# Patient Record
Sex: FEMALE | Race: WHITE | Employment: FULL TIME | RURAL
[De-identification: names, ages, dates, MRNs, and addresses within clinical notes are randomized per-mention and may not be internally consistent; named-entity substitution may affect disease eponyms.]

---

## 2018-01-17 ENCOUNTER — OFFICE VISIT (OUTPATIENT)
Dept: FAMILY MEDICINE CLINIC | Age: 53
End: 2018-01-17

## 2018-01-17 VITALS
HEART RATE: 90 BPM | RESPIRATION RATE: 20 BRPM | OXYGEN SATURATION: 96 % | SYSTOLIC BLOOD PRESSURE: 120 MMHG | WEIGHT: 221 LBS | DIASTOLIC BLOOD PRESSURE: 86 MMHG | TEMPERATURE: 98.6 F | BODY MASS INDEX: 35.52 KG/M2 | HEIGHT: 66 IN

## 2018-01-17 DIAGNOSIS — R68.89 FLU-LIKE SYMPTOMS: ICD-10-CM

## 2018-01-17 DIAGNOSIS — Z76.89 ENCOUNTER TO ESTABLISH CARE: ICD-10-CM

## 2018-01-17 DIAGNOSIS — J06.9 VIRAL URI WITH COUGH: Primary | ICD-10-CM

## 2018-01-17 LAB
QUICKVUE INFLUENZA TEST: NEGATIVE
VALID INTERNAL CONTROL?: YES

## 2018-01-17 RX ORDER — MELATONIN 5 MG
10 CAPSULE ORAL
COMMUNITY
End: 2022-06-12

## 2018-01-17 RX ORDER — ALPRAZOLAM 0.5 MG/1
0.5 TABLET ORAL AS NEEDED
COMMUNITY
End: 2018-07-11 | Stop reason: SDUPTHER

## 2018-01-17 RX ORDER — ERGOCALCIFEROL 1.25 MG/1
50000 CAPSULE ORAL
COMMUNITY
Start: 2017-12-11 | End: 2018-06-14 | Stop reason: SDUPTHER

## 2018-01-17 RX ORDER — BENZONATATE 100 MG/1
100 CAPSULE ORAL
Qty: 21 CAP | Refills: 0 | Status: SHIPPED | OUTPATIENT
Start: 2018-01-17 | End: 2018-01-24

## 2018-01-17 RX ORDER — LISINOPRIL 40 MG/1
40 TABLET ORAL DAILY
COMMUNITY
Start: 2017-12-17 | End: 2018-06-14 | Stop reason: SDUPTHER

## 2018-01-17 NOTE — PATIENT INSTRUCTIONS
Upper Respiratory Infection (Cold): Care Instructions  Your Care Instructions    An upper respiratory infection, or URI, is an infection of the nose, sinuses, or throat. URIs are spread by coughs, sneezes, and direct contact. The common cold is the most frequent kind of URI. The flu and sinus infections are other kinds of URIs. Almost all URIs are caused by viruses. Antibiotics won't cure them. But you can treat most infections with home care. This may include drinking lots of fluids and taking over-the-counter pain medicine. You will probably feel better in 4 to 10 days. The doctor has checked you carefully, but problems can develop later. If you notice any problems or new symptoms, get medical treatment right away. Follow-up care is a key part of your treatment and safety. Be sure to make and go to all appointments, and call your doctor if you are having problems. It's also a good idea to know your test results and keep a list of the medicines you take. How can you care for yourself at home? · To prevent dehydration, drink plenty of fluids, enough so that your urine is light yellow or clear like water. Choose water and other caffeine-free clear liquids until you feel better. If you have kidney, heart, or liver disease and have to limit fluids, talk with your doctor before you increase the amount of fluids you drink. · Take an over-the-counter pain medicine, such as acetaminophen (Tylenol), ibuprofen (Advil, Motrin), or naproxen (Aleve). Read and follow all instructions on the label. · Before you use cough and cold medicines, check the label. These medicines may not be safe for young children or for people with certain health problems. · Be careful when taking over-the-counter cold or flu medicines and Tylenol at the same time. Many of these medicines have acetaminophen, which is Tylenol. Read the labels to make sure that you are not taking more than the recommended dose.  Too much acetaminophen (Tylenol) can be harmful. · Get plenty of rest.  · Do not smoke or allow others to smoke around you. If you need help quitting, talk to your doctor about stop-smoking programs and medicines. These can increase your chances of quitting for good. When should you call for help? Call 911 anytime you think you may need emergency care. For example, call if:  ? · You have severe trouble breathing. ?Call your doctor now or seek immediate medical care if:  ? · You seem to be getting much sicker. ? · You have new or worse trouble breathing. ? · You have a new or higher fever. ? · You have a new rash. ? Watch closely for changes in your health, and be sure to contact your doctor if:  ? · You have a new symptom, such as a sore throat, an earache, or sinus pain. ? · You cough more deeply or more often, especially if you notice more mucus or a change in the color of your mucus. ? · You do not get better as expected. Where can you learn more? Go to http://kiran-travis.info/. Enter O515 in the search box to learn more about \"Upper Respiratory Infection (Cold): Care Instructions. \"  Current as of: May 12, 2017  Content Version: 11.4  © 7761-6035 Healthwise, Incorporated. Care instructions adapted under license by [employer] (which disclaims liability or warranty for this information). If you have questions about a medical condition or this instruction, always ask your healthcare professional. [place] [address] any warranty or liability for your use of this information.

## 2018-01-17 NOTE — PROGRESS NOTES
Identified pt with two pt identifiers(name and ). Chief Complaint   Patient presents with    URI     cough, low grade fever, chills, body aches x 24 hrs. Health Maintenance Due   Topic    Hepatitis C Screening     DTaP/Tdap/Td series (1 - Tdap)    PAP AKA CERVICAL CYTOLOGY     BREAST CANCER SCRN MAMMOGRAM     FOBT Q 1 YEAR AGE 50-75     Influenza Age 5 to Adult        Wt Readings from Last 3 Encounters:   18 221 lb (100.2 kg)     Temp Readings from Last 3 Encounters:   18 98.6 °F (37 °C) (Oral)     BP Readings from Last 3 Encounters:   18 120/86     Pulse Readings from Last 3 Encounters:   18 90         Learning Assessment:  :     No flowsheet data found. Depression Screening:  :     PHQ over the last two weeks 2018   Little interest or pleasure in doing things Not at all   Feeling down, depressed or hopeless Not at all   Total Score PHQ 2 0       Fall Risk Assessment:  :     No flowsheet data found. Abuse Screening:  :     Abuse Screening Questionnaire 2018   Do you ever feel afraid of your partner? N   Are you in a relationship with someone who physically or mentally threatens you? N   Is it safe for you to go home? Y       Coordination of Care Questionnaire:  :     1) Have you been to an emergency room, urgent care clinic since your last visit? no   Hospitalized since your last visit? no             2) Have you seen or consulted any other health care providers outside of [facility] since your last visit? no  (Include any pap smears or colon screenings in this section.)    3) Do you have an Advance Directive on file? no  Are you interested in receiving information about Advance Directives? no    Patient is accompanied by spouse I have received verbal consent from [address] [place] [address] to discuss any/all medical information while they are present in the room.     Reviewed record in preparation for visit and have obtained necessary documentation. Medication reconciliation up to date and corrected with patient at this time.

## 2018-01-17 NOTE — MR AVS SNAPSHOT
[place] 
 
 
 [address] [facility] 
[phone] Patient: Flakito Kumar MRN: [id number] :1965 Visit Information Date & Time Provider Department Dept. Phone Encounter #  
 2018  9:00 AM Leonel Witt [place] [phone] [id number] Follow-up Instructions Return if symptoms worsen or fail to improve. Upcoming Health Maintenance Date Due Hepatitis C Screening 1965 DTaP/Tdap/Td series (1 - Tdap) 1985 PAP AKA CERVICAL CYTOLOGY 1985 BREAST CANCER SCRN MAMMOGRAM 2014 FOBT Q 1 YEAR AGE 50-75 2014 Influenza Age 5 to Adult 2017 Allergies as of 2018  Review Complete On: 2018 By: Nae Esposito MD  
  
 Severity Noted Reaction Type Reactions Sulfa (Sulfonamide Antibiotics)  2018    Hives Current Immunizations  Never Reviewed No immunizations on file. Not reviewed this visit You Were Diagnosed With   
  
 Codes Comments Viral URI with cough    -  Primary ICD-10-CM: J06.9, B97.89 ICD-9-CM: 465.9 Flu-like symptoms     ICD-10-CM: R68.89 ICD-9-CM: 780.99 Encounter to establish care     ICD-10-CM: Z76.89 
ICD-9-CM: V65.8 Vitals BP Pulse Temp Resp Height(growth percentile) Weight(growth percentile) 120/86 (BP 1 Location: Left arm, BP Patient Position: Sitting) 90 98.6 °F (37 °C) (Oral) 20 5' 6\" (1.676 m) 221 lb (100.2 kg) SpO2 BMI OB Status Smoking Status 96% 35.67 kg/m2 Menopause Never Smoker BMI and BSA Data Body Mass Index Body Surface Area  
 35.67 kg/m 2 2.16 m 2 Preferred Pharmacy Pharmacy Name Phone [id number] [place] [facility] [address] [phone] Your Updated Medication List  
  
   
This list is accurate as of: 18  9:27 AM.  Always use your most recent med list.  
  
  
  
  
 benzonatate 100 mg capsule Commonly known as:  TESSALON Take 1 Cap by mouth three (3) times daily as needed for Cough for up to 7 days. lisinopril 40 mg tablet Commonly known as:  Noreen Justo Take 40 mg by mouth daily. melatonin 5 mg Cap capsule Take 5 mg by mouth nightly. PROBIOTIC 4X 10-15 mg Tbec Generic drug:  B.infantis-B.ani-B.long-B.bifi Take 1 Cap by mouth daily. VITAMIN D2 50,000 unit capsule Generic drug:  ergocalciferol Take 50,000 Units by mouth every seven (7) days. XANAX 0.5 mg tablet Generic drug:  ALPRAZolam  
Take 0.5 mg by mouth as needed for Anxiety. Prescriptions Sent to Pharmacy Refills  
 benzonatate (TESSALON) 100 mg capsule 0 Sig: Take 1 Cap by mouth three (3) times daily as needed for Cough for up to 7 days. Class: Normal  
 Pharmacy: [id number] - [place] - [address] Ph #: [phone] Route: Oral  
  
We Performed the Following AMB POC RAPID INFLUENZA TEST [93361 CPT(R)] Follow-up Instructions Return if symptoms worsen or fail to improve. Patient Instructions Upper Respiratory Infection (Cold): Care Instructions Your Care Instructions An upper respiratory infection, or URI, is an infection of the nose, sinuses, or throat. URIs are spread by coughs, sneezes, and direct contact. The common cold is the most frequent kind of URI. The flu and sinus infections are other kinds of URIs. Almost all URIs are caused by viruses. Antibiotics won't cure them. But you can treat most infections with home care. This may include drinking lots of fluids and taking over-the-counter pain medicine. You will probably feel better in 4 to 10 days. The doctor has checked you carefully, but problems can develop later. If you notice any problems or new symptoms, get medical treatment right away. Follow-up care is a key part of your treatment and safety.  Be sure to make and go to all appointments, and call your doctor if you are having problems. It's also a good idea to know your test results and keep a list of the medicines you take. How can you care for yourself at home? · To prevent dehydration, drink plenty of fluids, enough so that your urine is light yellow or clear like water. Choose water and other caffeine-free clear liquids until you feel better. If you have kidney, heart, or liver disease and have to limit fluids, talk with your doctor before you increase the amount of fluids you drink. · Take an over-the-counter pain medicine, such as acetaminophen (Tylenol), ibuprofen (Advil, Motrin), or naproxen (Aleve). Read and follow all instructions on the label. · Before you use cough and cold medicines, check the label. These medicines may not be safe for young children or for people with certain health problems. · Be careful when taking over-the-counter cold or flu medicines and Tylenol at the same time. Many of these medicines have acetaminophen, which is Tylenol. Read the labels to make sure that you are not taking more than the recommended dose. Too much acetaminophen (Tylenol) can be harmful. · Get plenty of rest. 
· Do not smoke or allow others to smoke around you. If you need help quitting, talk to your doctor about stop-smoking programs and medicines. These can increase your chances of quitting for good. When should you call for help? Call 911 anytime you think you may need emergency care. For example, call if: 
? · You have severe trouble breathing. ?Call your doctor now or seek immediate medical care if: 
? · You seem to be getting much sicker. ? · You have new or worse trouble breathing. ? · You have a new or higher fever. ? · You have a new rash. ? Watch closely for changes in your health, and be sure to contact your doctor if: 
? · You have a new symptom, such as a sore throat, an earache, or sinus pain. ? · You cough more deeply or more often, especially if you notice more mucus or a change in the color of your mucus. ? · You do not get better as expected. Where can you learn more? Go to http://kiran-travis.info/. Enter Q660 in the search box to learn more about \"Upper Respiratory Infection (Cold): Care Instructions. \" Current as of: May 12, 2017 Content Version: 11.4 © 6514-7507 [employer]. Care instructions adapted under license by [facility] [facility] [employer] (which disclaims liability or warranty for this information). If you have questions about a medical condition or this instruction, always ask your healthcare professional. [address] any warranty or liability for your use of this information. Introducing [facility]! Dear [facility]: 
Thank you for requesting a [employer] account. Our records indicate that you have previously registered for a [employer] account but its currently inactive. Please call our [employer] support line at [phone]. Additional Information If you have questions, please visit the Frequently Asked Questions section of the Smart [employer] website at [email or web address] Remember, [employer] is NOT to be used for urgent needs. For medical emergencies, dial 911. Now available from your iPhone and Android! Please provide this summary of care documentation to your next provider. Your primary care clinician is listed as Sukhdev Crouch. If you have any questions after today's visit, please call [phone].

## 2018-01-17 NOTE — PROGRESS NOTES
Subjective:      Mariusz Romero is a 47 y.o. female new patient her with complaint of a dry harsh cough which started about 3 days ago. Yesterday she had the onset of body aches, chills, low grade fevers. Appetite has been good and she is drinking plenty of fluids. Her  currently on Tamiflu for influenza A (diagnsed 5 days ago). She does not get the influenza vaccine - states that it makes her really sick. She denies nausea, vomiting, diarrhea, shortness of breath. Evaluation to date: none  Treatment to date: Coricidin HBP, Advil, dose of Tamiflu last night     Current Outpatient Prescriptions   Medication Sig Dispense Refill    VITAMIN D2 50,000 unit capsule Take 50,000 Units by mouth every seven (7) days.  lisinopril (PRINIVIL, ZESTRIL) 40 mg tablet Take 40 mg by mouth daily.  melatonin 5 mg cap capsule Take 5 mg by mouth nightly.  ALPRAZolam (XANAX) 0.5 mg tablet Take 0.5 mg by mouth as needed for Anxiety.  B.infantis-B.ani-B.long-B.bifi (PROBIOTIC 4X) 10-15 mg TbEC Take 1 Cap by mouth daily. Allergies   Allergen Reactions    Sulfa (Sulfonamide Antibiotics) Hives       Past medical history - reviewed. Past Medical History:   Diagnosis Date    Anxiety     Hypertension     Trouble in sleeping     Vertigo     Vitamin D deficiency        Social history - reviewed. Social History   Substance Use Topics    Smoking status: Never Smoker    Smokeless tobacco: Never Used    Alcohol use Yes      Comment: socially        Family history - reviewed. Family History   Problem Relation Age of Onset    Hypertension Mother     Hypertension Father        Review of Systems  Pertinent items are noted in HPI.      Objective:     Visit Vitals    /86 (BP 1 Location: Left arm, BP Patient Position: Sitting)    Pulse 90    Temp 98.6 °F (37 °C) (Oral)    Resp 20    Ht 5' 6\" (1.676 m)    Wt 221 lb (100.2 kg)    SpO2 96%    BMI 35.67 kg/m2      General appearance - alert, mildly ill appearing, and in no distress  Eyes - pupils equal and reactive, extraocular eye movements intact, sclera anicteric  Ears - bilateral TM's and external ear canals normal  Nasal exam - mucosal congestion and sinuses normal and nontender  Oropharyngx - mucous membranes moist, pharynx normal without lesions  Neck - supple, no significant adenopathy  Chest - clear to auscultation, no wheezes, rales or rhonchi, symmetric air entry, no tachypnea, retractions or cyanosis  Heart - normal rate, regular rhythm, normal S1, S2, no murmurs, rubs, clicks or gallops  Neurologic - alert, oriented, normal speech, no focal findings or movement disorder noted  Skin - normal coloration and turgor, no rashes, no suspicious skin lesions noted  Mental Status - alert, oriented to person, place, and time, normal mood, behavior, speech, dress, motor activity, and thought processes    Recent Results (from the past 12 hour(s))   AMB POC RAPID INFLUENZA TEST    Collection Time: 01/17/18  9:09 AM   Result Value Ref Range    VALID INTERNAL CONTROL POC Yes     QuickVue Influenza test Negative Negative       Assessment/Plan:   Leonel Rodas is a 47 y.o. female seen for:     1. Viral URI with cough: rapid influenza testing negative. Symptoms viral in etiology and symptomatic therapies suggested - rest, push fluids, OTC cough suppressant of choice, ibuprofen and/or acetaminophen as needed for pain. Sign/symptoms that would need to be reevaluated discussed. - benzonatate (TESSALON) 100 mg capsule; Take 1 Cap by mouth three (3) times daily as needed for Cough for up to 7 days. Dispense: 21 Cap; Refill: 0    2. Flu-like symptoms  - AMB POC RAPID INFLUENZA TEST    3. Encounter to establish care: previous medical records requested. I have discussed the diagnosis with the patient and the intended plan as seen in the above orders. The patient has received an after-visit summary and questions were answered concerning future plans.  I have discussed medication side effects and warnings with the patient as well. Patient verbalizes understanding of plan of care and denies further questions or concerns at this time. Informed patient to return to the office if symptoms worsen or if new symptoms arise. Follow-up Disposition:  Return if symptoms worsen or fail to improve.

## 2018-01-19 ENCOUNTER — TELEPHONE (OUTPATIENT)
Dept: FAMILY MEDICINE CLINIC | Age: 53
End: 2018-01-19

## 2018-01-19 DIAGNOSIS — R05.9 COUGH: Primary | ICD-10-CM

## 2018-01-19 RX ORDER — CODEINE PHOSPHATE AND GUAIFENESIN 10; 100 MG/5ML; MG/5ML
5 SOLUTION ORAL
Qty: 118 ML | Refills: 0 | OUTPATIENT
Start: 2018-01-19 | End: 2018-02-06

## 2018-01-19 NOTE — TELEPHONE ENCOUNTER
Patient called in today to report the following symptoms: low grade fevers (Tmax 100.9 degrees F), head congestion, green nasal discharge, worsening dry cough. Evaluated in the office on 1/17/18 with negative influenza testing at that time. Discussed symptomatic therapies and Tessalon Perles for cough (has not been effective). Will call in [facility] to her pharmacy, continue with symptomatic therapies. Advised to return for re-evaluation if symptoms fail to improve as expected. Patient verbalizes understanding.

## 2018-01-25 ENCOUNTER — TELEPHONE (OUTPATIENT)
Dept: FAMILY MEDICINE CLINIC | Age: 53
End: 2018-01-25

## 2018-01-25 NOTE — TELEPHONE ENCOUNTER
Pt is still sick and has lots of congestion and would like something called over to [facility]

## 2018-01-26 RX ORDER — AMOXICILLIN AND CLAVULANATE POTASSIUM 875; 125 MG/1; MG/1
1 TABLET, FILM COATED ORAL EVERY 12 HOURS
Qty: 14 TAB | Refills: 0 | Status: SHIPPED | OUTPATIENT
Start: 2018-01-26 | End: 2018-02-02

## 2018-01-26 NOTE — TELEPHONE ENCOUNTER
Patient called. Still with nasal congestion, nasal blockage, and sinus pressure. Symptoms present for >10 days initially attributed to viral infection. Will start Augmentin 875-125 mg BID x 7 days. Symptomatic therapies discussed. Patient verbalizes understanding. Orders Placed This Encounter    amoxicillin-clavulanate (AUGMENTIN) 875-125 mg per tablet     Sig: Take 1 Tab by mouth every twelve (12) hours for 7 days.      Dispense:  14 Tab     Refill:  0

## 2018-02-06 ENCOUNTER — OFFICE VISIT (OUTPATIENT)
Dept: FAMILY MEDICINE CLINIC | Age: 53
End: 2018-02-06

## 2018-02-06 VITALS
HEART RATE: 78 BPM | TEMPERATURE: 98.7 F | RESPIRATION RATE: 18 BRPM | BODY MASS INDEX: 35.52 KG/M2 | HEIGHT: 66 IN | DIASTOLIC BLOOD PRESSURE: 88 MMHG | OXYGEN SATURATION: 98 % | SYSTOLIC BLOOD PRESSURE: 138 MMHG | WEIGHT: 221 LBS

## 2018-02-06 DIAGNOSIS — I10 ESSENTIAL HYPERTENSION: Primary | ICD-10-CM

## 2018-02-06 DIAGNOSIS — Z23 ENCOUNTER FOR IMMUNIZATION: ICD-10-CM

## 2018-02-06 DIAGNOSIS — Z23 NEED FOR DIPHTHERIA-TETANUS-PERTUSSIS (TDAP) VACCINE: ICD-10-CM

## 2018-02-06 RX ORDER — LANOLIN ALCOHOL/MO/W.PET/CERES
1000 CREAM (GRAM) TOPICAL DAILY
COMMUNITY
End: 2018-04-05 | Stop reason: ALTCHOICE

## 2018-02-06 NOTE — PROGRESS NOTES
Identified pt with two pt identifiers(name and ). Chief Complaint   Patient presents with    Immunization/Injection     TDAp        Health Maintenance Due   Topic    Hepatitis C Screening     COLONOSCOPY     DTaP/Tdap/Td series (1 - Tdap)    PAP AKA CERVICAL CYTOLOGY     BREAST CANCER SCRN MAMMOGRAM        Wt Readings from Last 3 Encounters:   18 221 lb (100.2 kg)   18 221 lb (100.2 kg)     Temp Readings from Last 3 Encounters:   18 98.7 °F (37.1 °C) (Oral)   18 98.6 °F (37 °C) (Oral)     BP Readings from Last 3 Encounters:   18 (!) 142/98   18 120/86     Pulse Readings from Last 3 Encounters:   18 78   18 90         Learning Assessment:  :     No flowsheet data found. Depression Screening:  :     PHQ over the last two weeks 2018   Little interest or pleasure in doing things Not at all   Feeling down, depressed or hopeless Not at all   Total Score PHQ 2 0       Fall Risk Assessment:  :     No flowsheet data found. Abuse Screening:  :     Abuse Screening Questionnaire 2018   Do you ever feel afraid of your partner? N   Are you in a relationship with someone who physically or mentally threatens you? N   Is it safe for you to go home? Y       Coordination of Care Questionnaire:  :     1) Have you been to an emergency room, urgent care clinic since your last visit? no   Hospitalized since your last visit? no             2) Have you seen or consulted any other health care providers outside of [address] [place] [address] since your last visit? no  (Include any pap smears or colon screenings in this section.)    3) Do you have an Advance Directive on file? no  Are you interested in receiving information about Advance Directives? no    Reviewed record in preparation for visit and have obtained necessary documentation. Medication reconciliation up to date and corrected with patient at this time.

## 2018-02-06 NOTE — MR AVS SNAPSHOT
[address] [facility] 
[phone] Patient: Destini Funk MRN: [id number] :1965 Visit Information Date & Time Provider Department Dept. Phone Encounter #  
 2018  8:00 AM Leonel Mendez [phone] [id number] Follow-up Instructions Return as needed. Upcoming Health Maintenance Date Due Hepatitis C Screening 1965 COLONOSCOPY 1982 PAP AKA CERVICAL CYTOLOGY 1985 BREAST CANCER SCRN MAMMOGRAM 2014 DTaP/Tdap/Td series (2 - Td) 2028 Allergies as of 2018  Review Complete On: 2018 By: Fran Zuluaga MD  
  
 Severity Noted Reaction Type Reactions Sulfa (Sulfonamide Antibiotics)  2018    Hives Current Immunizations  Reviewed on 2018 Name Date DTP 2012 Influenza Vaccine (Quad) PF 2018 Pneumococcal Conjugate (PCV-13) 3/17/2017 Tdap 2018 Zoster Vaccine, Live 2017 Reviewed by Fran Zuluaga MD on 2018 at  8:32 AM  
You Were Diagnosed With   
  
 Codes Comments Essential hypertension    -  Primary ICD-10-CM: I10 
ICD-9-CM: 401.9 Encounter for immunization     ICD-10-CM: Y28 ICD-9-CM: V03.89 Need for diphtheria-tetanus-pertussis (Tdap) vaccine     ICD-10-CM: J77 ICD-9-CM: V06.1 Vitals BP Pulse Temp Resp Height(growth percentile) Weight(growth percentile) 138/88 (BP 1 Location: Right arm, BP Patient Position: Sitting) 78 98.7 °F (37.1 °C) (Oral) 18 5' 6\" (1.676 m) 221 lb (100.2 kg) SpO2 BMI OB Status Smoking Status 98% 35.67 kg/m2 Menopause Never Smoker Vitals History BMI and BSA Data Body Mass Index Body Surface Area  
 35.67 kg/m 2 2.16 m 2 Preferred Pharmacy Pharmacy Name Phone [id number] [address] [phone] Your Updated Medication List  
  
 This list is accurate as of: 2/6/18  9:04 AM.  Always use your most recent med list.  
  
  
  
  
 cyanocobalamin 1,000 mcg tablet Take 1,000 mcg by mouth daily. lisinopril 40 mg tablet Commonly known as:  Delketty Rumble Take 40 mg by mouth daily. melatonin 5 mg Cap capsule Take 5 mg by mouth nightly. PROBIOTIC 4X 10-15 mg Tbec Generic drug:  B.infantis-B.ani-B.long-B.bifi Take 1 Cap by mouth daily. VITAMIN D2 50,000 unit capsule Generic drug:  ergocalciferol Take 50,000 Units by mouth every seven (7) days. XANAX 0.5 mg tablet Generic drug:  ALPRAZolam  
Take 0.5 mg by mouth as needed for Anxiety. We Performed the Following INFLUENZA VIRUS VAC QUAD,SPLIT,PRESV FREE SYRINGE IM [id number] CPT(R)] TETANUS, DIPHTHERIA TOXOIDS AND ACELLULAR PERTUSSIS VACCINE (TDAP), IN INDIVIDS. >=7, IM [id number] CPT(R)] Follow-up Instructions Return as needed. Patient Instructions Influenza (Flu) Vaccine (Inactivated or Recombinant): What You Need to Know Why get vaccinated? Influenza (\"flu\") is a contagious disease that spreads around the [place] every winter, usually between October and May. Flu is caused by influenza viruses and is spread mainly by coughing, sneezing, and close contact. Anyone can get flu. Flu strikes suddenly and can last several days. Symptoms vary by age, but can include: · Fever/chills. · Sore throat. · Muscle aches. · Fatigue. · Cough. · Headache. · Runny or stuffy nose. Flu can also lead to pneumonia and blood infections, and cause diarrhea and seizures in children. If you have a medical condition, such as heart or lung disease, flu can make it worse. Flu is more dangerous for some people. Infants and young children, people 72years of age and older, pregnant women, and people with certain health conditions or a weakened immune system are at greatest risk. Each year thousands of people in the [facility] die from flu, and many more are hospitalized. Flu vaccine can: · Keep you from getting flu. · Make flu less severe if you do get it. · Keep you from spreading flu to your family and other people. Inactivated and recombinant flu vaccines A dose of flu vaccine is recommended every flu season. Children 6 months through 6years of age may need two doses during the same flu season. Everyone else needs only one dose each flu season. Some inactivated flu vaccines contain a very small amount of a mercury-based preservative called thimerosal. Studies have not shown thimerosal in vaccines to be harmful, but flu vaccines that do not contain thimerosal are available. There is no live flu virus in flu shots. They cannot cause the flu. There are many flu viruses, and they are always changing. Each year a new flu vaccine is made to protect against three or four viruses that are likely to cause disease in the upcoming flu season. But even when the vaccine doesn't exactly match these viruses, it may still provide some protection. Flu vaccine cannot prevent: · Flu that is caused by a virus not covered by the vaccine. · Illnesses that look like flu but are not. Some people should not get this vaccine Tell the person who is giving you the vaccine: · If you have any severe (life-threatening) allergies. If you ever had a life-threatening allergic reaction after a dose of flu vaccine, or have a severe allergy to any part of this vaccine, you may be advised not to get vaccinated. Most, but not all, types of flu vaccine contain a small amount of egg protein. · If you ever had Guillain-Barré syndrome (also called GBS) Some people with a history of GBS should not get this vaccine. This should be discussed with your doctor. · If you are not feeling well.  It is usually okay to get flu vaccine when you have a mild illness, but you might be asked to come back when you feel better. Risks of a vaccine reaction With any medicine, including vaccines, there is a chance of reactions. These are usually mild and go away on their own, but serious reactions are also possible. Most people who get a flu shot do not have any problems with it. Minor problems following a flu shot include: · Soreness, redness, or swelling where the shot was given · Hoarseness · Sore, red or itchy eyes · Cough · Fever · Aches · Headache · Itching · Fatigue If these problems occur, they usually begin soon after the shot and last 1 or 2 days. More serious problems following a flu shot can include the following: · There may be a small increased risk of Guillain-Barré Syndrome (GBS) after inactivated flu vaccine. This risk has been estimated at 1 or 2 additional cases per million people vaccinated. This is much lower than the risk of severe complications from flu, which can be prevented by flu vaccine. · Agueda Sanchez children who get the flu shot along with pneumococcal vaccine (PCV13) and/or DTaP vaccine at the same time might be slightly more likely to have a seizure caused by fever. Ask your doctor for more information. Tell your doctor if a child who is getting flu vaccine has ever had a seizure Problems that could happen after any injected vaccine: · People sometimes faint after a medical procedure, including vaccination. Sitting or lying down for about 15 minutes can help prevent fainting, and injuries caused by a fall. Tell your doctor if you feel dizzy, or have vision changes or ringing in the ears. · Some people get severe pain in the shoulder and have difficulty moving the arm where a shot was given. This happens very rarely. · Any medication can cause a severe allergic reaction. Such reactions from a vaccine are very rare, estimated at about 1 in a million doses, and would happen within a few minutes to a few hours after the vaccination. As with any medicine, there is a very remote chance of a vaccine causing a serious injury or death. The safety of vaccines is always being monitored. For more information, visit: www.cdc.gov/vaccinesafety/. What if there is a serious reaction? What should I look for? · Look for anything that concerns you, such as signs of a severe allergic reaction, very high fever, or unusual behavior. Signs of a severe allergic reaction can include hives, swelling of the face and throat, difficulty breathing, a fast heartbeat, dizziness, and weakness - usually within a few minutes to a few hours after the vaccination. What should I do? · If you think it is a severe allergic reaction or other emergency that can't wait, call 9-1-1 and get the person to the nearest hospital. Otherwise, call your doctor. · Reactions should be reported to the \"Vaccine Adverse Event Reporting System\" (VAERS). Your doctor should file this report, or you can do it yourself through the VAERS website at www.vaers. [facility].gov, or by calling 8-906.881.6744. VAERS does not give medical advice. The National Vaccine Injury Compensation Program 
The National Vaccine Injury Compensation Program (VICP) is a federal program that was created to compensate people who may have been injured by certain vaccines. Persons who believe they may have been injured by a vaccine can learn about the program and about filing a claim by calling 3-368.951.7186 or visiting the [employer] website at www.[facility].gov/vaccinecompensation. There is a time limit to file a claim for compensation. How can I learn more? · Ask your healthcare provider. He or she can give you the vaccine package insert or suggest other sources of information. · Call your local or state health department. · Contact the Centers for Disease Control and Prevention (CDC): 
¨ Call 6-477.650.8229 (1-800-CDC-INFO) or ¨ Visit CDC's website at www.cdc.gov/flu Vaccine Information Statement Inactivated Influenza Vaccine 2015) 42 ACE Rose 254WE-18 [facility] and [employer] Centers for Disease Control and Prevention Many Vaccine Information Statements are available in [place]ian and other languages. See www.immunize.org/vis. Muchas hojas de información sobre vacunas están disponibles en español y en otros idiomas. Visite www.immunize.org/vis. Care instructions adapted under license by [employer] (which disclaims liability or warranty for this information). If you have questions about a medical condition or this instruction, always ask your healthcare professional. [place] [address] any warranty or liability for your use of this information. Tdap (Tetanus, Diphtheria, Pertussis) Vaccine: What You Need to Know Why get vaccinated? Tetanus, diphtheria, and pertussis are very serious diseases. Tdap vaccine can protect us from these diseases. And Tdap vaccine given to pregnant women can protect  babies against pertussis. Tetanus (lockjaw) is rare in the [facility] today. It causes painful muscle tightening and stiffness, usually all over the body. · It can lead to tightening of muscles in the head and neck so you can't open your mouth, swallow, or sometimes even breathe. Tetanus kills about 1 out of 10 people who are infected even after receiving the best medical care. Diphtheria is also rare in the [place] today. It can cause a thick coating to form in the back of the throat. · It can lead to breathing problems, heart failure, paralysis, and death. Pertussis (whooping cough) causes severe coughing spells, which can cause difficulty breathing, vomiting, and disturbed sleep. · It can also lead to weight loss, incontinence, and rib fractures. Up to 2 in 100 adolescents and 5 in 100 adults with pertussis are hospitalized or have complications, which could include pneumonia or death. These diseases are caused by bacteria. Diphtheria and pertussis are spread from person to person through secretions from coughing or sneezing. Tetanus enters the body through cuts, scratches, or wounds. Before vaccines, as many as 200,000 cases of diphtheria, 200,000 cases of pertussis, and hundreds of cases of tetanus were reported in the [place] each year. Since vaccination began, reports of cases for tetanus and diphtheria have dropped by about 99% and for pertussis by about 80%. Tdap vaccine The Tdap vaccine can protect adolescents and adults from tetanus, diphtheria, and pertussis. One dose of Tdap is routinely given at age 6 or 15. People who did not get Tdap at that age should get it as soon as possible. Tdap is especially important for health care professionals and anyone having close contact with a baby younger than 12 months. Pregnant women should get a dose of Tdap during every pregnancy, to protect the  from pertussis. Infants are most at risk for severe, life-threatening complications from pertussis. Another vaccine, called Td, protects against tetanus and diphtheria, but not pertussis. A Td booster should be given every 10 years. Tdap may be given as one of these boosters if you have never gotten Tdap before. Tdap may also be given after a severe cut or burn to prevent tetanus infection. Your doctor or the person giving you the vaccine can give you more information. Tdap may safely be given at the same time as other vaccines. Some people should not get this vaccine · A person who has ever had a life-threatening allergic reaction after a previous dose of any diphtheria-, tetanus-, or pertussis-containing vaccine, OR has a severe allergy to any part of this vaccine, should not get Tdap vaccine. Tell the person giving the vaccine about any severe allergies.  
· Anyone who had coma or long repeated seizures within 7 days after a childhood dose of DTP or DTaP, or a previous dose of Tdap, should not get Tdap, unless a cause other than the vaccine was found. They can still get Td. · Talk to your doctor if you: 
¨ Have seizures or another nervous system problem. ¨ Had severe pain or swelling after any vaccine containing diphtheria, tetanus, or pertussis. ¨ Ever had a condition called Guillain-Barré Syndrome (GBS). ¨ Aren't feeling well on the day the shot is scheduled. Risks With any medicine, including vaccines, there is a chance of side effects. These are usually mild and go away on their own. Serious reactions are also possible but are rare. Most people who get Tdap vaccine do not have any problems with it. Mild problems following Tdap 
(Did not interfere with activities) · Pain where the shot was given (about 3 in 4 adolescents or 2 in 3 adults) · Redness or swelling where the shot was given (about 1 person in 5) · Mild fever of at least 100.4°F (up to about 1 in 25 adolescents or 1 in 100 adults) · Headache (about 3 or 4 people in 10) · Tiredness (about 1 person in 3 or 4) · Nausea, vomiting, diarrhea, stomachache (up to 1 in 4 adolescents or 1 in 10 adults) · Chills, sore joints (about 1 person in 10) · Body aches (about 1 person in 3 or 4) · Rash, swollen glands (uncommon) Moderate problems following Tdap (Interfered with activities, but did not require medical attention) · Pain where the shot was given (up to 1 in 5 or 6) · Redness or swelling where the shot was given (up to about 1 in 16 adolescents or 1 in 12 adults) · Fever over 102°F (about 1 in 100 adolescents or 1 in 250 adults) · Headache (about 1 in 7 adolescents or 1 in 10 adults) · Nausea, vomiting, diarrhea, stomachache (up to 1 to 3 people in 100) · Swelling of the entire arm where the shot was given (up to about 1 in 500) Severe problems following Tdap 
(Unable to perform usual activities; required medical attention) · Swelling, severe pain, bleeding and redness in the arm where the shot was given (rare) Problems that could happen after any vaccine: · People sometimes faint after a medical procedure, including vaccination. Sitting or lying down for about 15 minutes can help prevent fainting, and injuries caused by a fall. Tell your doctor if you feel dizzy or have vision changes or ringing in the ears. · Some people get severe pain in the shoulder and have difficulty moving the arm where a shot was given. This happens very rarely. · Any medication can cause a severe allergic reaction. Such reactions from a vaccine are very rare, estimated at fewer than 1 in a million doses, and would happen within a few minutes to a few hours after the vaccination. As with any medicine, there is a very remote chance of a vaccine causing a serious injury or death. The safety of vaccines is always being monitored. For more information, visit: www.cdc.gov/vaccinesafety. What if there is a serious problem? What should I look for? · Look for anything that concerns you, such as signs of a severe allergic reaction, very high fever, or unusual behavior. Signs of a severe allergic reaction can include hives, swelling of the face and throat, difficulty breathing, a fast heartbeat, dizziness, and weakness. These would usually start a few minutes to a few hours after the vaccination. What should I do? · If you think it is a severe allergic reaction or other emergency that can't wait, call 9-1-1 or get the person to the nearest hospital. Otherwise, call your doctor. · Afterward, the reaction should be reported to the Vaccine Adverse Event Reporting System (VAERS). Your doctor might file this report, or you can do it yourself through the VAERS web site at www.vaers. hhs.gov, or by calling 3-318.146.7470. VAERS does not give medical advice.  
The [employer] Vaccine Injury [employer] 
 The [employer] Injury Compensation Program (VICP) is a federal program that was created to compensate people who may have been injured by certain vaccines. Persons who believe they may have been injured by a vaccine can learn about the program and about filing a claim by calling 8-419.525.4756 or visiting the [employer] website at www.[facility].gov/vaccinecompensation. There is a time limit to file a claim for compensation. How can I learn more? · Ask your doctor. He or she can give you the vaccine package insert or suggest other sources of information. · Call your local or state health department. · Contact the Centers for Disease Control and Prevention (CDC): 
¨ Call 0-613.625.5201 (1-800-CDC-INFO) or ¨ Visit CDC's website at www.cdc.gov/vaccines Vaccine Information Statement (Interim) Tdap Vaccine 
(2/24/15) 42 ACE Rose [id number]-21 [facility] and [employer] Centers for Disease Control and Prevention Many Vaccine Information Statements are available in [place] and other languages. See www.immunize.org/vis. Muchas hojas de información sobre vacunas están disponibles en español y en otros idiomas. Visite www.immunize.org/vis. Care instructions adapted under license by [employer] (which disclaims liability or warranty for this information). If you have questions about a medical condition or this instruction, always ask your healthcare professional. [place] [address] any warranty or liability for your use of this information. Introducing [facility]! Dear Tessa Kirkland: 
Thank you for requesting a [employer] account. Our records indicate that you have previously registered for a [employer] account but its currently inactive. Please call our [employer] support line at [phone]. Additional Information If you have questions, please visit the Frequently Asked Questions section of the [employer] website at [email or web address]. [employer]. [employer]/. Remember, MyChart is NOT to be used for urgent needs. For medical emergencies, dial 911. Now available from your iPhone and Android! Please provide this summary of care documentation to your next provider. Your primary care clinician is listed as Guerda Charles. If you have any questions after today's visit, please call [phone].

## 2018-02-06 NOTE — PROGRESS NOTES
Subjective:      Brian Chung is a 47 y.o. female here for immunization. Her son and daughter-in-law are expecting a child due in March 2018. It has been recommended that she receive her pertussis vaccination as she will have close contact with the child. She believes her last Tdap vaccine was over 5 years ago. She is current doing well and has no other concerns or complaints at this time. Current Outpatient Prescriptions   Medication Sig Dispense Refill    cyanocobalamin 1,000 mcg tablet Take 1,000 mcg by mouth daily.  VITAMIN D2 50,000 unit capsule Take 50,000 Units by mouth every seven (7) days.  lisinopril (PRINIVIL, ZESTRIL) 40 mg tablet Take 40 mg by mouth daily.  melatonin 5 mg cap capsule Take 5 mg by mouth nightly.  ALPRAZolam (XANAX) 0.5 mg tablet Take 0.5 mg by mouth as needed for Anxiety.  B.infantis-B.ani-B.long-B.bifi (PROBIOTIC 4X) 10-15 mg TbEC Take 1 Cap by mouth daily.          Allergies   Allergen Reactions    Sulfa (Sulfonamide Antibiotics) Hives       Past Medical History:   Diagnosis Date    Anxiety     Hypertension     Trouble in sleeping     Vertigo     Vitamin D deficiency        Social History   Substance Use Topics    Smoking status: Never Smoker    Smokeless tobacco: Never Used    Alcohol use Yes      Comment: socially        Review of Systems  Constitutional: negative for fevers and chills  Ears, nose, mouth, throat, and face: negative for nasal congestion and sore throat  Respiratory: negative for cough, sputum or dyspnea on exertion  Cardiovascular: negative for chest pain, chest pressure/discomfort, palpitations, irregular heart beats, lower extremity edema  Gastrointestinal: negative for nausea, vomiting, change in bowel habits and abdominal pain    Objective:     Vitals:    02/06/18 0819 02/06/18 0842   BP: (!) 142/98  Comment: manual 138/88   BP 1 Location: Right arm Right arm  Comment: Manual   BP Patient Position: Sitting Sitting   Pulse: 78 Resp: 18    Temp: 98.7 °F (37.1 °C)    TempSrc: Oral    SpO2: 98%    Weight: 221 lb (100.2 kg)    Height: 5' 6\" (1.676 m)      General appearance - alert, well appearing, and in no distress  Chest - clear to auscultation, no wheezes, rales or rhonchi, symmetric air entry, no tachypnea, retractions or cyanosis  Heart - normal rate, regular rhythm, normal S1, S2, no murmurs, rubs, clicks or gallops  Neurological - alert, oriented, normal speech, no focal findings or movement disorder noted    Assessment/Plan:   Leonel Rodas is a 47 y.o. female seen for:     1. Essential hypertension: repeat blood pressure improved. Continue with current therapy. 2. Encounter for immunization: Tdap administered, VIS provided. - INFLUENZA VIRUS VACCINE QUADRIVALENT, PRESERVATIVE FREE SYRINGE (08408)  - TETANUS, DIPHTHERIA TOXOIDS AND ACELLULAR PERTUSSIS VACCINE (TDAP), IN INDIVIDS. >=7, IM    3. Need for diphtheria-tetanus-pertussis (Tdap) vaccine  - TETANUS, DIPHTHERIA TOXOIDS AND ACELLULAR PERTUSSIS VACCINE (TDAP), IN INDIVIDS. >=7, IM    I have discussed the diagnosis with the patient and the intended plan as seen in the above orders. The patient has received an after-visit summary and questions were answered concerning future plans. I have discussed medication side effects and warnings with the patient as well. Patient verbalizes understanding of plan of care and denies further questions or concerns at this time. Informed patient to return to the office if symptoms worsen or if new symptoms arise. Follow-up Disposition:  Return as needed.

## 2018-02-06 NOTE — PATIENT INSTRUCTIONS
Influenza (Flu) Vaccine (Inactivated or Recombinant): What You Need to Know  Why get vaccinated? Influenza (\"flu\") is a contagious disease that spreads around the [place] every winter, usually between October and May. Flu is caused by influenza viruses and is spread mainly by coughing, sneezing, and close contact. Anyone can get flu. Flu strikes suddenly and can last several days. Symptoms vary by age, but can include:  · Fever/chills. · Sore throat. · Muscle aches. · Fatigue. · Cough. · Headache. · Runny or stuffy nose. Flu can also lead to pneumonia and blood infections, and cause diarrhea and seizures in children. If you have a medical condition, such as heart or lung disease, flu can make it worse. Flu is more dangerous for some people. Infants and young children, people 72years of age and older, pregnant women, and people with certain health conditions or a weakened immune system are at greatest risk. Each year thousands of people in the [facility] die from flu, and many more are hospitalized. Flu vaccine can:  · Keep you from getting flu. · Make flu less severe if you do get it. · Keep you from spreading flu to your family and other people. Inactivated and recombinant flu vaccines  A dose of flu vaccine is recommended every flu season. Children 6 months through 6years of age may need two doses during the same flu season. Everyone else needs only one dose each flu season. Some inactivated flu vaccines contain a very small amount of a mercury-based preservative called thimerosal. Studies have not shown thimerosal in vaccines to be harmful, but flu vaccines that do not contain thimerosal are available. There is no live flu virus in flu shots. They cannot cause the flu. There are many flu viruses, and they are always changing. Each year a new flu vaccine is made to protect against three or four viruses that are likely to cause disease in the upcoming flu season.  But even when the vaccine doesn't exactly match these viruses, it may still provide some protection. Flu vaccine cannot prevent:  · Flu that is caused by a virus not covered by the vaccine. · Illnesses that look like flu but are not. Some people should not get this vaccine  Tell the person who is giving you the vaccine:  · If you have any severe (life-threatening) allergies. If you ever had a life-threatening allergic reaction after a dose of flu vaccine, or have a severe allergy to any part of this vaccine, you may be advised not to get vaccinated. Most, but not all, types of flu vaccine contain a small amount of egg protein. · If you ever had Guillain-Barré syndrome (also called GBS) Some people with a history of GBS should not get this vaccine. This should be discussed with your doctor. · If you are not feeling well. It is usually okay to get flu vaccine when you have a mild illness, but you might be asked to come back when you feel better. Risks of a vaccine reaction  With any medicine, including vaccines, there is a chance of reactions. These are usually mild and go away on their own, but serious reactions are also possible. Most people who get a flu shot do not have any problems with it. Minor problems following a flu shot include:  · Soreness, redness, or swelling where the shot was given  · Hoarseness  · Sore, red or itchy eyes  · Cough  · Fever  · Aches  · Headache  · Itching  · Fatigue  If these problems occur, they usually begin soon after the shot and last 1 or 2 days. More serious problems following a flu shot can include the following:  · There may be a small increased risk of Guillain-Barré Syndrome (GBS) after inactivated flu vaccine. This risk has been estimated at 1 or 2 additional cases per million people vaccinated. This is much lower than the risk of severe complications from flu, which can be prevented by flu vaccine.   · Antonieta Jose children who get the flu shot along with pneumococcal vaccine (PCV13) and/or DTaP vaccine at the same time might be slightly more likely to have a seizure caused by fever. Ask your doctor for more information. Tell your doctor if a child who is getting flu vaccine has ever had a seizure  Problems that could happen after any injected vaccine:  · People sometimes faint after a medical procedure, including vaccination. Sitting or lying down for about 15 minutes can help prevent fainting, and injuries caused by a fall. Tell your doctor if you feel dizzy, or have vision changes or ringing in the ears. · Some people get severe pain in the shoulder and have difficulty moving the arm where a shot was given. This happens very rarely. · Any medication can cause a severe allergic reaction. Such reactions from a vaccine are very rare, estimated at about 1 in a million doses, and would happen within a few minutes to a few hours after the vaccination. As with any medicine, there is a very remote chance of a vaccine causing a serious injury or death. The safety of vaccines is always being monitored. For more information, visit: www.cdc.gov/vaccinesafety/. What if there is a serious reaction? What should I look for? · Look for anything that concerns you, such as signs of a severe allergic reaction, very high fever, or unusual behavior. Signs of a severe allergic reaction can include hives, swelling of the face and throat, difficulty breathing, a fast heartbeat, dizziness, and weakness - usually within a few minutes to a few hours after the vaccination. What should I do? · If you think it is a severe allergic reaction or other emergency that can't wait, call 9-1-1 and get the person to the nearest hospital. Otherwise, call your doctor. · Reactions should be reported to the \"Vaccine Adverse Event Reporting System\" (VAERS). Your doctor should file this report, or you can do it yourself through the VAERS website at www.vaers. [facility].gov, or by calling 4-767.655.5450.   [employer] does not give medical advice. The National Vaccine Injury Compensation Program  The National Vaccine Injury Compensation Program (VICP) is a federal program that was created to compensate people who may have been injured by certain vaccines. Persons who believe they may have been injured by a vaccine can learn about the program and about filing a claim by calling 3-341.743.2019 or visiting the [employer] website at www.[facility].gov/vaccinecompensation. There is a time limit to file a claim for compensation. How can I learn more? · Ask your healthcare provider. He or she can give you the vaccine package insert or suggest other sources of information. · Call your local or state health department. · Contact the Centers for Disease Control and Prevention (CDC):  ¨ Call 8-443.928.6012 (1-800-CDC-INFO) or  ¨ Visit CDC's website at www.cdc.gov/flu  Vaccine Information Statement  Inactivated Influenza Vaccine  2015)  42 ACE Agustin 405UW-23  Department of Health and Human Services  Centers for Disease Control and Prevention  Many Vaccine Information Statements are available in [place]ese and other languages. See www.immunize.org/vis. Muchas hojas de información sobre vacunas están disponibles en español y en otros idiomas. Visite www.immunize.org/vis. Care instructions adapted under license by [employer] (which disclaims liability or warranty for this information). If you have questions about a medical condition or this instruction, always ask your healthcare professional. [place] [address] any warranty or liability for your use of this information. Tdap (Tetanus, Diphtheria, Pertussis) Vaccine: What You Need to Know  Why get vaccinated? Tetanus, diphtheria, and pertussis are very serious diseases. Tdap vaccine can protect us from these diseases. And Tdap vaccine given to pregnant women can protect  babies against pertussis. Tetanus (lockjaw) is rare in the [facility] today.  It causes painful muscle tightening and stiffness, usually all over the body. · It can lead to tightening of muscles in the head and neck so you can't open your mouth, swallow, or sometimes even breathe. Tetanus kills about 1 out of 10 people who are infected even after receiving the best medical care. Diphtheria is also rare in the [place] today. It can cause a thick coating to form in the back of the throat. · It can lead to breathing problems, heart failure, paralysis, and death. Pertussis (whooping cough) causes severe coughing spells, which can cause difficulty breathing, vomiting, and disturbed sleep. · It can also lead to weight loss, incontinence, and rib fractures. Up to 2 in 100 adolescents and 5 in 100 adults with pertussis are hospitalized or have complications, which could include pneumonia or death. These diseases are caused by bacteria. Diphtheria and pertussis are spread from person to person through secretions from coughing or sneezing. Tetanus enters the body through cuts, scratches, or wounds. Before vaccines, as many as 200,000 cases of diphtheria, 200,000 cases of pertussis, and hundreds of cases of tetanus were reported in the [place] each year. Since vaccination began, reports of cases for tetanus and diphtheria have dropped by about 99% and for pertussis by about 80%. Tdap vaccine  The Tdap vaccine can protect adolescents and adults from tetanus, diphtheria, and pertussis. One dose of Tdap is routinely given at age 6 or 15. People who did not get Tdap at that age should get it as soon as possible. Tdap is especially important for health care professionals and anyone having close contact with a baby younger than 12 months. Pregnant women should get a dose of Tdap during every pregnancy, to protect the  from pertussis. Infants are most at risk for severe, life-threatening complications from pertussis. Another vaccine, called Td, protects against tetanus and diphtheria, but not pertussis. A Td booster should be given every 10 years. Tdap may be given as one of these boosters if you have never gotten Tdap before. Tdap may also be given after a severe cut or burn to prevent tetanus infection. Your doctor or the person giving you the vaccine can give you more information. Tdap may safely be given at the same time as other vaccines. Some people should not get this vaccine  · A person who has ever had a life-threatening allergic reaction after a previous dose of any diphtheria-, tetanus-, or pertussis-containing vaccine, OR has a severe allergy to any part of this vaccine, should not get Tdap vaccine. Tell the person giving the vaccine about any severe allergies. · Anyone who had coma or long repeated seizures within 7 days after a childhood dose of DTP or DTaP, or a previous dose of Tdap, should not get Tdap, unless a cause other than the vaccine was found. They can still get Td. · Talk to your doctor if you:  ¨ Have seizures or another nervous system problem. ¨ Had severe pain or swelling after any vaccine containing diphtheria, tetanus, or pertussis. ¨ Ever had a condition called Guillain-Barré Syndrome (GBS). ¨ Aren't feeling well on the day the shot is scheduled. Risks  With any medicine, including vaccines, there is a chance of side effects. These are usually mild and go away on their own. Serious reactions are also possible but are rare. Most people who get Tdap vaccine do not have any problems with it.   Mild problems following Tdap  (Did not interfere with activities)  · Pain where the shot was given (about 3 in 4 adolescents or 2 in 3 adults)  · Redness or swelling where the shot was given (about 1 person in 5)  · Mild fever of at least 100.4°F (up to about 1 in 25 adolescents or 1 in 100 adults)  · Headache (about 3 or 4 people in 10)  · Tiredness (about 1 person in 3 or 4)  · Nausea, vomiting, diarrhea, stomachache (up to 1 in 4 adolescents or 1 in 10 adults)  · Chills, sore joints (about 1 person in 10)  · Body aches (about 1 person in 3 or 4)  · Rash, swollen glands (uncommon)  Moderate problems following Tdap  (Interfered with activities, but did not require medical attention)  · Pain where the shot was given (up to 1 in 5 or 6)  · Redness or swelling where the shot was given (up to about 1 in 16 adolescents or 1 in 12 adults)  · Fever over 102°F (about 1 in 100 adolescents or 1 in 250 adults)  · Headache (about 1 in 7 adolescents or 1 in 10 adults)  · Nausea, vomiting, diarrhea, stomachache (up to 1 to 3 people in 100)  · Swelling of the entire arm where the shot was given (up to about 1 in 500)  Severe problems following Tdap  (Unable to perform usual activities; required medical attention)  · Swelling, severe pain, bleeding and redness in the arm where the shot was given (rare)  Problems that could happen after any vaccine:  · People sometimes faint after a medical procedure, including vaccination. Sitting or lying down for about 15 minutes can help prevent fainting, and injuries caused by a fall. Tell your doctor if you feel dizzy or have vision changes or ringing in the ears. · Some people get severe pain in the shoulder and have difficulty moving the arm where a shot was given. This happens very rarely. · Any medication can cause a severe allergic reaction. Such reactions from a vaccine are very rare, estimated at fewer than 1 in a million doses, and would happen within a few minutes to a few hours after the vaccination. As with any medicine, there is a very remote chance of a vaccine causing a serious injury or death. The safety of vaccines is always being monitored. For more information, visit: www.cdc.gov/vaccinesafety. What if there is a serious problem? What should I look for? · Look for anything that concerns you, such as signs of a severe allergic reaction, very high fever, or unusual behavior.   Signs of a severe allergic reaction can include hives, swelling of the face and throat, difficulty breathing, a fast heartbeat, dizziness, and weakness. These would usually start a few minutes to a few hours after the vaccination. What should I do? · If you think it is a severe allergic reaction or other emergency that can't wait, call 9-1-1 or get the person to the nearest hospital. Otherwise, call your doctor. · Afterward, the reaction should be reported to the Vaccine Adverse Event Reporting System (VAERS). Your doctor might file this report, or you can do it yourself through the VAERS web site at www.vaers. [facility].gov, or by calling 7-259.975.7071. VAERS does not give medical advice. The National Vaccine Injury Compensation Program  The National Vaccine Injury Compensation Program (VICP) is a federal program that was created to compensate people who may have been injured by certain vaccines. Persons who believe they may have been injured by a vaccine can learn about the program and about filing a claim by calling 7-235.879.6875 or visiting the [employer] website at www.[facility].gov/vaccinecompensation. There is a time limit to file a claim for compensation. How can I learn more? · Ask your doctor. He or she can give you the vaccine package insert or suggest other sources of information. · Call your local or state health department. · Contact the Centers for Disease Control and Prevention (CDC):  ¨ Call 2-186.853.2895 (1-800-CDC-INFO) or  ¨ Visit CDC's website at www.cdc.gov/vaccines  Vaccine Information Statement (Interim)  Tdap Vaccine  (2/24/15)  42 UDave Wing 065HI-86  Department of Health and Human Services  Centers for Disease Control and Prevention  Many Vaccine Information Statements are available in [place] and other languages. See www.immunize.org/vis. Muchas hojas de información sobre vacunas están disponibles en español y en otros idiomas. Visite www.immunize.org/vis.   Care instructions adapted under license by [employer] (which disclaims liability or warranty for this information). If you have questions about a medical condition or this instruction, always ask your healthcare professional. [place] [address] any warranty or liability for your use of this information.

## 2018-04-05 ENCOUNTER — OFFICE VISIT (OUTPATIENT)
Dept: FAMILY MEDICINE CLINIC | Age: 53
End: 2018-04-05

## 2018-04-05 VITALS
OXYGEN SATURATION: 97 % | TEMPERATURE: 97.5 F | HEIGHT: 66 IN | BODY MASS INDEX: 36.16 KG/M2 | HEART RATE: 73 BPM | SYSTOLIC BLOOD PRESSURE: 132 MMHG | DIASTOLIC BLOOD PRESSURE: 82 MMHG | RESPIRATION RATE: 18 BRPM | WEIGHT: 225 LBS

## 2018-04-05 DIAGNOSIS — R42 VERTIGO: ICD-10-CM

## 2018-04-05 DIAGNOSIS — Z13.29 SCREENING FOR THYROID DISORDER: ICD-10-CM

## 2018-04-05 DIAGNOSIS — Z13.220 SCREENING FOR LIPID DISORDERS: ICD-10-CM

## 2018-04-05 DIAGNOSIS — E55.9 VITAMIN D DEFICIENCY: ICD-10-CM

## 2018-04-05 DIAGNOSIS — I10 ESSENTIAL HYPERTENSION: ICD-10-CM

## 2018-04-05 DIAGNOSIS — Z00.00 LABORATORY EXAM ORDERED AS PART OF ROUTINE GENERAL MEDICAL EXAMINATION: ICD-10-CM

## 2018-04-05 DIAGNOSIS — Z00.00 ENCOUNTER FOR ROUTINE ADULT MEDICAL EXAMINATION: Primary | ICD-10-CM

## 2018-04-05 RX ORDER — AMOXICILLIN AND CLAVULANATE POTASSIUM 875; 125 MG/1; MG/1
TABLET, FILM COATED ORAL
Refills: 0 | COMMUNITY
Start: 2018-03-04 | End: 2018-04-05 | Stop reason: ALTCHOICE

## 2018-04-05 RX ORDER — ALBUTEROL SULFATE 90 UG/1
AEROSOL, METERED RESPIRATORY (INHALATION)
Refills: 0 | COMMUNITY
Start: 2018-03-04 | End: 2018-04-05 | Stop reason: ALTCHOICE

## 2018-04-05 NOTE — PROGRESS NOTES
Subjective:   Wyatt Stoner is a 47 y.o. female here today for her annual physical exam. She is fasting today. Her gyne and breast care is done elsewhere by her Ob-Gyne physician. - will be scheduling appointment with Dr. Conrado Peña. Health Maintenance:  · Cervical cancer screening: will be scheduling well woman exam   · Mammogram: 4/20/17   · Colonoscopy: 4/20/17, repeat in 10 year   · Hepatitis C screening (born between 1945 and 1965): 9/20/17, negative  · Tetanus: Tdap 2/6/18  · Pneumovax: PCV-13 on 3/17/17  · Zostavax: 9/20/17    PMHx:   Vertigo - has been previously evaluated by Dr. Juana Linda for which she takes intermittent Xanax therpay. Current Outpatient Prescriptions   Medication Sig Dispense Refill    VITAMIN D2 50,000 unit capsule Take 50,000 Units by mouth every seven (7) days.  lisinopril (PRINIVIL, ZESTRIL) 40 mg tablet Take 40 mg by mouth daily.  melatonin 5 mg cap capsule Take 5 mg by mouth nightly.  ALPRAZolam (XANAX) 0.5 mg tablet Take 0.5 mg by mouth as needed (Vertigo).  B.infantis-B.ani-B.long-B.bifi (PROBIOTIC 4X) 10-15 mg TbEC Take 1 Cap by mouth daily. Allergies   Allergen Reactions    Sulfa (Sulfonamide Antibiotics) Hives       Past Medical History:   Diagnosis Date    Anxiety     Hypertension     Trouble in sleeping     Vertigo     Vitamin D deficiency        Social History   Substance Use Topics    Smoking status: Never Smoker    Smokeless tobacco: Never Used    Alcohol use Yes      Comment: socially        ROS: Feeling generally well. No TIA's or unusual headaches, no dysphagia. No prolonged cough. No dyspnea or chest pain on exertion. No abdominal pain, change in bowel habits, black or bloody stools. No urinary tract symptoms. No new or unusual musculoskeletal symptoms.     Objective:     Visit Vitals    /82 (BP 1 Location: Right arm, BP Patient Position: Sitting)  Comment: Manual    Pulse 73    Temp 97.5 °F (36.4 °C) (Oral)  Comment: .  Resp 18    Ht 5' 6\" (1.676 m)    Wt 225 lb (102.1 kg)    SpO2 97%    BMI 36.32 kg/m2     The patient appears well, alert, oriented x 3, in no distress. ENT normal.  Neck supple. No adenopathy or thyromegaly. FAITH. Lungs are clear, good air entry, no wheezes, rhonchi or rales. S1 and S2 normal, no murmurs, regular rate and rhythm. Abdomen soft without tenderness, guarding, mass or organomegaly. Extremities show no edema, normal peripheral pulses. Neurological is normal, no focal findings. Breast and Pelvic exams are deferred. Assessment/Plan:   Erika Mullins is a 47 y.o. female seen today for:     1. Encounter for routine adult medical examination: routine labs as below. She will schedule Gynecology appointment. Immunizations are UTD.   - TSH 3RD GENERATION  - T4, FREE  - T3, FREE  - LIPID PANEL  - METABOLIC PANEL, COMPREHENSIVE  - CBC WITH AUTOMATED DIFF    2. Laboratory exam ordered as part of routine general medical examination  - TSH 3RD GENERATION  - T4, FREE  - T3, FREE  - LIPID PANEL  - METABOLIC PANEL, COMPREHENSIVE  - CBC WITH AUTOMATED DIFF    3. Vertigo: has had ENT evaluation. PRN use of alprazolam.     4. Essential hypertension  - METABOLIC PANEL, COMPREHENSIVE    5. Vitamin D deficiency  - VITAMIN D, 25 HYDROXY    6. Screening for lipid disorders  - LIPID PANEL    7. Screening for thyroid disorder  - TSH 3RD GENERATION  - T4, FREE  - T3, FREE    8. Body mass index (bmi) 36.0-36.9, adult: Discussed the patient's above normal BMI with her. I have recommended the following interventions: encourage exercise and lifestyle education regarding diet . The BMI follow up plan is as follows: BMI is out of normal parameters and plan is as follows: I have counseled this patient on diet and exercise regimens (recommend at least 150 minutes per week of moderate-intensity exercise).      Follow-up Disposition:  Return in about 1 year (around 4/5/2019) for annual physical exam.

## 2018-04-05 NOTE — MR AVS SNAPSHOT
[address] [place] [address] [address] [facility] 
[phone] Patient: Kerrie Garcia MRN: [id number] :1965 Visit Information Date & Time Provider Department Dept. Phone Encounter #  
 2018  8:00 AM Leonel Swift [phone] [id number] Follow-up Instructions Return in about 1 year (around 2019) for annual physical exam. Upcoming Health Maintenance Date Due Hepatitis C Screening 1965 COLONOSCOPY 1982 PAP AKA CERVICAL CYTOLOGY 1985 BREAST CANCER SCRN MAMMOGRAM 2019 DTaP/Tdap/Td series (2 - Td) 2028 Allergies as of 2018  Review Complete On: 2018 By: Mleissa Delong MD  
  
 Severity Noted Reaction Type Reactions Sulfa (Sulfonamide Antibiotics)  2018    Hives Current Immunizations  Reviewed on 2018 Name Date DTP 2012 Influenza Vaccine (Quad) PF 2018 Pneumococcal Conjugate (PCV-13) 3/17/2017 Tdap 2018 Zoster Vaccine, Live 2017 Reviewed by Melissa Delong MD on 2018 at  8:12 AM  
You Were Diagnosed With   
  
 Codes Comments Encounter for routine adult medical examination    -  Primary ICD-10-CM: Z00.00 ICD-9-CM: V70.0 Laboratory exam ordered as part of routine general medical examination     ICD-10-CM: Z00.00 ICD-9-CM: V72.62 Vertigo     ICD-10-CM: G30 ICD-9-CM: 780.4 Essential hypertension     ICD-10-CM: I10 
ICD-9-CM: 401.9 Vitamin D deficiency     ICD-10-CM: E55.9 ICD-9-CM: 268.9 Screening for lipid disorders     ICD-10-CM: Z13.220 ICD-9-CM: V77.91 Screening for thyroid disorder     ICD-10-CM: Z13.29 ICD-9-CM: V77.0 Body mass index (bmi) 36.0-36.9, adult     ICD-10-CM: Z68.36 
ICD-9-CM: V85.36 Vitals BP Pulse Temp Resp Height(growth percentile) Weight(growth percentile) 132/82 (BP 1 Location: Right arm, BP Patient Position: Sitting) 73 97.5 °F (36.4 °C) (Oral) 18 5' 6\" (1.676 m) 225 lb (102.1 kg) SpO2 BMI OB Status Smoking Status 97% 36.32 kg/m2 Menopause Never Smoker Vitals History BMI and BSA Data Body Mass Index Body Surface Area  
 36.32 kg/m 2 2.18 m 2 Preferred Pharmacy Pharmacy Name Phone [id number] [address] [phone] Your Updated Medication List  
  
   
This list is accurate as of 4/5/18  8:38 AM.  Always use your most recent med list.  
  
  
  
  
 lisinopril 40 mg tablet Commonly known as:  Delroy Littler Take 40 mg by mouth daily. melatonin 5 mg Cap capsule Take 5 mg by mouth nightly. PROBIOTIC 4X 10-15 mg Tbec Generic drug:  B.infantis-B.ani-B.long-B.bifi Take 1 Cap by mouth daily. VITAMIN D2 50,000 unit capsule Generic drug:  ergocalciferol Take 50,000 Units by mouth every seven (7) days. XANAX 0.5 mg tablet Generic drug:  ALPRAZolam  
Take 0.5 mg by mouth as needed (Vertigo). We Performed the Following CBC WITH AUTOMATED DIFF [09552 CPT(R)] LIPID PANEL [35322 CPT(R)] METABOLIC PANEL, COMPREHENSIVE [86422 CPT(R)] T3, FREE [id number] CPT(R)] T4, FREE [id number] CPT(R)] TSH 3RD GENERATION [78766 CPT(R)] VITAMIN D, 25 HYDROXY [id number] CPT(R)] Follow-up Instructions Return in about 1 year (around 4/5/2019) for annual physical exam.  
  
  
Patient Instructions A Healthy Lifestyle: Care Instructions Your Care Instructions A healthy lifestyle can help you feel good, stay at a healthy weight, and have plenty of energy for both work and play. A healthy lifestyle is something you can share with your whole family. A healthy lifestyle also can lower your risk for serious health problems, such as high blood pressure, heart disease, and diabetes. You can follow a few steps listed below to improve your health and the health of your family. Follow-up care is a key part of your treatment and safety. Be sure to make and go to all appointments, and call your doctor if you are having problems. It's also a good idea to know your test results and keep a list of the medicines you take. How can you care for yourself at home? · Do not eat too much sugar, fat, or fast foods. You can still have dessert and treats now and then. The goal is moderation. · Start small to improve your eating habits. Pay attention to portion sizes, drink less juice and soda pop, and eat more fruits and vegetables. ¨ Eat a healthy amount of food. A 3-ounce serving of meat, for example, is about the size of a deck of cards. Fill the rest of your plate with vegetables and whole grains. ¨ Limit the amount of soda and sports drinks you have every day. Drink more water when you are thirsty. ¨ Eat at least 5 servings of fruits and vegetables every day. It may seem like a lot, but it is not hard to reach this goal. A serving or helping is 1 piece of fruit, 1 cup of vegetables, or 2 cups of leafy, raw vegetables. Have an apple or some carrot sticks as an afternoon snack instead of a candy bar. Try to have fruits and/or vegetables at every meal. 
· Make exercise part of your daily routine. You may want to start with simple activities, such as walking, bicycling, or slow swimming. Try to be active 30 to 60 minutes every day. You do not need to do all 30 to 60 minutes all at once. For example, you can exercise 3 times a day for 10 or 20 minutes. Moderate exercise is safe for most people, but it is always a good idea to talk to your doctor before starting an exercise program. 
· Keep moving. Leila Banner the lawn, work in the garden, or [place]. Take the stairs instead of the elevator at work. · If you smoke, quit.  People who smoke have an increased risk for heart attack, stroke, cancer, and other lung illnesses. Quitting is hard, but there are ways to boost your chance of quitting tobacco for good. ¨ Use nicotine gum, patches, or lozenges. ¨ Ask your doctor about stop-smoking programs and medicines. ¨ Keep trying. In addition to reducing your risk of diseases in the future, you will notice some benefits soon after you stop using tobacco. If you have shortness of breath or asthma symptoms, they will likely get better within a few weeks after you quit. · Limit how much alcohol you drink. Moderate amounts of alcohol (up to 2 drinks a day for men, 1 drink a day for women) are okay. But drinking too much can lead to liver problems, high blood pressure, and other health problems. Family health If you have a family, there are many things you can do together to improve your health. · Eat meals together as a family as often as possible. · Eat healthy foods. This includes fruits, vegetables, lean meats and dairy, and whole grains. · Include your family in your fitness plan. Most people think of activities such as jogging or tennis as the way to fitness, but there are many ways you and your family can be more active. Anything that makes you breathe hard and gets your heart pumping is exercise. Here are some tips: 
¨ Walk to do errands or to take your child to school or the bus. ¨ Go for a family bike ride after dinner instead of watching TV. Where can you learn more? Go to http://kiran-travis.info/. Enter C293 in the search box to learn more about \"A Healthy Lifestyle: Care Instructions. \" Current as of: May 12, 2017 Content Version: 11.4 © 5179-3969 CPXi. Care instructions adapted under license by [employer] (which disclaims liability or warranty for this information).  If you have questions about a medical condition or this instruction, always ask your healthcare professional. Rylie Lez[employer] disclaims any warranty or liability for your use of this information. Introducing [facility]! Dear Kip Galvan: 
Thank you for requesting a [employer] account. Our records indicate that you already have an active [employer] account. You can access your account anytime at [email or web address] Did you know that you can access your hospital and ER discharge instructions at any time in [employer]? You can also review all of your test results from your hospital stay or ER visit. Additional Information If you have questions, please visit the Frequently Asked Questions section of the [employer] website at [email or web address] Remember, [employer] is NOT to be used for urgent needs. For medical emergencies, dial 911. Now available from your iPhone and Android! Please provide this summary of care documentation to your next provider. Your primary care clinician is listed as Tessa Flannery. If you have any questions after today's visit, please call [phone].

## 2018-04-05 NOTE — PATIENT INSTRUCTIONS

## 2018-04-05 NOTE — PROGRESS NOTES
Identified pt with two pt identifiers(name and ). Chief Complaint   Patient presents with    Annual Wellness Visit    Labs     Fasting for labwork        Health Maintenance Due   Topic    Hepatitis C Screening     COLONOSCOPY     PAP AKA CERVICAL CYTOLOGY        Wt Readings from Last 3 Encounters:   18 225 lb (102.1 kg)   18 221 lb (100.2 kg)   18 221 lb (100.2 kg)     Temp Readings from Last 3 Encounters:   18 97.5 °F (36.4 °C) (Oral)   18 98.7 °F (37.1 °C) (Oral)   18 98.6 °F (37 °C) (Oral)     BP Readings from Last 3 Encounters:   18 132/82   18 138/88   18 120/86     Pulse Readings from Last 3 Encounters:   18 73   18 78   18 90         Learning Assessment:  :     No flowsheet data found. Depression Screening:  :     PHQ over the last two weeks 2018   Little interest or pleasure in doing things Not at all   Feeling down, depressed or hopeless Not at all   Total Score PHQ 2 0       Fall Risk Assessment:  :     No flowsheet data found. Abuse Screening:  :     Abuse Screening Questionnaire 2018   Do you ever feel afraid of your partner? N   Are you in a relationship with someone who physically or mentally threatens you? N   Is it safe for you to go home? Y       Coordination of Care Questionnaire:  :     1) Have you been to an emergency room, urgent care clinic since your last visit? no   Hospitalized since your last visit? no             2) Have you seen or consulted any other health care providers outside of [facility] since your last visit? no  (Include any pap smears or colon screenings in this section.)    3) Do you have an Advance Directive on file? no  Are you interested in receiving information about Advance Directives? no    Reviewed record in preparation for visit and have obtained necessary documentation. Medication reconciliation up to date and corrected with patient at this time.

## 2018-04-06 LAB
25(OH)D3+25(OH)D2 SERPL-MCNC: 37.4 NG/ML (ref 30–100)
ALBUMIN SERPL-MCNC: 4.1 G/DL (ref 3.5–5.5)
ALBUMIN/GLOB SERPL: 1.4 {RATIO} (ref 1.2–2.2)
ALP SERPL-CCNC: 72 IU/L (ref 39–117)
ALT SERPL-CCNC: 16 IU/L (ref 0–32)
AST SERPL-CCNC: 18 IU/L (ref 0–40)
BASOPHILS # BLD AUTO: 0 X10E3/UL (ref 0–0.2)
BASOPHILS NFR BLD AUTO: 0 %
BILIRUB SERPL-MCNC: 0.5 MG/DL (ref 0–1.2)
BUN SERPL-MCNC: 15 MG/DL (ref 6–24)
BUN/CREAT SERPL: 21 (ref 9–23)
CALCIUM SERPL-MCNC: 9.4 MG/DL (ref 8.7–10.2)
CHLORIDE SERPL-SCNC: 101 MMOL/L (ref 96–106)
CHOLEST SERPL-MCNC: 208 MG/DL (ref 100–199)
CO2 SERPL-SCNC: 27 MMOL/L (ref 18–29)
CREAT SERPL-MCNC: 0.71 MG/DL (ref 0.57–1)
EOSINOPHIL # BLD AUTO: 0.1 X10E3/UL (ref 0–0.4)
EOSINOPHIL NFR BLD AUTO: 1 %
ERYTHROCYTE [DISTWIDTH] IN BLOOD BY AUTOMATED COUNT: 15.9 % (ref 12.3–15.4)
GFR SERPLBLD CREATININE-BSD FMLA CKD-EPI: 112 ML/MIN/1.73
GFR SERPLBLD CREATININE-BSD FMLA CKD-EPI: 98 ML/MIN/1.73
GLOBULIN SER CALC-MCNC: 2.9 G/DL (ref 1.5–4.5)
GLUCOSE SERPL-MCNC: 87 MG/DL (ref 65–99)
HCT VFR BLD AUTO: 39.9 % (ref 34–46.6)
HDLC SERPL-MCNC: 73 MG/DL
HGB BLD-MCNC: 13.2 G/DL (ref 11.1–15.9)
IMM GRANULOCYTES # BLD: 0 X10E3/UL (ref 0–0.1)
IMM GRANULOCYTES NFR BLD: 0 %
INTERPRETATION, 910389: NORMAL
LDLC SERPL CALC-MCNC: 118 MG/DL (ref 0–99)
LYMPHOCYTES # BLD AUTO: 2 X10E3/UL (ref 0.7–3.1)
LYMPHOCYTES NFR BLD AUTO: 39 %
MCH RBC QN AUTO: 28.2 PG (ref 26.6–33)
MCHC RBC AUTO-ENTMCNC: 33.1 G/DL (ref 31.5–35.7)
MCV RBC AUTO: 85 FL (ref 79–97)
MONOCYTES # BLD AUTO: 0.4 X10E3/UL (ref 0.1–0.9)
MONOCYTES NFR BLD AUTO: 7 %
NEUTROPHILS # BLD AUTO: 2.8 X10E3/UL (ref 1.4–7)
NEUTROPHILS NFR BLD AUTO: 53 %
PLATELET # BLD AUTO: 285 X10E3/UL (ref 150–379)
POTASSIUM SERPL-SCNC: 4.6 MMOL/L (ref 3.5–5.2)
PROT SERPL-MCNC: 7 G/DL (ref 6–8.5)
RBC # BLD AUTO: 4.68 X10E6/UL (ref 3.77–5.28)
SODIUM SERPL-SCNC: 142 MMOL/L (ref 134–144)
T3FREE SERPL-MCNC: 3.6 PG/ML (ref 2–4.4)
T4 FREE SERPL-MCNC: 1.27 NG/DL (ref 0.82–1.77)
TRIGL SERPL-MCNC: 85 MG/DL (ref 0–149)
TSH SERPL DL<=0.005 MIU/L-ACNC: 2.91 UIU/ML (ref 0.45–4.5)
VLDLC SERPL CALC-MCNC: 17 MG/DL (ref 5–40)
WBC # BLD AUTO: 5.3 X10E3/UL (ref 3.4–10.8)

## 2018-06-18 RX ORDER — LISINOPRIL 40 MG/1
40 TABLET ORAL DAILY
Qty: 90 TAB | Refills: 1 | Status: SHIPPED | OUTPATIENT
Start: 2018-06-18 | End: 2018-12-15 | Stop reason: SDUPTHER

## 2018-06-18 RX ORDER — ERGOCALCIFEROL 1.25 MG/1
50000 CAPSULE ORAL
Qty: 12 CAP | Refills: 1 | Status: SHIPPED | OUTPATIENT
Start: 2018-06-18 | End: 2018-12-03 | Stop reason: SDUPTHER

## 2018-06-18 RX ORDER — LISINOPRIL 40 MG/1
40 TABLET ORAL DAILY
Qty: 90 TAB | Refills: 1 | Status: SHIPPED | OUTPATIENT
Start: 2018-06-18 | End: 2018-06-18 | Stop reason: SDUPTHER

## 2018-06-18 RX ORDER — ERGOCALCIFEROL 1.25 MG/1
50000 CAPSULE ORAL
Qty: 12 CAP | Refills: 0 | Status: SHIPPED | OUTPATIENT
Start: 2018-06-18 | End: 2018-06-18 | Stop reason: SDUPTHER

## 2018-06-18 NOTE — TELEPHONE ENCOUNTER
----- Message from Lilibeth Elias sent at 6/18/2018 12:21 PM EDT -----  Regarding: Dr. Noelle Land: [phone]  Pt needs to confirm that Rx for \"Ergocalciferol\" and \" Lisinopril\" was sent to [facility] and not [facility]. Please call and confirm with pt. When looking at her encounters I saw [facility] but then it stated sure scripts at the bottom. . Pt would like a call please.

## 2018-06-18 NOTE — TELEPHONE ENCOUNTER
Relayed information to patient and patient verbalized understanding.   Called and canceled RX at [facility]

## 2018-08-02 ENCOUNTER — APPOINTMENT (OUTPATIENT)
Dept: CT IMAGING | Age: 53
End: 2018-08-02
Attending: EMERGENCY MEDICINE
Payer: COMMERCIAL

## 2018-08-02 ENCOUNTER — HOSPITAL ENCOUNTER (EMERGENCY)
Age: 53
Discharge: HOME | End: 2018-08-02
Attending: EMERGENCY MEDICINE
Payer: COMMERCIAL

## 2018-08-02 ENCOUNTER — TELEPHONE (OUTPATIENT)
Dept: FAMILY MEDICINE CLINIC | Age: 53
End: 2018-08-02

## 2018-08-02 VITALS
DIASTOLIC BLOOD PRESSURE: 78 MMHG | BODY MASS INDEX: 36.32 KG/M2 | TEMPERATURE: 98.2 F | RESPIRATION RATE: 15 BRPM | SYSTOLIC BLOOD PRESSURE: 153 MMHG | HEART RATE: 74 BPM | HEIGHT: 66 IN | OXYGEN SATURATION: 95 % | WEIGHT: 226 LBS

## 2018-08-02 DIAGNOSIS — K62.5 RECTAL BLEEDING: ICD-10-CM

## 2018-08-02 DIAGNOSIS — R19.7 DIARRHEA OF PRESUMED INFECTIOUS ORIGIN: Primary | ICD-10-CM

## 2018-08-02 LAB
ALBUMIN SERPL-MCNC: 3.5 G/DL (ref 3.5–5)
ALBUMIN/GLOB SERPL: 0.9 {RATIO} (ref 1.1–2.2)
ALP SERPL-CCNC: 79 U/L (ref 45–117)
ALT SERPL-CCNC: 26 U/L (ref 12–78)
ANION GAP SERPL CALC-SCNC: 7 MMOL/L (ref 5–15)
APTT PPP: 27.3 SEC (ref 22.1–32)
AST SERPL-CCNC: 13 U/L (ref 15–37)
BASOPHILS # BLD: 0 K/UL (ref 0–0.1)
BASOPHILS NFR BLD: 0 % (ref 0–1)
BILIRUB SERPL-MCNC: 0.6 MG/DL (ref 0.2–1)
BUN SERPL-MCNC: 11 MG/DL (ref 6–20)
BUN/CREAT SERPL: 14 (ref 12–20)
CALCIUM SERPL-MCNC: 9 MG/DL (ref 8.5–10.1)
CHLORIDE SERPL-SCNC: 103 MMOL/L (ref 97–108)
CO2 SERPL-SCNC: 29 MMOL/L (ref 21–32)
CREAT SERPL-MCNC: 0.79 MG/DL (ref 0.55–1.02)
DIFFERENTIAL METHOD BLD: ABNORMAL
EOSINOPHIL # BLD: 0 K/UL (ref 0–0.4)
EOSINOPHIL NFR BLD: 0 % (ref 0–7)
ERYTHROCYTE [DISTWIDTH] IN BLOOD BY AUTOMATED COUNT: 14.8 % (ref 11.5–14.5)
GLOBULIN SER CALC-MCNC: 4 G/DL (ref 2–4)
GLUCOSE SERPL-MCNC: 104 MG/DL (ref 65–100)
HCT VFR BLD AUTO: 40.8 % (ref 35–47)
HGB BLD-MCNC: 13.6 G/DL (ref 11.5–16)
INR PPP: 1 (ref 0.9–1.1)
LYMPHOCYTES # BLD: 2.2 K/UL (ref 0.8–3.5)
LYMPHOCYTES NFR BLD: 32 % (ref 12–49)
MCH RBC QN AUTO: 28.6 PG (ref 26–34)
MCHC RBC AUTO-ENTMCNC: 33.3 G/DL (ref 30–36.5)
MCV RBC AUTO: 85.9 FL (ref 80–99)
MONOCYTES # BLD: 0.6 K/UL (ref 0–1)
MONOCYTES NFR BLD: 8 % (ref 5–13)
NEUTS SEG # BLD: 4.1 K/UL (ref 1.8–8)
NEUTS SEG NFR BLD: 60 % (ref 32–75)
PLATELET # BLD AUTO: 246 K/UL (ref 150–400)
PMV BLD AUTO: 9.7 FL (ref 8.9–12.9)
POTASSIUM SERPL-SCNC: 3.9 MMOL/L (ref 3.5–5.1)
PROT SERPL-MCNC: 7.5 G/DL (ref 6.4–8.2)
PROTHROMBIN TIME: 9.4 SEC (ref 9–11.1)
RBC # BLD AUTO: 4.75 M/UL (ref 3.8–5.2)
SODIUM SERPL-SCNC: 139 MMOL/L (ref 136–145)
THERAPEUTIC RANGE,PTTT: NORMAL SECS (ref 58–77)
WBC # BLD AUTO: 6.9 K/UL (ref 3.6–11)
XXWBCSUS: 0

## 2018-08-02 PROCEDURE — 85610 PROTHROMBIN TIME: CPT | Performed by: EMERGENCY MEDICINE

## 2018-08-02 PROCEDURE — 74011636320 HC RX REV CODE- 636/320: Performed by: EMERGENCY MEDICINE

## 2018-08-02 PROCEDURE — 36415 COLL VENOUS BLD VENIPUNCTURE: CPT | Performed by: EMERGENCY MEDICINE

## 2018-08-02 PROCEDURE — 74011250636 HC RX REV CODE- 250/636: Performed by: EMERGENCY MEDICINE

## 2018-08-02 PROCEDURE — 99284 EMERGENCY DEPT VISIT MOD MDM: CPT

## 2018-08-02 PROCEDURE — 85730 THROMBOPLASTIN TIME PARTIAL: CPT | Performed by: EMERGENCY MEDICINE

## 2018-08-02 PROCEDURE — 80053 COMPREHEN METABOLIC PANEL: CPT | Performed by: EMERGENCY MEDICINE

## 2018-08-02 PROCEDURE — 74177 CT ABD & PELVIS W/CONTRAST: CPT

## 2018-08-02 PROCEDURE — 96374 THER/PROPH/DIAG INJ IV PUSH: CPT

## 2018-08-02 PROCEDURE — 96361 HYDRATE IV INFUSION ADD-ON: CPT

## 2018-08-02 PROCEDURE — 85025 COMPLETE CBC W/AUTO DIFF WBC: CPT | Performed by: EMERGENCY MEDICINE

## 2018-08-02 RX ORDER — KETOROLAC TROMETHAMINE 30 MG/ML
15 INJECTION, SOLUTION INTRAMUSCULAR; INTRAVENOUS
Status: COMPLETED | OUTPATIENT
Start: 2018-08-02 | End: 2018-08-02

## 2018-08-02 RX ORDER — DICYCLOMINE HYDROCHLORIDE 10 MG/1
10 CAPSULE ORAL 3 TIMES DAILY
Qty: 21 CAP | Refills: 0 | Status: SHIPPED | OUTPATIENT
Start: 2018-08-02 | End: 2018-10-01 | Stop reason: ALTCHOICE

## 2018-08-02 RX ORDER — CIPROFLOXACIN 500 MG/1
500 TABLET ORAL 2 TIMES DAILY
Qty: 10 TAB | Refills: 0 | Status: SHIPPED | OUTPATIENT
Start: 2018-08-02 | End: 2018-08-07

## 2018-08-02 RX ORDER — ONDANSETRON 4 MG/1
4 TABLET, ORALLY DISINTEGRATING ORAL
Qty: 8 TAB | Refills: 0 | Status: SHIPPED | OUTPATIENT
Start: 2018-08-02 | End: 2018-10-01 | Stop reason: ALTCHOICE

## 2018-08-02 RX ADMIN — KETOROLAC TROMETHAMINE 15 MG: 30 INJECTION, SOLUTION INTRAMUSCULAR at 11:51

## 2018-08-02 RX ADMIN — SODIUM CHLORIDE 1000 ML: 900 INJECTION, SOLUTION INTRAVENOUS at 11:50

## 2018-08-02 RX ADMIN — IOPAMIDOL 100 ML: 755 INJECTION, SOLUTION INTRAVENOUS at 12:51

## 2018-08-02 NOTE — DISCHARGE INSTRUCTIONS
Diarrhea: Care Instructions  Your Care Instructions    Diarrhea is loose, watery stools (bowel movements). The exact cause is often hard to find. Sometimes diarrhea is your body's way of getting rid of what caused an upset stomach. Viruses, food poisoning, and many medicines can cause diarrhea. Some people get diarrhea in response to emotional stress, anxiety, or certain foods. Almost everyone has diarrhea now and then. It usually isn't serious, and your stools will return to normal soon. The important thing to do is replace the fluids you have lost, so you can prevent dehydration. The doctor has checked you carefully, but problems can develop later. If you notice any problems or new symptoms, get medical treatment right away. Follow-up care is a key part of your treatment and safety. Be sure to make and go to all appointments, and call your doctor if you are having problems. It's also a good idea to know your test results and keep a list of the medicines you take. How can you care for yourself at home? · Watch for signs of dehydration, which means your body has lost too much water. Dehydration is a serious condition and should be treated right away. Signs of dehydration are:  ¨ Increasing thirst and dry eyes and mouth. ¨ Feeling faint or lightheaded. ¨ Darker urine, and a smaller amount of urine than normal.  · To prevent dehydration, drink plenty of fluids, enough so that your urine is light yellow or clear like water. Choose water and other caffeine-free clear liquids until you feel better. If you have kidney, heart, or liver disease and have to limit fluids, talk with your doctor before you increase the amount of fluids you drink. · Begin eating small amounts of mild foods the next day, if you feel like it. ¨ Try yogurt that has live cultures of Lactobacillus. (Check the label.)  ¨ Avoid spicy foods, fruits, alcohol, and caffeine until 48 hours after all symptoms are gone.   ¨ Avoid chewing gum that contains sorbitol. ¨ Avoid dairy products (except for yogurt with Lactobacillus) while you have diarrhea and for 3 days after symptoms are gone. · The doctor may recommend that you take over-the-counter medicine, such as loperamide (Imodium), if you still have diarrhea after 6 hours. Read and follow all instructions on the label. Do not use this medicine if you have bloody diarrhea, a high fever, or other signs of serious illness. Call your doctor if you think you are having a problem with your medicine. When should you call for help? Call 911 anytime you think you may need emergency care. For example, call if:    · You passed out (lost consciousness).     · Your stools are maroon or very bloody.    Call your doctor now or seek immediate medical care if:    · You are dizzy or lightheaded, or you feel like you may faint.     · Your stools are black and look like tar, or they have streaks of blood.     · You have new or worse belly pain.     · You have symptoms of dehydration, such as:  ¨ Dry eyes and a dry mouth. ¨ Passing only a little dark urine. ¨ Feeling thirstier than usual.     · You have a new or higher fever.    Watch closely for changes in your health, and be sure to contact your doctor if:    · Your diarrhea is getting worse.     · You see pus in the diarrhea.     · You are not getting better after 2 days (48 hours). Where can you learn more? Go to http://kiran-travis.info/. Enter W439 in the search box to learn more about \"Diarrhea: Care Instructions. \"  Current as of: November 20, 2017  Content Version: 11.7  © 6888-1466 YouScribe. Care instructions adapted under license by [employer] (which disclaims liability or warranty for this information).  If you have questions about a medical condition or this instruction, always ask your healthcare professional. [place] [address] any warranty or liability for your use of this information. Lower Gastrointestinal Bleeding: Care Instructions  Your Care Instructions    The digestive or gastrointestinal tract goes from the mouth to the anus. It is often called the GI tract. Bleeding in the lower GI tract can happen anywhere in your small or large intestine. It can also happen in your rectum or anus. In some cases, it is caused by an infection, cancer, or inflammatory bowel disease. Or it may be caused by hemorrhoids, diverticulitis, or clotting problems. Light bleeding may not cause any symptoms at first. But if you continue to bleed for a while, you may feel very weak or tired. Sudden, heavy bleeding means you need to see a doctor right away. This kind of bleeding can be very dangerous. But it can usually be cured or controlled. The doctor may do some tests to find the cause of your bleeding. Follow-up care is a key part of your treatment and safety. Be sure to make and go to all appointments, and call your doctor if you are having problems. It's also a good idea to know your test results and keep a list of the medicines you take. How can you care for yourself at home? · Be safe with medicines. Take your medicines exactly as prescribed. Call your doctor if you think you are having a problem with your medicine. You will get more details on the specific medicines your doctor prescribes. · Do not take aspirin or other anti-inflammatory medicines, such as naproxen (Aleve) or ibuprofen (Advil, Motrin), without talking to your doctor first. Ask your doctor if it is okay to use acetaminophen (Tylenol). · Do not drink alcohol. · The bleeding may make you lose iron. So it's important to eat foods that have a lot of iron. These include red meat, shellfish, poultry, and eggs. They also include beans, raisins, whole-grain breads, and leafy green vegetables. If you want help planning meals, you can meet with a dietitian. When should you call for help?   Call 911 anytime you think you may need emergency care. For example, call if:    · You have sudden, severe belly pain.     · You vomit blood or what looks like coffee grounds.     · You passed out (lost consciousness).     · Your stools are maroon or very bloody.    Call your doctor now or seek immediate medical care if:    · You are dizzy or lightheaded, or you feel like you may faint.     · Your stools are black and look like tar, or they have streaks of blood.     · You have belly pain.     · You vomit or have nausea.    Watch closely for changes in your health, and be sure to contact your doctor if you do not get better as expected. Where can you learn more? Go to http://kiran-travis.info/. Enter J171 in the search box to learn more about \"Lower Gastrointestinal Bleeding: Care Instructions. \"  Current as of: November 20, 2017  Content Version: 11.7  © 9348-8097 NewsCrafted. Care instructions adapted under license by [employer] (which disclaims liability or warranty for this information). If you have questions about a medical condition or this instruction, always ask your healthcare professional. [place] [address] any warranty or liability for your use of this information. We hope that we have addressed all of your medical concerns. The examination and treatment you received in the Emergency Department were for an emergent problem and were not intended as complete care. It is important that you follow up with your healthcare provider(s) for ongoing care. If your symptoms worsen or do not improve as expected, and you are unable to reach your usual health care provider(s), you should return to the Emergency Department. Today's healthcare is undergoing tremendous change, and patient satisfaction surveys are one of the many tools to assess the quality of medical care.   You may receive a survey from the [employer] regarding your experience in the Emergency Department. I hope that your experience has been completely positive, particularly the medical care that I provided. As such, please participate in the survey; anything less than excellent does not meet my expectations or intentions. [address] [facility] and [facility] participate in nationally recognized quality of care measures. If your blood pressure is greater than 120/80, as reported below, we urge that you seek medical care to address the potential of high blood pressure, commonly known as hypertension. Hypertension can be hereditary or can be caused by certain medical conditions, pain, stress, or \"white coat syndrome. \"       Please make an appointment with your health care provider(s) for follow up of your Emergency Department visit. VITALS:   Patient Vitals for the past 8 hrs:   Temp Pulse Resp BP SpO2   08/02/18 1345 - - - (!) 161/98 97 %   08/02/18 1330 - - - (!) 164/92 94 %   08/02/18 1300 - - - 155/88 96 %   08/02/18 1245 - - - (!) 155/97 98 %   08/02/18 1130 - - - 159/88 97 %   08/02/18 1120 98.2 °F (36.8 °C) 74 15 (!) 172/102 98 %   08/02/18 1115 - - - (!) 176/102 96 %          Thank you for allowing us to provide you with medical care today. We realize that you have many choices for your emergency care needs. Please choose us in the future for any continued health care needs.       Marixa Armstrong MD    [facility], [facility].   Office: [phone]            Recent Results (from the past 24 hour(s))   CBC WITH AUTOMATED DIFF    Collection Time: 08/02/18 11:26 AM   Result Value Ref Range    WBC 6.9 3.6 - 11.0 K/uL    RBC 4.75 3.80 - 5.20 M/uL    HGB 13.6 11.5 - 16.0 g/dL    HCT 40.8 35.0 - 47.0 %    MCV 85.9 80.0 - 99.0 FL    MCH 28.6 26.0 - 34.0 PG    MCHC 33.3 30.0 - 36.5 g/dL    RDW 14.8 (H) 11.5 - 14.5 %    PLATELET 552 564 - 341 K/uL    MPV 9.7 8.9 - 12.9 FL    NEUTROPHILS 60 32 - 75 %    LYMPHOCYTES 32 12 - 49 % MONOCYTES 8 5 - 13 %    EOSINOPHILS 0 0 - 7 %    BASOPHILS 0 0 - 1 %    ABS. NEUTROPHILS 4.1 1.8 - 8.0 K/UL    ABS. LYMPHOCYTES 2.2 0.8 - 3.5 K/UL    ABS. MONOCYTES 0.6 0.0 - 1.0 K/UL    ABS. EOSINOPHILS 0.0 0.0 - 0.4 K/UL    ABS. BASOPHILS 0.0 0.0 - 0.1 K/UL    DF AUTOMATED      XXWBCSUS 0     METABOLIC PANEL, COMPREHENSIVE    Collection Time: 08/02/18 11:26 AM   Result Value Ref Range    Sodium 139 136 - 145 mmol/L    Potassium 3.9 3.5 - 5.1 mmol/L    Chloride 103 97 - 108 mmol/L    CO2 29 21 - 32 mmol/L    Anion gap 7 5 - 15 mmol/L    Glucose 104 (H) 65 - 100 mg/dL    BUN 11 6 - 20 MG/DL    Creatinine 0.79 0.55 - 1.02 MG/DL    BUN/Creatinine ratio 14 12 - 20      GFR est AA >60 >60 ml/min/1.73m2    GFR est non-AA >60 >60 ml/min/1.73m2    Calcium 9.0 8.5 - 10.1 MG/DL    Bilirubin, total 0.6 0.2 - 1.0 MG/DL    ALT (SGPT) 26 12 - 78 U/L    AST (SGOT) 13 (L) 15 - 37 U/L    Alk. phosphatase 79 45 - 117 U/L    Protein, total 7.5 6.4 - 8.2 g/dL    Albumin 3.5 3.5 - 5.0 g/dL    Globulin 4.0 2.0 - 4.0 g/dL    A-G Ratio 0.9 (L) 1.1 - 2.2     PROTHROMBIN TIME + INR    Collection Time: 08/02/18 11:26 AM   Result Value Ref Range    INR 1.0 0.9 - 1.1      Prothrombin time 9.4 9.0 - 11.1 sec   PTT    Collection Time: 08/02/18 11:26 AM   Result Value Ref Range    aPTT 27.3 22.1 - 32.0 sec    aPTT, therapeutic range     58.0 - 77.0 SECS       Ct Abd Pelv W Cont    Result Date: 8/2/2018  EXAM: CT ABDOMEN PELVIS WITH CONTRAST INDICATION: Abdominal pain and rectal bleeding, possible diverticulitis. . COMPARISON: None. CONTRAST: 100 mL of Isovue-370. TECHNIQUE: Multislice helical CT was performed from the diaphragm to the symphysis pubis during uneventful rapid bolus intravenous contrast administration. Oral contrast was not administered. Contiguous 5 mm axial images were reconstructed and lung and soft tissue windows were generated. Coronal and sagittal reformations were generated.  CT dose reduction was achieved through use of a standardized protocol tailored for this examination and automatic exposure control for dose modulation. FINDINGS: LOWER CHEST: The visualized portions of the lung bases are clear. ABDOMEN: Liver: The liver is normal in size and contour with no focal abnormality. Gallbladder and bile ducts: There is no calcified gallstone or biliary dilatation. Spleen: No abnormality. Pancreas: No abnormality. Adrenal glands: No abnormality. Kidneys: The kidneys are incompletely rotated bilaterally. PELVIS: Reproductive organs: The uterus is normal. Bladder: No abnormality. BOWEL AND MESENTERY: The small bowel is normal.  There is no mesenteric mass or adenopathy. The appendix is absent and there are surgical clips in the right lower quadrant. Gianni Keel PERITONEUM: There is no ascites or free intraperitoneal air. RETROPERITONEUM: The aorta  tapers without aneurysm. There is no retroperitoneal adenopathy or mass. There is no pelvic mass or adenopathy. BONES AND SOFT TISSUES: The bones and soft tissues of the abdominal wall are within normal limits. The patient is obese. IMPRESSION: 1. No evidence of diverticulitis or other acute abdominal or pelvic abnormality. 2. Nonrotation of the kidneys. 3. Status post appendectomy. Gianni Caban

## 2018-08-02 NOTE — ED TRIAGE NOTES
Pt brought to treatment area via wheelchair with c/o \"last night my stomach started hurting and this morning I started passing quarter sized blood clots in my stool. \"  Pt reports \"at least 4-5 bowel movements with bright red blood. \"  Pt states \"i am allergic to oysters and this reminds me of the same reaction only usually I am vomiting as well. \"  Pt also reports \"bilateral calf pain that has been going on a week. \"

## 2018-08-02 NOTE — ED PROVIDER NOTES
HPI Comments: 50-year-old female with a past medical history significant for hypertension and anxiety who presents with abdominal pain and rectal bleeding. Symptoms started last night when patient woke up with cramping abdominal pain. Pain was located throughout the abdomen with no radiation. It was somewhat relieved after having a loose bowel movement. She had several additional episodes of this throughout the night. After 3-4 episodes, patient started noticing dark red blood clots. She continued to have some abdominal pain although not as severe as initially. Patient has not taken any medications for the symptoms. There are no exacerbating or alleviating factors. She ate shrimp last night but no other significant food exposures. Patient notes that this morning she woke up feeling very dizzy and lightheaded. Denies any chest pain or trouble breathing. No fever that she knows of. She has been taking some ibuprofen this past week for bilateral calf pain. Patient is a 47 y.o. female presenting with abdominal pain and melena. The history is provided by the patient and the spouse. Abdominal Pain Associated symptoms include melena. Pertinent negatives include no fever, no dysuria, no arthralgias and no chest pain. Melena Associated symptoms include abdominal pain. Pertinent negatives include no fever, no chest pain and no rash. Past Medical History:  
Diagnosis Date  Anxiety  Hiatal hernia  Hypertension  Trouble in sleeping  Vertigo  Vitamin D deficiency Past Surgical History:  
Procedure Laterality Date  HX APPENDECTOMY    HX  SECTION    
 HX ORTHOPAEDIC Right ankle x 2 Family History:  
Problem Relation Age of Onset  Hypertension Mother  Hypertension Father Social History Social History  Marital status:  Spouse name: N/A  
 Number of children: N/A  
 Years of education: N/A Occupational History  Not on file. Social History Main Topics  Smoking status: Never Smoker  Smokeless tobacco: Never Used  Alcohol use Yes Comment: socially  Drug use: No  
 Sexual activity: Yes  
  Partners: Male Other Topics Concern  Not on file Social History Narrative ALLERGIES: Shellfish derived and Sulfa (sulfonamide antibiotics) Review of Systems Constitutional: Negative for fever. HENT: Negative for facial swelling. Eyes: Negative for visual disturbance. Respiratory: Negative for chest tightness. Cardiovascular: Negative for chest pain. Gastrointestinal: Positive for abdominal pain and melena. Genitourinary: Negative for dysuria. Musculoskeletal: Negative for arthralgias. Skin: Negative for rash. Neurological: Negative for dizziness. Hematological: Negative for adenopathy. Psychiatric/Behavioral: Negative for suicidal ideas. Vitals:  
 08/02/18 1115 08/02/18 1120 08/02/18 1130 BP: (!) 176/102 (!) 172/102 159/88 Pulse:  74 Resp:  15 Temp:  98.2 °F (36.8 °C) SpO2: 96% 98% 97% Weight:  102.5 kg (226 lb) Height:  5' 6\" (1.676 m) Physical Exam  
Constitutional: She is oriented to person, place, and time. She appears well-developed and well-nourished. No distress. HENT:  
Head: Normocephalic and atraumatic. Mouth/Throat: Oropharynx is clear and moist.  
Eyes: Pupils are equal, round, and reactive to light. No scleral icterus. Neck: Normal range of motion. Neck supple. No thyromegaly present. Cardiovascular: Normal rate, regular rhythm, normal heart sounds and intact distal pulses. No murmur heard. Pulmonary/Chest: Effort normal and breath sounds normal. No respiratory distress. Abdominal: Soft. Bowel sounds are normal. She exhibits no distension. There is no tenderness. Musculoskeletal: Normal range of motion. She exhibits no edema. Neurological: She is alert and oriented to person, place, and time.   
Skin: Skin is warm and dry. No rash noted. She is not diaphoretic. Nursing note and vitals reviewed. MDM Number of Diagnoses or Management Options Diagnosis management comments: A:  abd pain and rectal bleeding. No evidence of active bleeding at this time. VS stable with elevated BP.  abd soft with no focal ttp. P: 
Labs 
ivr Declines nausea medication at this time 
toradol for pain Ct a/p 
 
 
 
ED Course Procedures Labs and CT unremarkable. 2:16 PM 
Patient resting comfortably with no complaints at this time. VS remain stable. Repeat physical exam is unremarkable. Pt ambulatory without difficulty and tolerating po's well. Stable for discharge. Cipro for presumed infectious diarrhea. F/u with PCP in 3 days as needed.

## 2018-08-02 NOTE — TELEPHONE ENCOUNTER
The pt called to see if she should make an appt at the office or go to the ER. She has had an upset stomach since last night, very painful stomach pains to the point of her feeling like she is going to pass out. She now is passing blood clots in her stool while using the restroom and is still having stomach pains. Pt was advised to go to the ER due to the clots and possibly needing more testing than we could do in the pcp office. Pt understood and stated she would.

## 2018-08-02 NOTE — LETTER
[facility] EMERGENCY DEPT 
[phone] 
[phone] Work/School Note Date: 8/2/2018 To Whom It May concern: 
 
Juliana Chung was seen and treated today in the emergency room by the following provider(s): 
Attending Provider: Earl Winn MD. Juliana Chung may return to work on 8/6/2018.  
 
Sincerely, 
 
 
 
 
Earl Winn MD

## 2018-10-01 ENCOUNTER — OFFICE VISIT (OUTPATIENT)
Dept: FAMILY MEDICINE CLINIC | Age: 53
End: 2018-10-01

## 2018-10-01 VITALS
OXYGEN SATURATION: 97 % | HEIGHT: 66 IN | BODY MASS INDEX: 36.77 KG/M2 | DIASTOLIC BLOOD PRESSURE: 98 MMHG | SYSTOLIC BLOOD PRESSURE: 142 MMHG | RESPIRATION RATE: 20 BRPM | WEIGHT: 228.8 LBS | HEART RATE: 79 BPM | TEMPERATURE: 97.4 F

## 2018-10-01 DIAGNOSIS — J02.9 PHARYNGITIS, UNSPECIFIED ETIOLOGY: Primary | ICD-10-CM

## 2018-10-01 PROBLEM — E66.01 SEVERE OBESITY (HCC): Status: ACTIVE | Noted: 2018-10-01

## 2018-10-01 LAB
S PYO AG THROAT QL: NEGATIVE
VALID INTERNAL CONTROL?: YES

## 2018-10-01 RX ORDER — AMOXICILLIN 875 MG/1
875 TABLET, FILM COATED ORAL 2 TIMES DAILY
Qty: 20 TAB | Refills: 0 | Status: SHIPPED | OUTPATIENT
Start: 2018-10-01 | End: 2018-10-11

## 2018-10-01 NOTE — PROGRESS NOTES
Subjective:   Tena Russo is a 46 y.o. female who complains of congestion, sore throat, dry cough, myalgias and headache for 3-4 days, gradually worsening since that time. Granddaughter has strep. She denies a history of shortness of breath and wheezing. Evaluation to date: none. Treatment to date: OTC products. Patient does not smoke cigarettes. Relevant PMH: No pertinent additional PMH. Patient Active Problem List    Diagnosis Date Noted    Severe obesity ([address] 10/01/2018    Trouble in sleeping     Hypertension     Anxiety     Vitamin D deficiency     Vertigo      Current Outpatient Prescriptions   Medication Sig Dispense Refill    lisinopril (PRINIVIL, ZESTRIL) 40 mg tablet Take 1 Tab by mouth daily. 90 Tab 1    VITAMIN D2 50,000 unit capsule Take 1 Cap by mouth every seven (7) days. 12 Cap 1    melatonin 5 mg cap capsule Take 5 mg by mouth nightly.  ALPRAZolam (XANAX) 0.5 mg tablet Take 1 Tab by mouth as needed (Vertigo). Max Daily Amount: 48 mg. 30 Tab 1     Allergies   Allergen Reactions    Shellfish Derived Diarrhea and Nausea and Vomiting    Sulfa (Sulfonamide Antibiotics) Hives        Review of Systems  Pertinent items are noted in HPI. Objective:     Visit Vitals    BP (!) 142/98 (BP 1 Location: Left arm, BP Patient Position: Sitting)  Comment: manual    Pulse 79    Temp 97.4 °F (36.3 °C) (Oral)    Resp 20    Ht 5' 6\" (1.676 m)    Wt 228 lb 12.8 oz (103.8 kg)    SpO2 97%    BMI 36.93 kg/m2     General:  alert, cooperative, no distress   Eyes: negative   Ears: normal TM's and external ear canals AU   Sinuses: Normal paranasal sinuses without tenderness   Mouth:  abnormal findings: moderate oropharyngeal erythema   Neck: supple, symmetrical, trachea midline and no adenopathy. Heart: S1 and S2 normal, no murmurs noted.     Lungs: clear to auscultation bilaterally   Abdomen:         Results for orders placed or performed in visit on 10/01/18   AMB POC RAPID STREP A Result Value Ref Range    VALID INTERNAL CONTROL POC Yes     Group A Strep Ag Negative Negative       Assessment/Plan:         ICD-10-CM ICD-9-CM    1. Pharyngitis, unspecified etiology J02.9 462 AMB POC RAPID STREP A      amoxicillin (AMOXIL) 875 mg tablet   .

## 2018-10-01 NOTE — MR AVS SNAPSHOT
[facility] 
 
 
 [facility] [address] [facility] 
[phone] Patient: Jeffery Hinkle MRN: [id number] :1965 Visit Information Date & Time Provider Department Dept. Phone Encounter #  
   2:47 PM Leonel Alejandra [phone] [id number] Upcoming Health Maintenance Date Due COLONOSCOPY 1982 PAP AKA CERVICAL CYTOLOGY 1985 Shingrix Vaccine Age 50> (1 of 2) 2014 Influenza Age 5 to Adult 2018 BREAST CANCER SCRN MAMMOGRAM 2019 DTaP/Tdap/Td series (2 - Td) 2028 Allergies as of 10/1/2018  Review Complete On:  By: Ani Chaves MD  
  
 Severity Noted Reaction Type Reactions Shellfish Derived  2018    Diarrhea, Nausea and Vomiting  
 Sulfa (Sulfonamide Antibiotics)  2018    Hives Current Immunizations  Reviewed on 2018 Name Date DTP 2012 Influenza Vaccine (Quad) PF 2018 Pneumococcal Conjugate (PCV-13) 3/17/2017 Tdap 2018 Zoster Vaccine, Live 2017 Not reviewed this visit You Were Diagnosed With   
  
 Codes Comments Pharyngitis, unspecified etiology    -  Primary ICD-10-CM: J02.9 ICD-9-CM: 787 Vitals BP Pulse Temp Resp Height(growth percentile) Weight(growth percentile) (!) 142/98 (BP 1 Location: Left arm, BP Patient Position: Sitting) 79 97.4 °F (36.3 °C) (Oral) 20 5' 6\" (1.676 m) 228 lb 12.8 oz (103.8 kg) SpO2 BMI OB Status Smoking Status 97% 36.93 kg/m2 Menopause Never Smoker BMI and BSA Data Body Mass Index Body Surface Area  
 36.93 kg/m 2 2.2 m 2 Preferred Pharmacy Pharmacy Name Phone [id number] [address] [phone] Your Updated Medication List  
  
   
This list is accurate as of 10/1/18  3:02 PM.  Always use your most recent med list.  
  
  
  
  
 ALPRAZolam 0.5 mg tablet Commonly known as:  Roddie Finely Take 1 Tab by mouth as needed (Vertigo). Max Daily Amount: 48 mg.  
  
 amoxicillin 875 mg tablet Commonly known as:  AMOXIL Take 1 Tab by mouth two (2) times a day for 10 days. lisinopril 40 mg tablet Commonly known as:  Bonnita Silk Take 1 Tab by mouth daily. melatonin 5 mg Cap capsule Take 5 mg by mouth nightly. VITAMIN D2 50,000 unit capsule Generic drug:  ergocalciferol Take 1 Cap by mouth every seven (7) days. Prescriptions Sent to Pharmacy Refills  
 amoxicillin (AMOXIL) 875 mg tablet 0 Sig: Take 1 Tab by mouth two (2) times a day for 10 days. Class: Normal  
 Pharmacy: [facility] [address], [address] [place] [address] Ph #: [phone] Route: Oral  
  
We Performed the Following AMB POC RAPID STREP A [66840 CPT(R)] Introducing [facility] SERVICES! Dear Anna Loera: 
Thank you for requesting a [employer] account. Our records indicate that you already have an active [employer] account. You can access your account anytime at [email or web address] Did you know that you can access your hospital and ER discharge instructions at any time in [employer]? You can also review all of your test results from your hospital stay or ER visit. Additional Information If you have questions, please visit the Frequently Asked Questions section of the [employer] website at [email or web address] Remember, [employer] is NOT to be used for urgent needs. For medical emergencies, dial 911. Now available from your iPhone and Android! Please provide this summary of care documentation to your next provider. Your primary care clinician is listed as Jose Bush. If you have any questions after today's visit, please call [phone].

## 2018-10-01 NOTE — PROGRESS NOTES
Identified pt with two pt identifiers(name and ). Chief Complaint   Patient presents with    Fever     started sat morning - 99.8     Sore Throat    Back Pain    Cold Symptoms    Fatigue        Health Maintenance Due   Topic    COLONOSCOPY     PAP AKA CERVICAL CYTOLOGY     Shingrix Vaccine Age 49> (1 of 2)    Influenza Age 5 to Adult        Wt Readings from Last 3 Encounters:   10/01/18 228 lb 12.8 oz (103.8 kg)   18 226 lb (102.5 kg)   18 225 lb (102.1 kg)     Temp Readings from Last 3 Encounters:   10/01/18 97.4 °F (36.3 °C) (Oral)   18 98.2 °F (36.8 °C)   18 97.5 °F (36.4 °C) (Oral)     BP Readings from Last 3 Encounters:   10/01/18 (!) 142/98   18 153/78   18 132/82     Pulse Readings from Last 3 Encounters:   10/01/18 79   18 74   18 73         Learning Assessment:  :     Learning Assessment 10/1/2018   PRIMARY LEARNER Patient   HIGHEST LEVEL OF EDUCATION - PRIMARY LEARNER  GRADUATED HIGH SCHOOL OR GED   BARRIERS PRIMARY LEARNER NONE   CO-LEARNER CAREGIVER No   PRIMARY LANGUAGE ENGLISH   LEARNER PREFERENCE PRIMARY DEMONSTRATION   ANSWERED BY self   RELATIONSHIP SELF       Depression Screening:  :     PHQ over the last two weeks 2018   Little interest or pleasure in doing things Not at all   Feeling down, depressed, irritable, or hopeless Not at all   Total Score PHQ 2 0       Fall Risk Assessment:  :     No flowsheet data found. Abuse Screening:  :     Abuse Screening Questionnaire 2018   Do you ever feel afraid of your partner? N   Are you in a relationship with someone who physically or mentally threatens you? N   Is it safe for you to go home? Y       Coordination of Care Questionnaire:  :     1) Have you been to an emergency room, urgent care clinic since your last visit?  yes Lorin Bassem 18 stomach   Hospitalized since your last visit? no             2) Have you seen or consulted any other health care providers outside of [facility] [address] since your last visit? no  (Include any pap smears or colon screenings in this section.)    3) Do you have an Advance Directive on file? no  Are you interested in receiving information about Advance Directives? no    Patient is accompanied by spouse I have received verbal consent from [facility] to discuss any/all medical information while they are present in the room. Reviewed record in preparation for visit and have obtained necessary documentation. Medication reconciliation up to date and corrected with patient at this time.      Results for orders placed or performed in visit on 10/01/18   AMB POC RAPID STREP A   Result Value Ref Range    VALID INTERNAL CONTROL POC Yes     Group A Strep Ag Negative Negative

## 2018-10-22 ENCOUNTER — OFFICE VISIT (OUTPATIENT)
Dept: FAMILY MEDICINE CLINIC | Age: 53
End: 2018-10-22

## 2018-10-22 VITALS
HEART RATE: 100 BPM | BODY MASS INDEX: 36.64 KG/M2 | HEIGHT: 66 IN | DIASTOLIC BLOOD PRESSURE: 90 MMHG | OXYGEN SATURATION: 96 % | RESPIRATION RATE: 20 BRPM | SYSTOLIC BLOOD PRESSURE: 138 MMHG | TEMPERATURE: 98.5 F | WEIGHT: 228 LBS

## 2018-10-22 DIAGNOSIS — R68.89 FLU-LIKE SYMPTOMS: ICD-10-CM

## 2018-10-22 DIAGNOSIS — J01.90 SUBACUTE SINUSITIS, UNSPECIFIED LOCATION: Primary | ICD-10-CM

## 2018-10-22 LAB
FLUAV+FLUBV AG NOSE QL IA.RAPID: NEGATIVE POS/NEG
FLUAV+FLUBV AG NOSE QL IA.RAPID: NEGATIVE POS/NEG
VALID INTERNAL CONTROL?: YES

## 2018-10-22 RX ORDER — LEVOFLOXACIN 500 MG/1
500 TABLET, FILM COATED ORAL DAILY
Qty: 10 TAB | Refills: 0 | Status: SHIPPED | OUTPATIENT
Start: 2018-10-22 | End: 2018-11-01

## 2018-10-22 NOTE — LETTER
NOTIFICATION RETURN TO WORK / SCHOOL 
 
10/22/2018 12:46 PM 
 
Ms. Hilary Salgado 
[id number] [place] [phone] To Whom It May Concern: 
 
Hilary Salgado is currently under the care of [address] [place] [address]. She will return to work on: 10/24/2018. If there are questions or concerns please have the patient contact our office. Sincerely, Mary Shoemaker MD

## 2018-10-22 NOTE — PROGRESS NOTES
Identified pt with two pt identifiers(name and ). Chief Complaint   Patient presents with    Fever     100.1 this morning - this is day 4    Cough    Sinus Infection    Generalized Body Aches        Health Maintenance Due   Topic    COLONOSCOPY     PAP AKA CERVICAL CYTOLOGY     Shingrix Vaccine Age 49> (1 of 2)    Influenza Age 5 to Adult        Wt Readings from Last 3 Encounters:   10/22/18 228 lb (103.4 kg)   10/01/18 228 lb 12.8 oz (103.8 kg)   18 226 lb (102.5 kg)     Temp Readings from Last 3 Encounters:   10/22/18 98.5 °F (36.9 °C) (Oral)   10/01/18 97.4 °F (36.3 °C) (Oral)   18 98.2 °F (36.8 °C)     BP Readings from Last 3 Encounters:   10/22/18 138/90   10/01/18 (!) 142/98   18 153/78     Pulse Readings from Last 3 Encounters:   10/22/18 100   10/01/18 79   18 74         Learning Assessment:  :     Learning Assessment 10/1/2018   PRIMARY LEARNER Patient   HIGHEST LEVEL OF EDUCATION - PRIMARY LEARNER  GRADUATED HIGH SCHOOL OR GED   BARRIERS PRIMARY LEARNER NONE   CO-LEARNER CAREGIVER No   PRIMARY LANGUAGE ENGLISH   LEARNER PREFERENCE PRIMARY DEMONSTRATION   ANSWERED BY self   RELATIONSHIP SELF       Depression Screening:  :     PHQ over the last two weeks 2018   Little interest or pleasure in doing things Not at all   Feeling down, depressed, irritable, or hopeless Not at all   Total Score PHQ 2 0       Fall Risk Assessment:  :     No flowsheet data found. Abuse Screening:  :     Abuse Screening Questionnaire 2018   Do you ever feel afraid of your partner? N   Are you in a relationship with someone who physically or mentally threatens you? N   Is it safe for you to go home?  Y       Coordination of Care Questionnaire:  :     1) Have you been to an emergency room, urgent care clinic since your last visit? no   Hospitalized since your last visit? no             2) Have you seen or consulted any other health care providers outside of [address] [place] [address] since your last visit? no  (Include any pap smears or colon screenings in this section.)    3) Do you have an Advance Directive on file? no  Are you interested in receiving information about Advance Directives? no    Reviewed record in preparation for visit and have obtained necessary documentation. Medication reconciliation up to date and corrected with patient at this time.      Results for orders placed or performed in visit on 10/22/18   AMB POC PROSPER INFLUENZA A/B TEST   Result Value Ref Range    VALID INTERNAL CONTROL POC Yes     Influenza A Ag POC Negative Negative Pos/Neg    Influenza B Ag POC Negative Negative Pos/Neg

## 2018-10-22 NOTE — PATIENT INSTRUCTIONS
Sinusitis: Care Instructions  Your Care Instructions    Sinusitis is an infection of the lining of the sinus cavities in your head. Sinusitis often follows a cold. It causes pain and pressure in your head and face. In most cases, sinusitis gets better on its own in 1 to 2 weeks. But some mild symptoms may last for several weeks. Sometimes antibiotics are needed. Follow-up care is a key part of your treatment and safety. Be sure to make and go to all appointments, and call your doctor if you are having problems. It's also a good idea to know your test results and keep a list of the medicines you take. How can you care for yourself at home? · Take an over-the-counter pain medicine, such as acetaminophen (Tylenol), ibuprofen (Advil, Motrin), or naproxen (Aleve). Read and follow all instructions on the label. · If the doctor prescribed antibiotics, take them as directed. Do not stop taking them just because you feel better. You need to take the full course of antibiotics. · Be careful when taking over-the-counter cold or flu medicines and Tylenol at the same time. Many of these medicines have acetaminophen, which is Tylenol. Read the labels to make sure that you are not taking more than the recommended dose. Too much acetaminophen (Tylenol) can be harmful. · Breathe warm, moist air from a steamy shower, a hot bath, or a sink filled with hot water. Avoid cold, dry air. Using a humidifier in your home may help. Follow the directions for cleaning the machine. · Use saline (saltwater) nasal washes to help keep your nasal passages open and wash out mucus and bacteria. You can buy saline nose drops at a grocery store or drugstore. Or you can make your own at home by adding 1 teaspoon of salt and 1 teaspoon of baking soda to 2 cups of distilled water. If you make your own, fill a bulb syringe with the solution, insert the tip into your nostril, and squeeze gently. Luther Osman your nose.   · Put a hot, wet towel or a warm gel pack on your face 3 or 4 times a day for 5 to 10 minutes each time. · Try a decongestant nasal spray like oxymetazoline (Afrin). Do not use it for more than 3 days in a row. Using it for more than 3 days can make your congestion worse. When should you call for help? Call your doctor now or seek immediate medical care if:    · You have new or worse swelling or redness in your face or around your eyes.     · You have a new or higher fever.    Watch closely for changes in your health, and be sure to contact your doctor if:    · You have new or worse facial pain.     · The mucus from your nose becomes thicker (like pus) or has new blood in it.     · You are not getting better as expected. Where can you learn more? Go to http://kiran-travis.info/. Enter X830 in the search box to learn more about \"Sinusitis: Care Instructions. \"  Current as of: March 28, 2018  Content Version: 11.8  © 8765-8382 Healthwise, Incorporated. Care instructions adapted under license by [employer] (which disclaims liability or warranty for this information). If you have questions about a medical condition or this instruction, always ask your healthcare professional. [address] any warranty or liability for your use of this information.

## 2018-10-22 NOTE — PROGRESS NOTES
Subjective:   Santino Ramirez is a 46 y.o. female who complains of congestion, dry cough, myalgias, fever and chills for 4 days, gradually worsening since that time. She denies a history of chest pain, shortness of breath and wheezing. Evaluation to date: none. Took Amoxicillin 3 weeks ago. Treatment to date: OTC products. Patient does not smoke cigarettes. Relevant PMH: No pertinent additional PMH. Patient Active Problem List    Diagnosis Date Noted    Severe obesity ([address] 10/01/2018    Trouble in sleeping     Hypertension     Anxiety     Vitamin D deficiency     Vertigo      Allergies   Allergen Reactions    Shellfish Derived Diarrhea and Nausea and Vomiting    Sulfa (Sulfonamide Antibiotics) Hives        Review of Systems  Pertinent items are noted in HPI. Objective:     Visit Vitals  /90 (BP 1 Location: Right arm, BP Patient Position: Sitting) Comment: manual   Pulse 100   Temp 98.5 °F (36.9 °C) (Oral)   Resp 20   Ht 5' 6\" (1.676 m)   Wt 228 lb (103.4 kg)   SpO2 96%   BMI 36.80 kg/m²     General:  alert, cooperative, no distress   Eyes: negative   Ears: normal TM's and external ear canals AU   Sinuses: tenderness over bilateral maxillary   Mouth:  Lips, mucosa, and tongue normal. Teeth and gums normal   Neck: supple, symmetrical, trachea midline and mild anterior cervical adenopathy. Heart: S1 and S2 normal, no murmurs noted. Lungs: clear to auscultation bilaterally   Abdomen:         Assessment/Plan:   sinusitis      ICD-10-CM ICD-9-CM    1. Flu-like symptoms R68.89 780.99 AMB POC PROSPER INFLUENZA A/B TEST   2. Subacute sinusitis, unspecified location J01.90 461.9 levoFLOXacin (LEVAQUIN) 500 mg tablet   .

## 2018-12-03 RX ORDER — ERGOCALCIFEROL 1.25 MG/1
CAPSULE ORAL
Qty: 12 CAP | Refills: 1 | Status: SHIPPED | OUTPATIENT
Start: 2018-12-03 | End: 2019-05-20 | Stop reason: SDUPTHER

## 2019-01-04 RX ORDER — LISINOPRIL 40 MG/1
TABLET ORAL
Qty: 90 TAB | Refills: 1 | Status: SHIPPED | OUTPATIENT
Start: 2019-01-04 | End: 2019-07-03 | Stop reason: SDUPTHER

## 2019-01-04 NOTE — TELEPHONE ENCOUNTER
Forwarded from call center. ..           Pt got a notice from [facility] that the prescription for lisinopril has not been approve. [facility] is waiting for approval/ new electronic script for 90 days.  Pts contact [phone].

## 2019-02-04 ENCOUNTER — CLINICAL SUPPORT (OUTPATIENT)
Dept: FAMILY MEDICINE CLINIC | Age: 54
End: 2019-02-04

## 2019-02-04 VITALS
BODY MASS INDEX: 36.64 KG/M2 | RESPIRATION RATE: 16 BRPM | WEIGHT: 228 LBS | SYSTOLIC BLOOD PRESSURE: 139 MMHG | OXYGEN SATURATION: 96 % | DIASTOLIC BLOOD PRESSURE: 89 MMHG | HEIGHT: 66 IN | HEART RATE: 83 BPM | TEMPERATURE: 98.2 F

## 2019-02-04 DIAGNOSIS — Z23 ENCOUNTER FOR IMMUNIZATION: Primary | ICD-10-CM

## 2019-02-04 RX ORDER — MELATONIN
50000
COMMUNITY
Start: 2018-06-18 | End: 2019-04-10

## 2019-02-04 NOTE — PROGRESS NOTES
Identified pt with two pt identifiers(name and ). Chief Complaint   Patient presents with    Immunization/Injection     here for influenza vaccine        Health Maintenance Due   Topic    COLONOSCOPY     PAP AKA CERVICAL CYTOLOGY     Shingrix Vaccine Age 49> (1 of 2)    Influenza Age 5 to Adult     BREAST CANCER SCRN MAMMOGRAM        Wt Readings from Last 3 Encounters:   19 228 lb (103.4 kg)   10/22/18 228 lb (103.4 kg)   10/01/18 228 lb 12.8 oz (103.8 kg)     Temp Readings from Last 3 Encounters:   19 98.2 °F (36.8 °C) (Oral)   10/22/18 98.5 °F (36.9 °C) (Oral)   10/01/18 97.4 °F (36.3 °C) (Oral)     BP Readings from Last 3 Encounters:   19 139/89   10/22/18 138/90   10/01/18 (!) 142/98     Pulse Readings from Last 3 Encounters:   19 83   10/22/18 100   10/01/18 79         Learning Assessment:  :     Learning Assessment 10/1/2018   PRIMARY LEARNER Patient   HIGHEST LEVEL OF EDUCATION - PRIMARY LEARNER  GRADUATED HIGH SCHOOL OR GED   BARRIERS PRIMARY LEARNER NONE   CO-LEARNER CAREGIVER No   PRIMARY LANGUAGE ENGLISH   LEARNER PREFERENCE PRIMARY DEMONSTRATION   ANSWERED BY self   RELATIONSHIP SELF       Depression Screening:  :     PHQ over the last two weeks 2019   Little interest or pleasure in doing things Not at all   Feeling down, depressed, irritable, or hopeless Not at all   Total Score PHQ 2 0       Fall Risk Assessment:  :     No flowsheet data found. Abuse Screening:  :     Abuse Screening Questionnaire 2019   Do you ever feel afraid of your partner? N N   Are you in a relationship with someone who physically or mentally threatens you? N N   Is it safe for you to go home?  Y Y       Coordination of Care Questionnaire:  :     1) Have you been to an emergency room, urgent care clinic since your last visit? no   Hospitalized since your last visit? no             2) Have you seen or consulted any other health care providers outside of [facility] since your last visit? no  (Include any pap smears or colon screenings in this section.)    3) Do you have an Advance Directive on file? no  Are you interested in receiving information about Advance Directives? no    Patient is accompanied by . I have received verbal consent from Paramjit Stewart to discuss any/all medical information while they are present in the room. Reviewed record in preparation for visit and have obtained necessary documentation. Medication reconciliation up to date and corrected with patient at this time. Order for POC influenza placed per [facility]'s verbal order. Paramjit Stewart is a 46 y.o. female who presents for routine influenza immunization. She denies any symptoms, reactions or allergies that would exclude them from being immunized today. Risks and adverse reactions were discussed and the VIS was given to them. All questions were addressed. She was observed for 05 min post injection. There were no reactions observed.     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

## 2019-02-04 NOTE — PATIENT INSTRUCTIONS
Vaccine Information Statement    Influenza (Flu) Vaccine (Inactivated or Recombinant): What you need to know    Many Vaccine Information Statements are available in Thai and other languages. See www.immunize.org/vis  Hojas de Información Sobre Vacunas están disponibles en Español y en muchos otros idiomas. Visite www.immunize.org/vis    1. Why get vaccinated? Influenza (flu) is a contagious disease that spreads around the [place] every year, usually between October and May. Flu is caused by influenza viruses, and is spread mainly by coughing, sneezing, and close contact. Anyone can get flu. Flu strikes suddenly and can last several days. Symptoms vary by age, but can include:   fever/chills   sore throat   muscle aches   fatigue   cough   headache    runny or stuffy nose    Flu can also lead to pneumonia and blood infections, and cause diarrhea and seizures in children. If you have a medical condition, such as heart or lung disease, flu can make it worse. Flu is more dangerous for some people. Infants and young children, people 72years of age and older, pregnant women, and people with certain health conditions or a weakened immune system are at greatest risk. Each year thousands of people in the [facility] die from flu, and many more are hospitalized. Flu vaccine can:   keep you from getting flu,   make flu less severe if you do get it, and   keep you from spreading flu to your family and other people. 2. Inactivated and recombinant flu vaccines    A dose of flu vaccine is recommended every flu season. Children 6 months through 6years of age may need two doses during the same flu season. Everyone else needs only one dose each flu season.        Some inactivated flu vaccines contain a very small amount of a mercury-based preservative called thimerosal. Studies have not shown thimerosal in vaccines to be harmful, but flu vaccines that do not contain thimerosal are available. There is no live flu virus in flu shots. They cannot cause the flu. There are many flu viruses, and they are always changing. Each year a new flu vaccine is made to protect against three or four viruses that are likely to cause disease in the upcoming flu season. But even when the vaccine doesnt exactly match these viruses, it may still provide some protection    Flu vaccine cannot prevent:   flu that is caused by a virus not covered by the vaccine, or   illnesses that look like flu but are not. It takes about 2 weeks for protection to develop after vaccination, and protection lasts through the flu season. 3. Some people should not get this vaccine    Tell the person who is giving you the vaccine:     If you have any severe, life-threatening allergies. If you ever had a life-threatening allergic reaction after a dose of flu vaccine, or have a severe allergy to any part of this vaccine, you may be advised not to get vaccinated. Most, but not all, types of flu vaccine contain a small amount of egg protein.  If you ever had Guillain-Barré Syndrome (also called GBS). Some people with a history of GBS should not get this vaccine. This should be discussed with your doctor.  If you are not feeling well. It is usually okay to get flu vaccine when you have a mild illness, but you might be asked to come back when you feel better. 4. Risks of a vaccine reaction    With any medicine, including vaccines, there is a chance of reactions. These are usually mild and go away on their own, but serious reactions are also possible. Most people who get a flu shot do not have any problems with it.      Minor problems following a flu shot include:    soreness, redness, or swelling where the shot was given     hoarseness   sore, red or itchy eyes   cough   fever   aches   headache   itching   fatigue  If these problems occur, they usually begin soon after the shot and last 1 or 2 days. More serious problems following a flu shot can include the following:     There may be a small increased risk of Guillain-Barré Syndrome (GBS) after inactivated flu vaccine. This risk has been estimated at 1 or 2 additional cases per million people vaccinated. This is much lower than the risk of severe complications from flu, which can be prevented by flu vaccine.  Young children who get the flu shot along with pneumococcal vaccine (PCV13) and/or DTaP vaccine at the same time might be slightly more likely to have a seizure caused by fever. Ask your doctor for more information. Tell your doctor if a child who is getting flu vaccine has ever had a seizure. Problems that could happen after any injected vaccine:      People sometimes faint after a medical procedure, including vaccination. Sitting or lying down for about 15 minutes can help prevent fainting, and injuries caused by a fall. Tell your doctor if you feel dizzy, or have vision changes or ringing in the ears.  Some people get severe pain in the shoulder and have difficulty moving the arm where a shot was given. This happens very rarely.  Any medication can cause a severe allergic reaction. Such reactions from a vaccine are very rare, estimated at about 1 in a million doses, and would happen within a few minutes to a few hours after the vaccination. As with any medicine, there is a very remote chance of a vaccine causing a serious injury or death. The safety of vaccines is always being monitored. For more information, visit: www.cdc.gov/vaccinesafety/    5. What if there is a serious reaction? What should I look for?  Look for anything that concerns you, such as signs of a severe allergic reaction, very high fever, or unusual behavior.     Signs of a severe allergic reaction can include hives, swelling of the face and throat, difficulty breathing, a fast heartbeat, dizziness, and weakness - usually within a few minutes to a few hours after the vaccination. What should I do?  If you think it is a severe allergic reaction or other emergency that cant wait, call 9-1-1 and get the person to the nearest hospital. Otherwise, call your doctor.  Reactions should be reported to the Vaccine Adverse Event Reporting System (VAERS). Your doctor should file this report, or you can do it yourself through  the VAERS web site at www.vaers. [facility].gov, or by calling 6-480.684.4902. VAERS does not give medical advice. 6. The National Vaccine Injury Compensation Program    The [facility] Vaccine Injury Compensation Program (VICP) is a federal program that was created to compensate people who may have been injured by certain vaccines. Persons who believe they may have been injured by a vaccine can learn about the program and about filing a claim by calling 0-386.600.5941 or visiting the [employer] website at www.[facility].gov/vaccinecompensation. There is a time limit to file a claim for compensation. 7. How can I learn more?  Ask your healthcare provider. He or she can give you the vaccine package insert or suggest other sources of information.  Call your local or state health department.  Contact the Centers for Disease Control and Prevention (CDC):  - Call 5-396.770.5762 (1-800-CDC-INFO) or  - Visit CDCs website at www.cdc.gov/flu    Vaccine Information Statement   Inactivated Influenza Vaccine   8/7/2015  42 [address] [employer] Cons [id number]    Department of Health and Human Services  Centers for Disease Control and Prevention    Office Use Only

## 2019-02-07 ENCOUNTER — OFFICE VISIT (OUTPATIENT)
Dept: FAMILY MEDICINE CLINIC | Age: 54
End: 2019-02-07

## 2019-02-07 VITALS
TEMPERATURE: 98.1 F | BODY MASS INDEX: 36.16 KG/M2 | RESPIRATION RATE: 18 BRPM | WEIGHT: 225 LBS | HEART RATE: 78 BPM | SYSTOLIC BLOOD PRESSURE: 140 MMHG | DIASTOLIC BLOOD PRESSURE: 88 MMHG | HEIGHT: 66 IN | OXYGEN SATURATION: 98 %

## 2019-02-07 DIAGNOSIS — J06.9 URI WITH COUGH AND CONGESTION: ICD-10-CM

## 2019-02-07 DIAGNOSIS — R68.89 FLU-LIKE SYMPTOMS: ICD-10-CM

## 2019-02-07 DIAGNOSIS — J01.90 ACUTE RHINOSINUSITIS: Primary | ICD-10-CM

## 2019-02-07 LAB
QUICKVUE INFLUENZA TEST: NEGATIVE
VALID INTERNAL CONTROL?: YES

## 2019-02-07 RX ORDER — LEVOFLOXACIN 500 MG/1
500 TABLET, FILM COATED ORAL DAILY
Qty: 10 TAB | Refills: 0 | Status: SHIPPED | OUTPATIENT
Start: 2019-02-07 | End: 2019-02-17

## 2019-02-07 NOTE — PROGRESS NOTES
Subjective:      Kerrie Garcia is a 46 y.o. female here with complaint of congestion, left sinus pain and pressure, body aches, fever, chills, headache, harsh raspy cough for 1 day. She denies a history of nausea, vomiting, diarrhea. Positive sick contacts - her . She did receive her influenza vaccine on 2/4/19. Appetite is decreased, but she is trying to increase her fluid intake. Evaluation to date: none   Treatment to date: Coricidin, Tylenol (last dose ~1 hour ago), Flonase       Current Outpatient Medications   Medication Sig Dispense Refill    cholecalciferol (VITAMIN D3) 1,000 unit tablet Take 50,000 Units by mouth every seven (7) days.  lisinopril (PRINIVIL, ZESTRIL) 40 mg tablet TAKE 1 TABLET DAILY 90 Tab 1    VITAMIN D2 50,000 unit capsule TAKE 1 CAPSULE EVERY 7 DAYS 12 Cap 1    ALPRAZolam (XANAX) 0.5 mg tablet Take 1 Tab by mouth as needed (Vertigo). Max Daily Amount: 48 mg. 30 Tab 1    melatonin 5 mg cap capsule Take 5 mg by mouth nightly. Allergies   Allergen Reactions    Shellfish Derived Diarrhea and Nausea and Vomiting    Sulfa (Sulfonamide Antibiotics) Hives       Past Medical History:   Diagnosis Date    Anxiety     Hiatal hernia     Hypertension     Trouble in sleeping     Vertigo     Vitamin D deficiency        Social History     Tobacco Use    Smoking status: Never Smoker    Smokeless tobacco: Never Used   Substance Use Topics    Alcohol use: Yes     Comment: socially        Review of Systems  Pertinent items are noted in HPI.      Objective:     Visit Vitals  /88 (BP 1 Location: Right arm, BP Patient Position: Sitting) Comment: Manual   Pulse 78   Temp 98.1 °F (36.7 °C) (Oral) Comment: .   Resp 18   Ht 5' 6\" (1.676 m)   Wt 225 lb (102.1 kg)   SpO2 98%   BMI 36.32 kg/m²      General appearance - alert, ill appearing, and in no distress  Eyes - pupils equal and reactive, extraocular eye movements intact, sclera anicteric  Ears - bilateral TM's and external ear canals normal  Nose - mucosal congestion, mucosal erythema and sinus tenderness noted left maxillary sinus   Oropharyngx - mucous membranes moist, pharynx normal without lesions  Neck - bilateral symmetric anterior adenopathy  Chest - clear to auscultation, no wheezes, rales or rhonchi, symmetric air entry, no tachypnea, retractions or cyanosis  Heart - normal rate, regular rhythm, normal S1, S2, no murmurs, rubs, clicks or gallops    Recent Results (from the past 12 hour(s))   AMB POC RAPID INFLUENZA TEST    Collection Time: 02/07/19  2:37 PM   Result Value Ref Range    VALID INTERNAL CONTROL POC Yes     QuickVue Influenza test Negative Negative       Assessment/Plan:   Doron Rosa is a 46 y.o. female seen for:     1. Acute rhinosinusitis  - levoFLOXacin (LEVAQUIN) 500 mg tablet; Take 1 Tab by mouth daily for 10 days. Dispense: 10 Tab; Refill: 0  - Symptomatic therapy suggested: push fluids, rest, gargle warm salt water, use acetaminophen, ibuprofen, cough suppressant of choice prn and apply heat to sinuses prn  - Letter for work provided     2. URI with cough and congestion  - levoFLOXacin (LEVAQUIN) 500 mg tablet; Take 1 Tab by mouth daily for 10 days. Dispense: 10 Tab; Refill: 0    3. Flu-like symptoms: influenza testing negative  - AMB POC RAPID INFLUENZA TEST    I have discussed the diagnosis with the patient and the intended plan as seen in the above orders. The patient has received an after-visit summary and questions were answered concerning future plans. I have discussed medication side effects and warnings with the patient as well. Patient verbalizes understanding of plan of care and denies further questions or concerns at this time. Informed patient to return to the office if symptoms worsen or if new symptoms arise. Follow-up Disposition:  Return if symptoms worsen or fail to improve.

## 2019-02-07 NOTE — PROGRESS NOTES
Identified pt with two pt identifiers(name and ). Chief Complaint   Patient presents with    Cough     Symptoms began yesterday.  Generalized Body Aches    Fever     99.5    Nasal Congestion        Health Maintenance Due   Topic    COLONOSCOPY     PAP AKA CERVICAL CYTOLOGY     Shingrix Vaccine Age 49> (1 of 2)    BREAST CANCER SCRN MAMMOGRAM        Wt Readings from Last 3 Encounters:   19 225 lb (102.1 kg)   19 228 lb (103.4 kg)   10/22/18 228 lb (103.4 kg)     Temp Readings from Last 3 Encounters:   19 98.1 °F (36.7 °C) (Oral)   19 98.2 °F (36.8 °C) (Oral)   10/22/18 98.5 °F (36.9 °C) (Oral)     BP Readings from Last 3 Encounters:   19 140/88   19 139/89   10/22/18 138/90     Pulse Readings from Last 3 Encounters:   19 78   19 83   10/22/18 100         Learning Assessment:  :     Learning Assessment 10/1/2018   PRIMARY LEARNER Patient   HIGHEST LEVEL OF EDUCATION - PRIMARY LEARNER  GRADUATED HIGH SCHOOL OR GED   BARRIERS PRIMARY LEARNER NONE   CO-LEARNER CAREGIVER No   PRIMARY LANGUAGE ENGLISH   LEARNER PREFERENCE PRIMARY DEMONSTRATION   ANSWERED BY self   RELATIONSHIP SELF       Depression Screening:  :     PHQ over the last two weeks 2019   Little interest or pleasure in doing things Not at all   Feeling down, depressed, irritable, or hopeless Not at all   Total Score PHQ 2 0       Fall Risk Assessment:  :     No flowsheet data found. Abuse Screening:  :     Abuse Screening Questionnaire 2019   Do you ever feel afraid of your partner? N N   Are you in a relationship with someone who physically or mentally threatens you? N N   Is it safe for you to go home?  Y Y       Coordination of Care Questionnaire:  :     1) Have you been to an emergency room, urgent care clinic since your last visit? no   Hospitalized since your last visit? no             2) Have you seen or consulted any other health care providers outside of [facility] since your last visit? no  (Include any pap smears or colon screenings in this section.)    3) Do you have an Advance Directive on file? no  Are you interested in receiving information about Advance Directives? no    Reviewed record in preparation for visit and have obtained necessary documentation. Medication reconciliation up to date and corrected with patient at this time.

## 2019-02-07 NOTE — PATIENT INSTRUCTIONS
Sinusitis: Care Instructions  Your Care Instructions    Sinusitis is an infection of the lining of the sinus cavities in your head. Sinusitis often follows a cold. It causes pain and pressure in your head and face. In most cases, sinusitis gets better on its own in 1 to 2 weeks. But some mild symptoms may last for several weeks. Sometimes antibiotics are needed. Follow-up care is a key part of your treatment and safety. Be sure to make and go to all appointments, and call your doctor if you are having problems. It's also a good idea to know your test results and keep a list of the medicines you take. How can you care for yourself at home? · Take an over-the-counter pain medicine, such as acetaminophen (Tylenol), ibuprofen (Advil, Motrin), or naproxen (Aleve). Read and follow all instructions on the label. · If the doctor prescribed antibiotics, take them as directed. Do not stop taking them just because you feel better. You need to take the full course of antibiotics. · Be careful when taking over-the-counter cold or flu medicines and Tylenol at the same time. Many of these medicines have acetaminophen, which is Tylenol. Read the labels to make sure that you are not taking more than the recommended dose. Too much acetaminophen (Tylenol) can be harmful. · Breathe warm, moist air from a steamy shower, a hot bath, or a sink filled with hot water. Avoid cold, dry air. Using a humidifier in your home may help. Follow the directions for cleaning the machine. · Use saline (saltwater) nasal washes to help keep your nasal passages open and wash out mucus and bacteria. You can buy saline nose drops at a grocery store or drugstore. Or you can make your own at home by adding 1 teaspoon of salt and 1 teaspoon of baking soda to 2 cups of distilled water. If you make your own, fill a bulb syringe with the solution, insert the tip into your nostril, and squeeze gently. [place] your nose.   · Put a hot, wet towel or a warm gel pack on your face 3 or 4 times a day for 5 to 10 minutes each time. · Try a decongestant nasal spray like oxymetazoline (Afrin). Do not use it for more than 3 days in a row. Using it for more than 3 days can make your congestion worse. When should you call for help? Call your doctor now or seek immediate medical care if:    · You have new or worse swelling or redness in your face or around your eyes.     · You have a new or higher fever.    Watch closely for changes in your health, and be sure to contact your doctor if:    · You have new or worse facial pain.     · The mucus from your nose becomes thicker (like pus) or has new blood in it.     · You are not getting better as expected. Where can you learn more? Go to http://kiran-travis.info/. Enter C560 in the search box to learn more about \"Sinusitis: Care Instructions. \"  Current as of: March 27, 2018  Content Version: 11.9  © 4246-4950 [employer]. Care instructions adapted under license by [employer] (which disclaims liability or warranty for this information). If you have questions about a medical condition or this instruction, always ask your healthcare professional. [place] [address] any warranty or liability for your use of this information.

## 2019-02-07 NOTE — LETTER
NOTIFICATION RETURN TO WORK / SCHOOL 
 
2/7/2019 2:49 PM 
 
Ms. Zack Morel 
[phone] [employer] [facility] [phone] To Whom It May Concern: 
 
Zack Morel is currently under the care of [address] [place] [address]. She will return to work/school on: 2/11/19. Please excuse her absence 2//719 - 2/10/19 due to illness. If there are questions or concerns please have the patient contact our office. Sincerely, Francoise Hendrickson MD

## 2019-04-10 ENCOUNTER — TELEPHONE (OUTPATIENT)
Dept: FAMILY MEDICINE CLINIC | Age: 54
End: 2019-04-10

## 2019-04-10 ENCOUNTER — OFFICE VISIT (OUTPATIENT)
Dept: FAMILY MEDICINE CLINIC | Age: 54
End: 2019-04-10

## 2019-04-10 VITALS
TEMPERATURE: 98 F | HEIGHT: 66 IN | HEART RATE: 78 BPM | WEIGHT: 231 LBS | DIASTOLIC BLOOD PRESSURE: 80 MMHG | OXYGEN SATURATION: 98 % | BODY MASS INDEX: 37.12 KG/M2 | RESPIRATION RATE: 18 BRPM | SYSTOLIC BLOOD PRESSURE: 116 MMHG

## 2019-04-10 DIAGNOSIS — R42 VERTIGO: ICD-10-CM

## 2019-04-10 DIAGNOSIS — I10 ESSENTIAL HYPERTENSION: ICD-10-CM

## 2019-04-10 DIAGNOSIS — E66.01 SEVERE OBESITY (HCC): ICD-10-CM

## 2019-04-10 DIAGNOSIS — E55.9 VITAMIN D DEFICIENCY: ICD-10-CM

## 2019-04-10 DIAGNOSIS — Z13.29 SCREENING FOR THYROID DISORDER: ICD-10-CM

## 2019-04-10 DIAGNOSIS — Z00.00 ENCOUNTER FOR ROUTINE ADULT HEALTH EXAMINATION WITHOUT ABNORMAL FINDINGS: Primary | ICD-10-CM

## 2019-04-10 DIAGNOSIS — Z13.220 SCREENING FOR LIPID DISORDERS: ICD-10-CM

## 2019-04-10 RX ORDER — ALPRAZOLAM 0.5 MG/1
0.5 TABLET ORAL AS NEEDED
Qty: 30 TAB | Refills: 1 | Status: SHIPPED | OUTPATIENT
Start: 2019-04-10 | End: 2020-03-23 | Stop reason: SDUPTHER

## 2019-04-10 NOTE — PATIENT INSTRUCTIONS
A Healthy Lifestyle: Care Instructions Your Care Instructions A healthy lifestyle can help you feel good, stay at a healthy weight, and have plenty of energy for both work and play. A healthy lifestyle is something you can share with your whole family. A healthy lifestyle also can lower your risk for serious health problems, such as high blood pressure, heart disease, and diabetes. You can follow a few steps listed below to improve your health and the health of your family. Follow-up care is a key part of your treatment and safety. Be sure to make and go to all appointments, and call your doctor if you are having problems. It's also a good idea to know your test results and keep a list of the medicines you take. How can you care for yourself at home? · Do not eat too much sugar, fat, or fast foods. You can still have dessert and treats now and then. The goal is moderation. · Start small to improve your eating habits. Pay attention to portion sizes, drink less juice and soda pop, and eat more fruits and vegetables. ? Eat a healthy amount of food. A 3-ounce serving of meat, for example, is about the size of a deck of cards. Fill the rest of your plate with vegetables and whole grains. ? Limit the amount of soda and sports drinks you have every day. Drink more water when you are thirsty. ? Eat at least 5 servings of fruits and vegetables every day. It may seem like a lot, but it is not hard to reach this goal. A serving or helping is 1 piece of fruit, 1 cup of vegetables, or 2 cups of leafy, raw vegetables. Have an apple or some carrot sticks as an afternoon snack instead of a candy bar. Try to have fruits and/or vegetables at every meal. 
· Make exercise part of your daily routine. You may want to start with simple activities, such as walking, bicycling, or slow swimming. Try to be active 30 to 60 minutes every day.  You do not need to do all 30 to 60 minutes all at once. For example, you can exercise 3 times a day for 10 or 20 minutes. Moderate exercise is safe for most people, but it is always a good idea to talk to your doctor before starting an exercise program. 
· Keep moving. Tad Lanie the lawn, work in the garden, or [employer] driving school. Take the stairs instead of the elevator at work. · If you smoke, quit. People who smoke have an increased risk for heart attack, stroke, cancer, and other lung illnesses. Quitting is hard, but there are ways to boost your chance of quitting tobacco for good. ? Use nicotine gum, patches, or lozenges. ? Ask your doctor about stop-smoking programs and medicines. ? Keep trying. In addition to reducing your risk of diseases in the future, you will notice some benefits soon after you stop using tobacco. If you have shortness of breath or asthma symptoms, they will likely get better within a few weeks after you quit. · Limit how much alcohol you drink. Moderate amounts of alcohol (up to 2 drinks a day for men, 1 drink a day for women) are okay. But drinking too much can lead to liver problems, high blood pressure, and other health problems. Family health If you have a family, there are many things you can do together to improve your health. · Eat meals together as a family as often as possible. · Eat healthy foods. This includes fruits, vegetables, lean meats and dairy, and whole grains. · Include your family in your fitness plan. Most people think of activities such as jogging or tennis as the way to fitness, but there are many ways you and your family can be more active. Anything that makes you breathe hard and gets your heart pumping is exercise. Here are some tips: 
? Walk to do errands or to take your child to school or the bus. 
? Go for a family bike ride after dinner instead of watching TV. Where can you learn more? Go to http://kiran-travis.info/. Enter C838 in the search box to learn more about \"A Healthy Lifestyle: Care Instructions. \" Current as of: September 11, 2018 Content Version: 11.9 © 2154-6312 [employer]. Care instructions adapted under license by [employer] (which disclaims liability or warranty for this information). If you have questions about a medical condition or this instruction, always ask your healthcare professional. [place] [address] any warranty or liability for your use of this information.

## 2019-04-10 NOTE — PROGRESS NOTES
Identified pt with two pt identifiers(name and ). Chief Complaint Patient presents with  Annual Exam  
 Labs  
  patient is fasting Health Maintenance Due Topic  COLONOSCOPY  PAP AKA CERVICAL CYTOLOGY  Shingrix Vaccine Age 50> (1 of 2)  BREAST CANCER SCRN MAMMOGRAM   
 
 
Wt Readings from Last 3 Encounters:  
04/10/19 231 lb (104.8 kg) 19 225 lb (102.1 kg) 19 228 lb (103.4 kg) Temp Readings from Last 3 Encounters:  
04/10/19 98 °F (36.7 °C) (Oral) 19 98.1 °F (36.7 °C) (Oral) 19 98.2 °F (36.8 °C) (Oral) BP Readings from Last 3 Encounters:  
04/10/19 116/80  
19 140/88  
19 139/89 Pulse Readings from Last 3 Encounters:  
04/10/19 78  
19 78  
19 83 Learning Assessment: 
:  
 
Learning Assessment 10/1/2018 PRIMARY LEARNER Patient HIGHEST LEVEL OF EDUCATION - PRIMARY LEARNER  GRADUATED HIGH SCHOOL OR GED  
BARRIERS PRIMARY LEARNER NONE  
CO-LEARNER CAREGIVER No  
PRIMARY LANGUAGE ENGLISH  
LEARNER PREFERENCE PRIMARY DEMONSTRATION  
ANSWERED BY self RELATIONSHIP SELF Depression Screening: 
:  
 
3 most recent PHQ Screens 2019 Little interest or pleasure in doing things Not at all Feeling down, depressed, irritable, or hopeless Not at all Total Score PHQ 2 0 Fall Risk Assessment: 
:  
 
No flowsheet data found. Abuse Screening: 
:  
 
Abuse Screening Questionnaire 2019 Do you ever feel afraid of your partner? Rosa Abel Are you in a relationship with someone who physically or mentally threatens you? Rosa Abel Is it safe for you to go home? Katty Gomez Coordination of Care Questionnaire: 
:  
 
1) Have you been to an emergency room, urgent care clinic since your last visit? no  
Hospitalized since your last visit? no          
 
2) Have you seen or consulted any other health care providers outside of [address] [place] [address] since your last visit? no  (Include any pap smears or colon screenings in this section.) 3) Do you have an Advance Directive on file? no 
Are you interested in receiving information about Advance Directives? no 
 
Reviewed record in preparation for visit and have obtained necessary documentation. Medication reconciliation up to date and corrected with patient at this time.

## 2019-04-10 NOTE — PROGRESS NOTES
Subjective:  
Charline Kelly is a 46 y.o. female here today for her annual physical exam. She is fasting today. Her gyne and breast care is done elsewhere by her Ob-Gyne physician - Dr. Justine Varma. Health Maintenance: · Colonoscopy: 17, repeat in 10 years · Hepatitis C screenin17, negative · Tdap: 18 · Pneumovax: PCV-13 on 3/17/17 · Zostavax: 17 Additional concerns: - Vertigo - has been previously evaluated by Dr. Antoine Miranda for which she takes intermittent Xanax therpay. She is finding that she still is having episodes, but has greatly improved from previous. Requests medication refill.  
 
- Hypertension - does monitor BP at home with BP in normal ranges. Tolerating current therapy well. - Endorses fatigue, hot hands, insomnia, headaches, arthralgias for which she is concerned about hypothyroidism. Current Outpatient Medications Medication Sig Dispense Refill  lisinopril (PRINIVIL, ZESTRIL) 40 mg tablet TAKE 1 TABLET DAILY 90 Tab 1  VITAMIN D2 50,000 unit capsule TAKE 1 CAPSULE EVERY 7 DAYS 12 Cap 1  
 ALPRAZolam (XANAX) 0.5 mg tablet Take 1 Tab by mouth as needed (Vertigo). Max Daily Amount: 48 mg. 30 Tab 1  
 melatonin 5 mg cap capsule Take 5 mg by mouth nightly. Allergies Allergen Reactions  Shellfish Derived Diarrhea and Nausea and Vomiting  Sulfa (Sulfonamide Antibiotics) Hives Past Medical History:  
Diagnosis Date  Anxiety  Hiatal hernia  Hypertension  Trouble in sleeping  Vertigo  Vitamin D deficiency Social History Tobacco Use  Smoking status: Never Smoker  Smokeless tobacco: Never Used Substance Use Topics  Alcohol use: Yes Comment: socially ROS: Feeling generally well. No TIA's or unusual headaches, no dysphagia. No prolonged cough. No dyspnea or chest pain on exertion. No abdominal pain, change in bowel habits, black or bloody stools.   No urinary tract symptoms. No new or unusual musculoskeletal symptoms. Objective:  
 
Visit Vitals /80 (BP 1 Location: Right arm, BP Patient Position: Sitting) Comment: Manual  
Pulse 78 Temp 98 °F (36.7 °C) (Oral) Comment: . Resp 18 Ht 5' 6\" (1.676 m) Wt 231 lb (104.8 kg) SpO2 98% BMI 37.28 kg/m² The patient appears well, alert, oriented x 3, in no distress. ENT normal.  Neck supple. No adenopathy or thyromegaly. FAITH. Lungs are clear, good air entry, no wheezes, rhonchi or rales. S1 and S2 normal, no murmurs, regular rate and rhythm. Abdomen soft without tenderness, guarding, mass or organomegaly. Extremities show no edema, normal peripheral pulses. Neurological is normal, no focal findings. Breast and Pelvic exams are deferred. Assessment/Plan:  
Corinne Bushy is a 46 y.o. female seen today for:  
 
1. Encounter for routine adult health examination without abnormal findings: healthy, routine labs. Will complete biometric screening form. - LIPID PANEL 
- METABOLIC PANEL, COMPREHENSIVE 
- CBC W/O DIFF 2. Vertigo: stable, continue with PRN alprazolam.  
- ALPRAZolam (XANAX) 0.5 mg tablet; Take 1 Tab by mouth as needed (Vertigo). Max Daily Amount: 48 mg. Dispense: 30 Tab; Refill: 1 3. Essential hypertension: controlled, continue with current therapy. - METABOLIC PANEL, COMPREHENSIVE 
- CBC W/O DIFF 4. Severe obesity ([address]): I have reviewed/discussed the above normal BMI with the patient. I have recommended the following interventions: dietary management education, guidance, and counseling and encourage exercise. 5. Vitamin D deficiency: on high dose supplementation. Check level. - VITAMIN D, 25 HYDROXY 6. Screening for thyroid disorder: with symptoms suggestive for thyroid disease. Check labs. - TSH AND FREE T4 
- T3, FREE 7. Screening for lipid disorders - LIPID PANEL I have discussed the diagnosis with the patient and the intended plan as seen in the above orders. The patient has received an after-visit summary and questions were answered concerning future plans. I have discussed medication side effects and warnings with the patient as well. Patient verbalizes understanding of plan of care and denies further questions or concerns at this time. Informed patient to return to the office if symptoms worsen or if new symptoms arise. Follow-up and Dispositions · Return in about 6 months (around 10/10/2019) for hypertension follow up or sooner as needed.

## 2019-04-10 NOTE — TELEPHONE ENCOUNTER
Patient called and wanted to let Megan Ascencio know that she had her colonoscopy done on 4/20/17 at [address] [place] [address].

## 2019-04-11 LAB
25(OH)D3+25(OH)D2 SERPL-MCNC: 47.7 NG/ML (ref 30–100)
ALBUMIN SERPL-MCNC: 4.1 G/DL (ref 3.5–5.5)
ALBUMIN/GLOB SERPL: 1.5 {RATIO} (ref 1.2–2.2)
ALP SERPL-CCNC: 73 IU/L (ref 39–117)
ALT SERPL-CCNC: 19 IU/L (ref 0–32)
AST SERPL-CCNC: 18 IU/L (ref 0–40)
BILIRUB SERPL-MCNC: 0.5 MG/DL (ref 0–1.2)
BUN SERPL-MCNC: 12 MG/DL (ref 6–24)
BUN/CREAT SERPL: 18 (ref 9–23)
CALCIUM SERPL-MCNC: 9.2 MG/DL (ref 8.7–10.2)
CHLORIDE SERPL-SCNC: 103 MMOL/L (ref 96–106)
CHOLEST SERPL-MCNC: 205 MG/DL (ref 100–199)
CO2 SERPL-SCNC: 25 MMOL/L (ref 20–29)
CREAT SERPL-MCNC: 0.66 MG/DL (ref 0.57–1)
ERYTHROCYTE [DISTWIDTH] IN BLOOD BY AUTOMATED COUNT: 15.1 % (ref 12.3–15.4)
GLOBULIN SER CALC-MCNC: 2.8 G/DL (ref 1.5–4.5)
GLUCOSE SERPL-MCNC: 88 MG/DL (ref 65–99)
HCT VFR BLD AUTO: 40.1 % (ref 34–46.6)
HDLC SERPL-MCNC: 76 MG/DL
HGB BLD-MCNC: 13 G/DL (ref 11.1–15.9)
INTERPRETATION, 910389: NORMAL
LDLC SERPL CALC-MCNC: 111 MG/DL (ref 0–99)
MCH RBC QN AUTO: 27.5 PG (ref 26.6–33)
MCHC RBC AUTO-ENTMCNC: 32.4 G/DL (ref 31.5–35.7)
MCV RBC AUTO: 85 FL (ref 79–97)
PLATELET # BLD AUTO: 253 X10E3/UL (ref 150–379)
POTASSIUM SERPL-SCNC: 4.4 MMOL/L (ref 3.5–5.2)
PROT SERPL-MCNC: 6.9 G/DL (ref 6–8.5)
RBC # BLD AUTO: 4.73 X10E6/UL (ref 3.77–5.28)
SODIUM SERPL-SCNC: 143 MMOL/L (ref 134–144)
T3FREE SERPL-MCNC: 3.3 PG/ML (ref 2–4.4)
T4 FREE SERPL-MCNC: 1.21 NG/DL (ref 0.82–1.77)
TRIGL SERPL-MCNC: 90 MG/DL (ref 0–149)
TSH SERPL DL<=0.005 MIU/L-ACNC: 2.83 UIU/ML (ref 0.45–4.5)
VLDLC SERPL CALC-MCNC: 18 MG/DL (ref 5–40)
WBC # BLD AUTO: 5.2 X10E3/UL (ref 3.4–10.8)

## 2019-05-21 RX ORDER — ERGOCALCIFEROL 1.25 MG/1
CAPSULE ORAL
Qty: 12 CAP | Refills: 1 | Status: SHIPPED | OUTPATIENT
Start: 2019-05-21 | End: 2019-11-05 | Stop reason: SDUPTHER

## 2019-07-03 RX ORDER — LISINOPRIL 40 MG/1
TABLET ORAL
Qty: 90 TAB | Refills: 1 | Status: SHIPPED | OUTPATIENT
Start: 2019-07-03 | End: 2020-01-07

## 2019-11-05 RX ORDER — ERGOCALCIFEROL 1.25 MG/1
CAPSULE ORAL
Qty: 12 CAP | Refills: 4 | Status: SHIPPED | OUTPATIENT
Start: 2019-11-05 | End: 2021-01-12

## 2019-11-20 ENCOUNTER — OFFICE VISIT (OUTPATIENT)
Dept: FAMILY MEDICINE CLINIC | Age: 54
End: 2019-11-20

## 2019-11-20 VITALS
HEART RATE: 92 BPM | OXYGEN SATURATION: 94 % | BODY MASS INDEX: 36.64 KG/M2 | TEMPERATURE: 98.6 F | WEIGHT: 228 LBS | DIASTOLIC BLOOD PRESSURE: 88 MMHG | RESPIRATION RATE: 18 BRPM | HEIGHT: 66 IN | SYSTOLIC BLOOD PRESSURE: 140 MMHG

## 2019-11-20 DIAGNOSIS — J06.9 URI WITH COUGH AND CONGESTION: Primary | ICD-10-CM

## 2019-11-20 RX ORDER — AZITHROMYCIN 250 MG/1
TABLET, FILM COATED ORAL
Qty: 6 TAB | Refills: 0 | Status: CANCELLED | OUTPATIENT
Start: 2019-11-20 | End: 2019-11-25

## 2019-11-20 RX ORDER — LEVOFLOXACIN 500 MG/1
500 TABLET, FILM COATED ORAL DAILY
Qty: 10 TAB | Refills: 0 | Status: SHIPPED | OUTPATIENT
Start: 2019-11-20 | End: 2019-11-30

## 2019-11-20 RX ORDER — LEVOFLOXACIN 500 MG/1
500 TABLET, FILM COATED ORAL DAILY
Qty: 10 TAB | Refills: 0 | Status: SHIPPED | OUTPATIENT
Start: 2019-11-20 | End: 2019-11-20 | Stop reason: SDUPTHER

## 2019-11-20 NOTE — PATIENT INSTRUCTIONS
Sinusitis: Care Instructions Your Care Instructions Sinusitis is an infection of the lining of the sinus cavities in your head. Sinusitis often follows a cold. It causes pain and pressure in your head and face. In most cases, sinusitis gets better on its own in 1 to 2 weeks. But some mild symptoms may last for several weeks. Sometimes antibiotics are needed. Follow-up care is a key part of your treatment and safety. Be sure to make and go to all appointments, and call your doctor if you are having problems. It's also a good idea to know your test results and keep a list of the medicines you take. How can you care for yourself at home? · Take an over-the-counter pain medicine, such as acetaminophen (Tylenol), ibuprofen (Advil, Motrin), or naproxen (Aleve). Read and follow all instructions on the label. · If the doctor prescribed antibiotics, take them as directed. Do not stop taking them just because you feel better. You need to take the full course of antibiotics. · Be careful when taking over-the-counter cold or flu medicines and Tylenol at the same time. Many of these medicines have acetaminophen, which is Tylenol. Read the labels to make sure that you are not taking more than the recommended dose. Too much acetaminophen (Tylenol) can be harmful. · Breathe warm, moist air from a steamy shower, a hot bath, or a sink filled with hot water. Avoid cold, dry air. Using a humidifier in your home may help. Follow the directions for cleaning the machine. · Use saline (saltwater) nasal washes to help keep your nasal passages open and wash out mucus and bacteria. You can buy saline nose drops at a grocery store or drugstore. Or you can make your own at home by adding 1 teaspoon of salt and 1 teaspoon of baking soda to 2 cups of distilled water. If you make your own, fill a bulb syringe with the solution, insert the tip into your nostril, and squeeze gently. [place] your nose. · Put a hot, wet towel or a warm gel pack on your face 3 or 4 times a day for 5 to 10 minutes each time. · Try a decongestant nasal spray like oxymetazoline (Afrin). Do not use it for more than 3 days in a row. Using it for more than 3 days can make your congestion worse. When should you call for help? Call your doctor now or seek immediate medical care if: 
  · You have new or worse swelling or redness in your face or around your eyes.  
  · You have a new or higher fever.  
 Watch closely for changes in your health, and be sure to contact your doctor if: 
  · You have new or worse facial pain.  
  · The mucus from your nose becomes thicker (like pus) or has new blood in it.  
  · You are not getting better as expected. Where can you learn more? Go to http://kiran-travis.info/. Enter Y079 in the search box to learn more about \"Sinusitis: Care Instructions. \" Current as of: October 21, 2018 Content Version: 12.2 © 9980-2277 [employer]. Care instructions adapted under license by [employer] (which disclaims liability or warranty for this information). If you have questions about a medical condition or this instruction, always ask your healthcare professional. [address] any warranty or liability for your use of this information.

## 2019-11-20 NOTE — PROGRESS NOTES
Identified pt with two pt identifiers(name and ). Chief Complaint   Patient presents with    Nasal Congestion     began friday    Cough        Health Maintenance Due   Topic    COLONOSCOPY     PAP AKA CERVICAL CYTOLOGY     Shingrix Vaccine Age 50> (1 of 2)    BREAST CANCER SCRN MAMMOGRAM     Influenza Age 5 to Adult        Wt Readings from Last 3 Encounters:   19 228 lb (103.4 kg)   04/10/19 231 lb (104.8 kg)   19 225 lb (102.1 kg)     Temp Readings from Last 3 Encounters:   19 98.6 °F (37 °C) (Oral)   04/10/19 98 °F (36.7 °C) (Oral)   19 98.1 °F (36.7 °C) (Oral)     BP Readings from Last 3 Encounters:   19 140/88   04/10/19 116/80   19 140/88     Pulse Readings from Last 3 Encounters:   19 92   04/10/19 78   19 78         Learning Assessment:  :     Learning Assessment 10/1/2018   PRIMARY LEARNER Patient   HIGHEST LEVEL OF EDUCATION - PRIMARY LEARNER  GRADUATED HIGH SCHOOL OR GED   BARRIERS PRIMARY LEARNER NONE   CO-LEARNER CAREGIVER No   PRIMARY LANGUAGE ENGLISH   LEARNER PREFERENCE PRIMARY DEMONSTRATION   ANSWERED BY self   RELATIONSHIP SELF       Depression Screening:  :     3 most recent PHQ Screens 2019   Little interest or pleasure in doing things Not at all   Feeling down, depressed, irritable, or hopeless Not at all   Total Score PHQ 2 0       Fall Risk Assessment:  :     No flowsheet data found. Abuse Screening:  :     Abuse Screening Questionnaire 2019   Do you ever feel afraid of your partner? N N   Are you in a relationship with someone who physically or mentally threatens you? N N   Is it safe for you to go home?  Y Y       Coordination of Care Questionnaire:  :     1) Have you been to an emergency room, urgent care clinic since your last visit? no   Hospitalized since your last visit? no             2) Have you seen or consulted any other health care providers outside of [address] [place] [address] since your last visit? no (Include any pap smears or colon screenings in this section.)    3) Do you have an Advance Directive on file? no  Are you interested in receiving information about Advance Directives? no    Reviewed record in preparation for visit and have obtained necessary documentation. Medication reconciliation up to date and corrected with patient at this time.

## 2019-11-20 NOTE — PROGRESS NOTES
Chief Complaint:  Chief Complaint   Patient presents with    Nasal Congestion     began friday    Cough       History of Present Illness[de-identified]   Marissa Lopez is a 53 y.o. female who complains of congestion, sore throat, swollen glands, nasal blockage, post nasal drip, productive cough, myalgias and bilateral sinus pain for several days. She denies a history of chest pain and dizziness and denies a history of asthma. Patient does not smoke cigarettes. The patient reports that she had a similar presentation last year and she saw her PCP Dr. Lizbet Light and was prescribed Levaquin. The patient felt that this worked well and that Z-paks have not worked well in the past. We discussed the broad coverage of this antibiotic and potential side effects. Reviewed PmHx, RxHx, FmHx, SocHx, AllgHx and updated and dated in the chart.     Patient Active Problem List    Diagnosis    Severe obesity ([address]    Trouble in sleeping    Hypertension    Anxiety    Vitamin D deficiency    Vertigo     Review of Systems - negative except as listed above in the HPI    Objective:     Vitals:    11/20/19 1531   BP: 140/88   Pulse: 92   Resp: 18   Temp: 98.6 °F (37 °C)   TempSrc: Oral   SpO2: 94%   Weight: 228 lb (103.4 kg)   Height: 5' 6\" (1.676 m)     Physical Examination:   General appearance - ill-appearing  Mental status - alert, oriented to person, place, and time  Nose - sinus tenderness noted bilaterally  Mouth - mucous membranes moist, pharynx normal without lesions  Chest - clear to auscultation, no wheezes, rales or rhonchi, symmetric air entry  Heart - normal rate, regular rhythm, normal S1, S2, no murmurs, rubs, clicks or gallops  Neurological - alert, oriented, normal speech, no focal findings or movement disorder noted  Musculoskeletal - no joint tenderness, deformity or swelling    Assessment/ Plan:   Diagnoses and all orders for this visit:    1. URI with cough and congestion  -     levoFLOXacin (LEVAQUIN) 500 mg tablet; Take 1 Tab by mouth daily for 10 days. I have discussed the diagnosis with the patient and the intended treatment plan as seen in the above orders. The patient has received an after-visit summary and questions were answered concerning future plans. Asked to return should symptoms worsen or not improve with treatment. Any pending labs and studies will be relayed to patient when they become available. Pt verbalizes understanding of plan of care and denies further questions or concerns at this time. Follow-up and Dispositions    · Return if symptoms worsen or fail to improve. Moody Sandoval MD    Patient Instructions          Sinusitis: Care Instructions  Your Care Instructions    Sinusitis is an infection of the lining of the sinus cavities in your head. Sinusitis often follows a cold. It causes pain and pressure in your head and face. In most cases, sinusitis gets better on its own in 1 to 2 weeks. But some mild symptoms may last for several weeks. Sometimes antibiotics are needed. Follow-up care is a key part of your treatment and safety. Be sure to make and go to all appointments, and call your doctor if you are having problems. It's also a good idea to know your test results and keep a list of the medicines you take. How can you care for yourself at home? · Take an over-the-counter pain medicine, such as acetaminophen (Tylenol), ibuprofen (Advil, Motrin), or naproxen (Aleve). Read and follow all instructions on the label. · If the doctor prescribed antibiotics, take them as directed. Do not stop taking them just because you feel better. You need to take the full course of antibiotics. · Be careful when taking over-the-counter cold or flu medicines and Tylenol at the same time. Many of these medicines have acetaminophen, which is Tylenol. Read the labels to make sure that you are not taking more than the recommended dose. Too much acetaminophen (Tylenol) can be harmful.   · Breathe warm, moist air from a steamy shower, a hot bath, or a sink filled with hot water. Avoid cold, dry air. Using a humidifier in your home may help. Follow the directions for cleaning the machine. · Use saline (saltwater) nasal washes to help keep your nasal passages open and wash out mucus and bacteria. You can buy saline nose drops at a grocery store or drugstore. Or you can make your own at home by adding 1 teaspoon of salt and 1 teaspoon of baking soda to 2 cups of distilled water. If you make your own, fill a bulb syringe with the solution, insert the tip into your nostril, and squeeze gently. [place] your nose. · Put a hot, wet towel or a warm gel pack on your face 3 or 4 times a day for 5 to 10 minutes each time. · Try a decongestant nasal spray like oxymetazoline (Afrin). Do not use it for more than 3 days in a row. Using it for more than 3 days can make your congestion worse. When should you call for help? Call your doctor now or seek immediate medical care if:    · You have new or worse swelling or redness in your face or around your eyes.     · You have a new or higher fever.    Watch closely for changes in your health, and be sure to contact your doctor if:    · You have new or worse facial pain.     · The mucus from your nose becomes thicker (like pus) or has new blood in it.     · You are not getting better as expected. Where can you learn more? Go to http://kiran-travis.info/. Enter S753 in the search box to learn more about \"Sinusitis: Care Instructions. \"  Current as of: October 21, 2018  Content Version: 12.2  © 7657-3388 [employer]. Care instructions adapted under license by [employer] (which disclaims liability or warranty for this information).  If you have questions about a medical condition or this instruction, always ask your healthcare professional. [address] any warranty or liability for your use of this information.

## 2020-01-07 RX ORDER — LISINOPRIL 40 MG/1
TABLET ORAL
Qty: 90 TAB | Refills: 1 | Status: SHIPPED | OUTPATIENT
Start: 2020-01-07 | End: 2020-07-06

## 2020-02-27 ENCOUNTER — HOSPITAL ENCOUNTER (OUTPATIENT)
Dept: MAMMOGRAPHY | Age: 55
Discharge: HOME | End: 2020-02-27
Attending: SPECIALIST
Payer: COMMERCIAL

## 2020-02-27 DIAGNOSIS — Z12.31 VISIT FOR SCREENING MAMMOGRAM: ICD-10-CM

## 2020-02-27 PROCEDURE — 77067 SCR MAMMO BI INCL CAD: CPT

## 2020-03-10 ENCOUNTER — OFFICE VISIT (OUTPATIENT)
Dept: FAMILY MEDICINE CLINIC | Age: 55
End: 2020-03-10

## 2020-03-10 VITALS
SYSTOLIC BLOOD PRESSURE: 142 MMHG | WEIGHT: 229 LBS | HEIGHT: 66 IN | DIASTOLIC BLOOD PRESSURE: 94 MMHG | TEMPERATURE: 97.4 F | HEART RATE: 84 BPM | OXYGEN SATURATION: 97 % | BODY MASS INDEX: 36.8 KG/M2 | RESPIRATION RATE: 18 BRPM

## 2020-03-10 DIAGNOSIS — J02.9 SORE THROAT: ICD-10-CM

## 2020-03-10 DIAGNOSIS — J34.89 NASAL DISCHARGE: ICD-10-CM

## 2020-03-10 DIAGNOSIS — J02.0 STREP THROAT: Primary | ICD-10-CM

## 2020-03-10 LAB — S PYO AG THROAT QL: POSITIVE

## 2020-03-10 RX ORDER — AZITHROMYCIN 250 MG/1
TABLET, FILM COATED ORAL
Qty: 6 TAB | Refills: 0 | Status: SHIPPED | OUTPATIENT
Start: 2020-03-10 | End: 2020-03-15

## 2020-03-10 NOTE — LETTER
NOTIFICATION RETURN TO WORK / SCHOOL 
 
3/10/2020 3:33 PM 
 
Ms. Johnathon Garrett 
[phone] To Whom It May Concern: 
 
Johnathon Garrett is currently under the care of [address] [place] [address]. She needs to be excused from work on 3/10 and 3/11, due to illness If there are questions or concerns please have the patient contact our office. Sincerely, Cierra Gray NP

## 2020-03-10 NOTE — PROGRESS NOTES
HISTORY OF PRESENT ILLNESS  Karen Dunn is a 53 y.o. female. HPI  Pt presents with \"sore throat\"    Symptoms started last night  Sore throat  Nasal congestion  Fatigue  Her  was diagnosed with strep throat last week    Review of Systems   Constitutional: Negative for fever. HENT: Positive for sore throat. Gastrointestinal: Negative for diarrhea and vomiting. Physical Exam  Constitutional:       Appearance: Normal appearance. HENT:      Head: Normocephalic and atraumatic. Right Ear: Tympanic membrane normal.      Left Ear: Tympanic membrane normal.      Nose: Congestion present. Mouth/Throat:      Pharynx: Posterior oropharyngeal erythema present. Neck:      Musculoskeletal: Normal range of motion and neck supple. Cardiovascular:      Rate and Rhythm: Normal rate and regular rhythm. Heart sounds: Normal heart sounds. Pulmonary:      Effort: Pulmonary effort is normal.      Breath sounds: Normal breath sounds. Lymphadenopathy:      Cervical: Cervical adenopathy present. Neurological:      Mental Status: She is alert. Psychiatric:         Mood and Affect: Mood normal.         Behavior: Behavior normal.         ASSESSMENT and PLAN    ICD-10-CM ICD-9-CM    1. Strep throat J02.0 034.0 azithromycin (ZITHROMAX) 250 mg tablet   2. Sore throat J02.9 462 AMB POC RAPID STREP TEST   3. Nasal discharge J34.89 478.19 AMB POC RAPID STREP TEST     Educated about taking medication as prescribed, with food  Should stay well hydrated, and treat fever as needed    Pt informed to return to office with worsening of symptoms, or PRN with any questions or concerns. Pt verbalizes understanding of plan of care and denies further questions or concerns at this time.

## 2020-03-10 NOTE — PROGRESS NOTES
Identified pt with two pt identifiers(name and ). Chief Complaint   Patient presents with    Sore Throat    Nasal Discharge        Health Maintenance Due   Topic    Colonoscopy     PAP AKA CERVICAL CYTOLOGY     Shingrix Vaccine Age 50> (1 of 2)    Influenza Age 5 to Adult        Wt Readings from Last 3 Encounters:   03/10/20 229 lb (103.9 kg)   19 228 lb (103.4 kg)   04/10/19 231 lb (104.8 kg)     Temp Readings from Last 3 Encounters:   03/10/20 97.4 °F (36.3 °C) (Oral)   19 98.6 °F (37 °C) (Oral)   04/10/19 98 °F (36.7 °C) (Oral)     BP Readings from Last 3 Encounters:   03/10/20 (!) 142/94   19 140/88   04/10/19 116/80     Pulse Readings from Last 3 Encounters:   03/10/20 84   19 92   04/10/19 78         Learning Assessment:  :     Learning Assessment 10/1/2018   PRIMARY LEARNER Patient   HIGHEST LEVEL OF EDUCATION - PRIMARY LEARNER  GRADUATED HIGH SCHOOL OR GED   BARRIERS PRIMARY LEARNER NONE   CO-LEARNER CAREGIVER No   PRIMARY LANGUAGE ENGLISH   LEARNER PREFERENCE PRIMARY DEMONSTRATION   ANSWERED BY self   RELATIONSHIP SELF       Depression Screening:  :     3 most recent PHQ Screens 3/10/2020   Little interest or pleasure in doing things Not at all   Feeling down, depressed, irritable, or hopeless Not at all   Total Score PHQ 2 0       Fall Risk Assessment:  :     No flowsheet data found. Abuse Screening:  :     Abuse Screening Questionnaire 3/10/2020 2019 2018   Do you ever feel afraid of your partner? N N N   Are you in a relationship with someone who physically or mentally threatens you? N N N   Is it safe for you to go home?  Timothy Emerson       Coordination of Care Questionnaire:  :     1) Have you been to an emergency room, urgent care clinic since your last visit? no   Hospitalized since your last visit? no             2) Have you seen or consulted any other health care providers outside of [address] [place] [address] since your last visit? no  (Include any pap smears or colon screenings in this section.)    3) Do you have an Advance Directive on file? no  Are you interested in receiving information about Advance Directives? no    Reviewed record in preparation for visit and have obtained necessary documentation. Medication reconciliation up to date and corrected with patient at this time. Order for POC Rapid Strep Testing placed per [facility] verbal order.

## 2020-03-10 NOTE — PATIENT INSTRUCTIONS

## 2020-03-13 ENCOUNTER — OFFICE VISIT (OUTPATIENT)
Dept: FAMILY MEDICINE CLINIC | Age: 55
End: 2020-03-13

## 2020-03-13 VITALS
BODY MASS INDEX: 36.64 KG/M2 | DIASTOLIC BLOOD PRESSURE: 93 MMHG | SYSTOLIC BLOOD PRESSURE: 128 MMHG | HEART RATE: 86 BPM | HEIGHT: 66 IN | WEIGHT: 228 LBS | TEMPERATURE: 98.8 F | RESPIRATION RATE: 18 BRPM | OXYGEN SATURATION: 95 %

## 2020-03-13 DIAGNOSIS — R50.9 FEVER, UNSPECIFIED FEVER CAUSE: ICD-10-CM

## 2020-03-13 DIAGNOSIS — R09.81 NASAL CONGESTION: ICD-10-CM

## 2020-03-13 DIAGNOSIS — J01.00 ACUTE NON-RECURRENT MAXILLARY SINUSITIS: Primary | ICD-10-CM

## 2020-03-13 DIAGNOSIS — R05.9 COUGH: ICD-10-CM

## 2020-03-13 RX ORDER — AMOXICILLIN AND CLAVULANATE POTASSIUM 875; 125 MG/1; MG/1
1 TABLET, FILM COATED ORAL 2 TIMES DAILY
Qty: 20 TAB | Refills: 0 | Status: SHIPPED | OUTPATIENT
Start: 2020-03-13 | End: 2020-03-23

## 2020-03-13 NOTE — PROGRESS NOTES
HISTORY OF PRESENT ILLNESS  Janey Solorzano is a 53 y.o. female. HPI  Pt presents with \"continued cold symptoms\"    Pt was seen on 3/10, and was diagnosed with strep throat. Pt was treated with a z-pack and has been taking it since then,  She states that her symptoms have conitnued  Sinus pain and pressure  Nasal congestion  Nasal drainage  Body aches  Fever, off and on  OTC: Coricidin  Review of Systems   Constitutional: Positive for chills and fever. HENT: Positive for congestion and sinus pain. Gastrointestinal: Negative for diarrhea and vomiting. Physical Exam  Constitutional:       Appearance: Normal appearance. HENT:      Head: Normocephalic and atraumatic. Right Ear: Tympanic membrane normal.      Left Ear: Tympanic membrane normal.      Nose: Congestion and rhinorrhea present. Mouth/Throat:      Pharynx: Posterior oropharyngeal erythema present. Neck:      Musculoskeletal: Normal range of motion and neck supple. Cardiovascular:      Rate and Rhythm: Normal rate and regular rhythm. Heart sounds: Normal heart sounds. Pulmonary:      Effort: Pulmonary effort is normal.      Breath sounds: Normal breath sounds. Lymphadenopathy:      Cervical: Cervical adenopathy present. Right cervical: Superficial cervical adenopathy present. Left cervical: Superficial cervical adenopathy present. Neurological:      Mental Status: She is alert. Psychiatric:         Mood and Affect: Mood normal.         Behavior: Behavior normal.         ASSESSMENT and PLAN    ICD-10-CM ICD-9-CM    1. Acute non-recurrent maxillary sinusitis J01.00 461.0 amoxicillin-clavulanate (AUGMENTIN) 875-125 mg per tablet   2. Nasal congestion R09.81 478.19 AMB POC PROSPER INFLUENZA A/B TEST   3. Fever, unspecified fever cause R50.9 780.60 AMB POC PROSPER INFLUENZA A/B TEST   4.  Cough R05 786.2 AMB POC PROSPER INFLUENZA A/B TEST     Educated about taking medication as prescribed, with food  Should stay well hydrated, and treat fever as needed    Pt informed to return to office with worsening of symptoms, or PRN with any questions or concerns. Pt verbalizes understanding of plan of care and denies further questions or concerns at this time.

## 2020-03-13 NOTE — PATIENT INSTRUCTIONS
Sinusitis: Care Instructions Your Care Instructions Sinusitis is an infection of the lining of the sinus cavities in your head. Sinusitis often follows a cold. It causes pain and pressure in your head and face. In most cases, sinusitis gets better on its own in 1 to 2 weeks. But some mild symptoms may last for several weeks. Sometimes antibiotics are needed. Follow-up care is a key part of your treatment and safety. Be sure to make and go to all appointments, and call your doctor if you are having problems. It's also a good idea to know your test results and keep a list of the medicines you take. How can you care for yourself at home? · Take an over-the-counter pain medicine, such as acetaminophen (Tylenol), ibuprofen (Advil, Motrin), or naproxen (Aleve). Read and follow all instructions on the label. · If the doctor prescribed antibiotics, take them as directed. Do not stop taking them just because you feel better. You need to take the full course of antibiotics. · Be careful when taking over-the-counter cold or flu medicines and Tylenol at the same time. Many of these medicines have acetaminophen, which is Tylenol. Read the labels to make sure that you are not taking more than the recommended dose. Too much acetaminophen (Tylenol) can be harmful. · Breathe warm, moist air from a steamy shower, a hot bath, or a sink filled with hot water. Avoid cold, dry air. Using a humidifier in your home may help. Follow the directions for cleaning the machine. · Use saline (saltwater) nasal washes to help keep your nasal passages open and wash out mucus and bacteria. You can buy saline nose drops at a grocery store or drugstore. Or you can make your own at home by adding 1 teaspoon of salt and 1 teaspoon of baking soda to 2 cups of distilled water. If you make your own, fill a bulb syringe with the solution, insert the tip into your nostril, and squeeze gently. Tim Garciahal your nose. · Put a hot, wet towel or a warm gel pack on your face 3 or 4 times a day for 5 to 10 minutes each time. · Try a decongestant nasal spray like oxymetazoline (Afrin). Do not use it for more than 3 days in a row. Using it for more than 3 days can make your congestion worse. When should you call for help? Call your doctor now or seek immediate medical care if: 
  · You have new or worse swelling or redness in your face or around your eyes.  
  · You have a new or higher fever.  
 Watch closely for changes in your health, and be sure to contact your doctor if: 
  · You have new or worse facial pain.  
  · The mucus from your nose becomes thicker (like pus) or has new blood in it.  
  · You are not getting better as expected. Where can you learn more? Go to http://kiran-travis.info/ Enter H019 in the search box to learn more about \"Sinusitis: Care Instructions. \" Current as of: July 28, 2019Content Version: 12.4 © 1878-7834 Healthwise, Incorporated. Care instructions adapted under license by [employer] (which disclaims liability or warranty for this information). If you have questions about a medical condition or this instruction, always ask your healthcare professional. [address] any warranty or liability for your use of this information.

## 2020-03-13 NOTE — PROGRESS NOTES
Identified pt with two pt identifiers(name and ). Chief Complaint   Patient presents with    Sore Throat    Cough    Fever    Nasal Congestion        Health Maintenance Due   Topic    Colonoscopy     PAP AKA CERVICAL CYTOLOGY     Shingrix Vaccine Age 49> (1 of 2)    Influenza Age 5 to Adult        Wt Readings from Last 3 Encounters:   20 228 lb (103.4 kg)   03/10/20 229 lb (103.9 kg)   19 228 lb (103.4 kg)     Temp Readings from Last 3 Encounters:   20 98.8 °F (37.1 °C) (Oral)   03/10/20 97.4 °F (36.3 °C) (Oral)   19 98.6 °F (37 °C) (Oral)     BP Readings from Last 3 Encounters:   20 (!) 128/93   03/10/20 (!) 142/94   19 140/88     Pulse Readings from Last 3 Encounters:   20 86   03/10/20 84   19 92         Learning Assessment:  :     Learning Assessment 10/1/2018   PRIMARY LEARNER Patient   HIGHEST LEVEL OF EDUCATION - PRIMARY LEARNER  GRADUATED HIGH SCHOOL OR GED   BARRIERS PRIMARY LEARNER NONE   CO-LEARNER CAREGIVER No   PRIMARY LANGUAGE ENGLISH   LEARNER PREFERENCE PRIMARY DEMONSTRATION   ANSWERED BY self   RELATIONSHIP SELF       Depression Screening:  :     3 most recent PHQ Screens 3/10/2020   Little interest or pleasure in doing things Not at all   Feeling down, depressed, irritable, or hopeless Not at all   Total Score PHQ 2 0       Fall Risk Assessment:  :     No flowsheet data found. Abuse Screening:  :     Abuse Screening Questionnaire 3/10/2020 2019 2018   Do you ever feel afraid of your partner? N N N   Are you in a relationship with someone who physically or mentally threatens you? N N N   Is it safe for you to go home? Yuliet Aden       Coordination of Care Questionnaire:  :     1) Have you been to an emergency room, urgent care clinic since your last visit? no   Hospitalized since your last visit? no             2) Have you seen or consulted any other health care providers outside of [address] [place] [address] since your last visit? no  (Include any pap smears or colon screenings in this section.)    3) Do you have an Advance Directive on file? no  Are you interested in receiving information about Advance Directives? no    Reviewed record in preparation for visit and have obtained necessary documentation. Medication reconciliation up to date and corrected with patient at this time. Order for POC flu Testing placed per [facility] verbal order.

## 2020-03-23 DIAGNOSIS — R42 VERTIGO: ICD-10-CM

## 2020-03-23 RX ORDER — ALPRAZOLAM 0.5 MG/1
0.5 TABLET ORAL AS NEEDED
Qty: 30 TAB | Refills: 1 | Status: SHIPPED | OUTPATIENT
Start: 2020-03-23 | End: 2020-03-23 | Stop reason: SDUPTHER

## 2020-03-23 RX ORDER — ALPRAZOLAM 0.5 MG/1
0.5 TABLET ORAL
Qty: 30 TAB | Refills: 1 | Status: SHIPPED | OUTPATIENT
Start: 2020-03-23 | End: 2020-09-10 | Stop reason: SDUPTHER

## 2020-03-23 NOTE — TELEPHONE ENCOUNTER
----- Message from Rosie Patel sent at 3/23/2020 11:53 AM EDT -----  Regarding: Dr. José Luis Pérez: [phone]  Patient is wanting to know if she needing to forward her  mammogram results, or if they have been received from [facility]. Patient is also requesting a refill on Alprazolam to be sen to [facility] (on file). Patient has 6 pills left.

## 2020-06-24 ENCOUNTER — HOSPITAL ENCOUNTER (OUTPATIENT)
Dept: LAB | Age: 55
Discharge: HOME | End: 2020-06-24

## 2020-06-24 ENCOUNTER — OFFICE VISIT (OUTPATIENT)
Dept: FAMILY MEDICINE CLINIC | Age: 55
End: 2020-06-24

## 2020-06-24 VITALS
OXYGEN SATURATION: 96 % | WEIGHT: 233.4 LBS | TEMPERATURE: 97.4 F | DIASTOLIC BLOOD PRESSURE: 90 MMHG | RESPIRATION RATE: 18 BRPM | BODY MASS INDEX: 37.51 KG/M2 | HEIGHT: 66 IN | HEART RATE: 78 BPM | SYSTOLIC BLOOD PRESSURE: 138 MMHG

## 2020-06-24 DIAGNOSIS — E55.9 VITAMIN D DEFICIENCY: ICD-10-CM

## 2020-06-24 DIAGNOSIS — Z00.00 ENCOUNTER FOR ROUTINE ADULT HEALTH EXAMINATION WITHOUT ABNORMAL FINDINGS: Primary | ICD-10-CM

## 2020-06-24 DIAGNOSIS — Z00.00 ENCOUNTER FOR ROUTINE ADULT HEALTH EXAMINATION WITHOUT ABNORMAL FINDINGS: ICD-10-CM

## 2020-06-24 DIAGNOSIS — Z00.00 LABORATORY EXAM ORDERED AS PART OF ROUTINE GENERAL MEDICAL EXAMINATION: ICD-10-CM

## 2020-06-24 LAB
25(OH)D3 SERPL-MCNC: 77.4 NG/ML (ref 30–100)
ALBUMIN SERPL-MCNC: 3.8 G/DL (ref 3.5–5)
ALBUMIN/GLOB SERPL: 1.1 {RATIO} (ref 1.1–2.2)
ALP SERPL-CCNC: 83 U/L (ref 45–117)
ALT SERPL-CCNC: 19 U/L (ref 12–78)
ANION GAP SERPL CALC-SCNC: 6 MMOL/L (ref 5–15)
AST SERPL-CCNC: 10 U/L (ref 15–37)
BASOPHILS # BLD: 0 K/UL (ref 0–0.1)
BASOPHILS NFR BLD: 0 % (ref 0–1)
BILIRUB SERPL-MCNC: 0.6 MG/DL (ref 0.2–1)
BUN SERPL-MCNC: 16 MG/DL (ref 6–20)
BUN/CREAT SERPL: 23 (ref 12–20)
CALCIUM SERPL-MCNC: 9.1 MG/DL (ref 8.5–10.1)
CHLORIDE SERPL-SCNC: 106 MMOL/L (ref 97–108)
CHOLEST SERPL-MCNC: 222 MG/DL
CO2 SERPL-SCNC: 28 MMOL/L (ref 21–32)
CREAT SERPL-MCNC: 0.71 MG/DL (ref 0.55–1.02)
DIFFERENTIAL METHOD BLD: ABNORMAL
EOSINOPHIL # BLD: 0.1 K/UL (ref 0–0.4)
EOSINOPHIL NFR BLD: 2 % (ref 0–7)
ERYTHROCYTE [DISTWIDTH] IN BLOOD BY AUTOMATED COUNT: 15.3 % (ref 11.5–14.5)
GLOBULIN SER CALC-MCNC: 3.4 G/DL (ref 2–4)
GLUCOSE SERPL-MCNC: 92 MG/DL (ref 65–100)
HCT VFR BLD AUTO: 44.1 % (ref 35–47)
HDLC SERPL-MCNC: 78 MG/DL
HDLC SERPL: 2.8 {RATIO} (ref 0–5)
HGB BLD-MCNC: 13.1 G/DL (ref 11.5–16)
IMM GRANULOCYTES # BLD AUTO: 0 K/UL (ref 0–0.04)
IMM GRANULOCYTES NFR BLD AUTO: 0 % (ref 0–0.5)
LDLC SERPL CALC-MCNC: 124.8 MG/DL (ref 0–100)
LIPID PROFILE,FLP: ABNORMAL
LYMPHOCYTES # BLD: 2.2 K/UL (ref 0.8–3.5)
LYMPHOCYTES NFR BLD: 39 % (ref 12–49)
MCH RBC QN AUTO: 27.3 PG (ref 26–34)
MCHC RBC AUTO-ENTMCNC: 29.7 G/DL (ref 30–36.5)
MCV RBC AUTO: 92.1 FL (ref 80–99)
MONOCYTES # BLD: 0.5 K/UL (ref 0–1)
MONOCYTES NFR BLD: 8 % (ref 5–13)
NEUTS SEG # BLD: 2.9 K/UL (ref 1.8–8)
NEUTS SEG NFR BLD: 51 % (ref 32–75)
NRBC # BLD: 0 K/UL (ref 0–0.01)
NRBC BLD-RTO: 0 PER 100 WBC
PLATELET # BLD AUTO: 246 K/UL (ref 150–400)
PMV BLD AUTO: 9.8 FL (ref 8.9–12.9)
POTASSIUM SERPL-SCNC: 4.5 MMOL/L (ref 3.5–5.1)
PROT SERPL-MCNC: 7.2 G/DL (ref 6.4–8.2)
RBC # BLD AUTO: 4.79 M/UL (ref 3.8–5.2)
SODIUM SERPL-SCNC: 140 MMOL/L (ref 136–145)
T4 FREE SERPL-MCNC: 1 NG/DL (ref 0.8–1.5)
TRIGL SERPL-MCNC: 96 MG/DL (ref ?–150)
TSH SERPL DL<=0.05 MIU/L-ACNC: 2.63 UIU/ML (ref 0.36–3.74)
VLDLC SERPL CALC-MCNC: 19.2 MG/DL
WBC # BLD AUTO: 5.6 K/UL (ref 3.6–11)

## 2020-06-24 NOTE — PROGRESS NOTES
Subjective:   Catina Morris is a 53 y.o. female here today for her annual physical exam.   Her gyne and breast care is done elsewhere by her Ob-Gyne physician. Diet: does not follow a special diet   Physical activity: \"we're on the go a lot\", work out in the yard, walks    Health Maintenance:  · Cervical cancer screening:   · Mammogram: 2/27/20  · Colonoscopy: 4/20/17, repeat in 10 years  · Hepatitis C screening (born between 1946 and 1965): 9/21/17  · Tdap: 2/16/18  · Influenza vaccine: recommend that she receive in the fall      Current Outpatient Medications   Medication Sig Dispense Refill    ALPRAZolam (Xanax) 0.5 mg tablet Take 1 Tab by mouth daily as needed (Vertigo). 30 Tab 1    lisinopril (PRINIVIL, ZESTRIL) 40 mg tablet TAKE 1 TABLET DAILY 90 Tab 1    ergocalciferol (ERGOCALCIFEROL) 50,000 unit capsule TAKE 1 CAPSULE EVERY 7 DAYS 12 Cap 4    melatonin 5 mg cap capsule Take 5 mg by mouth nightly.          Past Medical History:   Diagnosis Date    Anxiety     Hiatal hernia     Hypertension     Menopause     early aj at age 42    Trouble in sleeping     Vertigo     Vitamin D deficiency        Family History   Problem Relation Age of Onset    Hypertension Mother     Hypertension Father        Social History     Tobacco Use    Smoking status: Never Smoker    Smokeless tobacco: Never Used   Substance Use Topics    Alcohol use: Yes     Comment: socially        Lab Results   Component Value Date/Time    WBC 5.2 04/10/2019 08:48 AM    HGB 13.0 04/10/2019 08:48 AM    Hemoglobin (POC) 13.6 11/05/2010 12:35 PM    HCT 40.1 04/10/2019 08:48 AM    Hematocrit (POC) 40 11/05/2010 12:35 PM    PLATELET 977 78/86/2450 08:48 AM    MCV 85 04/10/2019 08:48 AM       Lab Results   Component Value Date/Time    Cholesterol, total 205 (H) 04/10/2019 08:48 AM    HDL Cholesterol 76 04/10/2019 08:48 AM    LDL, calculated 111 (H) 04/10/2019 08:48 AM    Triglyceride 90 04/10/2019 08:48 AM     Lab Results   Component Value Date/Time    Sodium 143 04/10/2019 08:48 AM    Potassium 4.4 04/10/2019 08:48 AM    Chloride 103 04/10/2019 08:48 AM    CO2 25 04/10/2019 08:48 AM    Anion gap 7 08/02/2018 11:26 AM    Glucose 88 04/10/2019 08:48 AM    BUN 12 04/10/2019 08:48 AM    Creatinine 0.66 04/10/2019 08:48 AM    BUN/Creatinine ratio 18 04/10/2019 08:48 AM    GFR est  04/10/2019 08:48 AM    GFR est non- 04/10/2019 08:48 AM    Calcium 9.2 04/10/2019 08:48 AM    Bilirubin, total 0.5 04/10/2019 08:48 AM    ALT (SGPT) 19 04/10/2019 08:48 AM    Alk. phosphatase 73 04/10/2019 08:48 AM    Protein, total 6.9 04/10/2019 08:48 AM    Albumin 4.1 04/10/2019 08:48 AM    Globulin 4.0 08/02/2018 11:26 AM    A-G Ratio 1.5 04/10/2019 08:48 AM         ROS: Feeling generally well. No TIA's or unusual headaches, no dysphagia. No prolonged cough. No dyspnea or chest pain on exertion. No abdominal pain, change in bowel habits, black or bloody stools. No urinary tract symptoms. No new or unusual musculoskeletal symptoms. Specific concerns today: concerned about her thyroid function. Objective:     Vitals:    06/24/20 0811 06/24/20 0831   BP: (!) 136/98 138/90  Comment: manual   BP 1 Location: Right arm Right arm   BP Patient Position: Sitting Sitting   Pulse: 78    Resp: 18    Temp: 97.4 °F (36.3 °C)    TempSrc: Oral    SpO2: 96%    Weight: 233 lb 6.4 oz (105.9 kg)    Height: 5' 6\" (1.676 m)        The patient appears well, alert, oriented x 3, in no distress. ENT normal.  Neck supple. No adenopathy or thyromegaly. FAITH. Lungs are clear, good air entry, no wheezes, rhonchi or rales. S1 and S2 normal, no murmurs, regular rate and rhythm. Abdomen soft without tenderness, guarding, mass or organomegaly. Extremities show no edema, normal peripheral pulses. Neurological is normal, no focal findings. Breast and Pelvic exams are deferred. Assessment/Plan:   Mis Yates is a 53 y.o. female seen today for:     1.  Encounter for routine adult health examination without abnormal findings: healthy, check routine labs. 2. Laboratory exam ordered as part of routine general medical examination  - CBC WITH AUTOMATED DIFF; Future  - METABOLIC PANEL, COMPREHENSIVE; Future  - LIPID PANEL; Future  - TSH 3RD GENERATION; Future  - T4, FREE; Future    3. Vitamin D deficiency  - VITAMIN D, 25 HYDROXY; Future    4. Essential hypertension: elevated today, has been previously controlled. Will have her monitor BP at home. Return in 4 months for hypertension follow up. I have discussed the diagnosis with the patient and the intended plan as seen in the above orders. The patient has received an after-visit summary and questions were answered concerning future plans. I have discussed medication side effects and warnings with the patient as well. Patient verbalizes understanding of plan of care and denies further questions or concerns at this time. Informed patient to return to the office if symptoms worsen or if new symptoms arise. Follow-up and Dispositions    · Return in about 4 months (around 10/24/2020) for hypertension follow up or sooner as needed.

## 2020-06-24 NOTE — PROGRESS NOTES
Identified pt with two pt identifiers(name and ). Chief Complaint   Patient presents with    Physical     vit d supp question        Health Maintenance Due   Topic    Colonoscopy     PAP AKA CERVICAL CYTOLOGY     Shingrix Vaccine Age 50> (1 of 2)       Wt Readings from Last 3 Encounters:   20 233 lb 6.4 oz (105.9 kg)   20 228 lb (103.4 kg)   03/10/20 229 lb (103.9 kg)     Temp Readings from Last 3 Encounters:   20 97.4 °F (36.3 °C) (Oral)   20 98.8 °F (37.1 °C) (Oral)   03/10/20 97.4 °F (36.3 °C) (Oral)     BP Readings from Last 3 Encounters:   20 (!) 136/98   20 (!) 128/93   03/10/20 (!) 142/94     Pulse Readings from Last 3 Encounters:   20 78   20 86   03/10/20 84         Learning Assessment:  :     Learning Assessment 10/1/2018   PRIMARY LEARNER Patient   HIGHEST LEVEL OF EDUCATION - PRIMARY LEARNER  GRADUATED HIGH SCHOOL OR GED   BARRIERS PRIMARY LEARNER NONE   CO-LEARNER CAREGIVER No   PRIMARY LANGUAGE ENGLISH   LEARNER PREFERENCE PRIMARY DEMONSTRATION   ANSWERED BY self   RELATIONSHIP SELF       Depression Screening:  :     3 most recent PHQ Screens 3/10/2020   Little interest or pleasure in doing things Not at all   Feeling down, depressed, irritable, or hopeless Not at all   Total Score PHQ 2 0       Fall Risk Assessment:  :     No flowsheet data found. Abuse Screening:  :     Abuse Screening Questionnaire 3/10/2020 2019 2018   Do you ever feel afraid of your partner? N N N   Are you in a relationship with someone who physically or mentally threatens you? N N N   Is it safe for you to go home?  Y Y Y       Coordination of Care Questionnaire:  :     1) Have you been to an emergency room, urgent care clinic since your last visit? no   Hospitalized since your last visit? no             2) Have you seen or consulted any other health care providers outside of [address] [place] [address] since your last visit? no  (Include any pap smears or colon screenings in this section.)    3) Do you have an Advance Directive on file? no  Are you interested in receiving information about Advance Directives? no    Reviewed record in preparation for visit and have obtained necessary documentation.

## 2020-06-24 NOTE — PATIENT INSTRUCTIONS
A Healthy Lifestyle: Care Instructions Your Care Instructions A healthy lifestyle can help you feel good, stay at a healthy weight, and have plenty of energy for both work and play. A healthy lifestyle is something you can share with your whole family. A healthy lifestyle also can lower your risk for serious health problems, such as high blood pressure, heart disease, and diabetes. You can follow a few steps listed below to improve your health and the health of your family. Follow-up care is a key part of your treatment and safety. Be sure to make and go to all appointments, and call your doctor if you are having problems. It's also a good idea to know your test results and keep a list of the medicines you take. How can you care for yourself at home? · Do not eat too much sugar, fat, or fast foods. You can still have dessert and treats now and then. The goal is moderation. · Start small to improve your eating habits. Pay attention to portion sizes, drink less juice and soda pop, and eat more fruits and vegetables. ? Eat a healthy amount of food. A 3-ounce serving of meat, for example, is about the size of a deck of cards. Fill the rest of your plate with vegetables and whole grains. ? Limit the amount of soda and sports drinks you have every day. Drink more water when you are thirsty. ? Eat at least 5 servings of fruits and vegetables every day. It may seem like a lot, but it is not hard to reach this goal. A serving or helping is 1 piece of fruit, 1 cup of vegetables, or 2 cups of leafy, raw vegetables. Have an apple or some carrot sticks as an afternoon snack instead of a candy bar. Try to have fruits and/or vegetables at every meal. 
· Make exercise part of your daily routine. You may want to start with simple activities, such as walking, bicycling, or slow swimming. Try to be active 30 to 60 minutes every day.  You do not need to do all 30 to 60 minutes all at once. For example, you can exercise 3 times a day for 10 or 20 minutes. Moderate exercise is safe for most people, but it is always a good idea to talk to your doctor before starting an exercise program. 
· Keep moving. Manny Mings the lawn, work in the garden, or [employer]. Take the stairs instead of the elevator at work. · If you smoke, quit. People who smoke have an increased risk for heart attack, stroke, cancer, and other lung illnesses. Quitting is hard, but there are ways to boost your chance of quitting tobacco for good. ? Use nicotine gum, patches, or lozenges. ? Ask your doctor about stop-smoking programs and medicines. ? Keep trying. In addition to reducing your risk of diseases in the future, you will notice some benefits soon after you stop using tobacco. If you have shortness of breath or asthma symptoms, they will likely get better within a few weeks after you quit. · Limit how much alcohol you drink. Moderate amounts of alcohol (up to 2 drinks a day for men, 1 drink a day for women) are okay. But drinking too much can lead to liver problems, high blood pressure, and other health problems. Family health If you have a family, there are many things you can do together to improve your health. · Eat meals together as a family as often as possible. · Eat healthy foods. This includes fruits, vegetables, lean meats and dairy, and whole grains. · Include your family in your fitness plan. Most people think of activities such as jogging or tennis as the way to fitness, but there are many ways you and your family can be more active. Anything that makes you breathe hard and gets your heart pumping is exercise. Here are some tips: 
? Walk to do errands or to take your child to school or the bus. 
? Go for a family bike ride after dinner instead of watching TV. Where can you learn more? Go to http://kiran-travis.info/ Enter B540 in the search box to learn more about \"A Healthy Lifestyle: Care Instructions. \" Current as of: January 31, 2020               Content Version: 12.5 © 2006-2020 Healthwise, Incorporated. Care instructions adapted under license by [employer] (which disclaims liability or warranty for this information). If you have questions about a medical condition or this instruction, always ask your healthcare professional. [address] any warranty or liability for your use of this information.

## 2020-07-06 RX ORDER — LISINOPRIL 40 MG/1
TABLET ORAL
Qty: 90 TAB | Refills: 3 | Status: SHIPPED | OUTPATIENT
Start: 2020-07-06 | End: 2021-06-30 | Stop reason: SDUPTHER

## 2020-07-09 ENCOUNTER — APPOINTMENT (OUTPATIENT)
Dept: ULTRASOUND IMAGING | Age: 55
DRG: 373 | End: 2020-07-09
Attending: EMERGENCY MEDICINE
Payer: COMMERCIAL

## 2020-07-09 ENCOUNTER — APPOINTMENT (OUTPATIENT)
Dept: CT IMAGING | Age: 55
DRG: 373 | End: 2020-07-09
Attending: EMERGENCY MEDICINE
Payer: COMMERCIAL

## 2020-07-09 ENCOUNTER — APPOINTMENT (OUTPATIENT)
Dept: GENERAL RADIOLOGY | Age: 55
DRG: 373 | End: 2020-07-09
Attending: EMERGENCY MEDICINE
Payer: COMMERCIAL

## 2020-07-09 ENCOUNTER — HOSPITAL ENCOUNTER (INPATIENT)
Age: 55
LOS: 2 days | Discharge: HOME | DRG: 373 | End: 2020-07-11
Attending: EMERGENCY MEDICINE | Admitting: FAMILY MEDICINE
Payer: COMMERCIAL

## 2020-07-09 DIAGNOSIS — R10.84 ABDOMINAL PAIN, GENERALIZED: Primary | ICD-10-CM

## 2020-07-09 DIAGNOSIS — R55 SYNCOPE AND COLLAPSE: ICD-10-CM

## 2020-07-09 PROBLEM — R10.9 ABDOMINAL PAIN: Status: ACTIVE | Noted: 2020-07-09

## 2020-07-09 LAB
ALBUMIN SERPL-MCNC: 3.5 G/DL (ref 3.5–5)
ALBUMIN/GLOB SERPL: 0.9 {RATIO} (ref 1.1–2.2)
ALP SERPL-CCNC: 74 U/L (ref 45–117)
ALT SERPL-CCNC: 20 U/L (ref 12–78)
ANION GAP SERPL CALC-SCNC: 9 MMOL/L (ref 5–15)
APPEARANCE UR: ABNORMAL
AST SERPL-CCNC: 14 U/L (ref 15–37)
BACTERIA URNS QL MICRO: NEGATIVE /HPF
BASOPHILS # BLD: 0.1 K/UL (ref 0–0.1)
BASOPHILS NFR BLD: 0 % (ref 0–1)
BILIRUB SERPL-MCNC: 0.5 MG/DL (ref 0.2–1)
BILIRUB UR QL: NEGATIVE
BUN SERPL-MCNC: 16 MG/DL (ref 6–20)
BUN/CREAT SERPL: 14 (ref 12–20)
CALCIUM SERPL-MCNC: 9.1 MG/DL (ref 8.5–10.1)
CHLORIDE SERPL-SCNC: 101 MMOL/L (ref 97–108)
CK SERPL-CCNC: 66 U/L (ref 26–192)
CO2 SERPL-SCNC: 29 MMOL/L (ref 21–32)
COLOR UR: ABNORMAL
COMMENT, HOLDF: NORMAL
CREAT SERPL-MCNC: 1.16 MG/DL (ref 0.55–1.02)
DIFFERENTIAL METHOD BLD: ABNORMAL
EOSINOPHIL # BLD: 0.1 K/UL (ref 0–0.4)
EOSINOPHIL NFR BLD: 1 % (ref 0–7)
EPITH CASTS URNS QL MICRO: ABNORMAL /LPF
ERYTHROCYTE [DISTWIDTH] IN BLOOD BY AUTOMATED COUNT: 15 % (ref 11.5–14.5)
GLOBULIN SER CALC-MCNC: 3.8 G/DL (ref 2–4)
GLUCOSE SERPL-MCNC: 139 MG/DL (ref 65–100)
GLUCOSE UR STRIP.AUTO-MCNC: NEGATIVE MG/DL
HCT VFR BLD AUTO: 49.8 % (ref 35–47)
HGB BLD-MCNC: 15.8 G/DL (ref 11.5–16)
HGB UR QL STRIP: NEGATIVE
IMM GRANULOCYTES # BLD AUTO: 0.1 K/UL (ref 0–0.04)
IMM GRANULOCYTES NFR BLD AUTO: 0 % (ref 0–0.5)
KETONES UR QL STRIP.AUTO: NEGATIVE MG/DL
LACTATE SERPL-SCNC: 0.9 MMOL/L (ref 0.4–2)
LEUKOCYTE ESTERASE UR QL STRIP.AUTO: NEGATIVE
LIPASE SERPL-CCNC: 108 U/L (ref 73–393)
LYMPHOCYTES # BLD: 2.5 K/UL (ref 0.8–3.5)
LYMPHOCYTES NFR BLD: 16 % (ref 12–49)
MAGNESIUM SERPL-MCNC: 1.8 MG/DL (ref 1.6–2.4)
MCH RBC QN AUTO: 27.4 PG (ref 26–34)
MCHC RBC AUTO-ENTMCNC: 31.7 G/DL (ref 30–36.5)
MCV RBC AUTO: 86.5 FL (ref 80–99)
MONOCYTES # BLD: 0.9 K/UL (ref 0–1)
MONOCYTES NFR BLD: 6 % (ref 5–13)
NEUTS SEG # BLD: 12.2 K/UL (ref 1.8–8)
NEUTS SEG NFR BLD: 77 % (ref 32–75)
NITRITE UR QL STRIP.AUTO: NEGATIVE
NRBC # BLD: 0 K/UL (ref 0–0.01)
NRBC BLD-RTO: 0 PER 100 WBC
PH UR STRIP: 6 [PH] (ref 5–8)
PLATELET # BLD AUTO: 320 K/UL (ref 150–400)
PMV BLD AUTO: 9.6 FL (ref 8.9–12.9)
POTASSIUM SERPL-SCNC: 3.6 MMOL/L (ref 3.5–5.1)
PROT SERPL-MCNC: 7.3 G/DL (ref 6.4–8.2)
PROT UR STRIP-MCNC: NEGATIVE MG/DL
RBC # BLD AUTO: 5.76 M/UL (ref 3.8–5.2)
RBC #/AREA URNS HPF: ABNORMAL /HPF (ref 0–5)
SAMPLES BEING HELD,HOLD: NORMAL
SODIUM SERPL-SCNC: 139 MMOL/L (ref 136–145)
SP GR UR REFRACTOMETRY: 1.01 (ref 1–1.03)
TROPONIN I SERPL-MCNC: <0.05 NG/ML
UA: UC IF INDICATED,UAUC: ABNORMAL
UROBILINOGEN UR QL STRIP.AUTO: 0.2 EU/DL (ref 0.2–1)
WBC # BLD AUTO: 15.8 K/UL (ref 3.6–11)
WBC URNS QL MICRO: ABNORMAL /HPF (ref 0–4)

## 2020-07-09 PROCEDURE — 76705 ECHO EXAM OF ABDOMEN: CPT

## 2020-07-09 PROCEDURE — 85025 COMPLETE CBC W/AUTO DIFF WBC: CPT

## 2020-07-09 PROCEDURE — 83690 ASSAY OF LIPASE: CPT

## 2020-07-09 PROCEDURE — 82550 ASSAY OF CK (CPK): CPT

## 2020-07-09 PROCEDURE — 71045 X-RAY EXAM CHEST 1 VIEW: CPT

## 2020-07-09 PROCEDURE — 65270000029 HC RM PRIVATE

## 2020-07-09 PROCEDURE — 74011250636 HC RX REV CODE- 250/636: Performed by: EMERGENCY MEDICINE

## 2020-07-09 PROCEDURE — 83605 ASSAY OF LACTIC ACID: CPT

## 2020-07-09 PROCEDURE — 80053 COMPREHEN METABOLIC PANEL: CPT

## 2020-07-09 PROCEDURE — 83735 ASSAY OF MAGNESIUM: CPT

## 2020-07-09 PROCEDURE — 81001 URINALYSIS AUTO W/SCOPE: CPT

## 2020-07-09 PROCEDURE — 99285 EMERGENCY DEPT VISIT HI MDM: CPT

## 2020-07-09 PROCEDURE — 96375 TX/PRO/DX INJ NEW DRUG ADDON: CPT

## 2020-07-09 PROCEDURE — 81025 URINE PREGNANCY TEST: CPT

## 2020-07-09 PROCEDURE — 84484 ASSAY OF TROPONIN QUANT: CPT

## 2020-07-09 PROCEDURE — 74011636320 HC RX REV CODE- 636/320: Performed by: EMERGENCY MEDICINE

## 2020-07-09 PROCEDURE — 96361 HYDRATE IV INFUSION ADD-ON: CPT

## 2020-07-09 PROCEDURE — 36415 COLL VENOUS BLD VENIPUNCTURE: CPT

## 2020-07-09 PROCEDURE — 74177 CT ABD & PELVIS W/CONTRAST: CPT

## 2020-07-09 PROCEDURE — 93005 ELECTROCARDIOGRAM TRACING: CPT

## 2020-07-09 PROCEDURE — 96374 THER/PROPH/DIAG INJ IV PUSH: CPT

## 2020-07-09 RX ORDER — HYDROMORPHONE HYDROCHLORIDE 1 MG/ML
0.5 INJECTION, SOLUTION INTRAMUSCULAR; INTRAVENOUS; SUBCUTANEOUS
Status: COMPLETED | OUTPATIENT
Start: 2020-07-09 | End: 2020-07-09

## 2020-07-09 RX ORDER — ONDANSETRON 2 MG/ML
4 INJECTION INTRAMUSCULAR; INTRAVENOUS
Status: COMPLETED | OUTPATIENT
Start: 2020-07-09 | End: 2020-07-09

## 2020-07-09 RX ADMIN — SODIUM CHLORIDE 1000 ML: 900 INJECTION, SOLUTION INTRAVENOUS at 20:22

## 2020-07-09 RX ADMIN — SODIUM CHLORIDE 1000 ML: 900 INJECTION, SOLUTION INTRAVENOUS at 22:42

## 2020-07-09 RX ADMIN — HYDROMORPHONE HYDROCHLORIDE 0.5 MG: 1 INJECTION, SOLUTION INTRAMUSCULAR; INTRAVENOUS; SUBCUTANEOUS at 20:33

## 2020-07-09 RX ADMIN — HYDROMORPHONE HYDROCHLORIDE 0.5 MG: 1 INJECTION, SOLUTION INTRAMUSCULAR; INTRAVENOUS; SUBCUTANEOUS at 22:42

## 2020-07-09 RX ADMIN — IOPAMIDOL 70 ML: 755 INJECTION, SOLUTION INTRAVENOUS at 21:15

## 2020-07-09 RX ADMIN — ONDANSETRON 4 MG: 2 INJECTION INTRAMUSCULAR; INTRAVENOUS at 20:32

## 2020-07-10 ENCOUNTER — APPOINTMENT (OUTPATIENT)
Dept: NON INVASIVE DIAGNOSTICS | Age: 55
DRG: 373 | End: 2020-07-10
Attending: STUDENT IN AN ORGANIZED HEALTH CARE EDUCATION/TRAINING PROGRAM
Payer: COMMERCIAL

## 2020-07-10 LAB
ALBUMIN SERPL-MCNC: 2.6 G/DL (ref 3.5–5)
ALBUMIN/GLOB SERPL: 0.8 {RATIO} (ref 1.1–2.2)
ALP SERPL-CCNC: 57 U/L (ref 45–117)
ALT SERPL-CCNC: 17 U/L (ref 12–78)
ANION GAP SERPL CALC-SCNC: 5 MMOL/L (ref 5–15)
AST SERPL-CCNC: 21 U/L (ref 15–37)
ATRIAL RATE: 77 BPM
BILIRUB SERPL-MCNC: 0.4 MG/DL (ref 0.2–1)
BUN SERPL-MCNC: 15 MG/DL (ref 6–20)
BUN/CREAT SERPL: 21 (ref 12–20)
CALCIUM SERPL-MCNC: 7.7 MG/DL (ref 8.5–10.1)
CALCULATED P AXIS, ECG09: 61 DEGREES
CALCULATED R AXIS, ECG10: 50 DEGREES
CALCULATED T AXIS, ECG11: 62 DEGREES
CHLORIDE SERPL-SCNC: 108 MMOL/L (ref 97–108)
CO2 SERPL-SCNC: 25 MMOL/L (ref 21–32)
CREAT SERPL-MCNC: 0.7 MG/DL (ref 0.55–1.02)
DIAGNOSIS, 93000: NORMAL
ECHO AO ROOT DIAM: 3.12 CM
ECHO AV AREA PEAK VELOCITY: 2.22 CM2
ECHO AV AREA/BSA PEAK VELOCITY: 1.2 CM2/M2
ECHO AV PEAK GRADIENT: 9.09 MMHG
ECHO AV PEAK VELOCITY: 150.73 CM/S
ECHO EST RA PRESSURE: 3 MMHG
ECHO LA AREA 4C: 16.98 CM2
ECHO LA MAJOR AXIS: 3.43 CM
ECHO LA MINOR AXIS: 1.91 CM
ECHO LA VOL 2C: 54.21 ML (ref 22–52)
ECHO LA VOL 4C: 45.78 ML (ref 22–52)
ECHO LA VOL BP: 53.86 ML (ref 22–52)
ECHO LA VOL/BSA BIPLANE: 30.01 ML/M2 (ref 16–28)
ECHO LA VOLUME INDEX A2C: 30.2 ML/M2 (ref 16–28)
ECHO LA VOLUME INDEX A4C: 25.51 ML/M2 (ref 16–28)
ECHO LV E' LATERAL VELOCITY: 13.87 CENTIMETER/SECOND
ECHO LV E' SEPTAL VELOCITY: 7.35 CENTIMETER/SECOND
ECHO LV INTERNAL DIMENSION DIASTOLIC: 4.75 CM (ref 3.9–5.3)
ECHO LV INTERNAL DIMENSION SYSTOLIC: 2.83 CM
ECHO LV IVSD: 0.93 CM (ref 0.6–0.9)
ECHO LV MASS 2D: 147.4 G (ref 67–162)
ECHO LV MASS INDEX 2D: 82.1 G/M2 (ref 43–95)
ECHO LV POSTERIOR WALL DIASTOLIC: 0.89 CM (ref 0.6–0.9)
ECHO LVOT DIAM: 1.86 CM
ECHO LVOT PEAK GRADIENT: 6.01 MMHG
ECHO LVOT PEAK VELOCITY: 122.58 CM/S
ECHO MV A VELOCITY: 85.84 CENTIMETER/SECOND
ECHO MV E DECELERATION TIME (DT): 0.18 S
ECHO MV E VELOCITY: 74.57 CENTIMETER/SECOND
ECHO PV PEAK INSTANTANEOUS GRADIENT SYSTOLIC: 2.55 MMHG
ECHO PV REGURGITANT MAX VELOCITY: 79.85 CM/S
ECHO RIGHT VENTRICULAR SYSTOLIC PRESSURE (RVSP): 31.26 MMHG
ECHO RV INTERNAL DIMENSION: 3.78 CM
ECHO RV TAPSE: 2.74 CM (ref 1.5–2)
ECHO TV REGURGITANT MAX VELOCITY: 265.81 CM/S
ECHO TV REGURGITANT PEAK GRADIENT: 28.26 MMHG
GLOBULIN SER CALC-MCNC: 3.1 G/DL (ref 2–4)
GLUCOSE SERPL-MCNC: 117 MG/DL (ref 65–100)
HCG UR QL: NEGATIVE
LA VOL DISK BP: 49.89 ML (ref 22–52)
P-R INTERVAL, ECG05: 164 MS
PHOSPHATE SERPL-MCNC: 3.3 MG/DL (ref 2.6–4.7)
POTASSIUM SERPL-SCNC: 3.4 MMOL/L (ref 3.5–5.1)
PROT SERPL-MCNC: 5.7 G/DL (ref 6.4–8.2)
Q-T INTERVAL, ECG07: 388 MS
QRS DURATION, ECG06: 84 MS
QTC CALCULATION (BEZET), ECG08: 439 MS
SODIUM SERPL-SCNC: 138 MMOL/L (ref 136–145)
VENTRICULAR RATE, ECG03: 77 BPM

## 2020-07-10 PROCEDURE — C9113 INJ PANTOPRAZOLE SODIUM, VIA: HCPCS | Performed by: STUDENT IN AN ORGANIZED HEALTH CARE EDUCATION/TRAINING PROGRAM

## 2020-07-10 PROCEDURE — 74011250636 HC RX REV CODE- 250/636: Performed by: SURGERY

## 2020-07-10 PROCEDURE — 74011250637 HC RX REV CODE- 250/637: Performed by: SPECIALIST

## 2020-07-10 PROCEDURE — 87086 URINE CULTURE/COLONY COUNT: CPT

## 2020-07-10 PROCEDURE — 74011250636 HC RX REV CODE- 250/636: Performed by: STUDENT IN AN ORGANIZED HEALTH CARE EDUCATION/TRAINING PROGRAM

## 2020-07-10 PROCEDURE — 80053 COMPREHEN METABOLIC PANEL: CPT

## 2020-07-10 PROCEDURE — 65660000000 HC RM CCU STEPDOWN

## 2020-07-10 PROCEDURE — 74011250637 HC RX REV CODE- 250/637: Performed by: STUDENT IN AN ORGANIZED HEALTH CARE EDUCATION/TRAINING PROGRAM

## 2020-07-10 PROCEDURE — 93306 TTE W/DOPPLER COMPLETE: CPT

## 2020-07-10 PROCEDURE — 84100 ASSAY OF PHOSPHORUS: CPT

## 2020-07-10 PROCEDURE — 36415 COLL VENOUS BLD VENIPUNCTURE: CPT

## 2020-07-10 RX ORDER — SODIUM CHLORIDE 0.9 % (FLUSH) 0.9 %
5-40 SYRINGE (ML) INJECTION EVERY 8 HOURS
Status: DISCONTINUED | OUTPATIENT
Start: 2020-07-10 | End: 2020-07-11 | Stop reason: HOSPADM

## 2020-07-10 RX ORDER — CIPROFLOXACIN 2 MG/ML
400 INJECTION, SOLUTION INTRAVENOUS EVERY 12 HOURS
Status: DISCONTINUED | OUTPATIENT
Start: 2020-07-10 | End: 2020-07-10

## 2020-07-10 RX ORDER — POTASSIUM CHLORIDE 7.45 MG/ML
10 INJECTION INTRAVENOUS
Status: COMPLETED | OUTPATIENT
Start: 2020-07-10 | End: 2020-07-10

## 2020-07-10 RX ORDER — LEVOFLOXACIN 5 MG/ML
750 INJECTION, SOLUTION INTRAVENOUS EVERY 24 HOURS
Status: DISCONTINUED | OUTPATIENT
Start: 2020-07-10 | End: 2020-07-11 | Stop reason: HOSPADM

## 2020-07-10 RX ORDER — SODIUM CHLORIDE 0.9 % (FLUSH) 0.9 %
5-40 SYRINGE (ML) INJECTION AS NEEDED
Status: DISCONTINUED | OUTPATIENT
Start: 2020-07-10 | End: 2020-07-11 | Stop reason: HOSPADM

## 2020-07-10 RX ORDER — ONDANSETRON 2 MG/ML
4 INJECTION INTRAMUSCULAR; INTRAVENOUS
Status: DISCONTINUED | OUTPATIENT
Start: 2020-07-10 | End: 2020-07-11 | Stop reason: HOSPADM

## 2020-07-10 RX ORDER — ENOXAPARIN SODIUM 100 MG/ML
40 INJECTION SUBCUTANEOUS EVERY 24 HOURS
Status: DISCONTINUED | OUTPATIENT
Start: 2020-07-10 | End: 2020-07-11 | Stop reason: HOSPADM

## 2020-07-10 RX ORDER — MORPHINE SULFATE 2 MG/ML
2 INJECTION, SOLUTION INTRAMUSCULAR; INTRAVENOUS
Status: DISCONTINUED | OUTPATIENT
Start: 2020-07-10 | End: 2020-07-11 | Stop reason: HOSPADM

## 2020-07-10 RX ORDER — METRONIDAZOLE 500 MG/100ML
500 INJECTION, SOLUTION INTRAVENOUS EVERY 12 HOURS
Status: DISCONTINUED | OUTPATIENT
Start: 2020-07-10 | End: 2020-07-11 | Stop reason: HOSPADM

## 2020-07-10 RX ORDER — POLYETHYLENE GLYCOL 3350 17 G/17G
17 POWDER, FOR SOLUTION ORAL 2 TIMES DAILY
Status: DISCONTINUED | OUTPATIENT
Start: 2020-07-10 | End: 2020-07-11 | Stop reason: HOSPADM

## 2020-07-10 RX ORDER — POLYETHYLENE GLYCOL 3350 17 G/17G
17 POWDER, FOR SOLUTION ORAL
Status: COMPLETED | OUTPATIENT
Start: 2020-07-10 | End: 2020-07-10

## 2020-07-10 RX ORDER — METRONIDAZOLE 500 MG/100ML
500 INJECTION, SOLUTION INTRAVENOUS EVERY 8 HOURS
Status: DISCONTINUED | OUTPATIENT
Start: 2020-07-10 | End: 2020-07-10

## 2020-07-10 RX ORDER — SODIUM CHLORIDE 9 MG/ML
125 INJECTION, SOLUTION INTRAVENOUS CONTINUOUS
Status: DISCONTINUED | OUTPATIENT
Start: 2020-07-10 | End: 2020-07-11 | Stop reason: HOSPADM

## 2020-07-10 RX ADMIN — Medication 10 ML: at 19:34

## 2020-07-10 RX ADMIN — ENOXAPARIN SODIUM 40 MG: 40 INJECTION SUBCUTANEOUS at 09:23

## 2020-07-10 RX ADMIN — POLYETHYLENE GLYCOL 3350 17 G: 17 POWDER, FOR SOLUTION ORAL at 17:28

## 2020-07-10 RX ADMIN — PANTOPRAZOLE SODIUM 40 MG: 40 INJECTION, POWDER, FOR SOLUTION INTRAVENOUS at 09:26

## 2020-07-10 RX ADMIN — Medication 5 ML: at 02:00

## 2020-07-10 RX ADMIN — LEVOFLOXACIN 750 MG: 5 INJECTION, SOLUTION INTRAVENOUS at 10:43

## 2020-07-10 RX ADMIN — POLYETHYLENE GLYCOL 3350 17 G: 17 POWDER, FOR SOLUTION ORAL at 14:39

## 2020-07-10 RX ADMIN — METRONIDAZOLE 500 MG: 500 INJECTION, SOLUTION INTRAVENOUS at 09:20

## 2020-07-10 RX ADMIN — SODIUM CHLORIDE 125 ML/HR: 900 INJECTION, SOLUTION INTRAVENOUS at 19:34

## 2020-07-10 RX ADMIN — Medication 5 ML: at 06:00

## 2020-07-10 RX ADMIN — MORPHINE SULFATE 2 MG: 2 INJECTION, SOLUTION INTRAMUSCULAR; INTRAVENOUS at 03:24

## 2020-07-10 RX ADMIN — METRONIDAZOLE 500 MG: 500 INJECTION, SOLUTION INTRAVENOUS at 21:35

## 2020-07-10 RX ADMIN — POTASSIUM CHLORIDE 10 MEQ: 10 INJECTION, SOLUTION INTRAVENOUS at 10:42

## 2020-07-10 RX ADMIN — POTASSIUM CHLORIDE 10 MEQ: 10 INJECTION, SOLUTION INTRAVENOUS at 09:28

## 2020-07-10 RX ADMIN — MORPHINE SULFATE 2 MG: 2 INJECTION, SOLUTION INTRAMUSCULAR; INTRAVENOUS at 09:21

## 2020-07-10 RX ADMIN — METRONIDAZOLE 500 MG: 500 INJECTION, SOLUTION INTRAVENOUS at 02:00

## 2020-07-10 RX ADMIN — Medication 10 ML: at 21:35

## 2020-07-10 RX ADMIN — Medication 10 ML: at 14:39

## 2020-07-10 RX ADMIN — SODIUM CHLORIDE 125 ML/HR: 900 INJECTION, SOLUTION INTRAVENOUS at 02:00

## 2020-07-10 NOTE — ED NOTES
Patient medicated (see MAR) and repositioned in bed. Normal Saline fluids flowing via R AC 20g. Patient's  at bedside.

## 2020-07-10 NOTE — PROGRESS NOTES
Spiritual Care Assessment/Progress Note  [address]      NAME: Dom Younger      MRN: [id number]  AGE: 53 y.o. SEX: female  Anabaptism Affiliation: No Gnosticism   Language: English     7/10/2020     Total Time (in minutes): 20     Spiritual Assessment begun in [facility]  MED SURG 2 through conversation with:         [x]Patient        [x] Family    [] Friend(s)        Reason for Consult: Request by staff     Spiritual beliefs: (Please include comment if needed)     [x] Identifies with a azul tradition:   Debbie Thomson       [] Supported by a azul community:            [] Claims no spiritual orientation:           [] Seeking spiritual identity:                [] Adheres to an individual form of spirituality:           [] Not able to assess:                           Identified resources for coping:      [x] Prayer                               [] Music                  [] Guided Imagery     [x] Family/friends                 [] Pet visits     [] Devotional reading                         [] Unknown     [] Other:                                          Interventions offered during this visit: (See comments for more details)    Patient Interventions: Affirmation of emotions/emotional suffering, Affirmation of azul, Iconic (affirming the presence of God/Higher Power), Initial/Spiritual assessment, patient floor, Prayer (actual), Prayer (assurance of)     Family/Friend(s):  Affirmation of emotions/emotional suffering, Iconic (affirming the presence of God/Higher Power), Prayer (actual), Prayer (assurance of)     Plan of Care:     [] Support spiritual and/or cultural needs    [] Support AMD and/or advance care planning process      [] Support grieving process   [] Coordinate Rites and/or Rituals    [] Coordination with community clergy   [] No spiritual needs identified at this time   [] Detailed Plan of Care below (See Comments)  [] Make referral to Music Therapy  [] Make referral to Pet Therapy     [] Make referral to Addiction services  [] Make referral to [facility]  [] Make referral to Spiritual Care Partner  [] No future visits requested        [x] Follow up visits as needed     Comments: Responded to In Basket request for a  visit. Miss Jackie Mena and her  were in the room. She shared her sister works in the PACU and came up and prayed with her earlier. She expressed her hopes that they figure out what is going on anc can fix it. She tended to hold her breath, encouraged    In with the spirit. .. Out with the rest.... breathing meditation. Provided payer and assurance of prayer.   Visited by: Pat Mckeon., MS., [id number] [facility] [address] ([id number])

## 2020-07-10 NOTE — ROUTINE PROCESS
Bedside shift change report given to Yaa (oncoming nurse) by Louisa Eubanks (offgoing nurse). Report included the following information SBAR, Kardex, Procedure Summary, Intake/Output, MAR, Accordion, Recent Results and Med Rec Status.

## 2020-07-10 NOTE — CONSULTS
Leni Ramirez. Kathy Alvarez MD  [phone] office  [phone] voicemail   Gastroenterology Consultation Note      Admit Date: 7/9/2020  Consult Date: 7/10/2020   I greatly appreciate your asking me to see Bryant Roblero, thank you very much for the opportunity to participate in her care. Narrative Assessment and Plan   · Abdominal pain  · Vomiting  · Abnormal CT suggesting enteral inflammation  · Longstanding problems with abdominal pain and variably variable bowel habits    My leading diagnostic concern is acute bacterial/viral enteritis; with antecedent symptoms ascribed to chronic irritable bowel syndrome. She seems to have a degree of parasympathetic over-activation in response to her current illness (leading to vasovagal syncope) as well as prior to [facility] (which is not uncommon in IBS patients). I would suggest repeat colonoscopy and EGD with pillcam in response to the CT findings, but I don't think she has to remain hospitalized over the weekend for these studies. I will advance to clears, and if she tolerates this and finds her abdominal pain improved then discharge this weekend and I'll arrange endoscopic evaluation as outpatient. GI will follow with you. Subjective:     Chief Complaint: Abdominal Pain    History of Present Illness: 52 yo female with hypertension notes a many year history of intermittent abdominal pain, crampy, generalized or LLQ, usually prior to BM, with liquid or loose stools in associate with these pain episodes. They usually only last until BM then they resolve but she does note at times during BM feeling weak, washed out and nauseated. Yesterday she developed a different pattern of abdominal pain, more severe, without associated BM, which wouldn't resolve and led to her having to leave a restaurant because of discomfort. While riding home in car she lost consciousness and was thus brought to ER for evaluation. No bleeding no fever chills.   CT abnormal suggesting inflammatory change of the jejunum. WBC elevated. Recent trip to [place].    PCP:  Vish Griffin MD    Past Medical History:   Diagnosis Date    Anxiety     Hiatal hernia     Hypertension     Menopause     early aj at age 38    Trouble in sleeping     Vertigo     Vitamin D deficiency         Past Surgical History:   Procedure Laterality Date    HX APPENDECTOMY  2004    HX  SECTION      HX ORTHOPAEDIC      Right ankle x 2       Social History     Tobacco Use    Smoking status: Never Smoker    Smokeless tobacco: Never Used   Substance Use Topics    Alcohol use: Yes     Comment: socially        Family History   Problem Relation Age of Onset    Hypertension Mother     Hypertension Father         Allergies   Allergen Reactions    Shellfish Derived Diarrhea and Nausea and Vomiting    Sulfa (Sulfonamide Antibiotics) Hives            Home Medications:  Prior to Admission Medications   Prescriptions Last Dose Informant Patient Reported? Taking? ALPRAZolam (Xanax) 0.5 mg tablet   No No   Sig: Take 1 Tab by mouth daily as needed (Vertigo). ergocalciferol (ERGOCALCIFEROL) 50,000 unit capsule   No No   Sig: TAKE 1 CAPSULE EVERY 7 DAYS   lisinopriL (PRINIVIL, ZESTRIL) 40 mg tablet 2020 at Unknown time  No Yes   Sig: TAKE 1 TABLET DAILY   melatonin 5 mg cap capsule 2020 at Unknown time  Yes Yes   Sig: Take 5 mg by mouth nightly.       Facility-Administered Medications: None       Hospital Medications:  Current Facility-Administered Medications   Medication Dose Route Frequency    sodium chloride (NS) flush 5-40 mL  5-40 mL IntraVENous Q8H    sodium chloride (NS) flush 5-40 mL  5-40 mL IntraVENous PRN    0.9% sodium chloride infusion  125 mL/hr IntraVENous CONTINUOUS    morphine injection 2 mg  2 mg IntraVENous Q4H PRN    ondansetron (ZOFRAN) injection 4 mg  4 mg IntraVENous Q4H PRN    enoxaparin (LOVENOX) injection 40 mg  40 mg SubCUTAneous Q24H    pantoprazole (PROTONIX) 40 mg in 0.9% sodium chloride 10 mL injection  40 mg IntraVENous DAILY    levoFLOXacin (LEVAQUIN) 750 mg in D5W IVPB  750 mg IntraVENous Q24H    metroNIDAZOLE (FLAGYL) IVPB premix 500 mg  500 mg IntraVENous Q12H       Review of Systems: Admission ROS by Glenn Fothergill, MD from 7/9/2020 were reviewed with the patient and changes (other than per HPI) include: none      Objective:     Physical Exam:  Visit Vitals  /80 (BP 1 Location: Left arm, BP Patient Position: At rest;Supine)   Pulse 70   Temp 98.2 °F (36.8 °C)   Resp 17   Ht 5' 6\" (1.676 m)   Wt 70.3 kg (155 lb)   SpO2 96%   Breastfeeding No   BMI 25.02 kg/m²     SpO2 Readings from Last 6 Encounters:   07/10/20 96%   06/24/20 96%   03/13/20 95%   03/10/20 97%   11/20/19 94%   04/10/19 98%            Intake/Output Summary (Last 24 hours) at 7/10/2020 1610  Last data filed at 7/10/2020 0817  Gross per 24 hour   Intake 2575 ml   Output    Net 2575 ml      General: no distress, comfortable  Skin:  No rash or palpable dermatologic mass lesions  HEENT: Pupils equal, sclera anicteric, oropharynx with no gross lesions  Cardiovascular: No abnormal audible heart sounds, well perfused, no edema  Respiratory:  No abnormal audible breath sounds, normal respiratory effort, no throacic deformity  GI:  Abdomen nondistended, diffusely tender, no mass, no free fluid, no rebound or guarding. Musculoskeletal:  No skeletal deformity nor acute arthritis noted.   Neurological:  Motor and sensory function intact in upper extremeties  Psychiatric:  Normal affect, memory intact, appears to have insight into current illness  Lymphatic:  No cervical, supraclavicular, or periumbilic lymphadenopathy    Laboratory:    Recent Results (from the past 24 hour(s))   CBC WITH AUTOMATED DIFF    Collection Time: 07/09/20  8:20 PM   Result Value Ref Range    WBC 15.8 (H) 3.6 - 11.0 K/uL    RBC 5.76 (H) 3.80 - 5.20 M/uL    HGB 15.8 11.5 - 16.0 g/dL    HCT 49.8 (H) 35.0 - 47.0 %    MCV 86.5 80.0 - 99.0 FL    MCH 27.4 26.0 - 34.0 PG    MCHC 31.7 30.0 - 36.5 g/dL    RDW 15.0 (H) 11.5 - 14.5 %    PLATELET 678 610 - 501 K/uL    MPV 9.6 8.9 - 12.9 FL    NRBC 0.0 0.0  WBC    ABSOLUTE NRBC 0.00 0.00 - 0.01 K/uL    NEUTROPHILS 77 (H) 32 - 75 %    LYMPHOCYTES 16 12 - 49 %    MONOCYTES 6 5 - 13 %    EOSINOPHILS 1 0 - 7 %    BASOPHILS 0 0 - 1 %    IMMATURE GRANULOCYTES 0 0 - 0.5 %    ABS. NEUTROPHILS 12.2 (H) 1.8 - 8.0 K/UL    ABS. LYMPHOCYTES 2.5 0.8 - 3.5 K/UL    ABS. MONOCYTES 0.9 0.0 - 1.0 K/UL    ABS. EOSINOPHILS 0.1 0.0 - 0.4 K/UL    ABS. BASOPHILS 0.1 0.0 - 0.1 K/UL    ABS. IMM. GRANS. 0.1 (H) 0.00 - 0.04 K/UL    DF AUTOMATED     METABOLIC PANEL, COMPREHENSIVE    Collection Time: 07/09/20  8:20 PM   Result Value Ref Range    Sodium 139 136 - 145 mmol/L    Potassium 3.6 3.5 - 5.1 mmol/L    Chloride 101 97 - 108 mmol/L    CO2 29 21 - 32 mmol/L    Anion gap 9 5 - 15 mmol/L    Glucose 139 (H) 65 - 100 mg/dL    BUN 16 6 - 20 MG/DL    Creatinine 1.16 (H) 0.55 - 1.02 MG/DL    BUN/Creatinine ratio 14 12 - 20      GFR est AA 59 (L) >60 ml/min/1.73m2    GFR est non-AA 49 (L) >60 ml/min/1.73m2    Calcium 9.1 8.5 - 10.1 MG/DL    Bilirubin, total 0.5 0.2 - 1.0 MG/DL    ALT (SGPT) 20 12 - 78 U/L    AST (SGOT) 14 (L) 15 - 37 U/L    Alk.  phosphatase 74 45 - 117 U/L    Protein, total 7.3 6.4 - 8.2 g/dL    Albumin 3.5 3.5 - 5.0 g/dL    Globulin 3.8 2.0 - 4.0 g/dL    A-G Ratio 0.9 (L) 1.1 - 2.2     MAGNESIUM    Collection Time: 07/09/20  8:20 PM   Result Value Ref Range    Magnesium 1.8 1.6 - 2.4 mg/dL   LIPASE    Collection Time: 07/09/20  8:20 PM   Result Value Ref Range    Lipase 108 73 - 393 U/L   CK W/ REFLX CKMB    Collection Time: 07/09/20  8:20 PM   Result Value Ref Range    CK 66 26 - 192 U/L   TROPONIN I    Collection Time: 07/09/20  8:20 PM   Result Value Ref Range    Troponin-I, Qt. <0.05 <0.05 ng/mL   SAMPLES BEING HELD    Collection Time: 07/09/20  8:20 PM   Result Value Ref Range    SAMPLES BEING HELD 1 SST 1 BLUE     COMMENT        Add-on orders for these samples will be processed based on acceptable specimen integrity and analyte stability, which may vary by analyte.    EKG, 12 LEAD, INITIAL    Collection Time: 07/09/20  8:28 PM   Result Value Ref Range    Ventricular Rate 77 BPM    Atrial Rate 77 BPM    P-R Interval 164 ms    QRS Duration 84 ms    Q-T Interval 388 ms    QTC Calculation (Bezet) 439 ms    Calculated P Axis 61 degrees    Calculated R Axis 50 degrees    Calculated T Axis 62 degrees    Diagnosis       Normal sinus rhythm  Possible Left atrial enlargement  Borderline ECG  When compared with ECG of 05-NOV-2010 12:16,  No significant change was found  Confirmed by Manish GUERRERO, Jim ([id number]) on 7/10/2020 12:09:42 PM     URINALYSIS W/ REFLEX CULTURE    Collection Time: 07/09/20  8:40 PM    Specimen: Urine   Result Value Ref Range    Color YELLOW/STRAW      Appearance HAZY (A) CLEAR      Specific gravity 1.015 1.003 - 1.030      pH (UA) 6.0 5.0 - 8.0      Protein Negative NEG mg/dL    Glucose Negative NEG mg/dL    Ketone Negative NEG mg/dL    Bilirubin Negative NEG      Blood Negative NEG      Urobilinogen 0.2 0.2 - 1.0 EU/dL    Nitrites Negative NEG      Leukocyte Esterase Negative NEG      WBC 0-4 0 - 4 /hpf    RBC 0-5 0 - 5 /hpf    Epithelial cells FEW FEW /lpf    Bacteria Negative NEG /hpf    UA:UC IF INDICATED CULTURE NOT INDICATED BY UA RESULT CNI     HCG URINE, QL. - POC    Collection Time: 07/09/20  9:21 PM   Result Value Ref Range    Pregnancy test,urine (POC) Negative NEG     LACTIC ACID    Collection Time: 07/09/20 10:40 PM   Result Value Ref Range    Lactic acid 0.9 0.4 - 2.0 MMOL/L   METABOLIC PANEL, COMPREHENSIVE    Collection Time: 07/10/20  6:22 AM   Result Value Ref Range    Sodium 138 136 - 145 mmol/L    Potassium 3.4 (L) 3.5 - 5.1 mmol/L    Chloride 108 97 - 108 mmol/L    CO2 25 21 - 32 mmol/L    Anion gap 5 5 - 15 mmol/L    Glucose 117 (H) 65 - 100 mg/dL    BUN 15 6 - 20 MG/DL Creatinine 0.70 0.55 - 1.02 MG/DL    BUN/Creatinine ratio 21 (H) 12 - 20      GFR est AA >60 >60 ml/min/1.73m2    GFR est non-AA >60 >60 ml/min/1.73m2    Calcium 7.7 (L) 8.5 - 10.1 MG/DL    Bilirubin, total 0.4 0.2 - 1.0 MG/DL    ALT (SGPT) 17 12 - 78 U/L    AST (SGOT) 21 15 - 37 U/L    Alk.  phosphatase 57 45 - 117 U/L    Protein, total 5.7 (L) 6.4 - 8.2 g/dL    Albumin 2.6 (L) 3.5 - 5.0 g/dL    Globulin 3.1 2.0 - 4.0 g/dL    A-G Ratio 0.8 (L) 1.1 - 2.2     PHOSPHORUS    Collection Time: 07/10/20  6:22 AM   Result Value Ref Range    Phosphorus 3.3 2.6 - 4.7 MG/DL   ECHO ADULT COMPLETE    Collection Time: 07/10/20 11:43 AM   Result Value Ref Range    IVSd 0.93 (A) 0.6 - 0.9 cm    LVIDd 4.75 3.9 - 5.3 cm    LVIDs 2.83 cm    LVOT d 1.86 cm    LVPWd 0.89 0.6 - 0.9 cm    LVOT Peak Gradient 6.01 mmHg    LVOT Peak Velocity 122.58 cm/s    RVIDd 3.78 cm    RVSP 31.26 mmHg    Left Atrium Major Axis 3.43 cm    LA Volume 53.86 22 - 52 mL    LA Area 4C 16.98 cm2    LA Vol 2C 54.21 (A) 22 - 52 mL    LA Vol 4C 45.78 22 - 52 mL    LA Volume DISK BP 49.89 22 - 52 mL    Est. RA Pressure 3.00 mmHg    Aortic Valve Area by Continuity of Peak Velocity 2.22 cm2    AoV PG 9.09 mmHg    Aortic Valve Systolic Peak Velocity 763.54 cm/s    MV A Preston 85.84 centimeter/second    Mitral Valve E Wave Deceleration Time 0.18 s    MV E Preston 74.57 centimeter/second    LV E' Lateral Velocity 13.87 centimeter/second    LV E' Septal Velocity 7.35 centimeter/second    Pulmonic Valve Systolic Peak Instantaneous Gradient 2.55 mmHg    Pulmonic Regurgitant End Max Velocity 79.85 cm/s    Tapse 2.74 (A) 1.5 - 2.0 cm    Triscuspid Valve Regurgitation Peak Gradient 28.26 mmHg    TR Max Velocity 265.81 cm/s    Ao Root D 3.12 cm    LV Mass .4 67 - 162 g    LV Mass AL Index 82.1 43 - 95 g/m2    Left Atrium Minor Axis 1.91 cm    LA Vol Index 30.01 16 - 28 ml/m2    LA Vol Index 30.20 16 - 28 ml/m2    LA Vol Index 25.51 16 - 28 ml/m2    RAGINI/BSA Pk Preston 1.2 cm2/m2 Assessment/Plan:     Active Problems:    Syncope (7/9/2020)      Abdominal pain (7/9/2020)         See above narrative for full detail.

## 2020-07-10 NOTE — ED NOTES
Bedside and Verbal shift change report given to Rose Monroy (oncoming nurse) by Gregor Dorantes (offgoing nurse). Report included the following information SBAR, ED Summary, MAR, Recent Results and Cardiac Rhythm NSR.

## 2020-07-10 NOTE — CONSULTS
Surgery Consult    Subjective:      Tere Bullock is a 53 y.o. female who presents with abdominal pain that started yesterday around 4pm.  She took some medication for gas and an antacid and said slight improvement. However at dinner pain got worse and so  brought her to ED. She passed out in the car, likely related to pain. She reports she has dizzy spells. There was nausea when she got here to ED. No vomiting. Last BM was yesterday in AM and was normal.  She reports she has been thinking about visiting a GI specialist because she has been having weekly episodes of abdominal pain and has been getting more frequent. No family history of IBS. Reports colonoscopy in the past and had some polyps removed. CT in ED showed thickening in section of small bowel.   No free air    Past Medical History:   Diagnosis Date    Anxiety     Hiatal hernia     Hypertension     Menopause     early aj at age 38    Trouble in sleeping     Vertigo     Vitamin D deficiency      Past Surgical History:   Procedure Laterality Date    HX APPENDECTOMY  2004    HX  SECTION      HX ORTHOPAEDIC      Right ankle x 2      Family History   Problem Relation Age of Onset    Hypertension Mother     Hypertension Father      Social History     Socioeconomic History    Marital status:      Spouse name: Not on file    Number of children: Not on file    Years of education: Not on file    Highest education level: Not on file   Tobacco Use    Smoking status: Never Smoker    Smokeless tobacco: Never Used   Substance and Sexual Activity    Alcohol use: Yes     Comment: socially    Drug use: No    Sexual activity: Yes     Partners: Male      Current Facility-Administered Medications   Medication Dose Route Frequency    sodium chloride (NS) flush 5-40 mL  5-40 mL IntraVENous Q8H    sodium chloride (NS) flush 5-40 mL  5-40 mL IntraVENous PRN    0.9% sodium chloride infusion  125 mL/hr IntraVENous CONTINUOUS    morphine injection 2 mg  2 mg IntraVENous Q4H PRN    ondansetron (ZOFRAN) injection 4 mg  4 mg IntraVENous Q4H PRN    enoxaparin (LOVENOX) injection 40 mg  40 mg SubCUTAneous Q24H    metroNIDAZOLE (FLAGYL) IVPB premix 500 mg  500 mg IntraVENous Q8H    pantoprazole (PROTONIX) 40 mg in 0.9% sodium chloride 10 mL injection  40 mg IntraVENous DAILY    potassium chloride 10 mEq in 100 ml IVPB  10 mEq IntraVENous Q1H      Allergies   Allergen Reactions    Shellfish Derived Diarrhea and Nausea and Vomiting    Sulfa (Sulfonamide Antibiotics) Hives       Review of Systems:REVIEW OF SYSTEMS:     []     Unable to obtain  ROS due to  []    mental status change  []    sedated   []    intubated   [x]    Total of 12 systems reviewed as follows:    Constitutional: neg for fevers, chills, weight loss, malaise  Eyes: negative for blurry vision  Ears, nose, mouth, throat, and face: negative for sore throat  Respiratory: negative for SOB  Cardiovascular: negative for CP  Gastrointestinal: negative for vomiting, diarrhea, constipation, melena, hematochezia. Positive for abdominal pain and nausea  Genitourinary: negative for dysuria  Integument/breast: neg for skin rash  Hematologic/lymphatic: neg for bruising  Musculoskeletal: negative for muscle aches  Neurological: no dizziness or h/a    Objective:        Patient Vitals for the past 8 hrs:   BP Temp Pulse Resp SpO2   07/10/20 0807 127/72 97.6 °F (36.4 °C) 66 18 94 %   07/10/20 0707   65     07/10/20 0439 107/71 98.1 °F (36.7 °C) 62 14 95 %   07/10/20 0132   75     07/10/20 0125 134/86 98.5 °F (36.9 °C) 68 14 97 %       Temp (24hrs), Av °F (36.7 °C), Min:97.6 °F (36.4 °C), Max:98.5 °F (36.9 °C)      Physical Exam:  General:  Alert, cooperative, no distress, appears stated age. Eyes:  Conjunctivae/corneas clear.     Nose: Nares normal. Septum midline   Mouth/Throat: Lips, mucosa, and tongue normal.    Neck: Supple, symmetrical, trachea midline   Lungs:   Clear to auscultation bilaterally. Heart:  Regular rate and rhythm   Abdomen:   Soft, tender mainly from mid abdomen to left side. Minimal tenderness on right side of abdomen. Some rebound, non-distended, no guarding   Extremities: Extremities normal, atraumatic, no cyanosis or edema. Skin: Skin color, texture, turgor normal. No rashes or lesions   Neuro: Alert, oriented, speech clear     Labs:   Recent Labs     07/09/20 2020   WBC 15.8*   HGB 15.8   HCT 49.8*        Recent Labs     07/10/20  0622 07/09/20 2020    139   K 3.4* 3.6    101   CO2 25 29   * 139*   BUN 15 16   CREA 0.70 1.16*   CA 7.7* 9.1   MG  --  1.8   PHOS 3.3  --    ALB 2.6* 3.5   TBILI 0.4 0.5   ALT 17 20     No results for input(s): INR, INREXT in the last 72 hours. EXAM: CT ABD PELV W CONT     INDICATION: upper abd pain, syncope     COMPARISON: CT 8/2/2018      CONTRAST: 100 mL of Isovue-370.     TECHNIQUE:   Following the uneventful intravenous administration of contrast, thin axial  images were obtained through the abdomen and pelvis. Coronal and sagittal  reconstructions were generated. Oral contrast was not administered. CT dose  reduction was achieved through use of a standardized protocol tailored for this  examination and automatic exposure control for dose modulation. Adaptive  statistical iterative reconstruction (ASIR) was utilized.     FINDINGS:   LOWER THORAX: No significant abnormality in the incidentally imaged lower chest.  LIVER: No mass. Mild perihepatic ascites. BILIARY TREE: Gallbladder is within normal limits. CBD is not dilated. SPLEEN: within normal limits. Mild ascites in the left flank. PANCREAS: No mass or ductal dilatation. ADRENALS: Unremarkable. KIDNEYS: Nonrotation of the kidneys. No hydronephrosis or other acute  abnormality. STOMACH: Gastric distention.   SMALL BOWEL: Within the mid to lower abdomen, there are several loops of jejunum  with diffuse bowel wall thickening and mucosal hyperenhancement, and these loops  are not pathologically distended, but are relatively distended compared to  adjacent loops of bowel. Maximal dilatation is 2.9 cm. COLON: No dilatation or wall thickening. APPENDIX: Status post appendectomy. PERITONEUM: Mild ascites. No pneumoperitoneum  RETROPERITONEUM: No lymphadenopathy or aortic aneurysm. REPRODUCTIVE ORGANS: Uterus is noted. There is moderate pelvic ascites. URINARY BLADDER: No mass or calculus. BONES: No destructive bone lesion. ABDOMINAL WALL: No mass or hernia. ADDITIONAL COMMENTS: The vascular supply to the intestines appears intact. There  is no evidence of mesenteric vein thrombosis.     IMPRESSION  IMPRESSION:     1. There are several abnormal loops of jejunum in the mid to lower abdomen, with  abnormal concentric bowel wall thickening, mucosal hyperenhancement, and  adjacent mesenteric edema. There is also lower abdominal and pelvic ascites. Favor the sequela of an infectious or inflammatory intestinal process. 2. There is also gastric distention. 3. Status post appendectomy. Assessment and Plan:     54 y/o female with inflammation at section of small intestine.   No sign of obstruction at this time  Recommend NPO and IVF  Will add gram negative coverage as well to the flagyl she is on  Will also consult GI, Dr. Patricia Berg as patient has been having weekly episodes of abdominal pain getting worse      Signed By: Arline Lesches, MD     July 10, 2020

## 2020-07-10 NOTE — H&P
[address] [place] [address]   Office [phone]  Fax [phone]       Admission H&P     Name: Yuki Wilburn MRN: [id number]  Sex: Female   YOB: 1965  Age: 53 y.o. PCP: No Campos MD     Source of Information: patient, medical records    Chief complaint: Epigastric abdominal pain with syncope    History of Present Illness  Yuki Wilburn is a 53 y.o. female with PMHx of vertigo, vitamin D deficiency, essential hypertension, insomnia and anxiety who presents to the ED complaining of abdominal pain with syncope. The pain started when she was out having dinner with her . At 4pm as they got in the car to leave the venue the pain worsened and she lost consciousness. The  reports: Her head fell back and she started snoring. He pulled over, checked on her and drove straight to the ED. The pain is mainly focused in the epigastric and RUQ regions and radiates down. It's stabbing in character, 9/10 in intensity and associated with nausea and a facial flushing sensation. Patient has had GI pain in the past that has been associated with diarrhea and is relieved by defecation and is sometimes accompanied with a presyncopal episode in the bathroom. This is a longstanding issue. COVID Questions:   Experiencing any of the following symptoms: fever, chills, cough, SOB, diarrhea, URI symptoms. None  Any Sick contacts with fever, cough, diarrhea, SOB, URI symptoms. No  Lives with . Of note: General surgery was consulted in the ED, discussed patient with Dr Kirti Barth, recommended admitting patient for likely enteritis.     In the ED:  Vitals: Temp 97.6   /81   HR 69   RR 11   SatO2  93% on RA  Labs:  UA: Hazy appearance with some epithelial cells  CBC: WBC 15.8 (PMN's 77%)  BMP: Creatinine 1.16 (baseline 0.71)  LFT, CK, troponin, lactic acid WNL  Imaging:  RUQ U/S no significant findings  CXR negative  Abdominal CT:  - Jejunum wall thickening, mucosa hyperenhancement, mesenteric edema  - Lower abdominal and pelvic ascites  - Gastric distension  - Status post appendectomy (2004)    Treatment: NS 1L bolus x2, Dilaudid 0.5mg IV x2, zofran 4mg IV    EKG: Normal sinus rhythm. No change from baseline in 2010    Patient Vitals for the past 12 hrs:   Temp Pulse Resp BP SpO2   07/10/20 0132  75      07/10/20 0125 98.5 °F (36.9 °C) 68 14 134/86 97 %   07/10/20 0001  61 13 119/79 96 %   07/09/20 2354 98.1 °F (36.7 °C)       07/09/20 2345  63 13 110/70 95 %   07/09/20 2330  64 13 110/69 97 %   07/09/20 2315  65 13 110/78 93 %   07/09/20 2300  72 12 104/71 (!) 85 %   07/09/20 2245  68 11 102/77 95 %   07/09/20 2230  73 17 95/73 94 %   07/09/20 2215  66 13 96/69 91 %   07/09/20 2200  69 11 101/81 93 %   07/09/20 2145  74 14 105/77 90 %   07/09/20 2130  74 12 105/74 95 %   07/09/20 2102  74 13  93 %   07/09/20 2100  71 14 100/59 (!) 88 %   07/09/20 2045  73 (!) 7 95/69 92 %   07/09/20 2030  80 10 102/80 98 %   07/09/20 2021  83 15 97/75 95 %   07/09/20 2001 97.6 °F (36.4 °C)       07/09/20 1948    95/78 96 %   07/09/20 1945  74 18 95/78 97 %       Review of Systems  Review of Systems   Constitutional: Negative for activity change, appetite change, chills, diaphoresis, fever and unexpected weight change. HENT: Negative for ear pain and hearing loss. Eyes: Negative for visual disturbance. Respiratory: Negative for cough, chest tightness and shortness of breath. Cardiovascular: Negative for chest pain, palpitations and leg swelling. Gastrointestinal: Positive for abdominal distention, abdominal pain, diarrhea and nausea. Negative for anal bleeding, blood in stool, constipation and vomiting. Endocrine: Negative for polydipsia and polyphagia. Genitourinary: Negative for difficulty urinating, dysuria and hematuria. Musculoskeletal: Positive for arthralgias. Skin: Negative for rash and wound.    Neurological: Positive for syncope and light-headedness. Negative for dizziness, tremors and headaches. Hematological: Bruises/bleeds easily. Home Medications   Prior to Admission medications    Medication Sig Start Date End Date Taking? Authorizing Provider   lisinopriL (PRINIVIL, ZESTRIL) 40 mg tablet TAKE 1 TABLET DAILY 20  Yes No Campos MD   melatonin 5 mg cap capsule Take 5 mg by mouth nightly. Yes Provider, Historical   ALPRAZolam (Xanax) 0.5 mg tablet Take 1 Tab by mouth daily as needed (Vertigo). 3/23/20   No Campos MD   ergocalciferol (ERGOCALCIFEROL) 50,000 unit capsule TAKE 1 CAPSULE EVERY 7 DAYS 19   No Campos MD       Allergies  Allergies   Allergen Reactions    Shellfish Derived Diarrhea and Nausea and Vomiting    Sulfa (Sulfonamide Antibiotics) Hives       Past Medical History  Past Medical History:   Diagnosis Date    Anxiety     Hiatal hernia     Hypertension     Menopause     early aj at age 38    Trouble in sleeping     Vertigo     Vitamin D deficiency        Previous Hospitalization(s)  Past Surgical History:   Procedure Laterality Date    HX APPENDECTOMY      HX  SECTION      HX ORTHOPAEDIC      Right ankle x 2       Family History  Family History   Problem Relation Age of Onset    Hypertension Mother     Hypertension Father        Social History  Alcohol history: Social  Smoking history: Non-smoker  Illicit drug history: Not at all  Living arrangement: patient lives with their family. Ambulates: Independently     Vital Signs  Visit Vitals  /86 (BP 1 Location: Left arm, BP Patient Position: At rest)   Pulse 75   Temp 98.5 °F (36.9 °C)   Resp 14   Ht 5' 6\" (1.676 m)   Wt 155 lb (70.3 kg)   SpO2 97%   BMI 25.02 kg/m²       Physical Exam  General: Occasionally grimacing from abdominal pain. Alert. Cooperative. Head: Normocephalic. Atraumatic. Eyes:              Conjunctiva pink. Sclera white. PERRL. Ears:              Hearing grossly intact. Neck: Supple. No tenderness. No carotid bruit   Respiratory: CTAB. No w/r/r/c.   Cardiovascular: RRR. Normal S1,S2. No m/r/g. Pulses 2+ throughout. GI: + bowel sounds. Tender epigastrium and RUQ. Mild tenderness in paraumbilical region. No rebound tenderness or guarding. Distended. Extremities: No LE edema. Distal pulses intact. Neurological: CN 1-12 grossly intact. Strength 5/5 in all extremities   Skin: Warm, dry. No rashes. Laboratory Data  Recent Results (from the past 8 hour(s))   CBC WITH AUTOMATED DIFF    Collection Time: 07/09/20  8:20 PM   Result Value Ref Range    WBC 15.8 (H) 3.6 - 11.0 K/uL    RBC 5.76 (H) 3.80 - 5.20 M/uL    HGB 15.8 11.5 - 16.0 g/dL    HCT 49.8 (H) 35.0 - 47.0 %    MCV 86.5 80.0 - 99.0 FL    MCH 27.4 26.0 - 34.0 PG    MCHC 31.7 30.0 - 36.5 g/dL    RDW 15.0 (H) 11.5 - 14.5 %    PLATELET 820 636 - 281 K/uL    MPV 9.6 8.9 - 12.9 FL    NRBC 0.0 0.0  WBC    ABSOLUTE NRBC 0.00 0.00 - 0.01 K/uL    NEUTROPHILS 77 (H) 32 - 75 %    LYMPHOCYTES 16 12 - 49 %    MONOCYTES 6 5 - 13 %    EOSINOPHILS 1 0 - 7 %    BASOPHILS 0 0 - 1 %    IMMATURE GRANULOCYTES 0 0 - 0.5 %    ABS. NEUTROPHILS 12.2 (H) 1.8 - 8.0 K/UL    ABS. LYMPHOCYTES 2.5 0.8 - 3.5 K/UL    ABS. MONOCYTES 0.9 0.0 - 1.0 K/UL    ABS. EOSINOPHILS 0.1 0.0 - 0.4 K/UL    ABS. BASOPHILS 0.1 0.0 - 0.1 K/UL    ABS. IMM.  GRANS. 0.1 (H) 0.00 - 0.04 K/UL    DF AUTOMATED     METABOLIC PANEL, COMPREHENSIVE    Collection Time: 07/09/20  8:20 PM   Result Value Ref Range    Sodium 139 136 - 145 mmol/L    Potassium 3.6 3.5 - 5.1 mmol/L    Chloride 101 97 - 108 mmol/L    CO2 29 21 - 32 mmol/L    Anion gap 9 5 - 15 mmol/L    Glucose 139 (H) 65 - 100 mg/dL    BUN 16 6 - 20 MG/DL    Creatinine 1.16 (H) 0.55 - 1.02 MG/DL    BUN/Creatinine ratio 14 12 - 20      GFR est AA 59 (L) >60 ml/min/1.73m2    GFR est non-AA 49 (L) >60 ml/min/1.73m2    Calcium 9.1 8.5 - 10.1 MG/DL    Bilirubin, total 0.5 0.2 - 1.0 MG/DL    ALT (SGPT) 20 12 - 78 U/L AST (SGOT) 14 (L) 15 - 37 U/L    Alk. phosphatase 74 45 - 117 U/L    Protein, total 7.3 6.4 - 8.2 g/dL    Albumin 3.5 3.5 - 5.0 g/dL    Globulin 3.8 2.0 - 4.0 g/dL    A-G Ratio 0.9 (L) 1.1 - 2.2     MAGNESIUM    Collection Time: 07/09/20  8:20 PM   Result Value Ref Range    Magnesium 1.8 1.6 - 2.4 mg/dL   LIPASE    Collection Time: 07/09/20  8:20 PM   Result Value Ref Range    Lipase 108 73 - 393 U/L   CK W/ REFLX CKMB    Collection Time: 07/09/20  8:20 PM   Result Value Ref Range    CK 66 26 - 192 U/L   TROPONIN I    Collection Time: 07/09/20  8:20 PM   Result Value Ref Range    Troponin-I, Qt. <0.05 <0.05 ng/mL   SAMPLES BEING HELD    Collection Time: 07/09/20  8:20 PM   Result Value Ref Range    SAMPLES BEING HELD 1 SST 1 BLUE     COMMENT        Add-on orders for these samples will be processed based on acceptable specimen integrity and analyte stability, which may vary by analyte.    EKG, 12 LEAD, INITIAL    Collection Time: 07/09/20  8:28 PM   Result Value Ref Range    Ventricular Rate 77 BPM    Atrial Rate 77 BPM    P-R Interval 164 ms    QRS Duration 84 ms    Q-T Interval 388 ms    QTC Calculation (Bezet) 439 ms    Calculated P Axis 61 degrees    Calculated R Axis 50 degrees    Calculated T Axis 62 degrees    Diagnosis       Normal sinus rhythm  Possible Left atrial enlargement  Borderline ECG  When compared with ECG of 05-NOV-2010 12:16,  No significant change was found     URINALYSIS W/ REFLEX CULTURE    Collection Time: 07/09/20  8:40 PM    Specimen: Urine   Result Value Ref Range    Color YELLOW/STRAW      Appearance HAZY (A) CLEAR      Specific gravity 1.015 1.003 - 1.030      pH (UA) 6.0 5.0 - 8.0      Protein Negative NEG mg/dL    Glucose Negative NEG mg/dL    Ketone Negative NEG mg/dL    Bilirubin Negative NEG      Blood Negative NEG      Urobilinogen 0.2 0.2 - 1.0 EU/dL    Nitrites Negative NEG      Leukocyte Esterase Negative NEG      WBC 0-4 0 - 4 /hpf    RBC 0-5 0 - 5 /hpf    Epithelial cells FEW FEW /lpf    Bacteria Negative NEG /hpf    UA:UC IF INDICATED CULTURE NOT INDICATED BY UA RESULT CNI     LACTIC ACID    Collection Time: 07/09/20 10:40 PM   Result Value Ref Range    Lactic acid 0.9 0.4 - 2.0 MMOL/L       Imaging  CXR Results  (Last 48 hours)               07/09/20 2123  XR CHEST PORT Final result    Impression:  IMPRESSION: No evidence of acute cardiopulmonary process. Narrative:  INDICATION:  upper abd pain, eval for free air        COMPARISON: 11/5/2010       FINDINGS: Single AP portable view of the chest obtained at 2124 demonstrates a   stable cardiomediastinal silhouette. The lungs are clear bilaterally. No osseous   abnormalities are seen. There is no evidence of free intraperitoneal air beneath   the hemidiaphragms. CT Results  (Last 48 hours)               07/09/20 2115  CT ABD PELV W CONT Final result    Impression:  IMPRESSION:       1. There are several abnormal loops of jejunum in the mid to lower abdomen, with   abnormal concentric bowel wall thickening, mucosal hyperenhancement, and   adjacent mesenteric edema. There is also lower abdominal and pelvic ascites. Favor the sequela of an infectious or inflammatory intestinal process. 2. There is also gastric distention. 3. Status post appendectomy. Narrative:  EXAM: CT ABD PELV W CONT       INDICATION: upper abd pain, syncope       COMPARISON: CT 8/2/2018        CONTRAST: 100 mL of Isovue-370. TECHNIQUE:    Following the uneventful intravenous administration of contrast, thin axial   images were obtained through the abdomen and pelvis. Coronal and sagittal   reconstructions were generated. Oral contrast was not administered. CT dose   reduction was achieved through use of a standardized protocol tailored for this   examination and automatic exposure control for dose modulation. Adaptive   statistical iterative reconstruction (ASIR) was utilized.        FINDINGS:    LOWER THORAX: No significant abnormality in the incidentally imaged lower chest.   LIVER: No mass. Mild perihepatic ascites. BILIARY TREE: Gallbladder is within normal limits. CBD is not dilated. SPLEEN: within normal limits. Mild ascites in the left flank. PANCREAS: No mass or ductal dilatation. ADRENALS: Unremarkable. KIDNEYS: Nonrotation of the kidneys. No hydronephrosis or other acute   abnormality. STOMACH: Gastric distention. SMALL BOWEL: Within the mid to lower abdomen, there are several loops of jejunum   with diffuse bowel wall thickening and mucosal hyperenhancement, and these loops   are not pathologically distended, but are relatively distended compared to   adjacent loops of bowel. Maximal dilatation is 2.9 cm. COLON: No dilatation or wall thickening. APPENDIX: Status post appendectomy. PERITONEUM: Mild ascites. No pneumoperitoneum   RETROPERITONEUM: No lymphadenopathy or aortic aneurysm. REPRODUCTIVE ORGANS: Uterus is noted. There is moderate pelvic ascites. URINARY BLADDER: No mass or calculus. BONES: No destructive bone lesion. ABDOMINAL WALL: No mass or hernia. ADDITIONAL COMMENTS: The vascular supply to the intestines appears intact. There   is no evidence of mesenteric vein thrombosis. Assessment and Plan     Valentin Ponce is a 53 y.o. female with PMHx of vertigo, vitamin D deficiency, essential hypertension, insomnia and anxiety who was admitted for Abdominal pain with syncope    Abdominal Pain- Most likely enteritis. Abdominal CT scan in line with dx. Was seen by general surgeon in the ER who concurs. Not likely incomplete SBO due to adhesions from appendectomy. Not likely bowel ischemia: Lactate was normal  - Admit to remote telemetry  - Vitals per unit routine  - NPO   - NS IVMF 125ml/h  - Morphine 2mg IV Q4h as needed  - Zofran 4mg IV Q4h  - Flagyl 500mg IV Q8h  - Protonix 40mg in 10ml NS IV daily  - General surgery consulted.  Appreciate recommendations    Syncope- Possibly vasovagal syncope. Needs further investigation  - Echo ordered  - TSH ordered  - Patient is on remote telemetry to watch for arrhythmias   -Orthostatics ordered    Hypertension- BP on admission 101/81. At this time, 134/86.  - Hold lisinopril due to NPO  - Will continue to monitor    At Risk For Kidney Injury- POA Creatinine 1.16 Baseline 0.71. Etiology may be secondary to dehydration. Expect to improve with IV hydration. Avoid nephrotoxic agents. -IVFM: NS @ 125 mL/hr  -Daily CMP    Anxiety- Stable. Not medically treated. She's on Xanax for Vertigo    Insomnia-  -Holding Melatonin due to NPO    Vertigo- Stable. Last episode was few weeks ago. Follows with Dr Zana Ferreira who has prescribed Xanax for this indication  - Holding xanax due to NPO    Vitamin D deficiency- Patient normally takes it on mondays. Vit D last checked on 6/24/2020 WNL  - Hold ergocalciferol [id number] due to NPO    Obesity-   - Encouraging lifestyle modifications and further follow up outpatient. FEN/GI - NPO. NS at 125 mL/hr. Activity - Ambulate as tolerated  DVT prophylaxis - Lovenox  GI prophylaxis - Protonix  Fall prophylaxis - Not indicated at this time. Disposition - Admit to Remote Telemetry. Plan to d/c to Home. Code Status - Full. Discussed with patient  Next of Kin Name and Geoff Rolan Mulligan [address] [place] [address] - [phone]    Patient Pepe Corcoran will be discussed with Dr. Jie Barber.    1:36 AM, 07/10/20  Elsie Guajardo MD  Family Medicine Resident       For Billing    Chief Complaint   Patient presents with   [facility] Abdominal Pain       Hospital Problems  Date Reviewed: 6/24/2020          Codes Class Noted POA    Syncope ICD-10-CM: R55  ICD-9-CM: 780.2  7/9/2020 Unknown        Abdominal pain ICD-10-CM: R10.9  ICD-9-CM: 789.00  7/9/2020 Unknown               [address] Medicine Residency Attending Addendum:  I saw and evaluated the patient on the day of the encounter with Dr. Rina Bacon, performing the booth elements of the service.   I discussed the findings, assessment and plan with the resident and agree with the resident's findings and plan as documented in the resident's note. Add FQ, get stool studies, appreciate GI and Surgery recs.      Jeremy Loomis MD, FAAFP, CAQSM, RMSK

## 2020-07-10 NOTE — PROGRESS NOTES
Arrival to unit via stretcher. Family Practice notified, states they will be up soon to see patient.

## 2020-07-10 NOTE — PROGRESS NOTES
Spiritual Care Assessment/Progress Note  [address] [place] [address] Dr      NAME: Sarah Anderson      MRN: [id number]  AGE: 53 y.o. SEX: female  Temple Affiliation: No Anglican   Language: English     7/10/2020     Total Time (in minutes): 5     Spiritual Assessment begun in [facility]  MED SURG 2 through conversation with:         []Patient        [] Family    [] Friend(s)        Reason for Consult: Request by staff     Spiritual beliefs: (Please include comment if needed)     [] Identifies with a azul tradition:         [] Supported by a azul community:            [] Claims no spiritual orientation:           [] Seeking spiritual identity:                [] Adheres to an individual form of spirituality:           [x] Not able to assess:                           Identified resources for coping:      [] Prayer                               [] Music                  [] Guided Imagery     [] Family/friends                 [] Pet visits     [] Devotional reading                         [x] Unknown     [] Other:                                              Interventions offered during this visit: (See comments for more details)    Patient Interventions: Initial visit(Attempted)           Plan of Care:     [] Support spiritual and/or cultural needs    [] Support AMD and/or advance care planning process      [] Support grieving process   [] Coordinate Rites and/or Rituals    [] Coordination with community clergy   [] No spiritual needs identified at this time   [] Detailed Plan of Care below (See Comments)  [] Make referral to Music Therapy  [] Make referral to Pet Therapy     [] Make referral to Addiction services  [] Make referral to [facility]  [] Make referral to Spiritual Care Partner  [] No future visits requested        [] Follow up visits as needed     Comments: Responded to In Basket request for a  visit. Miss Nico Hernadez was receiving a test, unable to visit.   Chaplains will follow as able and/or needed.   Visited by: Hiren Banerjee, MS., [facility] ([id number])

## 2020-07-10 NOTE — PROGRESS NOTES
[address]   Senior Resident Admission Note    CC: abdominal pain, syncopal episode    HPI:  Nava Richmond is a 53 y.o. female who presents to the ER complaining of epigastric abdominal pain of sudden onset at about 4pm on day of admission (07/09). She states she was out with friends for dinner and when she got in the car with her  the pain became so severe that she laid back in her seat and next thing she knows she wakes up and her  is driving her to the ER. Her  states that he saw her head drop and she started snoring. He tried to shake her but it took her about 45 seconds to wake up. She was dizzy and 'out of it' when she came to, but felt back to her normal self after 15min on arrival to the ED. She has had pre-syncopal episodes in the past due to pain. The abdominal pain on this occasion is described as sharp/stabbing and 9/10. It was radiating down her abdomen. It was progressive and constant. Associated with nausea, no vomiting. Exacerbated with sitting up. Her last BM was early that morning and normal. No blood. She had only had toast and cheese with crackers today. Her fluid intake included 3x coffee and some water. Sick contacts: None     When asked about her 2018 visit to the ED for melena, she cannot clearly recall it. She does state that she had a colonoscopy prior to that episode. The chart shows it was in 04/2017 and no abnormalities. Of note, per ED doctor's consultation with Dr. Shauna Mantilla (General Surgery), admission recommended and patient's signs and symptoms likely enteritis. On patient's arrival to [place], I spoke to the radiologist on call who reviewed the images again. He states eh was able to see the intestinal vasculature on the CT scan with the contrast and stated there was no concern for occlusion or ischemia. Chart reviewed. Patient seen, examined, and discussed with Dr. Kain Meyer (PGY-1).   See H&P note for more details. A/P: Symptoms likely d/t infection vs inflammatory process as seen on CT abdominal scan. Will admit for management and further management. 1. Abdominal pain: likely secondary to enteritis, however mild small bowel obstruction also in differential due to bowel wall thickening, mesenteric edema and ascites (and patient with history of abdominal surgery). POA WBC 15.8, POA CT scan with bowel thickening in loops of jejunum, mesenteric edema with mild ascites throughout abdomen. No ascites noted on last CT scan in 2018, scan at that time obtained for episode of melena. Ischemic process less likely as no evidence on CT scan and POA lactic was wnl. · Admit to remote telemetry  · MIVF  · IV Flagyl for infectious abdominal coverage   · IV protonix   · NPO for bowel rest   · Monitor bowel movements  · Morphine prn for pain   · Gen surg consulted: case discussed with Dr. Kenrick Godoy in ED, appreciate ongoing recs     2. Syncopal episode: possibly vasovagal as patient states this occurs with pain. Patient also dehydrated. Electrolytes and EKG wnl on admission. Patient has had a previous work up for palpitations in 2016. Echo at the time was normal. Holter monitor showed small bursts of SVT but otherwise normal. Both results are in the chart. She has not had any further episodes since. · Admitted to remote telemetry for cardiac monitoring if arrhythmia present   · Obtain: Orthostatics, Echo, TSH  · Replete electrolytes as needed    3. At risk RIC: POA Cr 1.16 (BL 0.7) Monitor daily CMP, expect to improve with hydration     4. HTN: POA BP 95/78. Patient is usually normotensive outpatient. · Holding home lisinopril and monitoring vitals, expect to improve with hydration      5. Anxiety: stable. Not on any medication, however on Xanax prn for Vertigo      6. Hx of Vertigo: follows with Dr. Bessy Mccarty (ENT). Only needs one Xanax for Vertigo every few weeks.    · Holding home Xanax prn as no active vertigo and patient is NPO    7. Insomnia  · Holding home Melatonin while NPO    I agree with remaining assessment and plan as documented in Dr. Jeremy Loomis note.   Pt to be discussed with Dr. Arturo Rain (on-call attending physician)    Wil Alonzo MD  Family Medicine Resident

## 2020-07-10 NOTE — ED NOTES
TRANSFER - OUT REPORT:    Verbal report given to [facility]) on Emily Wills  being transferred to 5th floor(unit) for routine progression of care       Report consisted of patients Situation, Background, Assessment and   Recommendations(SBAR). Information from the following report(s) SBAR, ED Summary, MAR, Recent Results and Cardiac Rhythm NSR was reviewed with the receiving nurse. Lines:   Peripheral IV 07/09/20 Right Antecubital (Active)   Site Assessment Clean, dry, & intact 07/09/20 2019   Phlebitis Assessment 0 07/09/20 2019   Infiltration Assessment 0 07/09/20 2019   Dressing Status Clean, dry, & intact 07/09/20 2019   Dressing Type Tape;Transparent 07/09/20 2019   Hub Color/Line Status Pink;Flushed;Patent 07/09/20 2019   Action Taken Blood drawn 07/09/20 2019        Opportunity for questions and clarification was provided.       Patient transported with:   Monitor  O2 @ 2 liters

## 2020-07-10 NOTE — PROGRESS NOTES
Reason for Admission:   Syncope, Abdominal Pain                   RUR Score:       9%              Plan for utilizing home health:     No current needs                     Current Advanced Directive/Advance Care Plan: Full, No ACP                         Transition of Care Plan:   I met with the patient and her  while wearing a mask. Patient states she lives with her  Jaxon Lo   [phone] in a 1 story home. Scripts @ [employer]. Patient is independent on all ADL's and drives. No home health or DME. PCP Dr. Brando Fleming. Plan:  1. Monitor patients response to treatment. 2. No current needs. 3. Case Management to follow.   TRACI Howard

## 2020-07-10 NOTE — PROGRESS NOTES
Summary: pt admitted with complaints of abdominal cramps/pain. Oriented to unit. Orders received and initiated. Family Practice in to see and examine. Plan of care formulated as educational plan. Continue to monitor status, maintain comfort, provide emotional support.

## 2020-07-10 NOTE — PROGRESS NOTES
Bedside report given on on coming RN, Louise Diaz. SBAR, Kardex, plan of care, and events through shift reviewed. Opportunity given to ask questions and answered.

## 2020-07-10 NOTE — ED PROVIDER NOTES
HPI   52 yo F presents with ruq abdominal pain onset this afternoon around 4pm. Pain worsened about 45 min ago. C/o mild nausea, no vomiting or diarrhea. No bloody or black stools. Last BM this morning, normal. In the car felt lightheaded and worsening pain and then briefly passed out. Has history of similar fainting spells with pain in the past. Denies fever, chills, cp, sob.    Past Medical History:   Diagnosis Date    Anxiety     Hiatal hernia     Hypertension     Menopause     early aj at age 38    Trouble in sleeping     Vertigo     Vitamin D deficiency        Past Surgical History:   Procedure Laterality Date    HX APPENDECTOMY  2004    HX  SECTION      HX ORTHOPAEDIC      Right ankle x 2         Family History:   Problem Relation Age of Onset    Hypertension Mother     Hypertension Father        Social History     Socioeconomic History    Marital status:      Spouse name: Not on file    Number of children: Not on file    Years of education: Not on file    Highest education level: Not on file   Occupational History    Not on file   Social Needs    Financial resource strain: Not on file    Food insecurity     Worry: Not on file     Inability: Not on file    Transportation needs     Medical: Not on file     Non-medical: Not on file   Tobacco Use    Smoking status: Never Smoker    Smokeless tobacco: Never Used   Substance and Sexual Activity    Alcohol use: Yes     Comment: socially    Drug use: No    Sexual activity: Yes     Partners: Male   Lifestyle    Physical activity     Days per week: Not on file     Minutes per session: Not on file    Stress: Not on file   Relationships    Social connections     Talks on phone: Not on file     Gets together: Not on file     Attends Spiritism service: Not on file     Active member of club or organization: Not on file     Attends meetings of clubs or organizations: Not on file     Relationship status: Not on file    Intimate partner violence     Fear of current or ex partner: Not on file     Emotionally abused: Not on file     Physically abused: Not on file     Forced sexual activity: Not on file   Other Topics Concern    Not on file   Social History Narrative    Not on file         ALLERGIES: Shellfish derived and Sulfa (sulfonamide antibiotics)    Review of Systems   Constitutional: Negative for chills and fever. HENT: Negative for sore throat. Respiratory: Negative for cough and shortness of breath. Cardiovascular: Negative for chest pain. Gastrointestinal: Positive for abdominal pain and nausea. Negative for constipation, diarrhea and vomiting. Genitourinary: Negative for dysuria. Skin: Negative for rash. Neurological: Positive for syncope. Negative for headaches. All other systems reviewed and are negative.       Vitals:    07/09/20 1945 07/09/20 1948 07/09/20 2001   BP: 95/78 95/78    Pulse: 74     Resp: 18     Temp:   97.6 °F (36.4 °C)   SpO2: 97% 96%    Weight: 70.3 kg (155 lb)     Height: 5' 6\" (1.676 m)              Physical Exam   Physical Examination: General appearance - alert, well appearing, and in no distress, oriented to person, place, and time and normal appearing weight  Eyes - pupils equal and reactive, extraocular eye movements intact  Neck - supple, no significant adenopathy  Chest - clear to auscultation, no wheezes, rales or rhonchi, symmetric air entry  Heart - normal rate, regular rhythm, normal S1, S2, no murmurs, rubs, clicks or gallops  Abdomen - soft, diffuse tenderness to palpation greatest in LLQ and RUQ, mild rebound, no guarding or peritoneal signs, nondistended, no masses or organomegaly  Back exam - full range of motion, no tenderness, palpable spasm or pain on motion  Neurological - alert, oriented, normal speech, no focal findings or movement disorder noted  Musculoskeletal - no joint tenderness, deformity or swelling  Extremities - peripheral pulses normal, no pedal edema, no clubbing or cyanosis  Skin - normal coloration and turgor, no rashes, no suspicious skin lesions noted  MDM  Number of Diagnoses or Management Options  Abdominal pain, generalized:   Syncope and collapse:      Amount and/or Complexity of Data Reviewed  Clinical lab tests: ordered and reviewed  Tests in the radiology section of CPT®: ordered and reviewed  Decide to obtain previous medical records or to obtain history from someone other than the patient: yes  Obtain history from someone other than the patient: yes (spouse)  Review and summarize past medical records: yes  Discuss the patient with other providers: yes (General surgery, family medicine)  Independent visualization of images, tracings, or specimens: yes    Patient Progress  Patient progress: improved         Procedures  ED EKG interpretation:  Rhythm: normal sinus rhythm; and regular . Rate (approx.): 77; Axis: normal; P wave: normal; QRS interval: normal ; ST/T wave: non-specific changes;  EKG documented by Cara Pimentel MD'    Patient had some relief with Dilaudid given in ED. But symptoms are returning. She states her blood pressure normally runs around 977 systolic and has been running around 545 systolic in ED. Given the abdominal pain and vague CT findings will admit for observation. Updated patient and spouse on test results and plan for admission. 10:06 PM  Discussed with Dr. Sheridan Salgado, general surgery. Reviewed patient's presentation and test results including CT imaging. She recommends admission to hospitalist.    Carine Cespedes Serve for Admission  10:07 PM    ED Room Number: WER07/07  Patient Name and age:  Mia Farfan 53 y.o.  female  Working Diagnosis: abdominal pain, syncope  COVID-19 Suspicion:  No    Code Status:  Full Code  Readmission: no  Isolation Requirements:  no  Recommended Level of Care:  telemetry  Department:[place] [facility] - [phone]    11:32 PM  Discussed with Dr. Puneet Mustafa, ED physician.   Accepts patient for admission hold in the emergency room at [place].

## 2020-07-10 NOTE — PROGRESS NOTES
[facility] Dosing Services    Metronidazole was automatically dose-adjusted per [facility] P&T Committee Protocol. Assessment/Plan: Metronidazole regimen changed to 500 mg IV every 12 hours per P&T approved protocol. New Regimen Metronidazole 500 mg IV every 12 hours   Previous Regimen Metronidazole 500 mg IV every 8 hours   Serum Creatinine Lab Results   Component Value Date/Time    Creatinine 0.70 07/10/2020 06:22 AM    Creatinine (POC) 0.6 11/05/2010 12:35 PM       Creatinine Clearance Estimated Creatinine Clearance: 85 mL/min (based on SCr of 0.7 mg/dL).    BUN Lab Results   Component Value Date/Time    BUN 15 07/10/2020 06:22 AM    BUN (POC) 10 11/05/2010 12:35 PM       Pharmacist Crystal Tipton

## 2020-07-11 VITALS
SYSTOLIC BLOOD PRESSURE: 149 MMHG | RESPIRATION RATE: 18 BRPM | WEIGHT: 155 LBS | HEART RATE: 65 BPM | DIASTOLIC BLOOD PRESSURE: 96 MMHG | BODY MASS INDEX: 24.91 KG/M2 | TEMPERATURE: 98.3 F | OXYGEN SATURATION: 95 % | HEIGHT: 66 IN

## 2020-07-11 LAB
ALBUMIN SERPL-MCNC: 3 G/DL (ref 3.5–5)
ALBUMIN/GLOB SERPL: 0.9 {RATIO} (ref 1.1–2.2)
ALP SERPL-CCNC: 61 U/L (ref 45–117)
ALT SERPL-CCNC: 16 U/L (ref 12–78)
ANION GAP SERPL CALC-SCNC: 4 MMOL/L (ref 5–15)
AST SERPL-CCNC: 11 U/L (ref 15–37)
BASOPHILS # BLD: 0 K/UL (ref 0–0.1)
BASOPHILS NFR BLD: 1 % (ref 0–1)
BILIRUB SERPL-MCNC: 0.6 MG/DL (ref 0.2–1)
BUN SERPL-MCNC: 7 MG/DL (ref 6–20)
BUN/CREAT SERPL: 11 (ref 12–20)
CALCIUM SERPL-MCNC: 8 MG/DL (ref 8.5–10.1)
CHLORIDE SERPL-SCNC: 111 MMOL/L (ref 97–108)
CO2 SERPL-SCNC: 28 MMOL/L (ref 21–32)
CREAT SERPL-MCNC: 0.64 MG/DL (ref 0.55–1.02)
DIFFERENTIAL METHOD BLD: ABNORMAL
EOSINOPHIL # BLD: 0.1 K/UL (ref 0–0.4)
EOSINOPHIL NFR BLD: 1 % (ref 0–7)
ERYTHROCYTE [DISTWIDTH] IN BLOOD BY AUTOMATED COUNT: 14.8 % (ref 11.5–14.5)
GLOBULIN SER CALC-MCNC: 3.5 G/DL (ref 2–4)
GLUCOSE SERPL-MCNC: 94 MG/DL (ref 65–100)
HCT VFR BLD AUTO: 39.1 % (ref 35–47)
HGB BLD-MCNC: 12.3 G/DL (ref 11.5–16)
IMM GRANULOCYTES # BLD AUTO: 0 K/UL (ref 0–0.04)
IMM GRANULOCYTES NFR BLD AUTO: 0 % (ref 0–0.5)
LYMPHOCYTES # BLD: 2 K/UL (ref 0.8–3.5)
LYMPHOCYTES NFR BLD: 33 % (ref 12–49)
MCH RBC QN AUTO: 27.6 PG (ref 26–34)
MCHC RBC AUTO-ENTMCNC: 31.5 G/DL (ref 30–36.5)
MCV RBC AUTO: 87.9 FL (ref 80–99)
MONOCYTES # BLD: 0.5 K/UL (ref 0–1)
MONOCYTES NFR BLD: 8 % (ref 5–13)
NEUTS SEG # BLD: 3.5 K/UL (ref 1.8–8)
NEUTS SEG NFR BLD: 58 % (ref 32–75)
NRBC # BLD: 0 K/UL (ref 0–0.01)
NRBC BLD-RTO: 0 PER 100 WBC
PLATELET # BLD AUTO: 230 K/UL (ref 150–400)
PMV BLD AUTO: 9.5 FL (ref 8.9–12.9)
POTASSIUM SERPL-SCNC: 3.8 MMOL/L (ref 3.5–5.1)
PROT SERPL-MCNC: 6.5 G/DL (ref 6.4–8.2)
RBC # BLD AUTO: 4.45 M/UL (ref 3.8–5.2)
SODIUM SERPL-SCNC: 143 MMOL/L (ref 136–145)
TSH SERPL DL<=0.05 MIU/L-ACNC: 4.04 UIU/ML (ref 0.36–3.74)
WBC # BLD AUTO: 6.1 K/UL (ref 3.6–11)

## 2020-07-11 PROCEDURE — 74011250637 HC RX REV CODE- 250/637: Performed by: STUDENT IN AN ORGANIZED HEALTH CARE EDUCATION/TRAINING PROGRAM

## 2020-07-11 PROCEDURE — 85025 COMPLETE CBC W/AUTO DIFF WBC: CPT

## 2020-07-11 PROCEDURE — 74011250637 HC RX REV CODE- 250/637: Performed by: SPECIALIST

## 2020-07-11 PROCEDURE — 74011250636 HC RX REV CODE- 250/636: Performed by: STUDENT IN AN ORGANIZED HEALTH CARE EDUCATION/TRAINING PROGRAM

## 2020-07-11 PROCEDURE — 84443 ASSAY THYROID STIM HORMONE: CPT

## 2020-07-11 PROCEDURE — 80053 COMPREHEN METABOLIC PANEL: CPT

## 2020-07-11 PROCEDURE — 36415 COLL VENOUS BLD VENIPUNCTURE: CPT

## 2020-07-11 PROCEDURE — 74011250636 HC RX REV CODE- 250/636: Performed by: SURGERY

## 2020-07-11 PROCEDURE — C9113 INJ PANTOPRAZOLE SODIUM, VIA: HCPCS | Performed by: STUDENT IN AN ORGANIZED HEALTH CARE EDUCATION/TRAINING PROGRAM

## 2020-07-11 RX ORDER — ONDANSETRON 4 MG/1
4 TABLET, ORALLY DISINTEGRATING ORAL
Qty: 10 TAB | Refills: 0 | Status: SHIPPED | OUTPATIENT
Start: 2020-07-11 | End: 2020-10-06 | Stop reason: ALTCHOICE

## 2020-07-11 RX ORDER — METRONIDAZOLE 500 MG/1
500 TABLET ORAL 2 TIMES DAILY
Qty: 18 TAB | Refills: 0 | Status: SHIPPED | OUTPATIENT
Start: 2020-07-11 | End: 2020-07-20

## 2020-07-11 RX ORDER — LISINOPRIL 20 MG/1
40 TABLET ORAL DAILY
Status: DISCONTINUED | OUTPATIENT
Start: 2020-07-11 | End: 2020-07-11 | Stop reason: HOSPADM

## 2020-07-11 RX ORDER — LEVOFLOXACIN 750 MG/1
750 TABLET ORAL DAILY
Qty: 9 TAB | Refills: 0 | Status: SHIPPED | OUTPATIENT
Start: 2020-07-11 | End: 2020-07-20

## 2020-07-11 RX ADMIN — IBUPROFEN 600 MG: 200 TABLET, FILM COATED ORAL at 09:02

## 2020-07-11 RX ADMIN — PANTOPRAZOLE SODIUM 40 MG: 40 INJECTION, POWDER, FOR SOLUTION INTRAVENOUS at 08:26

## 2020-07-11 RX ADMIN — Medication 10 ML: at 12:31

## 2020-07-11 RX ADMIN — LISINOPRIL 40 MG: 20 TABLET ORAL at 12:31

## 2020-07-11 RX ADMIN — METRONIDAZOLE 500 MG: 500 INJECTION, SOLUTION INTRAVENOUS at 08:24

## 2020-07-11 RX ADMIN — Medication 10 ML: at 04:42

## 2020-07-11 RX ADMIN — ENOXAPARIN SODIUM 40 MG: 40 INJECTION SUBCUTANEOUS at 08:26

## 2020-07-11 RX ADMIN — SODIUM CHLORIDE 125 ML/HR: 900 INJECTION, SOLUTION INTRAVENOUS at 04:41

## 2020-07-11 RX ADMIN — LEVOFLOXACIN 750 MG: 5 INJECTION, SOLUTION INTRAVENOUS at 10:15

## 2020-07-11 RX ADMIN — POLYETHYLENE GLYCOL 3350 17 G: 17 POWDER, FOR SOLUTION ORAL at 08:23

## 2020-07-11 NOTE — PROGRESS NOTES
[address] [facility] PROGRAM  PROGRESS NOTE     7/11/2020  PCP: Ariadna Fierro MD     Assessment/Plan:     Radha Kurtz is a 53 y.o. female with a PMHx of HTN, vertigo, and vitamin D deficiency who is admitted for abdominal pain and syncope. Overnight Events: No acute events overnight. Abdominal Pain - POA WBC 15.8. Lactate wnl. Abdominal CT scan showed bowel thickening in loops of jejunum, mesenteric edema, and mild ascites throughout abdomen. Presentation is most likely due to acute bacterial/viral enteritis. GI and General Surgery have been consulted, appreciate recommendations. - Continue Levofloxacin and Flagyl for gram negative and anaerobic coverage   - Full liquid diet this morning, advance to GI lite for lunch. If patient can tolerate PO, will plan for discharge. - Morphine 2mg IV q4h prn  - Zofran 4mg IV Q4h prn  - GI recommended outpatient follow up for repeat colonoscopy/EGD with [facility] for further evaluation     Syncope- Likely vasovagal syncope due to pain response and dehydration. Echo showed EF of 60-65% with no RWMA. - Replete electrolytes as needed     Hypertension- Hypotensive on admission (/81). - Lisinopril held at time of admission   - Will continue to monitor BP and adjust as needed     At Risk For RIC- Improved after IV hydration. POA Creatinine 1.16 (baseline ~0.7). - Daily CMP  - Avoid nephrotoxic agents.     Anxiety- Stable. Not medically treated. Pt is on Xanax for Vertigo     Insomnia  -Melatonin held due to NPO on admission     Vertigo- Stable. Last episode was few weeks ago. Follows with Dr. Danita Chilel who has prescribed Xanax for this indication  - Xanax held due to NPO on admission      Vitamin D deficiency- Patient normally takes it on mondays.  Vit D last checked on 6/24/2020 WNL  - Hold ergocalciferol [id number] due to NPO on admission      Obesity-   - Encouraging lifestyle modifications and further follow up outpatient.     FEN/GI -  Full liquid diet, advance as tolerated  Activity - Ambulate as tolerated  DVT prophylaxis - Lovenox  GI prophylaxis - Protonix  Fall prophylaxis - Not indicated at this time. Disposition - Admit to Remote Telemetry. Plan to d/c to Home. Code Status - Full. Discussed with patient  Next of Kin Name and Geoff Mulligan 23 Shanna Gomez - [phone]    [address] discussed with Dr. Chiquis Dumont. Subjective:   Pt was seen and examined at bedside. Afebrile and hemodynamically stable. She states that she is feeling much better and her appetite has returned. Her abdominal pain has improved and now she only has mild diffuse pain when she gets up and moves around. She denies any nausea or vomiting, but reports constipation (last BM 2 days ago). Pt denies chest pain, SOB, fever, or chills. Objective:   Physical examination  Patient Vitals for the past 24 hrs:   Temp Pulse Resp BP SpO2   20 0817    (!) 152/92    20 0814 98.3 °F (36.8 °C) 73 18 (!) 148/92 98 %   20 0701  75      20 0430 97.6 °F (36.4 °C) 75 17 156/90 92 %   07/10/20 2242 98 °F (36.7 °C) 80 17 137/84 95 %   07/10/20 1925 98 °F (36.7 °C) 78 17 136/85 94 %   07/10/20 1517 98.2 °F (36.8 °C) 70 17 139/80 96 %   07/10/20 1507  80      07/10/20 1248 98.5 °F (36.9 °C) 74 18 140/90 97 %   07/10/20 1143    127/72       Temp (24hrs), Av.1 °F (36.7 °C), Min:97.6 °F (36.4 °C), Max:98.5 °F (36.9 °C)         O2 Device: Room air    Date 07/10/20 0700 - 20 - 20 0659   Shift [id number] 24 Hour Total 4638-8482 0611-5018 24 Hour Total   INTAKE   P.O.  250 250        P. O.  250 250      I. V.(mL/kg/hr) 100(0.1) 1375(1.6) 1475(0.9) 150  150     I.V.  1275 1275        Volume (0.9% sodium chloride infusion)  0 0        Volume (metroNIDAZOLE (FLAGYL) IVPB premix 500 mg) 100  100        Volume (potassium chloride 10 mEq in 100 ml IVPB) 0  0        Volume (levoFLOXacin (LEVAQUIN) 750 mg in D5W IVPB) 150  150     Volume (metroNIDAZOLE (FLAGYL) IVPB premix 500 mg)  100 100      Shift Total(mL/kg) 100(1.4) 1625(23.1) 1725(24.5) 150(2.1)  150(2.1)   OUTPUT   Urine(mL/kg/hr)           Urine Occurrence(s)  3 x 3 x      Stool           Stool Occurrence(s)  0 x 0 x      Shift Total(mL/kg)          1625 1725 150  150   Weight (kg) 70.3 70.3 70.3 70.3 70.3 70.3     General:   Alert, cooperative, no acute distress    Head:   Atraumatic   Eyes:   Conjunctivae clear   ENT:  Moist mucous membranes    Neck:  Supple, trachea midline   Lungs:   Clear to auscultation bilaterally; no wheezes, rales, rhonchi     Heart:   Normal rate, regular rhythm, no murmur, rubs or gallops   Abdomen:    Soft, minimal diffuse tenderness to palpation, no rebound or guarding     Extremities:  No edema, moves all 4 extremities equally    Pulses:  Symmetric all extremities   Skin:  Warm and dry    No rashes or lesions   Neurologic:  Alert and oriented x4   No focal deficits         Data Review:     CBC:  Recent Labs     07/11/20 0449 07/09/20 2020   WBC 6.1 15.8*   HGB 12.3 15.8   HCT 39.1 49.8*    241     Metabolic Panel:  Recent Labs     07/11/20  0449 07/10/20  0622 07/09/20 2020    138 139   K 3.8 3.4* 3.6   * 108 101   CO2 28 25 29   BUN 7 15 16   CREA 0.64 0.70 1.16*   GLU 94 117* 139*   CA 8.0* 7.7* 9.1   MG  --   --  1.8   PHOS  --  3.3  --    ALB 3.0* 2.6* 3.5   TBILI 0.6 0.4 0.5   ALT 16 17 20     Micro:  No results found for: CULT  Imaging:  Ct Abd Pelv W Cont    Result Date: 7/9/2020  EXAM: CT ABD PELV W CONT INDICATION: upper abd pain, syncope COMPARISON: CT 8/2/2018 CONTRAST: 100 mL of Isovue-370. TECHNIQUE: Following the uneventful intravenous administration of contrast, thin axial images were obtained through the abdomen and pelvis. Coronal and sagittal reconstructions were generated. Oral contrast was not administered.  CT dose reduction was achieved through use of a standardized protocol tailored for this examination and automatic exposure control for dose modulation. Adaptive statistical iterative reconstruction (ASIR) was utilized. FINDINGS: LOWER THORAX: No significant abnormality in the incidentally imaged lower chest. LIVER: No mass. Mild perihepatic ascites. BILIARY TREE: Gallbladder is within normal limits. CBD is not dilated. SPLEEN: within normal limits. Mild ascites in the left flank. PANCREAS: No mass or ductal dilatation. ADRENALS: Unremarkable. KIDNEYS: Nonrotation of the kidneys. No hydronephrosis or other acute abnormality. STOMACH: Gastric distention. SMALL BOWEL: Within the mid to lower abdomen, there are several loops of jejunum with diffuse bowel wall thickening and mucosal hyperenhancement, and these loops are not pathologically distended, but are relatively distended compared to adjacent loops of bowel. Maximal dilatation is 2.9 cm. COLON: No dilatation or wall thickening. APPENDIX: Status post appendectomy. PERITONEUM: Mild ascites. No pneumoperitoneum RETROPERITONEUM: No lymphadenopathy or aortic aneurysm. REPRODUCTIVE ORGANS: Uterus is noted. There is moderate pelvic ascites. URINARY BLADDER: No mass or calculus. BONES: No destructive bone lesion. ABDOMINAL WALL: No mass or hernia. ADDITIONAL COMMENTS: The vascular supply to the intestines appears intact. There is no evidence of mesenteric vein thrombosis. IMPRESSION: 1. There are several abnormal loops of jejunum in the mid to lower abdomen, with abnormal concentric bowel wall thickening, mucosal hyperenhancement, and adjacent mesenteric edema. There is also lower abdominal and pelvic ascites. Favor the sequela of an infectious or inflammatory intestinal process. 2. There is also gastric distention. 3. Status post appendectomy. [facility]    Result Date: 7/9/2020  INDICATION:  Right upper quadrant pain COMPARISON:  CT earlier today FINDINGS: Limited right upper quadrant ultrasound was performed.  The liver is normal. The common bile duct is normal measuring 5 mm in diameter. The gallbladder is normal. The sonographic Kennedy Plana sign is absent. The right kidney measures 9.1 cm in length. There is no hydronephrosis or visible ascites. IMPRESSION: Normal right upper quadrant ultrasound. Xr Chest Port    Result Date: 7/9/2020  INDICATION:  upper abd pain, eval for free air COMPARISON: 11/5/2010 FINDINGS: Single AP portable view of the chest obtained at 2124 demonstrates a stable cardiomediastinal silhouette. The lungs are clear bilaterally. No osseous abnormalities are seen. There is no evidence of free intraperitoneal air beneath the hemidiaphragms. IMPRESSION: No evidence of acute cardiopulmonary process. Medications reviewed  Current Facility-Administered Medications   Medication Dose Route Frequency    sodium chloride (NS) flush 5-40 mL  5-40 mL IntraVENous Q8H    sodium chloride (NS) flush 5-40 mL  5-40 mL IntraVENous PRN    0.9% sodium chloride infusion  125 mL/hr IntraVENous CONTINUOUS    morphine injection 2 mg  2 mg IntraVENous Q4H PRN    ondansetron (ZOFRAN) injection 4 mg  4 mg IntraVENous Q4H PRN    enoxaparin (LOVENOX) injection 40 mg  40 mg SubCUTAneous Q24H    pantoprazole (PROTONIX) 40 mg in 0.9% sodium chloride 10 mL injection  40 mg IntraVENous DAILY    levoFLOXacin (LEVAQUIN) 750 mg in D5W IVPB  750 mg IntraVENous Q24H    metroNIDAZOLE (FLAGYL) IVPB premix 500 mg  500 mg IntraVENous Q12H    polyethylene glycol (MIRALAX) packet 17 g  17 g Oral BID         Signed:   Rony Nichols DO  Family Medicine Resident      Attending note:    Attending note to follow

## 2020-07-11 NOTE — DISCHARGE SUMMARY
[address] [facility] [address] [place] [address]   Office [phone]  Fax [phone]       Discharge / Transfer / Off-Service Note     Name: Jagruti Manuel MRN: [id number]  Sex: Female   YOB: 1965  Age: 53 y.o. PCP: Jagruti Balbuena MD     Date of admission: 7/9/2020  Date of discharge/transfer: 7/11/2020    Date of admission: 7/9/2020  Date of discharge/transfer: 7/11/2020    Attending physician at admission: Jeremy Loomis MD  Attending physician at discharge/transfer: Felicitas Irby MD  Resident physician at discharge/transfer: Nury Hoang DO     Consultants during hospitalization  IP CONSULT TO [facility] TO [place] [address]     Admission diagnoses   Syncope [R55]  Abdominal pain [R10.9]    Recommended follow-up after discharge    1. PCP  2. GI     Follow up tests after discharge   Colonoscopy and EGD     Medication Changes:  START  - Levaquin 750 mg daily   - Flagyl 500 mg twice daily   - Zofran ODT 4 mg as needed for nausea/vomiting      ----------------------------------------------------------------------------------------------------------------------------  The patient was well enough to be discharged from the hospital. However, because they were inpatient in a hospital, they are at greater risk of having been exposed to the coronavirus. PLEASE stay inside and self-quarantine for 14 days to prevent further spread of the coronavirus.  ------------------------------------------------------------------------------------------------------------------    History of Present Illness  Per the admitting physician's H&P \"Denisse Wilson is a 53 y.o. female with PMHx of vertigo, vitamin D deficiency, essential hypertension, insomnia and anxiety who presents to the ED complaining of abdominal pain with syncope.     The pain started when she was out having dinner with her . At 4pm as they got in the car to leave the venue the pain worsened and she lost consciousness.  The  reports: Her head fell back and she started snoring. He pulled over, checked on her and drove straight to the ED. The pain is mainly focused in the epigastric and RUQ regions and radiates down. It's stabbing in character, 9/10 in intensity and associated with nausea and a facial flushing sensation.     Patient has had GI pain in the past that has been associated with diarrhea and is relieved by defecation and is sometimes accompanied with a presyncopal episode in the bathroom. This is a longstanding issue.     COVID Questions:   Experiencing any of the following symptoms: fever, chills, cough, SOB, diarrhea, URI symptoms. None  Any Sick contacts with fever, cough, diarrhea, SOB, URI symptoms. No  Lives with .     Of note: General surgery was consulted in the ED, discussed patient with Dr Bismark Herman, recommended admitting patient for likely enteritis.     In the ED:  Vitals: Temp 97.6   /81   HR 69   RR 11   SatO2  93% on RA  Labs:  UA: Hazy appearance with some epithelial cells  CBC: WBC 15.8 (PMN's 77%)  BMP: Creatinine 1.16 (baseline 0.71)  LFT, CK, troponin, lactic acid WNL  Imaging:  RUQ U/S no significant findings  CXR negative  Abdominal CT:  - Jejunum wall thickening, mucosa hyperenhancement, mesenteric edema  - Lower abdominal and pelvic ascites  - Gastric distension  - Status post appendectomy (2004)     Treatment: NS 1L bolus x2, Dilaudid 0.5mg IV x2, zofran 4mg IV     EKG: Normal sinus rhythm. No change from baseline in 2010\"     Hospital course  [address] was admitted into the Family Medicine Service from 7/9/2020 to 7/11/2020 for Syncope [R55] and Abdominal pain [R10.9]. During the course of this admission, the following conditions were addressed/managed:     Abdominal Pain - Abdominal CT scan showed bowel thickening in loops of jejunum, mesenteric edema, and mild ascites throughout abdomen. Presentation is most likely due to acute bacterial/viral enteritis.  GI and General Surgery were consulted, appreciate recommendations. - Continue Levofloxacin PO and Flagyl PO for gram negative and anaerobic coverage   - GI outpatient follow up for colonoscopy/EGD with possible Pillcam study for further evaluation     Syncope- Likely vasovagal syncope due to pain response and dehydration. Echo showed EF of 60-65% with no RWMA. No arrhythmias noted during admission. Not orthostatic.       Hypertension- Hypotensive on admission (/81), so home medication was initially held during admission.    - Continue home Lisinopril      At Risk For RIC- POA Creatinine 1.16 (baseline ~0.7). Improved to 0.64 after IV hydration.      Anxiety- Stable. Not medically treated. Pt is on Xanax for Vertigo     Insomnia - Stable. - Continue home Melatonin.      Vertigo- Stable. Last episode was few weeks ago. Follows with Dr. Merary Alston who has prescribed Xanax for this indication.      Vitamin D deficiency- Patient normally takes ergocalciferol 95967 IU on Mondays. Vit D last checked on 6/24/2020 WNL    Condition at discharge: Stable.     Labs  Recent Labs     07/11/20 0449 07/09/20 2020   WBC 6.1 15.8*   HGB 12.3 15.8   HCT 39.1 49.8*    320     Recent Labs     07/11/20  0449 07/10/20  0622 07/09/20  2020    138 139   K 3.8 3.4* 3.6   * 108 101   CO2 28 25 29   BUN 7 15 16   CREA 0.64 0.70 1.16*   GLU 94 117* 139*   CA 8.0* 7.7* 9.1   MG  --   --  1.8   PHOS  --  3.3  --      Recent Labs     07/11/20  0449 07/10/20  0622 07/09/20  2020   ALT 16 17 20   AP 61 57 74   TBILI 0.6 0.4 0.5   TP 6.5 5.7* 7.3   ALB 3.0* 2.6* 3.5   GLOB 3.5 3.1 3.8   LPSE  --   --  108     Recent Labs     07/09/20 2020   TROIQ <0.05     Cultures  · Urine culture pending     Procedures / Diagnostic Studies    Imaging  Results from Hospital Encounter encounter on 07/09/20   XR CHEST PORT    Narrative INDICATION:  upper abd pain, eval for free air     COMPARISON: 11/5/2010    FINDINGS: Single AP portable view of the chest obtained at 2124 demonstrates a  stable cardiomediastinal silhouette. The lungs are clear bilaterally. No osseous  abnormalities are seen. There is no evidence of free intraperitoneal air beneath  the hemidiaphragms. Impression IMPRESSION: No evidence of acute cardiopulmonary process. Results from [place] encounter on 07/09/20   US ABD LTD    Narrative INDICATION:  Right upper quadrant pain    COMPARISON:  CT earlier today    FINDINGS: Limited right upper quadrant ultrasound was performed. The liver is  normal. The common bile duct is normal measuring 5 mm in diameter. The  gallbladder is normal. The sonographic Alonza Snide sign is absent. The right kidney  measures 9.1 cm in length. There is no hydronephrosis or visible ascites. Impression IMPRESSION:   Normal right upper quadrant ultrasound. Results from [place] encounter on 07/09/20   CT ABD PELV W CONT    Narrative EXAM: CT ABD PELV W CONT    INDICATION: upper abd pain, syncope    COMPARISON: CT 8/2/2018     CONTRAST: 100 mL of Isovue-370. TECHNIQUE:   Following the uneventful intravenous administration of contrast, thin axial  images were obtained through the abdomen and pelvis. Coronal and sagittal  reconstructions were generated. Oral contrast was not administered. CT dose  reduction was achieved through use of a standardized protocol tailored for this  examination and automatic exposure control for dose modulation. Adaptive  statistical iterative reconstruction (ASIR) was utilized. FINDINGS:   LOWER THORAX: No significant abnormality in the incidentally imaged lower chest.  LIVER: No mass. Mild perihepatic ascites. BILIARY TREE: Gallbladder is within normal limits. CBD is not dilated. SPLEEN: within normal limits. Mild ascites in the left flank. PANCREAS: No mass or ductal dilatation. ADRENALS: Unremarkable. KIDNEYS: Nonrotation of the kidneys. No hydronephrosis or other acute  abnormality.   STOMACH: Gastric distention. SMALL BOWEL: Within the mid to lower abdomen, there are several loops of jejunum  with diffuse bowel wall thickening and mucosal hyperenhancement, and these loops  are not pathologically distended, but are relatively distended compared to  adjacent loops of bowel. Maximal dilatation is 2.9 cm. COLON: No dilatation or wall thickening. APPENDIX: Status post appendectomy. PERITONEUM: Mild ascites. No pneumoperitoneum  RETROPERITONEUM: No lymphadenopathy or aortic aneurysm. REPRODUCTIVE ORGANS: Uterus is noted. There is moderate pelvic ascites. URINARY BLADDER: No mass or calculus. BONES: No destructive bone lesion. ABDOMINAL WALL: No mass or hernia. ADDITIONAL COMMENTS: The vascular supply to the intestines appears intact. There  is no evidence of mesenteric vein thrombosis. Impression IMPRESSION:    1. There are several abnormal loops of jejunum in the mid to lower abdomen, with  abnormal concentric bowel wall thickening, mucosal hyperenhancement, and  adjacent mesenteric edema. There is also lower abdominal and pelvic ascites. Favor the sequela of an infectious or inflammatory intestinal process. 2. There is also gastric distention. 3. Status post appendectomy.            Chronic diagnoses   Problem List as of 7/11/2020 Date Reviewed: 6/24/2020          Codes Class Noted - Resolved    Syncope ICD-10-CM: R55  ICD-9-CM: 780.2  7/9/2020 - Present        Abdominal pain ICD-10-CM: R10.9  ICD-9-CM: 789.00  7/9/2020 - Present        Severe obesity ([facility] [address] ICD-10-CM: E66.01  ICD-9-CM: 278.01  10/1/2018 - Present        Trouble in sleeping ICD-10-CM: G47.9  ICD-9-CM: 780.50  Unknown - Present        Hypertension ICD-10-CM: I10  ICD-9-CM: 401.9  Unknown - Present        Anxiety ICD-10-CM: F41.9  ICD-9-CM: 300.00  Unknown - Present        Vitamin D deficiency ICD-10-CM: E55.9  ICD-9-CM: 268.9  Unknown - Present        Vertigo ICD-10-CM: R42  ICD-9-CM: 780.4  Unknown - Present Discharge/Transfer Medications  Discharge Medication List as of 7/11/2020  1:03 PM      START taking these medications    Details   levoFLOXacin (LEVAQUIN) 750 mg tablet Take 1 Tab by mouth daily for 9 days. , Normal, Disp-9 Tab,R-0      metroNIDAZOLE (FLAGYL) 500 mg tablet Take 1 Tab by mouth two (2) times a day for 9 days. , Normal, Disp-18 Tab,R-0      ondansetron (ZOFRAN ODT) 4 mg disintegrating tablet Take 1 Tab by mouth every eight (8) hours as needed for Nausea or Vomiting., Normal, Disp-10 Tab,R-0         CONTINUE these medications which have NOT CHANGED    Details   lisinopriL (PRINIVIL, ZESTRIL) 40 mg tablet TAKE 1 TABLET DAILY, Normal, Disp-90 Tab,R-3      melatonin 5 mg cap capsule Take 5 mg by mouth nightly., Historical Med      ALPRAZolam (Xanax) 0.5 mg tablet Take 1 Tab by mouth daily as needed (Vertigo). , Normal, Disp-30 Tab, R-1      ergocalciferol (ERGOCALCIFEROL) 50,000 unit capsule TAKE 1 CAPSULE EVERY 7 DAYS, Normal, Disp-12 Cap, R-4, EBNNY              Diet:  Lipscomb diet for first few days after dischagre, advance as tolerated . Activity:  As tolerated    Disposition: Home or Self Care    Discharge instructions to patient/family  Please seek medical attention for any new or worsening symptoms particularly fever, chest pain, shortness of breath, abdominal pain, nausea, vomiting    Follow up plans/appointments  Follow-up Information     Follow up With Specialties Details Why Contact Info    Raf Hough MD Family Practice Go on 7/15/2020 @10:30AM with your PCP for a virtual visit.  [address] [place] [address]697      Meng Crockett MD Gastroenterology Call in 2 weeks To set up your colonoscopy and endoscopy procedures [address] Livier Freedman DO  Family Medicine Resident     For Billing    Chief Complaint   Patient presents with   [employer] Abdominal Pain       Hospital Problems  Date Reviewed: 6/24/2020 Codes Class Noted POA    Syncope ICD-10-CM: R55  ICD-9-CM: 780.2  7/9/2020 Unknown        Abdominal pain ICD-10-CM: R10.9  ICD-9-CM: 789.00  7/9/2020 Unknown

## 2020-07-11 NOTE — ROUTINE PROCESS
Bedside and Verbal shift change report given to [facility] (oncoming nurse) by Yu Issa (offgoing nurse). Report included the following information SBAR and Kardex.

## 2020-07-11 NOTE — DISCHARGE INSTRUCTIONS
HOME DISCHARGE INSTRUCTIONS    Dipika Gillespie / [id number] : 1965    Admission date: 2020 Discharge date: 2020 12:00 PM     Please bring this form with you to show your care provider at your follow-up appointment. Primary care provider:  Kiersten Quintero MD     Discharging provider:  Yane Estrada DO  - Family Medicine Resident  Jamal Adkins MD - Family Medicine Attending      You have been admitted to the hospital with the following diagnoses:    ACUTE DIAGNOSES:  Syncope [R55]  Abdominal pain [R10.9]  Syncope [R55]  Abdominal pain [R10.9]    . . . . . . . . . . . . . . . . . . . . . . . . . . . . . . . . . . . . . . . . . . . . . . . . . . . . . . . . . . . . . . . . . . . . . . . .   You are well enough to be discharged from the hospital. However, because you were inpatient in a hospital, you are at greater risk of having been exposed to the coronavirus. PLEASE stay inside and self-quarantine for 14 days to prevent further spread of the coronavirus. . . . . . . . . . . . . . . . . . . . . . . . . . . . . . . . . . . . . . . . . . . . . . . . . . . . . . . . . . . . . . . . . . . . . . . . . MEDICATION CHANGES  START  - Levaquin 750 mg daily   - Flagyl 500 mg twice daily   - Zofran ODT 4 mg every 8 hours as needed for nausea/vomiting     No other changes were made to your medications, please take all your other home medicines as previously prescribed. . . . . . . . . . . . . . . . . . . . . . . . . . . . . . . . . . . . . . . . . . . . . . . . . . . . . . . . . . . . . . . . . . . . . . . . Daysi Cat FOLLOW-UP CARE RECOMMENDATIONS:    Appointments  Follow-up Information     Follow up With Specialties Details Why Contact Info    Kiersten Quintero MD Family Practice Go on 7/15/2020 @10:30AM with your PCP for a virtual visit.  [address] [place] [address]  Rasha[address]      Marc Johnson MD Gastroenterology Call To set up your colonoscopy and endoscopy procedures [address] [facility] [place]  [phone]               Follow-up tests needed: colonoscopy and EGD (with gastroenterology)    Pending test results: At the time of your discharge the following test results are still pending: stool studies (WBC, ova & parasites, enteric bacteria), urine culture . Please make sure you review these results with your outpatient follow-up provider(s). DIET/what to eat:  GI lite diet (bland diet, avoid spicy foods, fried foods, gravy). ACTIVITY:  Activity as tolerated    Specific symptoms to watch for: chest pain, shortness of breath, fever, chills, nausea, vomiting, diarrhea, change in mentation, falling, weakness, bleeding. What to do if new or unexpected symptoms occur? If you experience any of the above symptoms (or should other concerns or questions arise after discharge) please call your primary care physician. Return to the emergency room if you cannot get hold of your doctor. · It is very important that you keep your follow-up appointment(s). · Please bring discharge papers, medication list (and/or medication bottles) to your follow-up appointments for review by your outpatient provider(s). · Please check the list of medications and be sure it includes every medication (even non-prescription medications) that your provider wants you to take. · It is important that you take the medication exactly as they are prescribed. · Keep your medication in the bottles provided by the pharmacist and keep a list of the medication names, dosages, and times to be taken in your wallet. · Do not take other medications without consulting your doctor. · If you have any questions about your medications or other instructions, please talk to your nurse or care provider before you leave the hospital.     Information obtained by:     I understand that if any problems occur once I am at home I am to contact my physician.     These instructions were explained to me and I had the opportunity to ask questions. I understand and acknowledge receipt of the instructions indicated above.                                                                                                                                                Physician's or R.N.'s Signature                                                                  Date/Time                                                                                                                                              Patient or Representative Signature                                                          Date/Time

## 2020-07-11 NOTE — PROGRESS NOTES
Belen Zhou M.D.  [phone]           GI PROGRESS NOTE        NAME: Radha Kurtz   :  1965   MRN:  [id number]       Subjective:   Feeling better, tolerating liquids. Denies nausea or vomiting, abdomen much less bloated. Objective: In NAD      VITALS:   Last 24hrs VS reviewed since prior progress note. Most recent are:  Visit Vitals  BP (!) 149/96 (BP 1 Location: Right arm)   Pulse 65   Temp 98.3 °F (36.8 °C)   Resp 18   Ht 5' 6\" (1.676 m)   Wt 70.3 kg (155 lb)   SpO2 95%   Breastfeeding No   BMI 25.02 kg/m²       Intake/Output Summary (Last 24 hours) at 2020 1315  Last data filed at 2020 0804  Gross per 24 hour   Intake 1775 ml   Output    Net 1775 ml       PHYSICAL EXAM:  General: Alert, in no acute distress    HEENT: Anicteric sclerae. Lungs:            CTA Bilaterally. Heart:  Regular  rhythm,    Abdomen: Soft, less distended, minimally tender, bowel sounds present  Extremities: No c/c/e    Lab Data Reviewed:   Recent Labs     20   WBC 6.1 15.8*   HGB 12.3 15.8   HCT 39.1 49.8*    320     Recent Labs     07/11/20  0449 07/10/20  0622    138   K 3.8 3.4*   * 108   CO2 28 25   BUN 7 15   CREA 0.64 0.70   GLU 94 117*   PHOS  --  3.3   CA 8.0* 7.7*     Recent Labs     07/11/20  0449 07/10/20  0622 20   AP 61 57 74   TP 6.5 5.7* 7.3   ALB 3.0* 2.6* 3.5   GLOB 3.5 3.1 3.8   LPSE  --   --  108       ________________________________________________________________________  Patient Active Problem List   Diagnosis Code    Trouble in sleeping G47.9    Hypertension I10    Anxiety F41.9    Vitamin D deficiency E55.9    Vertigo R42    Severe obesity ([facility] [address] E66.01    Syncope R55    Abdominal pain R10.9         Assessment and Plan:  Abdominal pain with enteriticolitis noted on CT scan. Better with antibiotic regimen.   Ok to discharge home on oral antibiotics and follow-up as outpatient with  [address] on bland diet over the next few days.        Signed By: Stanley Hagan MD     7/11/2020  1:15 PM

## 2020-07-11 NOTE — PROGRESS NOTES
SURGERY PROGRESS NOTE      Admit Date: 2020    POD * No surgery found *    Procedure: * No surgery found *      Subjective:   Feels better today  Tolerating diet    Objective:     Visit Vitals  BP (!) 152/92 (BP 1 Location: Right arm, BP Patient Position: At rest;Sitting)   Pulse 73   Temp 98.3 °F (36.8 °C)   Resp 18   Ht 5' 6\" (1.676 m)   Wt 70.3 kg (155 lb)   SpO2 98%   Breastfeeding No   BMI 25.02 kg/m²        Temp (24hrs), Av.1 °F (36.7 °C), Min:97.6 °F (36.4 °C), Max:98.5 °F (36.9 °C)      Physical Exam:     Abdomen:  Soft. Non-tender, non-distended.  .      Assessment:     Active Problems:    Syncope (2020)      Abdominal pain (2020)        Plan/Recommendations/Medical Decision Making:   OK for d/c home  Follow up with GI for colonoscopy and endoscopy

## 2020-07-11 NOTE — ROUTINE PROCESS
Patient and  given discharge instructions and prescriptions sent electronically to pharmacy. IV removed. No questions at this time.

## 2020-07-12 LAB
BACTERIA SPEC CULT: ABNORMAL
CC UR VC: ABNORMAL
SERVICE CMNT-IMP: ABNORMAL

## 2020-07-12 NOTE — PROGRESS NOTES
Lactobacillus growing in UCx, likely vaginal lucas no URI sxs and pt currently on abx for acute gastroenteritis

## 2020-07-21 ENCOUNTER — TELEPHONE (OUTPATIENT)
Dept: FAMILY MEDICINE CLINIC | Age: 55
End: 2020-07-21

## 2020-07-21 NOTE — TELEPHONE ENCOUNTER
The pt has sent a my chart message and is following up    I have a question about VITAMIN D, 25 HYDROXY resulted on 6/24/20 at 10:29 PM.     Do you feel I need to continue taking the current prescription?

## 2020-08-24 ENCOUNTER — VIRTUAL VISIT (OUTPATIENT)
Dept: FAMILY MEDICINE CLINIC | Age: 55
End: 2020-08-24
Payer: COMMERCIAL

## 2020-08-24 ENCOUNTER — TELEPHONE (OUTPATIENT)
Dept: FAMILY MEDICINE CLINIC | Age: 55
End: 2020-08-24

## 2020-08-24 DIAGNOSIS — I89.8 CALCIFIED LYMPH NODES: Primary | ICD-10-CM

## 2020-08-24 PROCEDURE — 99213 OFFICE O/P EST LOW 20 MIN: CPT | Performed by: FAMILY MEDICINE

## 2020-08-24 NOTE — PROGRESS NOTES
Arthur Angel is a 63 y.o. female who was seen by synchronous (real-time) audio-video technology on 8/24/2020 for Other (Pt states she had a panoramic radiograph done 8/19/2020 and dentist found a 1 cm lesion on right side of neck.)    Assessment & Plan:   Diagnoses and all orders for this visit:    1. Calcified lymph nodes: right neck lymph node with no other symptoms. Warrants further evaluation and CT neck ordered. Further management pending results. -     CT NECK SOFT TISSUE W CONT; Future    Subjective:   Pt here today for follow up from dental evaluation at [facility] and Denice Boast on 8/19/2020. Panoramic XR of the neck with possible calcified lymph node on the right and referred to PCP for further evaluation and management. Per documentation received, no palpable neck masses or swelling. Denies pain or difficulty with swallowing, night sweats, unexplained weight loss. Prior to Admission medications    Medication Sig Start Date End Date Taking? Authorizing Provider   Lacto no.41-B. bifid-B.animalis (Advanced Probiotic-10) 13 mg (3 billion cell) cap Take  by mouth. Yes Provider, Historical   lisinopriL (PRINIVIL, ZESTRIL) 40 mg tablet TAKE 1 TABLET DAILY 7/6/20  Yes Kinsey Cardenas MD   ALPRAZolam (Xanax) 0.5 mg tablet Take 1 Tab by mouth daily as needed (Vertigo). 3/23/20  Yes Kinsey Cardenas MD   ergocalciferol (ERGOCALCIFEROL) 50,000 unit capsule TAKE 1 CAPSULE EVERY 7 DAYS 11/5/19  Yes Kinsey Cardenas MD   melatonin 5 mg cap capsule Take 10 mg by mouth nightly. Yes Provider, Historical   ondansetron (ZOFRAN ODT) 4 mg disintegrating tablet Take 1 Tab by mouth every eight (8) hours as needed for Nausea or Vomiting.   Patient not taking: Reported on 8/24/2020 7/11/20   Vincent Davey MD       Allergies   Allergen Reactions    Shellfish Derived Diarrhea and Nausea and Vomiting    Sulfa (Sulfonamide Antibiotics) Hives     Past Medical History:   Diagnosis Date    Anxiety     Hiatal hernia     Hypertension     Menopause     early aj at age 38    Trouble in sleeping     Vertigo     Vitamin D deficiency      Social History     Tobacco Use    Smoking status: Never Smoker    Smokeless tobacco: Never Used   Substance Use Topics    Alcohol use: Yes     Comment: socially       ROS  Pertinent items noted in HPI    Objective:   No flowsheet data found. General: alert, cooperative, no distress   Mental  status: normal mood, behavior, speech, dress, motor activity, and thought processes, able to follow commands   HENT: NCAT   Neck: no visualized mass   Resp: no respiratory distress   Neuro: no gross deficits   Skin: no discoloration or lesions of concern on visible areas   Psychiatric: normal affect, consistent with stated mood, no evidence of hallucinations       We discussed the expected course, resolution and complications of the diagnosis(es) in detail. Medication risks, benefits, costs, interactions, and alternatives were discussed as indicated. I advised her to contact the office if her condition worsens, changes or fails to improve as anticipated. She expressed understanding with the diagnosis(es) and plan. Marissa Lopez, who was evaluated through a patient-initiated, synchronous (real-time) audio-video encounter, and/or her healthcare decision maker, is aware that it is a billable service, with coverage as determined by her insurance carrier. She provided verbal consent to proceed: Yes, and patient identification was verified. It was conducted pursuant to the emergency declaration under the [address] [place] [address] authority and the [employer] Act. A caregiver was present when appropriate. Ability to conduct physical exam was limited. I was in the office. The patient was at home.       Giovanny Velasquez MD

## 2020-08-24 NOTE — TELEPHONE ENCOUNTER
Pt went to dentist and xray showed on right side found round radio paque 1 centimeter region and possible calcified lymphoid and looks to medial to be in cardioid region     Dentist name is Dr Maribell Zhang with [facility] in [facility] phone # [phone]    Pt setup virtual to discuss later this afternoon

## 2020-08-24 NOTE — PROGRESS NOTES
Pepe Corcoran is a 63 y.o. female    Chief Complaint   Patient presents with    Other     Pt states she had a panoramic radiograph done 8/19/2020 and dentist found a 1 cm lesion on right side of neck. Health Maintenance Due   Topic Date Due    PAP AKA CERVICAL CYTOLOGY  08/22/1985    Shingrix Vaccine Age 50> (1 of 2) 08/22/2014       1. Have you been to the ER, urgent care clinic since your last visit? Hospitalized since your last visit? No    2. Have you seen or consulted any other health care providers outside of the [address] [place] [address] since your last visit? Include any pap smears or colon screening. Dr. Ranjan Patino;  Rosalind Hughes and Licha Mix [address]   - Phone number [phone].

## 2020-08-24 NOTE — TELEPHONE ENCOUNTER
Called placed to [facility] TREATMENT [facility] and [place], but office is currently closed for lunch. LVM message asking that a representative return my phone call to discuss XR findings.

## 2020-09-02 ENCOUNTER — HOSPITAL ENCOUNTER (OUTPATIENT)
Dept: CT IMAGING | Age: 55
Discharge: HOME | End: 2020-09-02
Attending: FAMILY MEDICINE
Payer: COMMERCIAL

## 2020-09-02 DIAGNOSIS — I89.8 CALCIFIED LYMPH NODES: ICD-10-CM

## 2020-09-02 PROCEDURE — 74011000636 HC RX REV CODE- 636: Performed by: FAMILY MEDICINE

## 2020-09-02 PROCEDURE — 70491 CT SOFT TISSUE NECK W/DYE: CPT

## 2020-09-02 RX ADMIN — IOPAMIDOL 100 ML: 612 INJECTION, SOLUTION INTRAVENOUS at 10:04

## 2020-09-04 ENCOUNTER — TELEPHONE (OUTPATIENT)
Dept: FAMILY MEDICINE CLINIC | Age: 55
End: 2020-09-04

## 2020-09-04 DIAGNOSIS — I67.1 ANEURYSM OF INTERNAL CAROTID ARTERY: Primary | ICD-10-CM

## 2020-09-04 NOTE — TELEPHONE ENCOUNTER
Reason for call: Results       Callback required yes/no and why: Yes.  To provide results       Best contact number(s):[phone]       Details to clarify the request: Patient would like a call back to provide results from Emmanuel Alvarez

## 2020-09-04 NOTE — TELEPHONE ENCOUNTER
Patient called and results discussed. CTA head ordered and referral to Neurointerventional Surgery placed.  [employer] message was also sent with this information per patient's request.

## 2020-09-09 ENCOUNTER — HOSPITAL ENCOUNTER (OUTPATIENT)
Dept: CT IMAGING | Age: 55
Discharge: HOME | End: 2020-09-09
Payer: COMMERCIAL

## 2020-09-09 DIAGNOSIS — I67.1 ANEURYSM OF INTERNAL CAROTID ARTERY: ICD-10-CM

## 2020-09-09 PROCEDURE — 70496 CT ANGIOGRAPHY HEAD: CPT | Performed by: FAMILY MEDICINE

## 2020-09-09 RX ORDER — SODIUM CHLORIDE 0.9 % (FLUSH) 0.9 %
10 SYRINGE (ML) INJECTION
Status: COMPLETED | OUTPATIENT
Start: 2020-09-09 | End: 2020-09-09

## 2020-09-09 RX ADMIN — Medication 10 ML: at 09:25

## 2020-09-10 DIAGNOSIS — R42 VERTIGO: ICD-10-CM

## 2020-09-11 RX ORDER — ALPRAZOLAM 0.5 MG/1
0.5 TABLET ORAL
Qty: 30 TAB | Refills: 1 | Status: SHIPPED | OUTPATIENT
Start: 2020-09-11 | End: 2020-12-07

## 2020-09-14 ENCOUNTER — OFFICE VISIT (OUTPATIENT)
Dept: NEUROSURGERY | Age: 55
End: 2020-09-14
Payer: COMMERCIAL

## 2020-09-14 VITALS
WEIGHT: 228 LBS | DIASTOLIC BLOOD PRESSURE: 98 MMHG | HEART RATE: 76 BPM | SYSTOLIC BLOOD PRESSURE: 150 MMHG | OXYGEN SATURATION: 98 % | BODY MASS INDEX: 36.64 KG/M2 | HEIGHT: 66 IN | TEMPERATURE: 97.6 F

## 2020-09-14 DIAGNOSIS — I72.9 ANEURYSM (HCC): Primary | ICD-10-CM

## 2020-09-14 DIAGNOSIS — I72.9 ANEURYSM (HCC): ICD-10-CM

## 2020-09-14 PROCEDURE — 99203 OFFICE O/P NEW LOW 30 MIN: CPT | Performed by: STUDENT IN AN ORGANIZED HEALTH CARE EDUCATION/TRAINING PROGRAM

## 2020-09-14 NOTE — PATIENT INSTRUCTIONS
A Healthy Lifestyle: Care Instructions Your Care Instructions A healthy lifestyle can help you feel good, stay at a healthy weight, and have plenty of energy for both work and play. A healthy lifestyle is something you can share with your whole family. A healthy lifestyle also can lower your risk for serious health problems, such as high blood pressure, heart disease, and diabetes. You can follow a few steps listed below to improve your health and the health of your family. Follow-up care is a key part of your treatment and safety. Be sure to make and go to all appointments, and call your doctor if you are having problems. It's also a good idea to know your test results and keep a list of the medicines you take. How can you care for yourself at home? · Do not eat too much sugar, fat, or fast foods. You can still have dessert and treats now and then. The goal is moderation. · Start small to improve your eating habits. Pay attention to portion sizes, drink less juice and soda pop, and eat more fruits and vegetables. ? Eat a healthy amount of food. A 3-ounce serving of meat, for example, is about the size of a deck of cards. Fill the rest of your plate with vegetables and whole grains. ? Limit the amount of soda and sports drinks you have every day. Drink more water when you are thirsty. ? Eat at least 5 servings of fruits and vegetables every day. It may seem like a lot, but it is not hard to reach this goal. A serving or helping is 1 piece of fruit, 1 cup of vegetables, or 2 cups of leafy, raw vegetables. Have an apple or some carrot sticks as an afternoon snack instead of a candy bar. Try to have fruits and/or vegetables at every meal. 
· Make exercise part of your daily routine. You may want to start with simple activities, such as walking, bicycling, or slow swimming. Try to be active 30 to 60 minutes every day.  You do not need to do all 30 to 60 minutes all at once. For example, you can exercise 3 times a day for 10 or 20 minutes. Moderate exercise is safe for most people, but it is always a good idea to talk to your doctor before starting an exercise program. 
· Keep moving. Flavio Weeks the lawn, work in the garden, or [employer]. Take the stairs instead of the elevator at work. · If you smoke, quit. People who smoke have an increased risk for heart attack, stroke, cancer, and other lung illnesses. Quitting is hard, but there are ways to boost your chance of quitting tobacco for good. ? Use nicotine gum, patches, or lozenges. ? Ask your doctor about stop-smoking programs and medicines. ? Keep trying. In addition to reducing your risk of diseases in the future, you will notice some benefits soon after you stop using tobacco. If you have shortness of breath or asthma symptoms, they will likely get better within a few weeks after you quit. · Limit how much alcohol you drink. Moderate amounts of alcohol (up to 2 drinks a day for men, 1 drink a day for women) are okay. But drinking too much can lead to liver problems, high blood pressure, and other health problems. Family health If you have a family, there are many things you can do together to improve your health. · Eat meals together as a family as often as possible. · Eat healthy foods. This includes fruits, vegetables, lean meats and dairy, and whole grains. · Include your family in your fitness plan. Most people think of activities such as jogging or tennis as the way to fitness, but there are many ways you and your family can be more active. Anything that makes you breathe hard and gets your heart pumping is exercise. Here are some tips: 
? Walk to do errands or to take your child to school or the bus. 
? Go for a family bike ride after dinner instead of watching TV. Where can you learn more? Go to http://kiran-travis.info/ Enter O290 in the search box to learn more about \"A Healthy Lifestyle: Care Instructions. \" Current as of: January 31, 2020               Content Version: 12.6 © 6804-5690 [employer]. Care instructions adapted under license by [employer] (which disclaims liability or warranty for this information). If you have questions about a medical condition or this instruction, always ask your healthcare professional. [place] [address] any warranty or liability for your use of this information.

## 2020-09-14 NOTE — PROGRESS NOTES
New patient referred by Dr. Nathalia Moreira, PCP, presenting with Bilateral carotid artery aneurysm. Patient reports daily headaches for the past week. Pain concentrated across her forehead. Reports episodes of dizziness, and unsteady gait at times. Reports she occasionally leans to the right. Denies blurred or double vision, n/v, numbness and tingling. Reports her heart racing and a twinge like feeling across her upper chest at night. Reports this has occurred for several years. Advised to check with PCP.

## 2020-09-14 NOTE — PROGRESS NOTES
NeuroInterventional Surgery Note  Della Kemp MD    Patient: Tiffany Tejada MRN: [id number]  SSN: [id number]    YOB: 1965  Age: 63 y.o. Sex: female      Chief Complaint: Intracranial aneurysms    Subjective:      Tiffany Tejada is a 63 y.o. female who is being seen for intracranial aneurysms. Patient refers having some dizziness, mild headaches and episode of chest pain at night. No double vision or blurry vision. No weakness or numbness. No trouble swallowing. No other complaints. Patient reports mild headaches that get better with Tylenol. She has a history of cluster headaches that required visits in the ED in the past. (25 years ago)    Past Medical History:   Diagnosis Date    Anxiety     Chest pain     Fainting     Headache     migraines    Hiatal hernia     Hypertension     Joint pain     Menopause     early aj at age 38    Ringing in ears     Skipped beats     Trouble in sleeping     Vertigo     Vertigo     Vitamin D deficiency      Family History   Problem Relation Age of Onset    Hypertension Mother     Hypertension Father     Headache Other      Social History     Tobacco Use    Smoking status: Never Smoker    Smokeless tobacco: Never Used   Substance Use Topics    Alcohol use: Yes     Comment: 2-3 weekly      Cannot display prior to admission medications because the patient has not been admitted in this contact. Allergies   Allergen Reactions    Shellfish Derived Diarrhea and Nausea and Vomiting    Sulfa (Sulfonamide Antibiotics) Hives     Patient is allergic to oyster (nausea and vomiting and diarrhea) but she got CTA head with no adverse reactions    Review of Systems:  A comprehensive review of systems was negative except for that written in the History of Present Illness. Denies numbness, tingling, chest pain, leg pain, nausea, vomiting, difficulty swallowing, headache, and dyspnea.      Objective:     Vitals:    09/14/20 1102 BP: (!) 150/98   Pulse: 76   Temp: 97.6 °F (36.4 °C)   TempSrc: Temporal   SpO2: 98%   Weight: 228 lb (103.4 kg)   Height: 5' 6\" (1.676 m)      Physical Exam:  GENERAL: Calm, cooperative, NAD  SKIN: Warm, dry, color appropriate for ethnicity. Groin site soft, with no odor, bleeding or drainage noted. Mild ecchymosis present. Neurologic Exam:  Mental Status:  Alert and oriented x 4. Appropriate affect, mood and behavior. Language:    Normal fluency, repetition, comprehension and naming. Cranial Nerves:   Pupils 3 mm, equal, round and reactive to light. Visual fields full to confrontation. Extraocular movements intact. Facial sensation intact. Full facial strength, no asymmetry. Hearing grossly intact bilaterally. No dysarthria. Tongue protrudes to midline, palate elevates symmetrically. Shoulder shrug 5/5 bilaterally. Motor:    No pronator drift. Bulk and tone normal.      5/5 power in all extremities proximally and distally. No involuntary movements. Sensation:    Sensation intact throughout to light touch, temperature, pinprick, vibration, proprioception     Reflexes:    Reflexes are 2+ at the biceps, triceps, patella and achilles bilaterally. Negative Babinskis    Coordination & Gait: Normal. FTN and HTS intact with no ataxia present.     Labs:  Lab Results   Component Value Date/Time    WBC 6.1 07/11/2020 04:49 AM    Hemoglobin (POC) 13.6 11/05/2010 12:35 PM    HGB 12.3 07/11/2020 04:49 AM    Hematocrit (POC) 40 11/05/2010 12:35 PM    HCT 39.1 07/11/2020 04:49 AM    PLATELET 568 64/95/7885 04:49 AM    MCV 87.9 07/11/2020 04:49 AM      Lab Results   Component Value Date/Time    Sodium 143 07/11/2020 04:49 AM    Potassium 3.8 07/11/2020 04:49 AM    Chloride 111 (H) 07/11/2020 04:49 AM    CO2 28 07/11/2020 04:49 AM    Anion gap 4 (L) 07/11/2020 04:49 AM    Glucose 94 07/11/2020 04:49 AM    BUN 7 07/11/2020 04:49 AM    Creatinine 0.64 07/11/2020 04:49 AM    BUN/Creatinine ratio 11 (L) 07/11/2020 04:49 AM    GFR est AA >60 07/11/2020 04:49 AM    GFR est non-AA >60 07/11/2020 04:49 AM    Calcium 8.0 (L) 07/11/2020 04:49 AM     Lab Results   Component Value Date/Time    Troponin-I, Qt. <0.05 07/09/2020 08:20 PM       Imaging:      CT Results (maximum last 3): Results from Hospital Encounter encounter on 09/09/20   CTA HEAD    Narrative EXAM:  CTA HEAD    INDICATION:  Bilateral supraclinoid ICA aneurysms seen on CT neck    COMPARISON:  CT neck of 9/2/2020    TECHNIQUE:    Multislice helical axial CT angiography was performed from the skull base to the  top of the head during uneventful rapid bolus intravenous administration of 100  mL of Isovue 370. Postprocessing was performed to include MIP and volume  rendered images. Standard images  of the head were also obtained before and  after contrast. CT dose reduction was achieved through the use of a standardized  protocol tailored for this examination and automatic exposure control for dose  modulation. CTA HEAD     1. Right supraclinoid internal carotid artery aneurysm measuring approximately 7  mm AP and transverse and 6 mm craniocaudad, projecting superiorly. Wide neck of  at least 7 mm. Right middle cerebral artery arises from the posterior aspect of  the base of the aneurysm. 2. Left supraclinoid superiorly directed distal internal carotid artery aneurysm  measuring approximately 7 mm transverse, 7 mm AP, and 6 mm craniocaudad, with a  neck of approximately 6 mm. Left middle cerebral artery arises from the  posterior aspect of the base of the aneurysm. The visualized portion of the brain demonstrates no focal lesions and no areas  of abnormal enhancement. The vertebral arteries are codominant. The basilar artery and its branches  demonstrate no significant abnormalities. . The internal carotid, anterior  cerebral, and middle cerebral arteries are patent.  There is no flow-limiting  intracranial stenosis. There are no significant posterior communicating  arteries. No additional aneurysms are demonstrated. Impression IMPRESSION:  Bilateral supraclinoid internal carotid artery aneurysms measuring a maximum of  7 mm in size. Results from [facility] encounter on 09/02/20   CT NECK SOFT TISSUE W CONT    Narrative EXAM:  CT NECK SOFT TISSUE W CONT    INDICATION:  Calcified right neck lymph node seen on recent dental X-ray    COMPARISON: None. CONTRAST: 100 mL of Isovue-300. TECHNIQUE: Multislice helical CT was performed from the mid calvarium to the  aortic arch during uneventful rapid bolus intravenous contrast administration. Contiguous 2.5 mm axial images were reconstructed and lung and soft tissue  windows were generated. Coronal and sagittal reformations were generated. CT  dose reduction was achieved through use of a standardized protocol tailored for  this examination and automatic exposure control for dose modulation. FINDINGS:    The aerodigestive tract is unremarkable. The parotid, submandibular, and thyroid  glands are normal in appearance. No evidence of pathologically enlarged cervical  lymph nodes. No abnormal soft tissue enhancement or evidence of mass lesion. There are bilateral, superiorly directed supraclinoid ICA aneurysms measuring 6  x 6 mm on the right, and 6 x 6 mm on the left (series 3 image 3). Paranasal  sinuses and mastoid air cells are clear. Intraorbital contents are unremarkable. The cervical vasculature is patent. No acute fracture or aggressive osseous lesion. Multilevel degenerative disc  disease and facet arthropathy in the cervical spine. Visualized portions of the  lung apices are normal.      Impression IMPRESSION:   1. No evidence of neck mass, cervical lymphadenopathy, or other significant soft  tissue abnormality in the neck. 2. Bilateral superiorly directed supraclinoid ICA aneurysms measuring 6 x 6 mm  bilaterally.  CTA of the head for further delineation and neurointerventional  radiology consultation are recommended. RECOMMENDATION: CTA head and neurointerventional radiology consultation for  bilateral supraclinoid ICA aneurysms. 23X         [facility]   CT ABD PELV W CONT    Narrative EXAM: CT ABD PELV W CONT    INDICATION: upper abd pain, syncope    COMPARISON: CT 8/2/2018     CONTRAST: 100 mL of Isovue-370. TECHNIQUE:   Following the uneventful intravenous administration of contrast, thin axial  images were obtained through the abdomen and pelvis. Coronal and sagittal  reconstructions were generated. Oral contrast was not administered. CT dose  reduction was achieved through use of a standardized protocol tailored for this  examination and automatic exposure control for dose modulation. Adaptive  statistical iterative reconstruction (ASIR) was utilized. FINDINGS:   LOWER THORAX: No significant abnormality in the incidentally imaged lower chest.  LIVER: No mass. Mild perihepatic ascites. BILIARY TREE: Gallbladder is within normal limits. CBD is not dilated. SPLEEN: within normal limits. Mild ascites in the left flank. PANCREAS: No mass or ductal dilatation. ADRENALS: Unremarkable. KIDNEYS: Nonrotation of the kidneys. No hydronephrosis or other acute  abnormality. STOMACH: Gastric distention. SMALL BOWEL: Within the mid to lower abdomen, there are several loops of jejunum  with diffuse bowel wall thickening and mucosal hyperenhancement, and these loops  are not pathologically distended, but are relatively distended compared to  adjacent loops of bowel. Maximal dilatation is 2.9 cm. COLON: No dilatation or wall thickening. APPENDIX: Status post appendectomy. PERITONEUM: Mild ascites. No pneumoperitoneum  RETROPERITONEUM: No lymphadenopathy or aortic aneurysm. REPRODUCTIVE ORGANS: Uterus is noted. There is moderate pelvic ascites. URINARY BLADDER: No mass or calculus.   BONES: No destructive bone lesion. ABDOMINAL WALL: No mass or hernia. ADDITIONAL COMMENTS: The vascular supply to the intestines appears intact. There  is no evidence of mesenteric vein thrombosis. Impression IMPRESSION:    1. There are several abnormal loops of jejunum in the mid to lower abdomen, with  abnormal concentric bowel wall thickening, mucosal hyperenhancement, and  adjacent mesenteric edema. There is also lower abdominal and pelvic ascites. Favor the sequela of an infectious or inflammatory intestinal process. 2. There is also gastric distention. 3. Status post appendectomy. Assessment:   A 63year old female with PMHx of HTN on Lisinopril 40 mg POD, pituitary adenoma 25 years ago, Vit D deficiency, insomnia, migraines, anxiety associated with vertigo on Alprazolam PRN presenting with 2 intracranial bilateral supraclinoid aneurysms measuring approximately 7 mm in size (CTA on 9/9/20) incidentally found during workup for a lymph node on the right side of the neck (9/2/20). CT of the neck did not show a lymph node. Patient was explained that the risk of these aneurysms to rupture spontaneously is approximately 2.6% per year and they need to be treated. No family history of aneurysms. She does not smoke. Plan:   Diagnostic cerebral angiogram in 2 weeks. Patient with bilateral supraclinoid ICA aneurysms 7 mm in size. Will discuss treatment options either stent assisted coiling vs pipeline after diagnostic cerebral angiogram is performed. Thank you for this consult and participating in the care of this patient.   Signed By: Rhianna Barker MD     September 14, 2020

## 2020-09-15 LAB
ALBUMIN SERPL-MCNC: 4.3 G/DL (ref 3.8–4.9)
ALBUMIN/GLOB SERPL: 1.5 {RATIO} (ref 1.2–2.2)
ALP SERPL-CCNC: 79 IU/L (ref 39–117)
ALT SERPL-CCNC: 20 IU/L (ref 0–32)
AST SERPL-CCNC: 19 IU/L (ref 0–40)
BILIRUB SERPL-MCNC: 0.6 MG/DL (ref 0–1.2)
BUN SERPL-MCNC: 13 MG/DL (ref 6–24)
BUN/CREAT SERPL: 16 (ref 9–23)
CALCIUM SERPL-MCNC: 9.5 MG/DL (ref 8.7–10.2)
CHLORIDE SERPL-SCNC: 106 MMOL/L (ref 96–106)
CO2 SERPL-SCNC: 25 MMOL/L (ref 20–29)
CREAT SERPL-MCNC: 0.8 MG/DL (ref 0.57–1)
ERYTHROCYTE [DISTWIDTH] IN BLOOD BY AUTOMATED COUNT: 14.4 % (ref 11.7–15.4)
GLOBULIN SER CALC-MCNC: 2.8 G/DL (ref 1.5–4.5)
GLUCOSE SERPL-MCNC: 92 MG/DL (ref 65–99)
HCT VFR BLD AUTO: 39.7 % (ref 34–46.6)
HGB BLD-MCNC: 13.3 G/DL (ref 11.1–15.9)
MCH RBC QN AUTO: 28 PG (ref 26.6–33)
MCHC RBC AUTO-ENTMCNC: 33.5 G/DL (ref 31.5–35.7)
MCV RBC AUTO: 84 FL (ref 79–97)
PLATELET # BLD AUTO: 245 X10E3/UL (ref 150–450)
POTASSIUM SERPL-SCNC: 4.9 MMOL/L (ref 3.5–5.2)
PROT SERPL-MCNC: 7.1 G/DL (ref 6–8.5)
RBC # BLD AUTO: 4.75 X10E6/UL (ref 3.77–5.28)
SODIUM SERPL-SCNC: 142 MMOL/L (ref 134–144)
WBC # BLD AUTO: 6.5 X10E3/UL (ref 3.4–10.8)

## 2020-09-18 ENCOUNTER — TELEPHONE (OUTPATIENT)
Dept: NEUROSURGERY | Age: 55
End: 2020-09-18

## 2020-09-18 NOTE — TELEPHONE ENCOUNTER
Patient would like to know more information about upcoming procedure. She also would like to know if she should cancel a dental procedure?

## 2020-09-24 ENCOUNTER — TELEPHONE (OUTPATIENT)
Dept: NEUROSURGERY | Age: 55
End: 2020-09-24

## 2020-09-24 NOTE — TELEPHONE ENCOUNTER
Return call from patient. Patient wanted to know if she had any restrictions prior to her diagnostic angiogram and will her insurance be checked for the procedure. Informed patient to continue daily activities as normal. If she experiences a thunderclap headache or signs/symptoms of stroke (BEFAST), seek emergency care. Asked if she could have major dental work prior to angiogram. Advised patient to use her judgement. If she has treatment after the angiogram, she will be on DAPT. Increased risk of bleeding. Informed patient scheduling will check for authorization for diagnostic angiogram.  Patient stated understanding. Date and time of angiogram confirmed with patient.

## 2020-10-01 ENCOUNTER — TELEPHONE (OUTPATIENT)
Dept: NEUROSURGERY | Age: 55
End: 2020-10-01

## 2020-10-01 NOTE — TELEPHONE ENCOUNTER
Received a call from patient with questions regarding her diagnostic angiogram on 10/5/2020. Answered all patient's questions. Informed patient to take 650 mg aspirin the night prior to angiogram, no eating or drinking after midnight, take morning medications with sips of water. Confirmed 10/5/2020 with 7:00am arrival time at [facility]. Informed patient she would need a  upon discharge. Her  can accompany her. She can proceed with her normal activities this weekend. Advised patient to adhere to CDC guidelines regarding COVID-19. Patient stated understanding.

## 2020-10-05 ENCOUNTER — HOSPITAL ENCOUNTER (OUTPATIENT)
Dept: INTERVENTIONAL RADIOLOGY/VASCULAR | Age: 55
Discharge: HOME | End: 2020-10-05
Attending: STUDENT IN AN ORGANIZED HEALTH CARE EDUCATION/TRAINING PROGRAM | Admitting: STUDENT IN AN ORGANIZED HEALTH CARE EDUCATION/TRAINING PROGRAM
Payer: COMMERCIAL

## 2020-10-05 VITALS
DIASTOLIC BLOOD PRESSURE: 115 MMHG | SYSTOLIC BLOOD PRESSURE: 162 MMHG | TEMPERATURE: 98.8 F | HEART RATE: 81 BPM | RESPIRATION RATE: 20 BRPM | OXYGEN SATURATION: 97 %

## 2020-10-05 DIAGNOSIS — I72.9 ANEURYSM (HCC): ICD-10-CM

## 2020-10-05 PROCEDURE — 76937 US GUIDE VASCULAR ACCESS: CPT

## 2020-10-05 PROCEDURE — 36224 PLACE CATH CAROTD ART: CPT

## 2020-10-05 PROCEDURE — 74011250636 HC RX REV CODE- 250/636

## 2020-10-05 PROCEDURE — 76377 3D RENDER W/INTRP POSTPROCES: CPT

## 2020-10-05 PROCEDURE — 36215 PLACE CATHETER IN ARTERY: CPT

## 2020-10-05 PROCEDURE — 74011250637 HC RX REV CODE- 250/637: Performed by: STUDENT IN AN ORGANIZED HEALTH CARE EDUCATION/TRAINING PROGRAM

## 2020-10-05 PROCEDURE — 74011250636 HC RX REV CODE- 250/636: Performed by: STUDENT IN AN ORGANIZED HEALTH CARE EDUCATION/TRAINING PROGRAM

## 2020-10-05 PROCEDURE — C1769 GUIDE WIRE: HCPCS

## 2020-10-05 PROCEDURE — 36223 PLACE CATH CAROTID/INOM ART: CPT

## 2020-10-05 PROCEDURE — 77030003394 HC NDL ART COOK -A

## 2020-10-05 PROCEDURE — 77030012468 HC VLV BLEEDBK CNTRL ABBT -B

## 2020-10-05 PROCEDURE — 74011000636 HC RX REV CODE- 636: Performed by: STUDENT IN AN ORGANIZED HEALTH CARE EDUCATION/TRAINING PROGRAM

## 2020-10-05 PROCEDURE — 77030021533 HC CATH ANGI DX PRFMA MRTM -A

## 2020-10-05 PROCEDURE — 2709999900 HC NON-CHARGEABLE SUPPLY

## 2020-10-05 PROCEDURE — C1894 INTRO/SHEATH, NON-LASER: HCPCS

## 2020-10-05 PROCEDURE — 74011000250 HC RX REV CODE- 250: Performed by: STUDENT IN AN ORGANIZED HEALTH CARE EDUCATION/TRAINING PROGRAM

## 2020-10-05 RX ORDER — HEPARIN SODIUM 1000 [USP'U]/ML
INJECTION, SOLUTION INTRAVENOUS; SUBCUTANEOUS
Status: DISCONTINUED
Start: 2020-10-05 | End: 2020-10-05 | Stop reason: HOSPADM

## 2020-10-05 RX ORDER — VERAPAMIL HYDROCHLORIDE 2.5 MG/ML
2.5 INJECTION, SOLUTION INTRAVENOUS ONCE
Status: COMPLETED | OUTPATIENT
Start: 2020-10-05 | End: 2020-10-05

## 2020-10-05 RX ORDER — FENTANYL CITRATE 50 UG/ML
100 INJECTION, SOLUTION INTRAMUSCULAR; INTRAVENOUS
Status: DISCONTINUED | OUTPATIENT
Start: 2020-10-05 | End: 2020-10-05 | Stop reason: HOSPADM

## 2020-10-05 RX ORDER — HEPARIN SODIUM 1000 [USP'U]/ML
2000 INJECTION, SOLUTION INTRAVENOUS; SUBCUTANEOUS ONCE
Status: COMPLETED | OUTPATIENT
Start: 2020-10-05 | End: 2020-10-05

## 2020-10-05 RX ORDER — MIDAZOLAM HYDROCHLORIDE 1 MG/ML
INJECTION, SOLUTION INTRAMUSCULAR; INTRAVENOUS
Status: COMPLETED
Start: 2020-10-05 | End: 2020-10-05

## 2020-10-05 RX ORDER — ACETAMINOPHEN 325 MG/1
650 TABLET ORAL ONCE
Status: COMPLETED | OUTPATIENT
Start: 2020-10-05 | End: 2020-10-05

## 2020-10-05 RX ORDER — LIDOCAINE HYDROCHLORIDE 20 MG/ML
20 INJECTION, SOLUTION INFILTRATION; PERINEURAL ONCE
Status: COMPLETED | OUTPATIENT
Start: 2020-10-05 | End: 2020-10-05

## 2020-10-05 RX ORDER — MIDAZOLAM HYDROCHLORIDE 1 MG/ML
5 INJECTION, SOLUTION INTRAMUSCULAR; INTRAVENOUS
Status: DISCONTINUED | OUTPATIENT
Start: 2020-10-05 | End: 2020-10-05 | Stop reason: HOSPADM

## 2020-10-05 RX ORDER — LIDOCAINE 40 MG/G
CREAM TOPICAL
Status: DISCONTINUED | OUTPATIENT
Start: 2020-10-05 | End: 2020-10-05 | Stop reason: HOSPADM

## 2020-10-05 RX ADMIN — FENTANYL CITRATE 50 MCG: 50 INJECTION, SOLUTION INTRAMUSCULAR; INTRAVENOUS at 10:59

## 2020-10-05 RX ADMIN — HEPARIN SODIUM 16000 UNITS: 1000 INJECTION INTRAVENOUS; SUBCUTANEOUS at 11:03

## 2020-10-05 RX ADMIN — VERAPAMIL HYDROCHLORIDE 2.5 MG: 2.5 INJECTION, SOLUTION INTRAVENOUS at 10:59

## 2020-10-05 RX ADMIN — LIDOCAINE HYDROCHLORIDE 15 MG: 20 INJECTION, SOLUTION INFILTRATION; PERINEURAL at 11:03

## 2020-10-05 RX ADMIN — HEPARIN SODIUM 2000 UNITS: 1000 INJECTION INTRAVENOUS; SUBCUTANEOUS at 10:59

## 2020-10-05 RX ADMIN — IOPAMIDOL 84 ML: 612 INJECTION, SOLUTION INTRAVENOUS at 11:49

## 2020-10-05 RX ADMIN — ACETAMINOPHEN 650 MG: 325 TABLET ORAL at 12:05

## 2020-10-05 RX ADMIN — MIDAZOLAM 1 MG: 1 INJECTION INTRAMUSCULAR; INTRAVENOUS at 10:58

## 2020-10-05 NOTE — PROGRESS NOTES
Pt remains without s/s of neuro deficits. As ordered, deflating TR Band in stages as ordered.   No s/s of bleeding or hematoma

## 2020-10-05 NOTE — DISCHARGE INSTRUCTIONS
Patient Education        Brain Angiogram: What to Expect at Home  Your Recovery  A brain angiogram (cerebral angiogram) is a test (also called a procedure) that looks for problems with blood vessels and blood flow in the brain. The doctor inserted a thin, flexible tube (catheter) into a blood vessel in your groin. Or the doctor may have put the catheter in a blood vessel in your arm. Then he or she injected a dye into the catheter. The dye flows into the blood vessel. The dye made the blood vessels show up on a video screen. You may have had this test to see if a blood vessel in the brain is bulging, narrowed, or blocked. The test may also be used to check other symptoms, such as unusual headaches, or to check problems found during a different test.  You may have a bruise where the catheter was put in, and you may feel sore for a day or two. You can do light activities around the house. But don't do anything strenuous for several days. This care sheet gives you a general idea about how long it will take for you to recover. But each person recovers at a different pace. Follow the steps below to feel better as quickly as possible. How can you care for yourself at home? Activity    · Do not do strenuous exercise and do not lift, pull, or push anything heavy until your doctor says it is okay. This may be for a day or two. You can walk around the house and do light activity, such as cooking.     · If the catheter was placed in your groin, try not to walk up stairs for the first couple of days.     · If the catheter was placed in your arm near your wrist, do not bend your wrist deeply for the first couple of days. Be careful using your hand to get into and out of a chair or bed.     · If your doctor recommends it, get more exercise. Walking is a good choice. Bit by bit, increase the amount you walk every day. Try for at least 30 minutes on most days of the week.    Diet    · Drink plenty of fluids to help your body flush out the dye. If you have kidney, heart, or liver disease and have to limit fluids, talk with your doctor before you increase the amount of fluids you drink.     · Keep eating a heart-healthy diet that has lots of fruits, vegetables, and whole grains. If you need help with your diet, talk to your doctor. You also may want to talk to a dietitian. This expert can help you to learn about healthy foods and plan meals. Medicines    · Your doctor will tell you if and when you can restart your medicines. He or she will also give you instructions about taking any new medicines.     · If you take aspirin or some other blood thinner, ask your doctor if and when to start taking it again. Make sure that you understand exactly what your doctor wants you to do.     · Be safe with medicines. Read and follow all instructions on the label. ? If the doctor gave you a prescription medicine for pain, take it as prescribed. ? If you are not taking a prescription pain medicine, ask your doctor if you can take an over-the-counter medicine. Care of the catheter site    · For the first 3 days, keep a bandage over the spot where the catheter was put in.     · Put ice or a cold pack on the area for 10 to 20 minutes at a time. Try to do this every 1 to 2 hours for the next 3 days (when you are awake) or until the swelling goes down. Put a thin cloth between the ice and your skin.     · You may shower 24 to 48 hours after the procedure, if your doctor okays it. Pat the incision dry.     · Do not soak the catheter site until it is healed. Don't take a bath for 1 week, or until your doctor tells you it is okay.     · Watch for bleeding from the site. A small amount of blood (up to the size of a quarter) on the bandage can be normal.     · If you are bleeding, lie down and press on the area for 15 minutes to try to make it stop. If the bleeding does not stop, call your doctor or seek immediate medical care.    Follow-up care is a key part of your treatment and safety. Be sure to make and go to all appointments, and call your doctor if you are having problems. It's also a good idea to know your test results and keep a list of the medicines you take. When should you call for help? Call 911 anytime you think you may need emergency care. For example, call if:    · You passed out (lost consciousness).     · You have severe trouble breathing.     · You have sudden chest pain and shortness of breath, or you cough up blood.     · You have symptoms of a stroke. These may include:  ? Sudden numbness, tingling, weakness, or loss of movement in your face, arm, or leg, especially on only one side of your body. ? Sudden vision changes. ? Sudden trouble speaking. ? Sudden confusion or trouble understanding simple statements. ? Sudden problems with walking or balance. ? A sudden, severe headache that is different from past headaches. Call your doctor now or seek immediate medical care if:    · You are bleeding from the area where the catheter was put in your artery.     · You have a fast-growing, painful lump at the catheter site.     · You have symptoms of infection, such as:  ? Increased pain, swelling, warmth, or redness. ? Red streaks leading from the area. ? Pus draining from the area. ? A fever.     · Your leg, arm, or hand is painful, looks blue, or feels cold, numb, or tingly. Watch closely for any changes in your health, and be sure to contact your doctor if:    · You are not getting better as expected. Where can you learn more? Go to http://www.gray.com/  Enter O249 in the search box to learn more about \"Brain Angiogram: What to Expect at Home. \"  Current as of: March 4, 2020               Content Version: 12.6  © 5492-6302 [employer]. Care instructions adapted under license by [employer] (which disclaims liability or warranty for this information).  If you have questions about a medical condition or this instruction, always ask your healthcare professional. [place] [address] any warranty or liability for your use of this information.

## 2020-10-05 NOTE — PROGRESS NOTES
D/c TR Band without incident, site is clean dry and intact. Pt and  verbalized understanding of d/c and follow up instructions. Pt signed receipt of said instructions.   D/c via W/C to front door,  driving home

## 2020-10-06 ENCOUNTER — VIRTUAL VISIT (OUTPATIENT)
Dept: FAMILY MEDICINE CLINIC | Age: 55
End: 2020-10-06
Payer: COMMERCIAL

## 2020-10-06 ENCOUNTER — DOCUMENTATION ONLY (OUTPATIENT)
Dept: NEUROSURGERY | Age: 55
End: 2020-10-06

## 2020-10-06 DIAGNOSIS — I10 ESSENTIAL HYPERTENSION: Primary | ICD-10-CM

## 2020-10-06 PROBLEM — I67.1 CEREBRAL ANEURYSM: Status: ACTIVE | Noted: 2020-10-06

## 2020-10-06 PROCEDURE — 99214 OFFICE O/P EST MOD 30 MIN: CPT | Performed by: FAMILY MEDICINE

## 2020-10-06 RX ORDER — HYDROCHLOROTHIAZIDE 12.5 MG/1
12.5 TABLET ORAL DAILY
Qty: 30 TAB | Refills: 0 | Status: SHIPPED | OUTPATIENT
Start: 2020-10-06 | End: 2020-11-03 | Stop reason: SDUPTHER

## 2020-10-06 NOTE — PROGRESS NOTES
Angelic Sears is a 63 y.o. female    Chief Complaint   Patient presents with    Hypertension     158/98 this morning     Pt wants to go over her IR ANGIO CAROTID/CERVICAL BI that she had yesterday. Health Maintenance Due   Topic Date Due    PAP AKA CERVICAL CYTOLOGY  08/22/1985    Shingrix Vaccine Age 50> (1 of 2) 08/22/2014    Flu Vaccine (1) 09/01/2020       3 most recent PHQ Screens 3/10/2020   Little interest or pleasure in doing things Not at all   Feeling down, depressed, irritable, or hopeless Not at all   Total Score PHQ 2 0       Abuse Screening Questionnaire 3/10/2020   Do you ever feel afraid of your partner? N   Are you in a relationship with someone who physically or mentally threatens you? N   Is it safe for you to go home? Y         1. Have you been to the ER, urgent care clinic since your last visit? Hospitalized since your last visit? No    2. Have you seen or consulted any other health care providers outside of the [address] [place] [address] since your last visit? Include any pap smears or colon screening.  No

## 2020-10-06 NOTE — PROGRESS NOTES
David Gonzalez is a 63 y.o. female who was seen by synchronous (real-time) audio-video technology on 10/6/2020 for Hypertension (158/98 this morning)      Assessment & Plan:   Diagnoses and all orders for this visit:    1. Essential hypertension: not controlled. Continue with lisinopril as prescribed and add HCTZ 12.5 mg. Medication side effects discussed. Plan to follow up by telephone at the end of the week. Continue to monitor BP at home. -     hydroCHLOROthiazide (HYDRODIURIL) 12.5 mg tablet; Take 1 Tab by mouth daily. Subjective:   Reports that blood pressure has been variable over the past few weeks. Awoke with a headache this morning and BP was 158/98. Compliant with lisinopril which she takes in the evening. Had cerebral angiogram yesterday and BPs were elevated throughout procedure. She does admit to added stress due to diagnosis of cerebral aneurysms. BP Readings from Last 5 Encounters:   10/05/20 (!) 162/115   09/14/20 (!) 150/98   07/11/20 (!) 149/96   06/24/20 138/90   03/13/20 (!) 128/93       Prior to Admission medications    Medication Sig Start Date End Date Taking? Authorizing Provider   ALPRAZolam (Xanax) 0.5 mg tablet Take 1 Tab by mouth daily as needed (Vertigo). 9/11/20  Yes Estephanie Cartagena MD   Lacto no.41-B. bifid-B.animalis (Advanced Probiotic-10) 13 mg (3 billion cell) cap Take  by mouth. Yes Provider, Historical   lisinopriL (PRINIVIL, ZESTRIL) 40 mg tablet TAKE 1 TABLET DAILY 7/6/20  Yes Estephanie Cartagena MD   ergocalciferol (ERGOCALCIFEROL) 50,000 unit capsule TAKE 1 CAPSULE EVERY 7 DAYS 11/5/19  Yes Estephanie Cartagena MD   melatonin 5 mg cap capsule Take 10 mg by mouth nightly. Yes Provider, Historical   ondansetron (ZOFRAN ODT) 4 mg disintegrating tablet Take 1 Tab by mouth every eight (8) hours as needed for Nausea or Vomiting.   Patient not taking: Reported on 8/24/2020 7/11/20 10/6/20  Nolvia Ordonez MD       Allergies   Allergen Reactions    Shellfish Derived Diarrhea and Nausea and Vomiting    Sulfa (Sulfonamide Antibiotics) Hives       Past Medical History:   Diagnosis Date    Anxiety     Chest pain     Fainting     Headache     migraines    Hiatal hernia     Hypertension     Joint pain     Menopause     early aj at age 38    Ringing in ears     Skipped beats     Trouble in sleeping     Vertigo     Vertigo     Vitamin D deficiency      Social History     Tobacco Use    Smoking status: Never Smoker    Smokeless tobacco: Never Used   Substance Use Topics    Alcohol use: Yes     Comment: 2-3 weekly       ROS  Pertinent items noted in HPI    Objective:     Patient-Reported Vitals 10/6/2020   Patient-Reported Pulse 65   Patient-Reported Systolic  861   Patient-Reported Diastolic 92      General: alert, cooperative, no distress   Mental  status: normal mood, behavior, speech, dress, motor activity, and thought processes, able to follow commands   HENT: NCAT   Neck: no visualized mass   Resp: no respiratory distress   Neuro: no gross deficits   Skin: no discoloration or lesions of concern on visible areas   Psychiatric: normal affect, consistent with stated mood, no evidence of hallucinations       We discussed the expected course, resolution and complications of the diagnosis(es) in detail. Medication risks, benefits, costs, interactions, and alternatives were discussed as indicated. I advised her to contact the office if her condition worsens, changes or fails to improve as anticipated. She expressed understanding with the diagnosis(es) and plan. Sergio Yates, who was evaluated through a patient-initiated, synchronous (real-time) audio-video encounter, and/or her healthcare decision maker, is aware that it is a billable service, with coverage as determined by her insurance carrier. She provided verbal consent to proceed: Yes, and patient identification was verified.  It was conducted pursuant to the emergency declaration under the [employer] and the [employer] Act, [facility] authority and the [employer] and [employer] Act. A caregiver was present when appropriate. Ability to conduct physical exam was limited. I was in the office. The patient was at home.       Jaren See MD

## 2020-10-06 NOTE — PROGRESS NOTES
NEUROINTERVENTIONAL SURGERY POST-PROCEDURE NOTE    PROCEDURE:  Cerebral angiogram with 3D reconstruction     VESSEL(S) STUDIED:  1. Right common carotid artery  3. Left common carotid artery  4. Right vertebral artery  VESSEL(S) TREATED:  1. None        COMPLICATIONS:  None    FOLLOW-UP:  Will follow up at the clinic [address] [place] [address]:  10/5/20    ATTENDING SURGEON(S):  Angelita Morse MD    ANESTHESIA:   Mild sedation    MEDICATIONS:   See nursing record    PUNCTURE SITE:  Right radial artery. Arteriotomy closed with TR band.          Angelita Morse MD  NeuroInterventional Surgery

## 2020-10-09 ENCOUNTER — TELEPHONE (OUTPATIENT)
Dept: FAMILY MEDICINE CLINIC | Age: 55
End: 2020-10-09

## 2020-10-09 ENCOUNTER — TELEPHONE (OUTPATIENT)
Dept: NEUROSURGERY | Age: 55
End: 2020-10-09

## 2020-10-09 NOTE — TELEPHONE ENCOUNTER
Patient called to follow up on blood pressure. Home BP has improved since starting HCTZ 12.5 mg and reports /83 today. She will continue to check and send values via [employer] next week.

## 2020-10-15 ENCOUNTER — OFFICE VISIT (OUTPATIENT)
Dept: NEUROSURGERY | Age: 55
End: 2020-10-15
Payer: COMMERCIAL

## 2020-10-15 VITALS
WEIGHT: 228 LBS | BODY MASS INDEX: 36.64 KG/M2 | SYSTOLIC BLOOD PRESSURE: 126 MMHG | DIASTOLIC BLOOD PRESSURE: 98 MMHG | TEMPERATURE: 98.6 F | OXYGEN SATURATION: 97 % | HEIGHT: 66 IN | HEART RATE: 89 BPM

## 2020-10-15 DIAGNOSIS — I72.9 ANEURYSM (HCC): Primary | ICD-10-CM

## 2020-10-15 PROCEDURE — 99215 OFFICE O/P EST HI 40 MIN: CPT | Performed by: STUDENT IN AN ORGANIZED HEALTH CARE EDUCATION/TRAINING PROGRAM

## 2020-10-15 NOTE — PROGRESS NOTES
Procedure follow up for cerebral aneurysm. Patient reports continues headaches but not as frequent or painful. Denies dizziness, blurred or double vision, n/v, and numbness and tingling. No acute problems reported.

## 2020-10-15 NOTE — PATIENT INSTRUCTIONS
A Healthy Lifestyle: Care Instructions Your Care Instructions A healthy lifestyle can help you feel good, stay at a healthy weight, and have plenty of energy for both work and play. A healthy lifestyle is something you can share with your whole family. A healthy lifestyle also can lower your risk for serious health problems, such as high blood pressure, heart disease, and diabetes. You can follow a few steps listed below to improve your health and the health of your family. Follow-up care is a key part of your treatment and safety. Be sure to make and go to all appointments, and call your doctor if you are having problems. It's also a good idea to know your test results and keep a list of the medicines you take. How can you care for yourself at home? · Do not eat too much sugar, fat, or fast foods. You can still have dessert and treats now and then. The goal is moderation. · Start small to improve your eating habits. Pay attention to portion sizes, drink less juice and soda pop, and eat more fruits and vegetables. ? Eat a healthy amount of food. A 3-ounce serving of meat, for example, is about the size of a deck of cards. Fill the rest of your plate with vegetables and whole grains. ? Limit the amount of soda and sports drinks you have every day. Drink more water when you are thirsty. ? Eat at least 5 servings of fruits and vegetables every day. It may seem like a lot, but it is not hard to reach this goal. A serving or helping is 1 piece of fruit, 1 cup of vegetables, or 2 cups of leafy, raw vegetables. Have an apple or some carrot sticks as an afternoon snack instead of a candy bar. Try to have fruits and/or vegetables at every meal. 
· Make exercise part of your daily routine. You may want to start with simple activities, such as walking, bicycling, or slow swimming. Try to be active 30 to 60 minutes every day.  You do not need to do all 30 to 60 minutes all at once. For example, you can exercise 3 times a day for 10 or 20 minutes. Moderate exercise is safe for most people, but it is always a good idea to talk to your doctor before starting an exercise program. 
· Keep moving. Anna Rendon the lawn, work in the garden, or [employer]. Take the stairs instead of the elevator at work. · If you smoke, quit. People who smoke have an increased risk for heart attack, stroke, cancer, and other lung illnesses. Quitting is hard, but there are ways to boost your chance of quitting tobacco for good. ? Use nicotine gum, patches, or lozenges. ? Ask your doctor about stop-smoking programs and medicines. ? Keep trying. In addition to reducing your risk of diseases in the future, you will notice some benefits soon after you stop using tobacco. If you have shortness of breath or asthma symptoms, they will likely get better within a few weeks after you quit. · Limit how much alcohol you drink. Moderate amounts of alcohol (up to 2 drinks a day for men, 1 drink a day for women) are okay. But drinking too much can lead to liver problems, high blood pressure, and other health problems. Family health If you have a family, there are many things you can do together to improve your health. · Eat meals together as a family as often as possible. · Eat healthy foods. This includes fruits, vegetables, lean meats and dairy, and whole grains. · Include your family in your fitness plan. Most people think of activities such as jogging or tennis as the way to fitness, but there are many ways you and your family can be more active. Anything that makes you breathe hard and gets your heart pumping is exercise. Here are some tips: 
? Walk to do errands or to take your child to school or the bus. 
? Go for a family bike ride after dinner instead of watching TV. Where can you learn more? Go to http://www.[employer]en[employer]/ Enter G592 in the search box to learn more about \"A Healthy Lifestyle: Care Instructions. \" Current as of: January 31, 2020               Content Version: 12.6 © 1829-8136 [employer]. Care instructions adapted under license by [employer] (which disclaims liability or warranty for this information). If you have questions about a medical condition or this instruction, always ask your healthcare professional. [place] [address] any warranty or liability for your use of this information.

## 2020-10-16 NOTE — PROGRESS NOTES
NeuroInterventional Surgery Note  Otoniel Tiwari MD    Patient: Delvin Tan MRN: [id number]  SSN: [id number]    YOB: 1965  Age: 63 y.o. Sex: female      Chief Complaint: intracranial aneurysms    Subjective:      Delvin Tan is a 63 y.o. female who is being seen for intracranial aneurysms. Patient refers feeling well. She has occasional headaches that get relieved with Ibuprofen. No radial hematoma from prior cerebral angiogram.     Past Medical History:   Diagnosis Date    Anxiety     Chest pain     Fainting     Headache     migraines    Hiatal hernia     Hypertension     Joint pain     Menopause     early aj at age 38    Ringing in ears     Skipped beats     Trouble in sleeping     Vertigo     Vertigo     Vitamin D deficiency      Family History   Problem Relation Age of Onset    Hypertension Mother     Hypertension Father     Headache Other      Social History     Tobacco Use    Smoking status: Never Smoker    Smokeless tobacco: Never Used   Substance Use Topics    Alcohol use: Yes     Comment: 2-3 weekly      Cannot display prior to admission medications because the patient has not been admitted in this contact. Allergies   Allergen Reactions    Shellfish Derived Diarrhea and Nausea and Vomiting    Sulfa (Sulfonamide Antibiotics) Hives       Review of Systems:  A comprehensive review of systems was negative except for that written in the History of Present Illness. Denies numbness, tingling, chest pain, leg pain, nausea, vomiting, difficulty swallowing, headache, and dyspnea. Objective:     Vitals:    10/15/20 1547   BP: (!) 126/98   Pulse: 89   Temp: 98.6 °F (37 °C)   TempSrc: Temporal   SpO2: 97%   Weight: 228 lb (103.4 kg)   Height: 5' 6\" (1.676 m)      Physical Exam:  GENERAL: Calm, cooperative, NAD  SKIN: Warm, dry, color appropriate for ethnicity. Groin site soft, with no odor, bleeding or drainage noted.  Mild ecchymosis present. Neurologic Exam:  Mental Status:  Alert and oriented x 4. Appropriate affect, mood and behavior. Language:    Normal fluency, repetition, comprehension and naming. Cranial Nerves:   Pupils 3 mm, equal, round and reactive to light. Visual fields full to confrontation. Extraocular movements intact. Facial sensation intact. Full facial strength, no asymmetry. Hearing grossly intact bilaterally. No dysarthria. Tongue protrudes to midline, palate elevates symmetrically. Shoulder shrug 5/5 bilaterally. Motor:    No pronator drift. Bulk and tone normal.      5/5 power in all extremities proximally and distally. No involuntary movements. Sensation:    Sensation intact throughout to light touch, temperature, pinprick, vibration, proprioception     Reflexes:    Reflexes are 2+ at the biceps, triceps, patella and achilles bilaterally. Negative Babinskis    Coordination & Gait: Normal. FTN and HTS intact with no ataxia present. Labs:  Lab Results   Component Value Date/Time    WBC 6.5 09/14/2020 12:47 PM    Hemoglobin (POC) 13.6 11/05/2010 12:35 PM    HGB 13.3 09/14/2020 12:47 PM    Hematocrit (POC) 40 11/05/2010 12:35 PM    HCT 39.7 09/14/2020 12:47 PM    PLATELET 982 29/62/1351 12:47 PM    MCV 84 09/14/2020 12:47 PM      Lab Results   Component Value Date/Time    Sodium 142 09/14/2020 12:47 PM    Potassium 4.9 09/14/2020 12:47 PM    Chloride 106 09/14/2020 12:47 PM    CO2 25 09/14/2020 12:47 PM    Anion gap 4 (L) 07/11/2020 04:49 AM    Glucose 92 09/14/2020 12:47 PM    BUN 13 09/14/2020 12:47 PM    Creatinine 0.80 09/14/2020 12:47 PM    BUN/Creatinine ratio 16 09/14/2020 12:47 PM    GFR est AA 95 09/14/2020 12:47 PM    GFR est non-AA 83 09/14/2020 12:47 PM    Calcium 9.5 09/14/2020 12:47 PM     Lab Results   Component Value Date/Time    Troponin-I, Qt. <0.05 07/09/2020 08:20 PM       Imaging:   CT Results (maximum last 3):   Results from [facility] [place] on 09/09/20   CTA HEAD    Narrative EXAM:  CTA HEAD    INDICATION:  Bilateral supraclinoid ICA aneurysms seen on CT neck    COMPARISON:  CT neck of 9/2/2020    TECHNIQUE:    Multislice helical axial CT angiography was performed from the skull base to the  top of the head during uneventful rapid bolus intravenous administration of 100  mL of Isovue 370. Postprocessing was performed to include MIP and volume  rendered images. Standard images  of the head were also obtained before and  after contrast. CT dose reduction was achieved through the use of a standardized  protocol tailored for this examination and automatic exposure control for dose  modulation. CTA HEAD     1. Right supraclinoid internal carotid artery aneurysm measuring approximately 7  mm AP and transverse and 6 mm craniocaudad, projecting superiorly. Wide neck of  at least 7 mm. Right middle cerebral artery arises from the posterior aspect of  the base of the aneurysm. 2. Left supraclinoid superiorly directed distal internal carotid artery aneurysm  measuring approximately 7 mm transverse, 7 mm AP, and 6 mm craniocaudad, with a  neck of approximately 6 mm. Left middle cerebral artery arises from the  posterior aspect of the base of the aneurysm. The visualized portion of the brain demonstrates no focal lesions and no areas  of abnormal enhancement. The vertebral arteries are codominant. The basilar artery and its branches  demonstrate no significant abnormalities. . The internal carotid, anterior  cerebral, and middle cerebral arteries are patent. There is no flow-limiting  intracranial stenosis. There are no significant posterior communicating  arteries. No additional aneurysms are demonstrated. Impression IMPRESSION:  Bilateral supraclinoid internal carotid artery aneurysms measuring a maximum of  7 mm in size.      Results from [place] encounter on 09/02/20   CT NECK SOFT TISSUE W CONT    Narrative EXAM:  CT NECK SOFT TISSUE W CONT    INDICATION:  Calcified right neck lymph node seen on recent dental X-ray    COMPARISON: None. CONTRAST: 100 mL of Isovue-300. TECHNIQUE: Multislice helical CT was performed from the mid calvarium to the  aortic arch during uneventful rapid bolus intravenous contrast administration. Contiguous 2.5 mm axial images were reconstructed and lung and soft tissue  windows were generated. Coronal and sagittal reformations were generated. CT  dose reduction was achieved through use of a standardized protocol tailored for  this examination and automatic exposure control for dose modulation. FINDINGS:    The aerodigestive tract is unremarkable. The parotid, submandibular, and thyroid  glands are normal in appearance. No evidence of pathologically enlarged cervical  lymph nodes. No abnormal soft tissue enhancement or evidence of mass lesion. There are bilateral, superiorly directed supraclinoid ICA aneurysms measuring 6  x 6 mm on the right, and 6 x 6 mm on the left (series 3 image 3). Paranasal  sinuses and mastoid air cells are clear. Intraorbital contents are unremarkable. The cervical vasculature is patent. No acute fracture or aggressive osseous lesion. Multilevel degenerative disc  disease and facet arthropathy in the cervical spine. Visualized portions of the  lung apices are normal.      Impression IMPRESSION:   1. No evidence of neck mass, cervical lymphadenopathy, or other significant soft  tissue abnormality in the neck. 2. Bilateral superiorly directed supraclinoid ICA aneurysms measuring 6 x 6 mm  bilaterally. CTA of the head for further delineation and neurointerventional  radiology consultation are recommended. RECOMMENDATION: CTA head and neurointerventional radiology consultation for  bilateral supraclinoid ICA aneurysms.     23X         Results from [place] encounter on 07/09/20   CT ABD PELV W CONT    Narrative EXAM: CT ABD PELV W CONT    INDICATION: upper abd pain, syncope    COMPARISON: CT 8/2/2018     CONTRAST: 100 mL of Isovue-370. TECHNIQUE:   Following the uneventful intravenous administration of contrast, thin axial  images were obtained through the abdomen and pelvis. Coronal and sagittal  reconstructions were generated. Oral contrast was not administered. CT dose  reduction was achieved through use of a standardized protocol tailored for this  examination and automatic exposure control for dose modulation. Adaptive  statistical iterative reconstruction (ASIR) was utilized. FINDINGS:   LOWER THORAX: No significant abnormality in the incidentally imaged lower chest.  LIVER: No mass. Mild perihepatic ascites. BILIARY TREE: Gallbladder is within normal limits. CBD is not dilated. SPLEEN: within normal limits. Mild ascites in the left flank. PANCREAS: No mass or ductal dilatation. ADRENALS: Unremarkable. KIDNEYS: Nonrotation of the kidneys. No hydronephrosis or other acute  abnormality. STOMACH: Gastric distention. SMALL BOWEL: Within the mid to lower abdomen, there are several loops of jejunum  with diffuse bowel wall thickening and mucosal hyperenhancement, and these loops  are not pathologically distended, but are relatively distended compared to  adjacent loops of bowel. Maximal dilatation is 2.9 cm. COLON: No dilatation or wall thickening. APPENDIX: Status post appendectomy. PERITONEUM: Mild ascites. No pneumoperitoneum  RETROPERITONEUM: No lymphadenopathy or aortic aneurysm. REPRODUCTIVE ORGANS: Uterus is noted. There is moderate pelvic ascites. URINARY BLADDER: No mass or calculus. BONES: No destructive bone lesion. ABDOMINAL WALL: No mass or hernia. ADDITIONAL COMMENTS: The vascular supply to the intestines appears intact. There  is no evidence of mesenteric vein thrombosis. Impression IMPRESSION:    1.  There are several abnormal loops of jejunum in the mid to lower abdomen, with  abnormal concentric bowel wall thickening, mucosal hyperenhancement, and  adjacent mesenteric edema. There is also lower abdominal and pelvic ascites. Favor the sequela of an infectious or inflammatory intestinal process. 2. There is also gastric distention. 3. Status post appendectomy. Assessment:   A 63year old female with PMHx of HTN on Lisinopril 40 mg POD and HCTZ 12.5 mg daily, pituitary adenoma 25 years ago, Vit D deficiency, insomnia, migraines, anxiety associated with vertigo on Alprazolam PRN presenting with 2 intracranial bilateral supraclinoid aneurysms measuring approximately 7 mm in size (CTA on 9/9/20) incidentally found during workup for a lymph node on the right side of the neck (9/2/20). CT of the neck did not show a lymph node. Patient was explained that the risk of these aneurysms to rupture spontaneously is approximately 2.6% per year and they need to be treated. No family history of aneurysms. She does not smoke. A cerebral angiogram was performed on 10/5/20 and showed a 6.9 x 6.3 mm saccular wide necked right supraclinoid aneurysm and a 7.5 x 7.6 mm wide necked left supraclinoid ICA aneurysm. The OA appears to originate from the dome of the left supraclinoid aneurysm. Patient will undergo embolization of the right supraclinoid aneurysm on 11/5/20 with the Surpass Evolve stent. Patient was explained in detail about the procedure, risks and complications. I explained to patient and  that the risk of complications is less than 4-5% and this includes stroke, hemorrhage in the brain or groin hematoma. She was explained that this stent is a new technology and preliminary studies show good efficacy on intracranial aneurysms. She will be started on ASA and Ticagrelor one week before the embolization. Patient and her  agreed with the procedure. She has a history of pituitary adenoma 25 years ago, last time checked was 12 years ago.  Will perform an MRI to make sure there is no increase in size of her adenoma. Plan:  MRI brain with contrast with pituitary protocol  Patient will be scheduled for Surpass Evolve embolization for 11/5/20    Thank you for this consult and participating in the care of this patient.   Signed By: Collin Cabrera MD     October 16, 2020

## 2020-10-16 NOTE — H&P (VIEW-ONLY)
NeuroInterventional Surgery Note Janusz Neumann MD 
 
Patient: Mary English MRN: [id number]  SSN: [id number] YOB: 1965  Age: 63 y.o. Sex: female Chief Complaint: intracranial aneurysms Subjective:  
  
Mary English is a 63 y.o. female who is being seen for intracranial aneurysms. Patient refers feeling well. She has occasional headaches that get relieved with Ibuprofen. No radial hematoma from prior cerebral angiogram.  
 
Past Medical History:  
Diagnosis Date  Anxiety  Chest pain  Fainting  Headache   
 migraines  Hiatal hernia  Hypertension  Joint pain  Menopause   
 early aj at age 38  Ringing in ears  Skipped beats  Trouble in sleeping  Vertigo  Vertigo  Vitamin D deficiency Family History Problem Relation Age of Onset  Hypertension Mother  Hypertension Father  Headache Other Social History Tobacco Use  Smoking status: Never Smoker  Smokeless tobacco: Never Used Substance Use Topics  Alcohol use: Yes Comment: 2-3 weekly Cannot display prior to admission medications because the patient has not been admitted in this contact. Allergies Allergen Reactions  Shellfish Derived Diarrhea and Nausea and Vomiting  Sulfa (Sulfonamide Antibiotics) Hives Review of Systems: A comprehensive review of systems was negative except for that written in the History of Present Illness. Denies numbness, tingling, chest pain, leg pain, nausea, vomiting, difficulty swallowing, headache, and dyspnea. Objective:  
 
Vitals:  
 10/15/20 1547 BP: (!) 126/98 Pulse: 89 Temp: 98.6 °F (37 °C) TempSrc: Temporal  
SpO2: 97% Weight: 228 lb (103.4 kg) Height: 5' 6\" (1.676 m) Physical Exam: 
GENERAL: Calm, cooperative, NAD SKIN: Warm, dry, color appropriate for ethnicity.  Groin site soft, with no odor, bleeding or drainage noted. Mild ecchymosis present. Neurologic Exam: 
Mental Status:  Alert and oriented x 4. Appropriate affect, mood and behavior. Language:    Normal fluency, repetition, comprehension and naming. Cranial Nerves:   Pupils 3 mm, equal, round and reactive to light. Visual fields full to confrontation. Extraocular movements intact. Facial sensation intact. Full facial strength, no asymmetry. Hearing grossly intact bilaterally. No dysarthria. Tongue protrudes to midline, palate elevates symmetrically. Shoulder shrug 5/5 bilaterally. Motor:    No pronator drift. Bulk and tone normal.  
   5/5 power in all extremities proximally and distally. No involuntary movements. Sensation:    Sensation intact throughout to light touch, temperature, pinprick, vibration, proprioception Reflexes:    Reflexes are 2+ at the biceps, triceps, patella and achilles bilaterally. Negative Babinskis Coordination & Gait: Normal. FTN and HTS intact with no ataxia present. Labs: 
Lab Results Component Value Date/Time WBC 6.5 09/14/2020 12:47 PM  
 Hemoglobin (POC) 13.6 11/05/2010 12:35 PM  
 HGB 13.3 09/14/2020 12:47 PM  
 Hematocrit (POC) 40 11/05/2010 12:35 PM  
 HCT 39.7 09/14/2020 12:47 PM  
 PLATELET 827 72/79/6825 12:47 PM  
 MCV 84 09/14/2020 12:47 PM  
  
Lab Results Component Value Date/Time Sodium 142 09/14/2020 12:47 PM  
 Potassium 4.9 09/14/2020 12:47 PM  
 Chloride 106 09/14/2020 12:47 PM  
 CO2 25 09/14/2020 12:47 PM  
 Anion gap 4 (L) 07/11/2020 04:49 AM  
 Glucose 92 09/14/2020 12:47 PM  
 BUN 13 09/14/2020 12:47 PM  
 Creatinine 0.80 09/14/2020 12:47 PM  
 BUN/Creatinine ratio 16 09/14/2020 12:47 PM  
 GFR est AA 95 09/14/2020 12:47 PM  
 GFR est non-AA 83 09/14/2020 12:47 PM  
 Calcium 9.5 09/14/2020 12:47 PM  
 
Lab Results Component Value Date/Time Troponin-I, Qt. <0.05 07/09/2020 08:20 PM  
 
 
Imaging: CT Results (maximum last 3): Results from [facility] Encounter encounter on 09/09/20 CTA HEAD Narrative EXAM:  CTA HEAD INDICATION:  Bilateral supraclinoid ICA aneurysms seen on CT neck COMPARISON:  CT neck of 9/2/2020 TECHNIQUE:   
Multislice helical axial CT angiography was performed from the skull base to the 
top of the head during uneventful rapid bolus intravenous administration of 100 
mL of Isovue 370. Postprocessing was performed to include MIP and volume 
rendered images. Standard images  of the head were also obtained before and 
after contrast. CT dose reduction was achieved through the use of a standardized 
protocol tailored for this examination and automatic exposure control for dose 
modulation. CTA HEAD 1. Right supraclinoid internal carotid artery aneurysm measuring approximately 7 
mm AP and transverse and 6 mm craniocaudad, projecting superiorly. Wide neck of 
at least 7 mm. Right middle cerebral artery arises from the posterior aspect of 
the base of the aneurysm. 2. Left supraclinoid superiorly directed distal internal carotid artery aneurysm 
measuring approximately 7 mm transverse, 7 mm AP, and 6 mm craniocaudad, with a 
neck of approximately 6 mm. Left middle cerebral artery arises from the 
posterior aspect of the base of the aneurysm. The visualized portion of the brain demonstrates no focal lesions and no areas 
of abnormal enhancement. The vertebral arteries are codominant. The basilar artery and its branches 
demonstrate no significant abnormalities. . The internal carotid, anterior 
cerebral, and middle cerebral arteries are patent. There is no flow-limiting 
intracranial stenosis. There are no significant posterior communicating 
arteries. No additional aneurysms are demonstrated. Impression IMPRESSION: 
Bilateral supraclinoid internal carotid artery aneurysms measuring a maximum of 
7 mm in size. Results from [facility] [place] [place] Encounter encounter on 09/02/20 CT NECK SOFT TISSUE W CONT Narrative EXAM:  CT NECK SOFT TISSUE W CONT INDICATION:  Calcified right neck lymph node seen on recent dental X-ray COMPARISON: None. CONTRAST: 100 mL of Isovue-300. TECHNIQUE: Multislice helical CT was performed from the mid calvarium to the 
aortic arch during uneventful rapid bolus intravenous contrast administration. Contiguous 2.5 mm axial images were reconstructed and lung and soft tissue 
windows were generated. Coronal and sagittal reformations were generated. CT 
dose reduction was achieved through use of a standardized protocol tailored for 
this examination and automatic exposure control for dose modulation. FINDINGS: 
 
The aerodigestive tract is unremarkable. The parotid, submandibular, and thyroid 
glands are normal in appearance. No evidence of pathologically enlarged cervical 
lymph nodes. No abnormal soft tissue enhancement or evidence of mass lesion. There are bilateral, superiorly directed supraclinoid ICA aneurysms measuring 6 
x 6 mm on the right, and 6 x 6 mm on the left (series 3 image 3). Paranasal 
sinuses and mastoid air cells are clear. Intraorbital contents are unremarkable. The cervical vasculature is patent. No acute fracture or aggressive osseous lesion. Multilevel degenerative disc 
disease and facet arthropathy in the cervical spine. Visualized portions of the 
lung apices are normal. 
  
 Impression IMPRESSION:  
1. No evidence of neck mass, cervical lymphadenopathy, or other significant soft 
tissue abnormality in the neck. 2. Bilateral superiorly directed supraclinoid ICA aneurysms measuring 6 x 6 mm 
bilaterally. CTA of the head for further delineation and neurointerventional 
radiology consultation are recommended. RECOMMENDATION: CTA head and neurointerventional radiology consultation for 
bilateral supraclinoid ICA aneurysms. 23X [place] [facility] [place]20 CT ABD PELV W CONT Narrative EXAM: CT ABD PELV W CONT INDICATION: upper abd pain, syncope COMPARISON: CT 8/2/2018 CONTRAST: 100 mL of Isovue-370. TECHNIQUE:  
Following the uneventful intravenous administration of contrast, thin axial 
images were obtained through the abdomen and pelvis. Coronal and sagittal 
reconstructions were generated. Oral contrast was not administered. CT dose 
reduction was achieved through use of a standardized protocol tailored for this 
examination and automatic exposure control for dose modulation. Adaptive 
statistical iterative reconstruction (ASIR) was utilized. FINDINGS:  
LOWER THORAX: No significant abnormality in the incidentally imaged lower chest. 
LIVER: No mass. Mild perihepatic ascites. BILIARY TREE: Gallbladder is within normal limits. CBD is not dilated. SPLEEN: within normal limits. Mild ascites in the left flank. PANCREAS: No mass or ductal dilatation. ADRENALS: Unremarkable. KIDNEYS: Nonrotation of the kidneys. No hydronephrosis or other acute 
abnormality. STOMACH: Gastric distention. SMALL BOWEL: Within the mid to lower abdomen, there are several loops of jejunum 
with diffuse bowel wall thickening and mucosal hyperenhancement, and these loops 
are not pathologically distended, but are relatively distended compared to 
adjacent loops of bowel. Maximal dilatation is 2.9 cm. COLON: No dilatation or wall thickening. APPENDIX: Status post appendectomy. PERITONEUM: Mild ascites. No pneumoperitoneum RETROPERITONEUM: No lymphadenopathy or aortic aneurysm. REPRODUCTIVE ORGANS: Uterus is noted. There is moderate pelvic ascites. URINARY BLADDER: No mass or calculus. BONES: No destructive bone lesion. ABDOMINAL WALL: No mass or hernia. ADDITIONAL COMMENTS: The vascular supply to the intestines appears intact. There 
is no evidence of mesenteric vein thrombosis.  
  
 Impression IMPRESSION: 
 1. There are several abnormal loops of jejunum in the mid to lower abdomen, with 
abnormal concentric bowel wall thickening, mucosal hyperenhancement, and 
adjacent mesenteric edema. There is also lower abdominal and pelvic ascites. Favor the sequela of an infectious or inflammatory intestinal process. 2. There is also gastric distention. 3. Status post appendectomy. Assessment: A 63year old female with PMHx of HTN on Lisinopril 40 mg POD and HCTZ 12.5 mg daily, pituitary adenoma 25 years ago, Vit D deficiency, insomnia, migraines, anxiety associated with vertigo on Alprazolam PRN presenting with 2 intracranial bilateral supraclinoid aneurysms measuring approximately 7 mm in size (CTA on 9/9/20) incidentally found during workup for a lymph node on the right side of the neck (9/2/20). CT of the neck did not show a lymph node. Patient was explained that the risk of these aneurysms to rupture spontaneously is approximately 2.6% per year and they need to be treated. No family history of aneurysms. She does not smoke. A cerebral angiogram was performed on 10/5/20 and showed a 6.9 x 6.3 mm saccular wide necked right supraclinoid aneurysm and a 7.5 x 7.6 mm wide necked left supraclinoid ICA aneurysm. The OA appears to originate from the dome of the left supraclinoid aneurysm. Patient will undergo embolization of the right supraclinoid aneurysm on 11/5/20 with the Surpass Evolve stent. Patient was explained in detail about the procedure, risks and complications. I explained to patient and  that the risk of complications is less than 4-5% and this includes stroke, hemorrhage in the brain or groin hematoma. She was explained that this stent is a new technology and preliminary studies show good efficacy on intracranial aneurysms. She will be started on ASA and Ticagrelor one week before the embolization. Patient and her  agreed with the procedure. She has a history of pituitary adenoma 25 years ago, last time checked was 12 years ago. Will perform an MRI to make sure there is no increase in size of her adenoma. Plan: MRI brain with contrast with pituitary protocol Patient will be scheduled for Surpass Evolve embolization for 11/5/20 Thank you for this consult and participating in the care of this patient. Signed By: Vani Shay MD   
 October 16, 2020

## 2020-10-19 DIAGNOSIS — I72.9 ANEURYSM (HCC): Primary | ICD-10-CM

## 2020-10-21 ENCOUNTER — TELEPHONE (OUTPATIENT)
Dept: NEUROSURGERY | Age: 55
End: 2020-10-21

## 2020-10-21 NOTE — TELEPHONE ENCOUNTER
Returned call to patient regarding question about her upcoming procedure. Patient was informed to have her COVID-19 and lab work done 10/30/2020. Locations and times given to patient. Patient also given the number to Central scheduling to schedule her MRI prior to procedure. Confirmed date of procedure with patient.   Patient stated understanding/

## 2020-10-23 ENCOUNTER — TELEPHONE (OUTPATIENT)
Dept: NEUROSURGERY | Age: 55
End: 2020-10-23

## 2020-10-23 NOTE — TELEPHONE ENCOUNTER
Spoke to patient and informed her that provider will decide about the [address] after her imaging is completed. Patient stated understanding.

## 2020-10-23 NOTE — TELEPHONE ENCOUNTER
----- Message from Dillon Molina RN sent at 10/23/2020  9:17 AM EDT -----  Regarding: FW: Eugeniolarryno More bartlett  Patient called and wants update on her Brilinta prescription. Please see Sylvester Liao message below. ----- Message -----  From: Whitney Stein MD  Sent: 10/21/2020  11:16 AM EDT  To: Dillon Molina RN  Subject: RE: Eugeniolarryno More dhruv                                Hi Valeri. No need to start ticagrelor yet in this patient. Will get the MRI brain first, still pending. Then will start ticagrelor 1 week before the procedure. Jeremy Oliveira  ----- Message -----  From: Chandler Marshall RN  Sent: 10/19/2020   2:57 PM EDT  To: Nancy Ling LPN, #  Subject: Francisco Rebolledovida dhruv                                    Patient called today from her pharmacy looking for a Tigralor prescription. There was nothing ordered. Please order asap.

## 2020-10-27 ENCOUNTER — TELEPHONE (OUTPATIENT)
Dept: NEUROSURGERY | Age: 55
End: 2020-10-27

## 2020-10-27 DIAGNOSIS — I67.1 CEREBRAL ANEURYSM: Primary | ICD-10-CM

## 2020-10-27 NOTE — TELEPHONE ENCOUNTER
Called in stating that the PT has a procedure coming up and wanted to answer any questions about the procedure.     BCB# [phone]

## 2020-10-28 ENCOUNTER — TELEPHONE (OUTPATIENT)
Dept: NEUROSURGERY | Age: 55
End: 2020-10-28

## 2020-10-28 NOTE — TELEPHONE ENCOUNTER
Call placed to [facility] for prior authorization for pipeline placement on 11/5/2020. Spoke to Jax RODRIGUEZ regarding prior authorization. Last office and diagnostic note faxed to [phone] per Opal's request.   Pending auth # [id number] for I67.1 with CPT codes 63694,71920,22136,[id number].

## 2020-10-29 ENCOUNTER — HOSPITAL ENCOUNTER (OUTPATIENT)
Dept: MRI IMAGING | Age: 55
Discharge: HOME | End: 2020-10-29
Attending: STUDENT IN AN ORGANIZED HEALTH CARE EDUCATION/TRAINING PROGRAM
Payer: COMMERCIAL

## 2020-10-29 VITALS — WEIGHT: 224 LBS | BODY MASS INDEX: 36.15 KG/M2

## 2020-10-29 DIAGNOSIS — I72.9 ANEURYSM (HCC): ICD-10-CM

## 2020-10-29 DIAGNOSIS — I67.1 CEREBRAL ANEURYSM: ICD-10-CM

## 2020-10-29 PROCEDURE — 74011250636 HC RX REV CODE- 250/636: Performed by: RADIOLOGY

## 2020-10-29 PROCEDURE — 70553 MRI BRAIN STEM W/O & W/DYE: CPT

## 2020-10-29 PROCEDURE — A9575 INJ GADOTERATE MEGLUMI 0.1ML: HCPCS | Performed by: RADIOLOGY

## 2020-10-29 RX ORDER — GADOTERATE MEGLUMINE 376.9 MG/ML
20 INJECTION INTRAVENOUS
Status: COMPLETED | OUTPATIENT
Start: 2020-10-29 | End: 2020-10-29

## 2020-10-29 RX ADMIN — GADOTERATE MEGLUMINE 20 ML: 376.9 INJECTION INTRAVENOUS at 17:13

## 2020-10-30 ENCOUNTER — HOSPITAL ENCOUNTER (OUTPATIENT)
Dept: PREADMISSION TESTING | Age: 55
Discharge: HOME | End: 2020-10-30
Payer: COMMERCIAL

## 2020-10-30 ENCOUNTER — TRANSCRIBE ORDER (OUTPATIENT)
Dept: REGISTRATION | Age: 55
End: 2020-10-30

## 2020-10-30 ENCOUNTER — TELEPHONE (OUTPATIENT)
Dept: NEUROSURGERY | Age: 55
End: 2020-10-30

## 2020-10-30 DIAGNOSIS — Z01.812 PRE-PROCEDURE LAB EXAM: Primary | ICD-10-CM

## 2020-10-30 DIAGNOSIS — Z01.812 PRE-PROCEDURE LAB EXAM: ICD-10-CM

## 2020-10-30 PROCEDURE — 87635 SARS-COV-2 COVID-19 AMP PRB: CPT

## 2020-10-30 RX ORDER — ASPIRIN 81 MG/1
81 TABLET ORAL DAILY
Qty: 30 TAB | Refills: 2 | Status: SHIPPED | OUTPATIENT
Start: 2020-10-30

## 2020-10-30 NOTE — TELEPHONE ENCOUNTER
MRI results reviewed with pt. Brilinta 60 mg BID sent to pt pharmacy, discussed with her to start Brilinta today. She has already started taking ASA 81 mg daily. Also discussed pt will need to have labs, including P2Y12 and Aspirin assay testing on Monday 11/2/20. She states she is getting her COVID-19 test today and then will obtain labs on 11/2/20.

## 2020-10-31 LAB — SARS-COV-2, COV2NT: NOT DETECTED

## 2020-11-02 ENCOUNTER — HOSPITAL ENCOUNTER (OUTPATIENT)
Dept: LAB | Age: 55
Discharge: HOME | End: 2020-11-02

## 2020-11-02 LAB
ASPIRIN TEST, ASPIRN: 370 ARU
COMMENT, HOLDF: NORMAL
P2Y12 PLT RESPONSE,PPPR: 7 PRU (ref 194–418)
SAMPLES BEING HELD,HOLD: NORMAL

## 2020-11-03 DIAGNOSIS — I10 ESSENTIAL HYPERTENSION: ICD-10-CM

## 2020-11-04 ENCOUNTER — ANESTHESIA EVENT (OUTPATIENT)
Dept: INTERVENTIONAL RADIOLOGY/VASCULAR | Age: 55
DRG: 272 | End: 2020-11-04
Payer: COMMERCIAL

## 2020-11-04 ENCOUNTER — TELEPHONE (OUTPATIENT)
Dept: NEUROSURGERY | Age: 55
End: 2020-11-04

## 2020-11-04 ENCOUNTER — APPOINTMENT (OUTPATIENT)
Dept: FAMILY MEDICINE CLINIC | Age: 55
End: 2020-11-04

## 2020-11-04 DIAGNOSIS — I10 ESSENTIAL HYPERTENSION: ICD-10-CM

## 2020-11-04 RX ORDER — HYDROCHLOROTHIAZIDE 12.5 MG/1
12.5 TABLET ORAL DAILY
Qty: 30 TAB | Refills: 0 | OUTPATIENT
Start: 2020-11-04

## 2020-11-04 RX ORDER — HYDROCHLOROTHIAZIDE 12.5 MG/1
12.5 TABLET ORAL DAILY
Qty: 90 TAB | Refills: 1 | Status: SHIPPED | OUTPATIENT
Start: 2020-11-04 | End: 2021-01-28 | Stop reason: SDUPTHER

## 2020-11-04 NOTE — TELEPHONE ENCOUNTER
Spoke to [facility] regarding CBC and CMP results. Blood was not drawn when correctly when patient went to [address] [place] [address] Gregg arranged for patient to have blood work drawn @ [facility] and have test run Stat. Spoke to patient to inform her of this. Location is where patient's PCP is located. Patient will go and have labs done today. Confirmed procedure for tomorrow @ [facility] with a 9:00 am arrival time. Informed patient no eating or drinking after midnight and take all morning medications with sips of water, and she will have an overnight stay in ICU. Patient stated understanding.

## 2020-11-05 ENCOUNTER — HOSPITAL ENCOUNTER (INPATIENT)
Dept: INTERVENTIONAL RADIOLOGY/VASCULAR | Age: 55
LOS: 1 days | Discharge: HOME | DRG: 272 | End: 2020-11-06
Attending: STUDENT IN AN ORGANIZED HEALTH CARE EDUCATION/TRAINING PROGRAM | Admitting: STUDENT IN AN ORGANIZED HEALTH CARE EDUCATION/TRAINING PROGRAM
Payer: COMMERCIAL

## 2020-11-05 ENCOUNTER — ANESTHESIA (OUTPATIENT)
Dept: INTERVENTIONAL RADIOLOGY/VASCULAR | Age: 55
DRG: 272 | End: 2020-11-05
Payer: COMMERCIAL

## 2020-11-05 DIAGNOSIS — I67.1 CEREBRAL ANEURYSM: ICD-10-CM

## 2020-11-05 DIAGNOSIS — I72.9 ANEURYSM (HCC): ICD-10-CM

## 2020-11-05 LAB
ACT BLD: 230 SECS (ref 79–138)
ALBUMIN SERPL-MCNC: 4.4 G/DL (ref 3.8–4.9)
ALBUMIN/GLOB SERPL: 1.6 {RATIO} (ref 1.2–2.2)
ALP SERPL-CCNC: 86 IU/L (ref 39–117)
ALT SERPL-CCNC: 14 IU/L (ref 0–32)
ANION GAP SERPL CALC-SCNC: 5 MMOL/L (ref 5–15)
ASPIRIN TEST, ASPIRN: 375 ARU
AST SERPL-CCNC: 13 IU/L (ref 0–40)
BILIRUB SERPL-MCNC: 0.3 MG/DL (ref 0–1.2)
BUN SERPL-MCNC: 13 MG/DL (ref 6–20)
BUN SERPL-MCNC: 13 MG/DL (ref 6–24)
BUN/CREAT SERPL: 17 (ref 9–23)
BUN/CREAT SERPL: 20 (ref 12–20)
CALCIUM SERPL-MCNC: 8.2 MG/DL (ref 8.5–10.1)
CALCIUM SERPL-MCNC: 9.4 MG/DL (ref 8.7–10.2)
CHLORIDE SERPL-SCNC: 101 MMOL/L (ref 96–106)
CHLORIDE SERPL-SCNC: 108 MMOL/L (ref 97–108)
CO2 SERPL-SCNC: 25 MMOL/L (ref 21–32)
CO2 SERPL-SCNC: 26 MMOL/L (ref 20–29)
CREAT SERPL-MCNC: 0.66 MG/DL (ref 0.55–1.02)
CREAT SERPL-MCNC: 0.77 MG/DL (ref 0.57–1)
ERYTHROCYTE [DISTWIDTH] IN BLOOD BY AUTOMATED COUNT: 13.7 % (ref 11.7–15.4)
ERYTHROCYTE [DISTWIDTH] IN BLOOD BY AUTOMATED COUNT: 14.1 % (ref 11.5–14.5)
GLOBULIN SER CALC-MCNC: 2.8 G/DL (ref 1.5–4.5)
GLUCOSE SERPL-MCNC: 112 MG/DL (ref 65–99)
GLUCOSE SERPL-MCNC: 121 MG/DL (ref 65–100)
HCT VFR BLD AUTO: 28.3 % (ref 35–47)
HCT VFR BLD AUTO: 40.1 % (ref 34–46.6)
HGB BLD-MCNC: 13.7 G/DL (ref 11.1–15.9)
HGB BLD-MCNC: 9.1 G/DL (ref 11.5–16)
MCH RBC QN AUTO: 28.2 PG (ref 26–34)
MCH RBC QN AUTO: 29.1 PG (ref 26.6–33)
MCHC RBC AUTO-ENTMCNC: 32.2 G/DL (ref 30–36.5)
MCHC RBC AUTO-ENTMCNC: 34.2 G/DL (ref 31.5–35.7)
MCV RBC AUTO: 85 FL (ref 79–97)
MCV RBC AUTO: 87.6 FL (ref 80–99)
NRBC # BLD: 0 K/UL (ref 0–0.01)
NRBC BLD-RTO: 0 PER 100 WBC
P2Y12 PLT RESPONSE,PPPR: 24 PRU (ref 194–418)
PLATELET # BLD AUTO: 158 K/UL (ref 150–400)
PLATELET # BLD AUTO: 273 X10E3/UL (ref 150–450)
PMV BLD AUTO: 9.6 FL (ref 8.9–12.9)
POTASSIUM SERPL-SCNC: 3.9 MMOL/L (ref 3.5–5.1)
POTASSIUM SERPL-SCNC: 4.5 MMOL/L (ref 3.5–5.2)
PROT SERPL-MCNC: 7.2 G/DL (ref 6–8.5)
RBC # BLD AUTO: 3.23 M/UL (ref 3.8–5.2)
RBC # BLD AUTO: 4.71 X10E6/UL (ref 3.77–5.28)
SODIUM SERPL-SCNC: 138 MMOL/L (ref 136–145)
SODIUM SERPL-SCNC: 141 MMOL/L (ref 134–144)
WBC # BLD AUTO: 5.5 K/UL (ref 3.6–11)
WBC # BLD AUTO: 7.7 X10E3/UL (ref 3.4–10.8)

## 2020-11-05 PROCEDURE — 77030013079 HC BLNKT BAIR HGGR 3M -A: Performed by: ANESTHESIOLOGY

## 2020-11-05 PROCEDURE — 85576 BLOOD PLATELET AGGREGATION: CPT

## 2020-11-05 PROCEDURE — 85347 COAGULATION TIME ACTIVATED: CPT

## 2020-11-05 PROCEDURE — 74011250637 HC RX REV CODE- 250/637: Performed by: NURSE PRACTITIONER

## 2020-11-05 PROCEDURE — C1769 GUIDE WIRE: HCPCS

## 2020-11-05 PROCEDURE — 03VG3HZ RESTRICTION OF INTRACRANIAL ARTERY WITH INTRALUMINAL DEVICE, FLOW DIVERTER, PERCUTANEOUS APPROACH: ICD-10-PCS | Performed by: STUDENT IN AN ORGANIZED HEALTH CARE EDUCATION/TRAINING PROGRAM

## 2020-11-05 PROCEDURE — 77030026438 HC STYL ET INTUB CARD -A: Performed by: ANESTHESIOLOGY

## 2020-11-05 PROCEDURE — C1887 CATHETER, GUIDING: HCPCS

## 2020-11-05 PROCEDURE — 77030008684 HC TU ET CUF COVD -B: Performed by: ANESTHESIOLOGY

## 2020-11-05 PROCEDURE — B31R1ZZ FLUOROSCOPY OF INTRACRANIAL ARTERIES USING LOW OSMOLAR CONTRAST: ICD-10-PCS | Performed by: STUDENT IN AN ORGANIZED HEALTH CARE EDUCATION/TRAINING PROGRAM

## 2020-11-05 PROCEDURE — 51798 US URINE CAPACITY MEASURE: CPT

## 2020-11-05 PROCEDURE — 76377 3D RENDER W/INTRP POSTPROCES: CPT | Performed by: STUDENT IN AN ORGANIZED HEALTH CARE EDUCATION/TRAINING PROGRAM

## 2020-11-05 PROCEDURE — 77030025747 HC CATH ANGI NEURN PENU -C

## 2020-11-05 PROCEDURE — 74011250637 HC RX REV CODE- 250/637: Performed by: STUDENT IN AN ORGANIZED HEALTH CARE EDUCATION/TRAINING PROGRAM

## 2020-11-05 PROCEDURE — 99255 IP/OBS CONSLTJ NEW/EST HI 80: CPT | Performed by: STUDENT IN AN ORGANIZED HEALTH CARE EDUCATION/TRAINING PROGRAM

## 2020-11-05 PROCEDURE — 85027 COMPLETE CBC AUTOMATED: CPT

## 2020-11-05 PROCEDURE — B41F1ZZ FLUOROSCOPY OF RIGHT LOWER EXTREMITY ARTERIES USING LOW OSMOLAR CONTRAST: ICD-10-PCS | Performed by: STUDENT IN AN ORGANIZED HEALTH CARE EDUCATION/TRAINING PROGRAM

## 2020-11-05 PROCEDURE — 77030012468 HC VLV BLEEDBK CNTRL ABBT -B

## 2020-11-05 PROCEDURE — 2709999900 HC NON-CHARGEABLE SUPPLY

## 2020-11-05 PROCEDURE — C1760 CLOSURE DEV, VASC: HCPCS

## 2020-11-05 PROCEDURE — 76377 3D RENDER W/INTRP POSTPROCES: CPT

## 2020-11-05 PROCEDURE — B3161ZZ FLUOROSCOPY OF RIGHT INTERNAL CAROTID ARTERY USING LOW OSMOLAR CONTRAST: ICD-10-PCS | Performed by: STUDENT IN AN ORGANIZED HEALTH CARE EDUCATION/TRAINING PROGRAM

## 2020-11-05 PROCEDURE — 76060000035 HC ANESTHESIA 2 TO 2.5 HR

## 2020-11-05 PROCEDURE — B3151ZZ FLUOROSCOPY OF BILATERAL COMMON CAROTID ARTERIES USING LOW OSMOLAR CONTRAST: ICD-10-PCS | Performed by: STUDENT IN AN ORGANIZED HEALTH CARE EDUCATION/TRAINING PROGRAM

## 2020-11-05 PROCEDURE — 77030038863

## 2020-11-05 PROCEDURE — 36224 PLACE CATH CAROTD ART: CPT | Performed by: STUDENT IN AN ORGANIZED HEALTH CARE EDUCATION/TRAINING PROGRAM

## 2020-11-05 PROCEDURE — 76937 US GUIDE VASCULAR ACCESS: CPT | Performed by: STUDENT IN AN ORGANIZED HEALTH CARE EDUCATION/TRAINING PROGRAM

## 2020-11-05 PROCEDURE — 61624 TCAT PERM OCCLS/EMBOLJ CNS: CPT | Performed by: STUDENT IN AN ORGANIZED HEALTH CARE EDUCATION/TRAINING PROGRAM

## 2020-11-05 PROCEDURE — 74011250636 HC RX REV CODE- 250/636: Performed by: STUDENT IN AN ORGANIZED HEALTH CARE EDUCATION/TRAINING PROGRAM

## 2020-11-05 PROCEDURE — 74011000250 HC RX REV CODE- 250: Performed by: NURSE PRACTITIONER

## 2020-11-05 PROCEDURE — 74011250636 HC RX REV CODE- 250/636: Performed by: NURSE PRACTITIONER

## 2020-11-05 PROCEDURE — 80048 BASIC METABOLIC PNL TOTAL CA: CPT

## 2020-11-05 PROCEDURE — 36600 WITHDRAWAL OF ARTERIAL BLOOD: CPT

## 2020-11-05 PROCEDURE — G0269 OCCLUSIVE DEVICE IN VEIN ART: HCPCS

## 2020-11-05 PROCEDURE — 36415 COLL VENOUS BLD VENIPUNCTURE: CPT

## 2020-11-05 PROCEDURE — 77030008584 HC TOOL GDWRE DEV TERU -A

## 2020-11-05 PROCEDURE — 65610000006 HC RM INTENSIVE CARE

## 2020-11-05 RX ORDER — ACETAMINOPHEN 325 MG/1
650 TABLET ORAL
Status: COMPLETED | OUTPATIENT
Start: 2020-11-05 | End: 2020-11-05

## 2020-11-05 RX ORDER — LABETALOL HYDROCHLORIDE 5 MG/ML
10 INJECTION, SOLUTION INTRAVENOUS
Status: DISCONTINUED | OUTPATIENT
Start: 2020-11-05 | End: 2020-11-06 | Stop reason: HOSPADM

## 2020-11-05 RX ORDER — GLYCOPYRROLATE 0.2 MG/ML
INJECTION INTRAMUSCULAR; INTRAVENOUS AS NEEDED
Status: DISCONTINUED | OUTPATIENT
Start: 2020-11-05 | End: 2020-11-05 | Stop reason: HOSPADM

## 2020-11-05 RX ORDER — ASPIRIN 81 MG/1
81 TABLET ORAL DAILY
Status: DISCONTINUED | OUTPATIENT
Start: 2020-11-06 | End: 2020-11-06 | Stop reason: HOSPADM

## 2020-11-05 RX ORDER — HEPARIN SODIUM 1000 [USP'U]/ML
INJECTION, SOLUTION INTRAVENOUS; SUBCUTANEOUS AS NEEDED
Status: DISCONTINUED | OUTPATIENT
Start: 2020-11-05 | End: 2020-11-05 | Stop reason: HOSPADM

## 2020-11-05 RX ORDER — LIDOCAINE HYDROCHLORIDE 20 MG/ML
INJECTION, SOLUTION EPIDURAL; INFILTRATION; INTRACAUDAL; PERINEURAL AS NEEDED
Status: DISCONTINUED | OUTPATIENT
Start: 2020-11-05 | End: 2020-11-05 | Stop reason: HOSPADM

## 2020-11-05 RX ORDER — SODIUM CHLORIDE 9 MG/ML
75 INJECTION, SOLUTION INTRAVENOUS CONTINUOUS
Status: DISCONTINUED | OUTPATIENT
Start: 2020-11-05 | End: 2020-11-06 | Stop reason: HOSPADM

## 2020-11-05 RX ORDER — SUCCINYLCHOLINE CHLORIDE 20 MG/ML
INJECTION INTRAMUSCULAR; INTRAVENOUS AS NEEDED
Status: DISCONTINUED | OUTPATIENT
Start: 2020-11-05 | End: 2020-11-05 | Stop reason: HOSPADM

## 2020-11-05 RX ORDER — LIDOCAINE HYDROCHLORIDE AND EPINEPHRINE 10; 10 MG/ML; UG/ML
INJECTION, SOLUTION INFILTRATION; PERINEURAL
Status: DISPENSED
Start: 2020-11-05 | End: 2020-11-06

## 2020-11-05 RX ORDER — LIDOCAINE HYDROCHLORIDE 20 MG/ML
20 INJECTION, SOLUTION INFILTRATION; PERINEURAL ONCE
Status: DISCONTINUED | OUTPATIENT
Start: 2020-11-05 | End: 2020-11-05 | Stop reason: HOSPADM

## 2020-11-05 RX ORDER — ONDANSETRON 2 MG/ML
INJECTION INTRAMUSCULAR; INTRAVENOUS AS NEEDED
Status: DISCONTINUED | OUTPATIENT
Start: 2020-11-05 | End: 2020-11-05 | Stop reason: HOSPADM

## 2020-11-05 RX ORDER — ACETAMINOPHEN 500 MG
500 TABLET ORAL
Status: DISCONTINUED | OUTPATIENT
Start: 2020-11-05 | End: 2020-11-05

## 2020-11-05 RX ORDER — DEXAMETHASONE SODIUM PHOSPHATE 4 MG/ML
INJECTION, SOLUTION INTRA-ARTICULAR; INTRALESIONAL; INTRAMUSCULAR; INTRAVENOUS; SOFT TISSUE AS NEEDED
Status: DISCONTINUED | OUTPATIENT
Start: 2020-11-05 | End: 2020-11-05 | Stop reason: HOSPADM

## 2020-11-05 RX ORDER — LIDOCAINE HYDROCHLORIDE AND EPINEPHRINE 10; 10 MG/ML; UG/ML
1.5 INJECTION, SOLUTION INFILTRATION; PERINEURAL ONCE
Status: ACTIVE | OUTPATIENT
Start: 2020-11-05 | End: 2020-11-06

## 2020-11-05 RX ORDER — SODIUM CHLORIDE, SODIUM LACTATE, POTASSIUM CHLORIDE, CALCIUM CHLORIDE 600; 310; 30; 20 MG/100ML; MG/100ML; MG/100ML; MG/100ML
INJECTION, SOLUTION INTRAVENOUS
Status: DISCONTINUED | OUTPATIENT
Start: 2020-11-05 | End: 2020-11-05 | Stop reason: HOSPADM

## 2020-11-05 RX ORDER — ROCURONIUM BROMIDE 10 MG/ML
INJECTION, SOLUTION INTRAVENOUS AS NEEDED
Status: DISCONTINUED | OUTPATIENT
Start: 2020-11-05 | End: 2020-11-05 | Stop reason: HOSPADM

## 2020-11-05 RX ORDER — PROPOFOL 10 MG/ML
INJECTION, EMULSION INTRAVENOUS AS NEEDED
Status: DISCONTINUED | OUTPATIENT
Start: 2020-11-05 | End: 2020-11-05 | Stop reason: HOSPADM

## 2020-11-05 RX ORDER — ACETAMINOPHEN 325 MG/1
650 TABLET ORAL
Status: DISCONTINUED | OUTPATIENT
Start: 2020-11-05 | End: 2020-11-06 | Stop reason: HOSPADM

## 2020-11-05 RX ORDER — HYDROCHLOROTHIAZIDE 25 MG/1
12.5 TABLET ORAL DAILY
Status: DISCONTINUED | OUTPATIENT
Start: 2020-11-06 | End: 2020-11-06 | Stop reason: HOSPADM

## 2020-11-05 RX ORDER — SODIUM CHLORIDE 9 MG/ML
INJECTION, SOLUTION INTRAVENOUS
Status: DISCONTINUED | OUTPATIENT
Start: 2020-11-05 | End: 2020-11-05 | Stop reason: HOSPADM

## 2020-11-05 RX ORDER — LISINOPRIL 20 MG/1
40 TABLET ORAL DAILY
Status: DISCONTINUED | OUTPATIENT
Start: 2020-11-05 | End: 2020-11-06 | Stop reason: HOSPADM

## 2020-11-05 RX ORDER — LANOLIN ALCOHOL/MO/W.PET/CERES
3 CREAM (GRAM) TOPICAL
Status: DISCONTINUED | OUTPATIENT
Start: 2020-11-05 | End: 2020-11-06 | Stop reason: HOSPADM

## 2020-11-05 RX ORDER — ASPIRIN 81 MG/1
81 TABLET ORAL DAILY
Status: DISCONTINUED | OUTPATIENT
Start: 2020-11-06 | End: 2020-11-05

## 2020-11-05 RX ORDER — LISINOPRIL 20 MG/1
40 TABLET ORAL DAILY
Status: DISCONTINUED | OUTPATIENT
Start: 2020-11-06 | End: 2020-11-05

## 2020-11-05 RX ORDER — NEOSTIGMINE METHYLSULFATE 1 MG/ML
INJECTION, SOLUTION INTRAVENOUS AS NEEDED
Status: DISCONTINUED | OUTPATIENT
Start: 2020-11-05 | End: 2020-11-05 | Stop reason: HOSPADM

## 2020-11-05 RX ADMIN — SODIUM CHLORIDE 75 ML/HR: 900 INJECTION, SOLUTION INTRAVENOUS at 15:20

## 2020-11-05 RX ADMIN — Medication 3 MG: at 22:16

## 2020-11-05 RX ADMIN — ACETAMINOPHEN 650 MG: 325 TABLET ORAL at 19:59

## 2020-11-05 RX ADMIN — NICARDIPINE HYDROCHLORIDE 5 MG/HR: 2.5 INJECTION, SOLUTION INTRAVENOUS at 20:02

## 2020-11-05 RX ADMIN — HEPARIN SODIUM 4000 UNITS: 1000 INJECTION INTRAVENOUS; SUBCUTANEOUS at 12:40

## 2020-11-05 RX ADMIN — GLYCOPYRROLATE 0.4 MG: 0.2 INJECTION INTRAMUSCULAR; INTRAVENOUS at 13:38

## 2020-11-05 RX ADMIN — HEPARIN SODIUM 4000 UNITS: 1000 INJECTION INTRAVENOUS; SUBCUTANEOUS at 12:36

## 2020-11-05 RX ADMIN — PROPOFOL 200 MG: 10 INJECTION, EMULSION INTRAVENOUS at 11:58

## 2020-11-05 RX ADMIN — TICAGRELOR 60 MG: 60 TABLET ORAL at 18:53

## 2020-11-05 RX ADMIN — LISINOPRIL 40 MG: 20 TABLET ORAL at 19:59

## 2020-11-05 RX ADMIN — HEPARIN SODIUM 4000 UNITS: 1000 INJECTION INTRAVENOUS; SUBCUTANEOUS at 12:37

## 2020-11-05 RX ADMIN — ROCURONIUM BROMIDE 25 MG: 10 INJECTION, SOLUTION INTRAVENOUS at 12:00

## 2020-11-05 RX ADMIN — HEPARIN SODIUM 5000 UNITS: 1000 INJECTION, SOLUTION INTRAVENOUS; SUBCUTANEOUS at 12:50

## 2020-11-05 RX ADMIN — HEPARIN SODIUM 4000 UNITS: 1000 INJECTION INTRAVENOUS; SUBCUTANEOUS at 12:33

## 2020-11-05 RX ADMIN — ROCURONIUM BROMIDE 5 MG: 10 INJECTION, SOLUTION INTRAVENOUS at 11:58

## 2020-11-05 RX ADMIN — DEXAMETHASONE SODIUM PHOSPHATE 4 MG: 4 INJECTION, SOLUTION INTRA-ARTICULAR; INTRALESIONAL; INTRAMUSCULAR; INTRAVENOUS; SOFT TISSUE at 12:24

## 2020-11-05 RX ADMIN — NEOSTIGMINE METHYLSULFATE 2.5 MG: 1 INJECTION, SOLUTION INTRAVENOUS at 13:38

## 2020-11-05 RX ADMIN — ONDANSETRON 4 MG: 2 INJECTION INTRAMUSCULAR; INTRAVENOUS at 12:24

## 2020-11-05 RX ADMIN — SUCCINYLCHOLINE CHLORIDE 140 MG: 20 INJECTION INTRAMUSCULAR; INTRAVENOUS at 11:58

## 2020-11-05 RX ADMIN — SODIUM CHLORIDE: 9 INJECTION, SOLUTION INTRAVENOUS at 13:56

## 2020-11-05 RX ADMIN — NICARDIPINE HYDROCHLORIDE 5 MG/HR: 2.5 INJECTION, SOLUTION INTRAVENOUS at 15:20

## 2020-11-05 RX ADMIN — LIDOCAINE HYDROCHLORIDE 100 MG: 20 INJECTION, SOLUTION EPIDURAL; INFILTRATION; INTRACAUDAL; PERINEURAL at 11:58

## 2020-11-05 RX ADMIN — SODIUM CHLORIDE, SODIUM LACTATE, POTASSIUM CHLORIDE, CALCIUM CHLORIDE: 600; 310; 30; 20 INJECTION, SOLUTION INTRAVENOUS at 11:51

## 2020-11-05 RX ADMIN — ACETAMINOPHEN 650 MG: 325 TABLET ORAL at 09:34

## 2020-11-05 RX ADMIN — HEPARIN SODIUM 4000 UNITS: 1000 INJECTION INTRAVENOUS; SUBCUTANEOUS at 12:35

## 2020-11-05 NOTE — INTERVAL H&P NOTE
Update History & Physical    The Patient's History and Physical of 10/15/20 was reviewed and remains current. The patient was examined immediately prior to the procedure today. She reports 7/10 frontal headache and blurred vision of the right eye. She reports this visual disturbance comes and goes and she has experienced it prior to the procedure today. She was treated with tylenol upon arrival for headache. She wishes to proceed with cerebral angiogram and treatment of her aneurysm today. - Brilinta last taken this morning, 81 mg ASA taken last night- ASA and P2Y12 assay results reviewed by myself and Dr. Laura Miramontes  - All labs reviewed prior to procedure  - COVID-19 testing reviewed and is negative  - Plan to stay in ICU overnight following treatment      Risk, benefits and alternatives to procedure were reviewed prior to this procedure by myself and Dr. Luara Miramontes with patient. She would like to proceed with procedure as planned. Written informed consent has been obtained.     Electronically signed by Marley Cutler NP on 11/5/2020 at 9:47 AM

## 2020-11-05 NOTE — PROGRESS NOTES
Pt arrives ambulatory to angio department accompanied by , Pritesh Goncalves, for cerebral angiogram with treatment and groin closure procedure. All assessments completed and consent was reviewed. Education given was regarding procedure, general anesthesia sedation, post-procedure care and  management/follow-up. Opportunity for questions was provided and all questions and concerns were addressed.

## 2020-11-05 NOTE — PROGRESS NOTES
Neurointerventional Surgery Brief Progress Note:    Rounded on patient post angio in ICU. She reports dull headache, 3/10, but much improved from preangio headache. She denies any other complaints or visual loss. Physical Exam:  Gen: NAD, calm, cooperative  Neuro: A&Ox4. Follows commands. Speech clear. Affect normal. PERRL, 3 mm bilaterally. Visual fields intact. EOMI. Face symmetric. Palate symmetric. Tongue midline. Mimi spontaneously. Strength 5/5 in UE and LE BL. Negative drift. Sensation intact. Bulk and tone normal. No involuntary movements. Gait deferred. Skin: Warm, dry, color appropriate for ethnicity. Right groin arteriotomy site soft and non-tender on palpation, dressing is C/D/I, with no active bleeding or drainage noted. Continue SBP <140, Q1 hour neuro checks. Prioritize weaning cardene for anticipated discharge tomorrow.      Duy Shaffer NP  Neurocritical Care Nurse Practitioner  [phone]

## 2020-11-05 NOTE — PROGRESS NOTES
1410: Bedside and Verbal shift change report given to Lam Maria RN (oncoming nurse) by Jyashree Woodward RN (offgoing nurse). Report included the following information SBAR, Kardex, Procedure Summary, Intake/Output, MAR, Accordion, Recent Results, Med Rec Status, Cardiac Rhythm NSR, Alarm Parameters  and Dual Neuro Assessment.      Assumed care of patient; documentation of assessment via flowsheet; neuro intact; moves all extremities equally; PERRLA; R leg flat x 4 hours; HOB flat x 2 hours; neurovascular checks documented via flowsheet according to protocol

## 2020-11-05 NOTE — ANESTHESIA POSTPROCEDURE EVALUATION
* No procedures listed *.    general    Anesthesia Post Evaluation      Multimodal analgesia: multimodal analgesia used between 6 hours prior to anesthesia start to PACU discharge  Patient location during evaluation: PACU  Patient participation: complete - patient participated  Level of consciousness: awake  Pain score: 2  Pain management: adequate  Airway patency: patent  Anesthetic complications: no  Cardiovascular status: acceptable  Respiratory status: acceptable  Hydration status: acceptable  Comments: I have evaluated the patient and meets criteria for discharge from PACU. Aster Etienne MD  Post anesthesia nausea and vomiting:  controlled      INITIAL Post-op Vital signs:   Vitals Value Taken Time   /81 11/5/2020  3:30 PM   Temp     Pulse 80 11/5/2020  3:34 PM   Resp 13 11/5/2020  3:34 PM   SpO2 98 % 11/5/2020  3:34 PM   Vitals shown include unvalidated device data.

## 2020-11-05 NOTE — PROGRESS NOTES
Pt transferred to angio suite and moved to angio table. Anesthesia at bedside to manage pt airway, pt medications, pt VS and pt status. Pt VSS at time of transfer.

## 2020-11-05 NOTE — PROGRESS NOTES
NeuroInterventional Note  A 63year old female with PMHx of HTN , pituitary adenoma, Vit D deficiency, insomnia, migraines, anxiety, with bilateral unruptured supraclinoid ICA aneurysms. Patient is now s/p Surpass embolization of right ICA aneurysm. No complications during the procedure.   Plan  Neurochecks q1h  Right leg straight for 4h  SBP less than 140 mmHg  Continue with ASA 81 mg and Brilinta 60 mg BID  If any changes in neurological status, pls get a stat head CT    Michael Antoine MD  NeuroInterventional Surgery

## 2020-11-05 NOTE — ANESTHESIA PREPROCEDURE EVALUATION
Relevant Problems   No relevant active problems       Anesthetic History   No history of anesthetic complications            Review of Systems / Medical History  Patient summary reviewed, nursing notes reviewed and pertinent labs reviewed    Pulmonary  Within defined limits                 Neuro/Psych   Within defined limits    CVA       Cardiovascular  Within defined limits  Hypertension                   GI/Hepatic/Renal  Within defined limits              Endo/Other  Within defined limits      Morbid obesity     Other Findings              Physical Exam    Airway  Mallampati: II  TM Distance: > 6 cm  Neck ROM: normal range of motion   Mouth opening: Normal     Cardiovascular  Regular rate and rhythm,  S1 and S2 normal,  no murmur, click, rub, or gallop             Dental  No notable dental hx       Pulmonary  Breath sounds clear to auscultation               Abdominal  GI exam deferred       Other Findings            Anesthetic Plan    ASA: 3  Anesthesia type: general    Monitoring Plan: Arterial line      Induction: Intravenous  Anesthetic plan and risks discussed with: Patient

## 2020-11-05 NOTE — PROGRESS NOTES
TRANSFER - OUT REPORT:    Verbal report given to Dalene Kussmaul, RN , Destiny Pedraza, RN(name) on [facility]  being transferred to ICU(unit) for routine progression of care       Report consisted of patients Situation, Background, Assessment and   Recommendations(SBAR). Information from the following report(s) SBAR, Kardex, Procedure Summary, Intake/Output, MAR and Recent Results was reviewed with the receiving nurse. Lines:   Peripheral IV 11/05/20 Left Antecubital (Active)       Arterial Line 11/05/20 Left Radial artery (Active)        Opportunity for questions and clarification was provided.       Patient transported with:   Registered Nurse   Pt on monitor (post General Anesthesia)  CRNA

## 2020-11-06 ENCOUNTER — DOCUMENTATION ONLY (OUTPATIENT)
Dept: NEUROSURGERY | Age: 55
End: 2020-11-06

## 2020-11-06 ENCOUNTER — APPOINTMENT (OUTPATIENT)
Dept: CT IMAGING | Age: 55
DRG: 272 | End: 2020-11-06
Attending: NURSE PRACTITIONER
Payer: COMMERCIAL

## 2020-11-06 VITALS
DIASTOLIC BLOOD PRESSURE: 81 MMHG | BODY MASS INDEX: 37.84 KG/M2 | OXYGEN SATURATION: 100 % | HEART RATE: 67 BPM | RESPIRATION RATE: 17 BRPM | HEIGHT: 66 IN | WEIGHT: 235.45 LBS | TEMPERATURE: 97.9 F | SYSTOLIC BLOOD PRESSURE: 117 MMHG

## 2020-11-06 LAB — P2Y12 PLT RESPONSE,PPPR: 59 PRU (ref 194–418)

## 2020-11-06 PROCEDURE — 74011250636 HC RX REV CODE- 250/636: Performed by: STUDENT IN AN ORGANIZED HEALTH CARE EDUCATION/TRAINING PROGRAM

## 2020-11-06 PROCEDURE — 74011250637 HC RX REV CODE- 250/637: Performed by: NURSE PRACTITIONER

## 2020-11-06 PROCEDURE — 85576 BLOOD PLATELET AGGREGATION: CPT

## 2020-11-06 PROCEDURE — 70450 CT HEAD/BRAIN W/O DYE: CPT

## 2020-11-06 PROCEDURE — 90471 IMMUNIZATION ADMIN: CPT

## 2020-11-06 PROCEDURE — 36415 COLL VENOUS BLD VENIPUNCTURE: CPT

## 2020-11-06 PROCEDURE — 99233 SBSQ HOSP IP/OBS HIGH 50: CPT | Performed by: STUDENT IN AN ORGANIZED HEALTH CARE EDUCATION/TRAINING PROGRAM

## 2020-11-06 PROCEDURE — 74011250636 HC RX REV CODE- 250/636: Performed by: NURSE PRACTITIONER

## 2020-11-06 PROCEDURE — 90686 IIV4 VACC NO PRSV 0.5 ML IM: CPT | Performed by: STUDENT IN AN ORGANIZED HEALTH CARE EDUCATION/TRAINING PROGRAM

## 2020-11-06 PROCEDURE — 74011000250 HC RX REV CODE- 250: Performed by: STUDENT IN AN ORGANIZED HEALTH CARE EDUCATION/TRAINING PROGRAM

## 2020-11-06 RX ORDER — BACITRACIN 500 UNIT/G
1 PACKET (EA) TOPICAL
Status: COMPLETED | OUTPATIENT
Start: 2020-11-06 | End: 2020-11-06

## 2020-11-06 RX ORDER — BUTALBITAL, ACETAMINOPHEN AND CAFFEINE 50; 325; 40 MG/1; MG/1; MG/1
1 TABLET ORAL
Status: DISCONTINUED | OUTPATIENT
Start: 2020-11-06 | End: 2020-11-06 | Stop reason: HOSPADM

## 2020-11-06 RX ADMIN — TICAGRELOR 60 MG: 60 TABLET ORAL at 09:13

## 2020-11-06 RX ADMIN — BUTALBITAL, ACETAMINOPHEN AND CAFFEINE 1 TABLET: 50; 325; 40 TABLET ORAL at 02:14

## 2020-11-06 RX ADMIN — SODIUM CHLORIDE 75 ML/HR: 900 INJECTION, SOLUTION INTRAVENOUS at 06:23

## 2020-11-06 RX ADMIN — HYDROCHLOROTHIAZIDE 12.5 MG: 25 TABLET ORAL at 09:13

## 2020-11-06 RX ADMIN — BACITRACIN 1 PACKET: 500 OINTMENT TOPICAL at 09:46

## 2020-11-06 RX ADMIN — INFLUENZA VIRUS VACCINE 0.5 ML: 15; 15; 15; 15 SUSPENSION INTRAMUSCULAR at 09:45

## 2020-11-06 NOTE — PROGRESS NOTES
1930: Bedside shift change report given to [address] [place] [address] (oncoming nurse) by Harris Zamora (offgoing nurse). Report included the following information SBAR, Kardex, Procedure Summary, Intake/Output, MAR, Recent Results, Cardiac Rhythm NSR and Alarm Parameters . 2000: Shift assessment. Pt is A/Ox4, some R eye blurred vision which she states feels like has improved since having procedure. Up to bedside commode w 1 assist for lines, steady gait. Cardene gtt infusing for SBP goal <140. R groin site is C/D/I w ecchymosis. 0005Kia Moreno neuro NP made aware of increased tenderness and firmness at groin site. Sabrina Jiménez NP at bedside to assess pt, no new orders received. 0145: headache 6/10 pain, somewhat relieved by tylenol earlier, Sabrina Jiménez NP made aware, orders received for prn Fioricet. 0400: Decreased tenderness noted at R groin site. 0730: Bedside shift change report given to Stacie Bernabe RN (oncoming nurse) by [address] [place] [address] (offgoing nurse). Report included the following information SBAR, Kardex, Intake/Output, MAR, Recent Results, Med Rec Status, Cardiac Rhythm NSR and Dual Neuro Assessment.

## 2020-11-06 NOTE — ROUTINE PROCESS
Specialty appointment has been scheduled with Dr. Katy Mahoney for Nov 24 @ 11AM (earliest available). Pending patient discharge.   Kathy Duran, Care Management Specialist.

## 2020-11-06 NOTE — DISCHARGE INSTRUCTIONS
DIET: Normal diet    FOLLOW UP APPOINTMENTS:   1. Follow up in the [facility] in 2 weeks with Dr. Conchita Chu  2   Please make an appointment to follow up with PCP in 2-4 weeks     ACTIVITY:   Do not lift anything over 10 lbs for two weeks. Try to limit going up and down stairs as much as possible. Rest and hydrate. May resume all physical activities as tolerated after 2 weeks. WOUND CARE:   Montior for signs and sx of infection including fever, expanding inflammation and discolored drainage. Report any changes in temperature in affected extremity, numbness, weakness or hematoma. If you experience any increased bruising or bleeding at your groin puncture site either outside or under the skin please apply direct, firm pressure for 20 minutes. Keep track of the bruising at the groin site by marking it with a pen or marker. If the bruising continues to be dark purple or blue in color ,OR is expanding , please call our office to let the on call doctor know. We have someone on call 24/7. OTHER RECOMMENDATIONS:  BEFAST reviewed to educate for stroke symptoms. Please call 911 and proceed to emergency department immediately if you develop any of the following:  - Acute changes in your balance or vision  - Weakness in your face, arm or leg   - Speech changes or difficulties. DISCHARGE MEDICATIONS:   1. Resume all home meds  2. Continue Aspirin 81 mg daily   3. Continue Brilinta 45 mg twice daily    Avoid NSAIDS including (Advil, Motrin, Ibuprofen, Celebrex etc ) while you are taking Aspirin and Brilinta/Plavix. You may take Tylenol for pain.     ____________________________________________________________________________________________     Brain Angiogram: What to Expect at [address] brain angiogram (cerebral angiogram) is a test (also called a procedure) that looks for problems with blood vessels and blood flow in the brain.  The doctor inserted a thin, flexible tube (catheter) into a blood vessel in your groin. Or the doctor may have put the catheter in a blood vessel in your arm. Then he or she injected a dye into the catheter. The dye flows into the blood vessel. The dye made the blood vessels show up on a video screen. You may have had this test to see if a blood vessel in the brain is bulging, narrowed, or blocked. The test may also be used to check other symptoms, such as unusual headaches, or to check problems found during a different test.  You may have a bruise where the catheter was put in, and you may feel sore for a day or two. You can do light activities around the house. But don't do anything strenuous for several days. This care sheet gives you a general idea about how long it will take for you to recover. But each person recovers at a different pace. Follow the steps below to feel better as quickly as possible. How can you care for yourself at home? Activity    · Do not do strenuous exercise and do not lift, pull, or push anything heavy until your doctor says it is okay. This may be for a day or two. You can walk around the house and do light activity, such as cooking.     · If the catheter was placed in your groin, try not to walk up stairs for the first couple of days.     · If the catheter was placed in your arm near your wrist, do not bend your wrist deeply for the first couple of days. Be careful using your hand to get into and out of a chair or bed.     · If your doctor recommends it, get more exercise. Walking is a good choice. Bit by bit, increase the amount you walk every day. Try for at least 30 minutes on most days of the week. Diet    · Drink plenty of fluids to help your body flush out the dye. If you have kidney, heart, or liver disease and have to limit fluids, talk with your doctor before you increase the amount of fluids you drink.     · Keep eating a heart-healthy diet that has lots of fruits, vegetables, and whole grains.  If you need help with your diet, talk to your doctor. You also may want to talk to a dietitian. This expert can help you to learn about healthy foods and plan meals. Medicines    · Your doctor will tell you if and when you can restart your medicines. He or she will also give you instructions about taking any new medicines.     · If you take aspirin or some other blood thinner, ask your doctor if and when to start taking it again. Make sure that you understand exactly what your doctor wants you to do.     · Be safe with medicines. Read and follow all instructions on the label. ? If the doctor gave you a prescription medicine for pain, take it as prescribed. ? If you are not taking a prescription pain medicine, ask your doctor if you can take an over-the-counter medicine. Care of the catheter site    · For the first 3 days, keep a bandage over the spot where the catheter was put in.     · Put ice or a cold pack on the area for 10 to 20 minutes at a time. Try to do this every 1 to 2 hours for the next 3 days (when you are awake) or until the swelling goes down. Put a thin cloth between the ice and your skin.     · You may shower 24 to 48 hours after the procedure, if your doctor okays it. Pat the incision dry.     · Do not soak the catheter site until it is healed. Don't take a bath for 1 week, or until your doctor tells you it is okay.     · Watch for bleeding from the site. A small amount of blood (up to the size of a quarter) on the bandage can be normal.     · If you are bleeding, lie down and press on the area for 15 minutes to try to make it stop. If the bleeding does not stop, call your doctor or seek immediate medical care. Follow-up care is a key part of your treatment and safety. Be sure to make and go to all appointments, and call your doctor if you are having problems. It's also a good idea to know your test results and keep a list of the medicines you take. When should you call for help?    Call 911 anytime you think you may need emergency care. For example, call if:    · You passed out (lost consciousness).     · You have severe trouble breathing.     · You have sudden chest pain and shortness of breath, or you cough up blood.     · You have symptoms of a stroke. These may include:  ? Sudden numbness, tingling, weakness, or loss of movement in your face, arm, or leg, especially on only one side of your body. ? Sudden vision changes. ? Sudden trouble speaking. ? Sudden confusion or trouble understanding simple statements. ? Sudden problems with walking or balance. ? A sudden, severe headache that is different from past headaches. Call your doctor now or seek immediate medical care if:    · You are bleeding from the area where the catheter was put in your artery.     · You have a fast-growing, painful lump at the catheter site.     · You have symptoms of infection, such as:  ? Increased pain, swelling, warmth, or redness. ? Red streaks leading from the area. ? Pus draining from the area. ? A fever.     · Your leg, arm, or hand is painful, looks blue, or feels cold, numb, or tingly. Watch closely for any changes in your health, and be sure to contact your doctor if:    · You are not getting better as expected. Where can you learn more? Go to http://www.gray.com/  Enter O249 in the search box to learn more about \"Brain Angiogram: What to Expect at Home. \"  Current as of: March 4, 2020               Content Version: 12.6  © 3378-0796 [employer] (formerly [employer]). Care instructions adapted under license by [employer] (which disclaims liability or warranty for this information). If you have questions about a medical condition or this instruction, always ask your healthcare professional. [place] [address] any warranty or liability for your use of this information.

## 2020-11-06 NOTE — DISCHARGE SUMMARY
Neurointerventional Surgery Discharge Summary  [address], [address]    Patient: Delvin Tan MRN: [id number]  SSN: [id number]    YOB: 1965  Age: 63 y.o. Sex: female      DATE OF ADMISSION: 11/5/2020    DATE OF DISCHARGE: 11/06/20    PROCEDURES/SURGERIES: s/p Cerebral angiogram and Surpass embolization of right supraclinoid cerebral aneurysm on 11/5/20     [id number] [place] [address] COURSE:     ICD-10-CM ICD-9-CM    1. Aneurysm (HCC)  I72.9 442.9    2. Cerebral aneurysm  I67.1 437.3      Pt was admitted for scheduled elective cerebral angiogram and Surpass embolization of right supraclinoid cerebral aneurysm by Dr Eddi Hawthorne. The procedure was performed in the Interventional Radiology suite under general anesthesia. The patient tolerated the procedure well without complications. Following extubation, patient was transferred to ICU, fully recovered and returned to neurological baseline. The patient was admitted overnight for continued monitoring due to potential risks of stroke and thrombosis. There were no neurological events overnight. The hospital course was uneventful and the patient was appropriate for discharged home today in stable condition. Patient Vitals for the past 12 hrs:   Temp Pulse Resp BP SpO2   11/06/20 0700 -- 72 14 115/82 97 %   11/06/20 0600 -- 65 13 90/77 96 %   11/06/20 0500 -- (!) 57 14 109/73 97 %   11/06/20 0400 97.8 °F (36.6 °C) 71 19 134/72 96 %   11/06/20 0300 -- (!) 58 17 125/68 96 %   11/06/20 0215 -- 71 15 -- 97 %   11/06/20 0200 -- 71 15 124/81 97 %   11/06/20 0100 -- 63 10 123/67 99 %   11/06/20 0000 97.7 °F (36.5 °C) 84 23 115/68 95 %   11/05/20 2300 -- 85 11 -- 93 %   11/05/20 2239 -- 94 -- 110/63 --   11/05/20 2200 -- 94 25 (!) 134/93 94 %       Physical Exam:  GENERAL: NAD, calm, cooperative  HEART: RRR  LUNGS: CTAB  SKIN: Warm, dry, color appropriate for ethnicity.  Right groin arteriotomy site soft and non-tender on palpation, dressing is C/D/I, with no active bleeding or drainage noted. Mild ecchymosis present. Neurologic Exam:  Mental Status:  Alert and oriented x 4. Appropriate affect, mood and behavior. Language:    Normal fluency, repetition, comprehension and naming. Cranial Nerves:   Pupils 3 mm bilaterally, equal, round and reactive to light. Visual fields full to confrontation. Extraocular movements intact. Facial sensation intact. Full facial strength, no asymmetry. Hearing intact bilaterally. No dysarthria. Tongue protrudes to midline, palate elevates symmetrically. Shoulder shrug 5/5 bilaterally. Motor:    No pronator drift. Bulk and tone normal.      5/5 power in all extremities proximally and distally. No involuntary movements. Sensation:    Sensation intact throughout to light touch. Coordination & Gait: Gait deferred. FTN and HTS intact with no ataxia present. Labs:  Lab Results   Component Value Date/Time    Sodium 138 11/05/2020 03:11 PM    Potassium 3.9 11/05/2020 03:11 PM    Chloride 108 11/05/2020 03:11 PM    CO2 25 11/05/2020 03:11 PM    Anion gap 5 11/05/2020 03:11 PM    Glucose 121 (H) 11/05/2020 03:11 PM    BUN 13 11/05/2020 03:11 PM    Creatinine 0.66 11/05/2020 03:11 PM    BUN/Creatinine ratio 20 11/05/2020 03:11 PM    GFR est AA >60 11/05/2020 03:11 PM    GFR est non-AA >60 11/05/2020 03:11 PM    Calcium 8.2 (L) 11/05/2020 03:11 PM     Lab Results   Component Value Date/Time    WBC 5.5 11/05/2020 03:11 PM    Hemoglobin (POC) 13.6 11/05/2010 12:35 PM    HGB 9.1 (L) 11/05/2020 03:11 PM    Hematocrit (POC) 40 11/05/2010 12:35 PM    HCT 28.3 (L) 11/05/2020 03:11 PM    PLATELET 916 27/56/1910 03:11 PM    MCV 87.6 11/05/2020 03:11 PM     Lab Results   Component Value Date/Time    MCH 28.2 11/05/2020 03:11 PM    MCHC 32.2 11/05/2020 03:11 PM    BASOPHILS 1 07/11/2020 04:49 AM    ABS. LYMPHOCYTES 2.0 07/11/2020 04:49 AM    ABS.  MONOCYTES 0.5 07/11/2020 04:49 AM    ABS. EOSINOPHILS 0.1 07/11/2020 04:49 AM    ABS. BASOPHILS 0.0 07/11/2020 04:49 AM    Phosphorus 3.3 07/10/2020 06:22 AM    Magnesium 1.8 07/09/2020 08:20 PM       DIET: Normal diet    FOLLOW UP APPOINTMENTS:   1. Follow up in the [facility] in 2 weeks with Dr. Pancho Castillo  2   Please make an appointment to follow up with PCP in 2-4 weeks     ACTIVITY:   Do not lift anything over 10 lbs for two weeks. Try to limit going up and down stairs as much as possible. Rest and hydrate. May resume all physical activities as tolerated after 2 weeks. WOUND CARE:   [place] for signs and sx of infection including fever, expanding inflammation and discolored drainage. Report any changes in temperature in affected extremity, numbness, weakness or hematoma. If you experience any increased bruising or bleeding at your groin puncture site either outside or under the skin please apply direct, firm pressure for 20 minutes. Keep track of the bruising at the groin site by marking it with a pen or marker. If the bruising continues to be dark purple or blue in color ,OR is expanding , please call our office to let the on call doctor know. We have someone on call 24/7. OTHER RECOMMENDATIONS:  BEFAST reviewed to educate for stroke symptoms. Please call 911 and proceed to emergency department immediately if you develop any of the following:  - Acute changes in your balance or vision  - Weakness in your face, arm or leg   - Speech changes or difficulties. DISCHARGE MEDICATIONS:   1. Resume all home meds  2. Continue Aspirin 81 mg daily   3. Continue Brilinta 60 mg BID      Avoid NSAIDS including (Advil, Motrin, Ibuprofen, Celebrex etc ) while you are taking Aspirin and Brilinta/Plavix. You may take Tylenol for pain.      Terra Reddy NP  Neurointerventional Surgery

## 2020-11-06 NOTE — PROGRESS NOTES
Bedside shift change report given to Christian Young RN  (oncoming nurse) by Dav Fay RN  (offgoing nurse). Report included the following information SBAR, Procedure Summary, Intake/Output, MAR, Recent Results, Cardiac Rhythm NSR, Alarm Parameters  and Dual Neuro Assessment     1624: Arterial line removed following protocol, no signs of bruising or bleeding. Site care education post line removal provided, opportunity for questions provided. I have reviewed discharge instructions with the patient and spouse. The patient and spouse verbalized understanding.

## 2020-11-06 NOTE — PROGRESS NOTES
Neurocritical Care Brief Progress Note:    Rounded on patient in the ICU. She is status post cerebral angiogram with embolization of right ICA aneurysm with placement of SURPASS flow diversion device. Patient still with mild to moderate headache. Denies any visual disturbances, nausea, vomiting, chest pain, leg pain, numbness, tingling or weakness. Physical Exam:  Gen: NAD, calm, cooperative  Neuro: A&Ox4. Follows commands. Speech clear. Affect normal. PERRL, 3 mm bilaterally. Blinks to threat. No disconjugate gaze present. EOMI. Face symmetric. Palate symmetric. Tongue midline. Mimi spontaneously. Strength 5/5 in UE and LE BL. Negative drift. Bulk and tone normal. No involuntary movements. Gait deferred. Skin: Warm, dry, color appropriate for ethnicity. Groin arteriotomy site soft, non-tender on palpation, ecchymosis noted, dressing is C/D/I, with no active bleeding or drainage noted. Positive pedal pulses in bilateral lower extremities. NIHSS:      1a-LOC:0    1b-Month/Age:0    1c-Open/Close Hand:0    2-Best Gaze:0    3-Visual Fields:0    4-Facial Palsy:0    5a-Left Arm:0    5b-Right Arm:0    6a-Left Le    6b-Right Le    7-Limb Ataxia:0    8-Sensory:0    9-Best Language:0    10-Dysarthria:0    11-Extinction/Inattention:0  TOTAL SCORE:0    PLAN: Continue current plan per [facility]. - Neuro checks q hourly. -SBP goal less than 140. Wean off of Cardene as possible.   -Continue current dose of aspirin and Brilinta per Dr. Karen Rayo note. Will recheck P2Y12 in am as level was 24 at 3 pm today. Aspirin level therapeutic at 375.   -Stat head CT with neuro changes. Addendum  00:10. Notified by nursing staff that she felt right groin site was more firm. Assessed site and site is soft with some ecchymosis and mild tenderness noted on exam. No firmness, no bleeding or hematoma noted. Positive pedal pulses bilaterally. 01:48. Patient continues with headache. Helped some by Tylenol.  Patient states that caffeine helps her headaches at home. Ordered Fioricet to be given prn.      Crystal Yeboah NP  Neurocritical Care Nurse Practitioner  [phone]

## 2020-11-10 ENCOUNTER — TELEPHONE (OUTPATIENT)
Dept: NEUROSURGERY | Age: 55
End: 2020-11-10

## 2020-11-10 NOTE — TELEPHONE ENCOUNTER
Message left for patient to check on her condition since her procedure last week and to confirm date and time of next treatment. 12/7/2020 @ [facility] [place] with a 7:00 am arrival time. Call office to confirm.

## 2020-11-11 ENCOUNTER — TELEPHONE (OUTPATIENT)
Dept: NEUROSURGERY | Age: 55
End: 2020-11-11

## 2020-11-11 NOTE — TELEPHONE ENCOUNTER
Return call from patient. Patient stated she was a little sore but doing well since her procedure last week. Patient has returned to work. No acute problems reported. Patient can have next angiogram done 12/7/2020 but would prefer 12/9/2020.

## 2020-11-12 ENCOUNTER — TELEPHONE (OUTPATIENT)
Dept: NEUROSURGERY | Age: 55
End: 2020-11-12

## 2020-11-12 ENCOUNTER — APPOINTMENT (OUTPATIENT)
Dept: CT IMAGING | Age: 55
End: 2020-11-12
Attending: EMERGENCY MEDICINE
Payer: COMMERCIAL

## 2020-11-12 ENCOUNTER — DOCUMENTATION ONLY (OUTPATIENT)
Dept: NEUROSURGERY | Age: 55
End: 2020-11-12

## 2020-11-12 ENCOUNTER — HOSPITAL ENCOUNTER (EMERGENCY)
Age: 55
Discharge: HOME | End: 2020-11-12
Attending: EMERGENCY MEDICINE
Payer: COMMERCIAL

## 2020-11-12 VITALS
WEIGHT: 230.82 LBS | HEART RATE: 66 BPM | HEIGHT: 66 IN | TEMPERATURE: 98.4 F | RESPIRATION RATE: 11 BRPM | OXYGEN SATURATION: 97 % | BODY MASS INDEX: 37.1 KG/M2 | DIASTOLIC BLOOD PRESSURE: 84 MMHG | SYSTOLIC BLOOD PRESSURE: 121 MMHG

## 2020-11-12 DIAGNOSIS — R51.9 ACUTE NONINTRACTABLE HEADACHE, UNSPECIFIED HEADACHE TYPE: Primary | ICD-10-CM

## 2020-11-12 LAB
ALBUMIN SERPL-MCNC: 3.7 G/DL (ref 3.5–5)
ALBUMIN/GLOB SERPL: 0.9 {RATIO} (ref 1.1–2.2)
ALP SERPL-CCNC: 84 U/L (ref 45–117)
ALT SERPL-CCNC: 23 U/L (ref 12–78)
ANION GAP SERPL CALC-SCNC: 3 MMOL/L (ref 5–15)
AST SERPL-CCNC: 11 U/L (ref 15–37)
BASOPHILS # BLD: 0 K/UL (ref 0–0.1)
BASOPHILS NFR BLD: 0 % (ref 0–1)
BILIRUB SERPL-MCNC: 0.4 MG/DL (ref 0.2–1)
BUN SERPL-MCNC: 18 MG/DL (ref 6–20)
BUN/CREAT SERPL: 20 (ref 12–20)
CALCIUM SERPL-MCNC: 9.1 MG/DL (ref 8.5–10.1)
CHLORIDE SERPL-SCNC: 103 MMOL/L (ref 97–108)
CO2 SERPL-SCNC: 28 MMOL/L (ref 21–32)
COMMENT, HOLDF: NORMAL
CREAT SERPL-MCNC: 0.88 MG/DL (ref 0.55–1.02)
DIFFERENTIAL METHOD BLD: NORMAL
EOSINOPHIL # BLD: 0.1 K/UL (ref 0–0.4)
EOSINOPHIL NFR BLD: 1 % (ref 0–7)
ERYTHROCYTE [DISTWIDTH] IN BLOOD BY AUTOMATED COUNT: 14.2 % (ref 11.5–14.5)
GLOBULIN SER CALC-MCNC: 4.1 G/DL (ref 2–4)
GLUCOSE BLD STRIP.AUTO-MCNC: 111 MG/DL (ref 65–100)
GLUCOSE SERPL-MCNC: 116 MG/DL (ref 65–100)
HCT VFR BLD AUTO: 41 % (ref 35–47)
HGB BLD-MCNC: 13.1 G/DL (ref 11.5–16)
IMM GRANULOCYTES # BLD AUTO: 0 K/UL (ref 0–0.04)
IMM GRANULOCYTES NFR BLD AUTO: 0 % (ref 0–0.5)
LYMPHOCYTES # BLD: 2.1 K/UL (ref 0.8–3.5)
LYMPHOCYTES NFR BLD: 29 % (ref 12–49)
MCH RBC QN AUTO: 27.6 PG (ref 26–34)
MCHC RBC AUTO-ENTMCNC: 32 G/DL (ref 30–36.5)
MCV RBC AUTO: 86.5 FL (ref 80–99)
MONOCYTES # BLD: 0.6 K/UL (ref 0–1)
MONOCYTES NFR BLD: 8 % (ref 5–13)
NEUTS SEG # BLD: 4.6 K/UL (ref 1.8–8)
NEUTS SEG NFR BLD: 62 % (ref 32–75)
NRBC # BLD: 0 K/UL (ref 0–0.01)
NRBC BLD-RTO: 0 PER 100 WBC
P2Y12 PLT RESPONSE,PPPR: 107 PRU (ref 194–418)
PLATELET # BLD AUTO: 271 K/UL (ref 150–400)
PMV BLD AUTO: 9.5 FL (ref 8.9–12.9)
POTASSIUM SERPL-SCNC: 3.7 MMOL/L (ref 3.5–5.1)
PROT SERPL-MCNC: 7.8 G/DL (ref 6.4–8.2)
RBC # BLD AUTO: 4.74 M/UL (ref 3.8–5.2)
SAMPLES BEING HELD,HOLD: NORMAL
SERVICE CMNT-IMP: ABNORMAL
SODIUM SERPL-SCNC: 134 MMOL/L (ref 136–145)
WBC # BLD AUTO: 7.3 K/UL (ref 3.6–11)

## 2020-11-12 PROCEDURE — 36415 COLL VENOUS BLD VENIPUNCTURE: CPT

## 2020-11-12 PROCEDURE — 99284 EMERGENCY DEPT VISIT MOD MDM: CPT

## 2020-11-12 PROCEDURE — 70450 CT HEAD/BRAIN W/O DYE: CPT

## 2020-11-12 PROCEDURE — 74011250637 HC RX REV CODE- 250/637: Performed by: EMERGENCY MEDICINE

## 2020-11-12 PROCEDURE — 70498 CT ANGIOGRAPHY NECK: CPT

## 2020-11-12 PROCEDURE — 85576 BLOOD PLATELET AGGREGATION: CPT

## 2020-11-12 PROCEDURE — 74011000258 HC RX REV CODE- 258: Performed by: RADIOLOGY

## 2020-11-12 PROCEDURE — 85025 COMPLETE CBC W/AUTO DIFF WBC: CPT

## 2020-11-12 PROCEDURE — 74011000636 HC RX REV CODE- 636: Performed by: RADIOLOGY

## 2020-11-12 PROCEDURE — 70496 CT ANGIOGRAPHY HEAD: CPT

## 2020-11-12 PROCEDURE — 80053 COMPREHEN METABOLIC PANEL: CPT

## 2020-11-12 PROCEDURE — 82962 GLUCOSE BLOOD TEST: CPT

## 2020-11-12 RX ORDER — ACETAMINOPHEN 325 MG/1
650 TABLET ORAL
Status: COMPLETED | OUTPATIENT
Start: 2020-11-12 | End: 2020-11-12

## 2020-11-12 RX ORDER — SODIUM CHLORIDE 0.9 % (FLUSH) 0.9 %
10 SYRINGE (ML) INJECTION
Status: COMPLETED | OUTPATIENT
Start: 2020-11-12 | End: 2020-11-12

## 2020-11-12 RX ADMIN — SODIUM CHLORIDE 100 ML: 900 INJECTION, SOLUTION INTRAVENOUS at 01:52

## 2020-11-12 RX ADMIN — Medication 10 ML: at 01:52

## 2020-11-12 RX ADMIN — ACETAMINOPHEN 650 MG: 325 TABLET ORAL at 03:13

## 2020-11-12 RX ADMIN — IOPAMIDOL 100 ML: 755 INJECTION, SOLUTION INTRAVENOUS at 01:52

## 2020-11-12 NOTE — TELEPHONE ENCOUNTER
Received a call from patient requesting changing the date of her procedure to 12/9/2020. Confirmed date with RUSTAM Paz and patient. Inquired how patient was feeling since her trip to ED last night. Patient stated she now has migraines with visual disturbances. Feeling okay. No acute problems reported.

## 2020-11-12 NOTE — DISCHARGE INSTRUCTIONS
Patient Education   Work up in the emergency room was reassuring tonight- including blood work and cat scans of your brain. Dr[facility] and I feel your symptoms are likely related to a migraine. You can take Tylenol every 4 hours as needed for headache. Benadryl helps as well. Follow up with your primary care doctor or the neurologist if your symptoms are not improving. Return to the ED if you develop any new or worsening symptoms such as sudden severe headache, loss of vision or double vision, difficulty with speech, and/or focal weakness/numbness, etc.     Headache: After Your Visit to the Emergency Room  Your Care Instructions  Headaches have many possible causes. Most headaches are not a sign of serious problems and will get better on their own. Home treatment may help you feel better soon. Even though you have been released from the emergency room, you still need to watch for any problems. The doctor carefully checked you. But sometimes problems can develop later. If you have new symptoms, or if your symptoms do not get better, return to the emergency room or call your doctor right away. A visit to the emergency room is only one step in your treatment. Even if you feel better, you still need to do what your doctor recommends, such as going to all suggested follow-up appointments and taking medicines exactly as directed. This will help you recover and help prevent future problems. How can you care for yourself at home? · Have someone drive you home if you have taken pain medicine. Do not drive yourself. · Rest in a quiet, dark room until your headache is gone. Close your eyes, and try to relax or go to sleep. Do not watch TV or read. · Put ice or a cold pack on the area for 10 to 20 minutes at a time. Put a thin cloth between the ice and your skin. · Use a warm, moist towel or a heating pad set on low to relax tight shoulder and neck muscles.   · Have someone gently massage your neck and shoulders. · Take pain medicines exactly as directed. ¨ If the doctor gave you a prescription medicine for pain, take it as prescribed. ¨ If you are not taking a prescription pain medicine, ask your doctor if you can take an over-the-counter medicine. · Limit your caffeine intake to 1 to 2 cups a day. People who drink a lot of caffeine often get a headache several hours after their last drink of caffeine. Or they may wake up with a headache that is relieved by drinking caffeine. Cut down slowly to avoid caffeine-withdrawal headaches. · Seek help if you have depression or anxiety. Your headaches may be linked to these conditions. When should you call for help? Call 911 if:  · You have signs of a stroke. These may include:  ¨ Sudden numbness, paralysis, or weakness in your face, arm, or leg, especially on only one side of your body. ¨ New problems with walking or balance. ¨ Sudden vision changes. ¨ Drooling or slurred speech. ¨ New problems speaking or understanding simple statements, or feeling confused. ¨ A sudden, severe headache that is different from past headaches. · You have other symptoms that you think are a medical emergency. Return to the emergency room now if:  · You get a fever and a stiff neck. · Your headache gets worse. · You have new nausea and vomiting, or you cannot keep down food or liquids. Call your doctor today if:  · Your headache does not get better within 1 to 2 days. · Your headaches get worse or happen more often. Where can you learn more? Go to Intradiem[employer]be  Enter D038 in the search box to learn more about \"Headache: After Your Visit to the Emergency Room. \"   © 9655-6015 [employer]. Care instructions adapted under license by [employer] (which disclaims liability or warranty for this information).  This care instruction is for use with your licensed healthcare professional. If you have questions about a medical condition or this instruction, always ask your healthcare professional. [place] [address] any warranty or liability for your use of this information.   Content Version: 9.3.11704; Last Revised: October 20, 2011

## 2020-11-12 NOTE — ED PROVIDER NOTES
HPI     9year-old female presents the emergency department with acute onset at 10:50 PM this evening of blurry vision in the periphery of her left eye that lasted about 15 minutes followed by a frontal tension type headache. She has no photophobia nausea or vomiting. She has no focal weakness or numbness. Patient had a right sided cerebral aneurysm stent done on . She has another aneurysm on the left which is supposed to be stented on . She does have a history of headaches/migraines with visual disturbance somewhat similar to this. But given her recent stent she spoke to the neuro interventionalists who wanted her to come in for evaluation including a CTA of her head. Patient currently states her headache is a 4-5 out of 10. She also reports she did have bourbon with dinner tonight and took some Prilosec. She is on aspirin and Brillinta.      Past Medical History:   Diagnosis Date    Anxiety     Chest pain     Fainting     Headache     migraines    Hiatal hernia     Hypertension     Joint pain     Menopause     early aj at age 38    Ringing in ears     Skipped beats     Trouble in sleeping     Vertigo     Vertigo     Vitamin D deficiency        Past Surgical History:   Procedure Laterality Date    HX APPENDECTOMY      HX  SECTION      HX ORTHOPAEDIC      Right ankle x 2         Family History:   Problem Relation Age of Onset    Hypertension Mother     Hypertension Father     Headache Other        Social History     Socioeconomic History    Marital status:      Spouse name: Not on file    Number of children: Not on file    Years of education: Not on file    Highest education level: Not on file   Occupational History    Not on file   Social Needs    Financial resource strain: Not on file    Food insecurity     Worry: Not on file     Inability: Not on file    Transportation needs     Medical: Not on file     Non-medical: Not on file   Tobacco Use    Smoking status: Never Smoker    Smokeless tobacco: Never Used   Substance and Sexual Activity    Alcohol use: Yes     Comment: 2-3 weekly    Drug use: No    Sexual activity: Yes     Partners: Male   Lifestyle    Physical activity     Days per week: Not on file     Minutes per session: Not on file    Stress: Not on file   Relationships    Social connections     Talks on phone: Not on file     Gets together: Not on file     Attends Latter-day service: Not on file     Active member of club or organization: Not on file     Attends meetings of clubs or organizations: Not on file     Relationship status: Not on file    Intimate partner violence     Fear of current or ex partner: Not on file     Emotionally abused: Not on file     Physically abused: Not on file     Forced sexual activity: Not on file   Other Topics Concern    Not on file   Social History Narrative    Not on file         ALLERGIES: Shellfish derived and Sulfa (sulfonamide antibiotics)    Review of Systems   Constitutional: Negative for fever. HENT: Negative for congestion. Eyes: Positive for visual disturbance. Respiratory: Negative for cough and shortness of breath. Cardiovascular: Negative for chest pain. Gastrointestinal: Negative for abdominal pain and nausea. Genitourinary: Negative for dysuria. Musculoskeletal: Negative for gait problem. Skin: Negative for rash. Neurological: Positive for dizziness and headaches. Negative for weakness and numbness. Psychiatric/Behavioral: Negative for dysphoric mood. Vitals:    11/12/20 0015   BP: (!) 139/99   Pulse: 71   Resp: 18   Temp: 98.4 °F (36.9 °C)   SpO2: 100%   Weight: 104.7 kg (230 lb 13.2 oz)   Height: 5' 6\" (1.676 m)            Physical Exam  Constitutional:       General: She is not in acute distress. Appearance: She is well-developed. HENT:      Head: Normocephalic and atraumatic. Mouth/Throat:      Pharynx: No oropharyngeal exudate.    Eyes: General: No scleral icterus. Right eye: No discharge. Left eye: No discharge. Pupils: Pupils are equal, round, and reactive to light. Neck:      Musculoskeletal: Normal range of motion and neck supple. Vascular: No JVD. Cardiovascular:      Rate and Rhythm: Normal rate and regular rhythm. Heart sounds: Normal heart sounds. No murmur. Pulmonary:      Effort: Pulmonary effort is normal. No respiratory distress. Breath sounds: Normal breath sounds. No stridor. No wheezing or rales. Chest:      Chest wall: No tenderness. Abdominal:      General: Bowel sounds are normal. There is no distension. Palpations: Abdomen is soft. There is no mass. Tenderness: There is no abdominal tenderness. There is no guarding or rebound. Musculoskeletal: Normal range of motion. Skin:     General: Skin is warm and dry. Capillary Refill: Capillary refill takes less than 2 seconds. Findings: No rash. Neurological:      Mental Status: She is oriented to person, place, and time. Psychiatric:         Behavior: Behavior normal.         Thought Content: Thought content normal.         Judgment: Judgment normal.          MDM       Procedures      CTs are reassuring. Patient has been seen by Dr. Angelica Cranker and the neuro NP. We are waiting on P2 Y 12 testing. Patient states her headache is a 3 out of 10 so will medicate with Tylenol.   Anticipate discharge pending P2 Y 12 results

## 2020-11-12 NOTE — ED NOTES
Patient received discharge instructions by MD and RN. Reviewed discharge instructions with patient. Patient verbalized understanding of discharge teaching. Patient left ED ambulatory. Patient reports relief of most intense pain.

## 2020-11-12 NOTE — CONSULTS
[address] [place], [place]  NeuroInterventional Care Physician Assistant  [phone]    Patient: Rosa Doll MRN: [id number]  SSN: [id number]    YOB: 1965  Age: 63 y.o. Sex: female      Chief Complaint: dizziness, blurry vision, nausea    Subjective:      Rosa Doll is a 63 y.o. female who presents with mild to moderate headache, dizziness, blurry vision, flashing lights in her visual field in setting of recent aneurysm stent (Surpass) on 11/5/2020. Patient has a history of migraine, but has not had one in 4 years. Patient denies weakness, numbness/tingling, neck/back pain, nausea/vomiting, confusion, fever/chills, chest pain, abdominal pain, cough/shortness of breath, recent sick contacts. Past Medical History:   Diagnosis Date    Anxiety     Chest pain     Fainting     Headache     migraines    Hiatal hernia     Hypertension     Joint pain     Menopause     early aj at age 38    Ringing in ears     Skipped beats     Trouble in sleeping     Vertigo     Vertigo     Vitamin D deficiency      Family History   Problem Relation Age of Onset    Hypertension Mother     Hypertension Father     Headache Other      Social History     Tobacco Use    Smoking status: Never Smoker    Smokeless tobacco: Never Used   Substance Use Topics    Alcohol use: Yes     Comment: 2-3 weekly      Prior to Admission Medications   Prescriptions Last Dose Informant Patient Reported? Taking? ALPRAZolam (Xanax) 0.5 mg tablet   No No   Sig: Take 1 Tab by mouth daily as needed (Vertigo). Lacto no.41-B. bifid-B.animalis (Advanced Probiotic-10) 13 mg (3 billion cell) cap   Yes No   Sig: Take  by mouth. aspirin delayed-release 81 mg tablet   No No   Sig: Take 1 Tab by mouth daily.    ergocalciferol (ERGOCALCIFEROL) 50,000 unit capsule   No No   Sig: TAKE 1 CAPSULE EVERY 7 DAYS   hydroCHLOROthiazide (HYDRODIURIL) 12.5 mg tablet   No No   Sig: Take 1 Tab by mouth daily.   lisinopriL (PRINIVIL, ZESTRIL) 40 mg tablet   No No   Sig: TAKE 1 TABLET DAILY   melatonin 5 mg cap capsule   Yes No   Sig: Take 10 mg by mouth nightly. ticagrelor (BRILINTA) 60 mg tab tablet   No No   Sig: Take 1 Tab by mouth two (2) times a day. Facility-Administered Medications: None       Allergies   Allergen Reactions    Shellfish Derived Diarrhea and Nausea and Vomiting    Sulfa (Sulfonamide Antibiotics) Hives       Review of Systems:  A comprehensive review of systems was negative except for that written in the History of Present Illness. Denies numbness, tingling, chest pain, leg pain, nausea, vomiting, difficulty swallowing, headache, and dyspnea. Objective:     Vitals:    11/12/20 0015   BP: (!) 139/99   Pulse: 71   Resp: 18   Temp: 98.4 °F (36.9 °C)   SpO2: 100%   Weight: 104.7 kg (230 lb 13.2 oz)   Height: 5' 6\" (1.676 m)      Physical Exam:  GENERAL: Calm, cooperative, NAD  Card: RRR, no m/r/g  Pulm: CTA, no w/r/r  Abd: SNT  SKIN: Warm, dry, color appropriate for ethnicity. Neurologic Exam:  Mental Status:  Alert and oriented x 4. Appropriate affect, mood and behavior. Language:    Normal fluency, repetition, comprehension and naming. Cranial Nerves:   Pupils 3 mm, equal, round and reactive to light. Visual fields full to confrontation. Extraocular movements intact. Facial sensation intact. Full facial strength, no asymmetry. Hearing grossly intact bilaterally. No dysarthria. Tongue protrudes to midline, palate elevates    symmetrically. Shoulder shrug 5/5 bilaterally. Motor:    No pronator drift. Bulk and tone normal.      5/5 power in all extremities proximally and distally. No involuntary movements. Sensation:    Sensation intact throughout to light touch, temperature, pinprick,    vibration, proprioception     Reflexes:    Reflexes are 2+ at the biceps, triceps, patella and achilles bilaterally.     Negative Babinskis    Coordination & Gait: Normal. FTN and HTS intact with no ataxia present. Labs:    P2Y12: 107    Recent Results (from the past 12 hour(s))   GLUCOSE, POC    Collection Time: 11/12/20 12:20 AM   Result Value Ref Range    Glucose (POC) 111 (H) 65 - 100 mg/dL    Performed by [address]    Collection Time: 11/12/20 12:41 AM   Result Value Ref Range    SAMPLES BEING HELD 1red,1blu     COMMENT        Add-on orders for these samples will be processed based on acceptable specimen integrity and analyte stability, which may vary by analyte. CBC WITH AUTOMATED DIFF    Collection Time: 11/12/20 12:42 AM   Result Value Ref Range    WBC 7.3 3.6 - 11.0 K/uL    RBC 4.74 3.80 - 5.20 M/uL    HGB 13.1 11.5 - 16.0 g/dL    HCT 41.0 35.0 - 47.0 %    MCV 86.5 80.0 - 99.0 FL    MCH 27.6 26.0 - 34.0 PG    MCHC 32.0 30.0 - 36.5 g/dL    RDW 14.2 11.5 - 14.5 %    PLATELET 095 999 - 417 K/uL    MPV 9.5 8.9 - 12.9 FL    NRBC 0.0 0  WBC    ABSOLUTE NRBC 0.00 0.00 - 0.01 K/uL    NEUTROPHILS 62 32 - 75 %    LYMPHOCYTES 29 12 - 49 %    MONOCYTES 8 5 - 13 %    EOSINOPHILS 1 0 - 7 %    BASOPHILS 0 0 - 1 %    IMMATURE GRANULOCYTES 0 0.0 - 0.5 %    ABS. NEUTROPHILS 4.6 1.8 - 8.0 K/UL    ABS. LYMPHOCYTES 2.1 0.8 - 3.5 K/UL    ABS. MONOCYTES 0.6 0.0 - 1.0 K/UL    ABS. EOSINOPHILS 0.1 0.0 - 0.4 K/UL    ABS. BASOPHILS 0.0 0.0 - 0.1 K/UL    ABS. IMM.  GRANS. 0.0 0.00 - 0.04 K/UL    DF AUTOMATED     METABOLIC PANEL, COMPREHENSIVE    Collection Time: 11/12/20 12:42 AM   Result Value Ref Range    Sodium 134 (L) 136 - 145 mmol/L    Potassium 3.7 3.5 - 5.1 mmol/L    Chloride 103 97 - 108 mmol/L    CO2 28 21 - 32 mmol/L    Anion gap 3 (L) 5 - 15 mmol/L    Glucose 116 (H) 65 - 100 mg/dL    BUN 18 6 - 20 MG/DL    Creatinine 0.88 0.55 - 1.02 MG/DL    BUN/Creatinine ratio 20 12 - 20      GFR est AA >60 >60 ml/min/1.73m2    GFR est non-AA >60 >60 ml/min/1.73m2    Calcium 9.1 8.5 - 10.1 MG/DL    Bilirubin, total 0.4 0.2 - 1.0 MG/DL    ALT (SGPT) 23 12 - 78 U/L    AST (SGOT) 11 (L) 15 - 37 U/L    Alk. phosphatase 84 45 - 117 U/L    Protein, total 7.8 6.4 - 8.2 g/dL    Albumin 3.7 3.5 - 5.0 g/dL    Globulin 4.1 (H) 2.0 - 4.0 g/dL    A-G Ratio 0.9 (L) 1.1 - 2.2          Imaging:  Ct Head Wo Cont    Result Date: 11/12/2020  IMPRESSION: No acute intracranial process. Patient is status post stent assisted embolization of right supraclinoid ICA aneurysm. Cta Head    Result Date: 11/12/2020  IMPRESSION: No evidence of acute intracranial processes/subarachnoid hemorrhage. Status post stenting of the supraclinoid ICA for embolization of right supraclinoid ICA aneurysm. Persistent filling of the residual aneurysm sac on the right. Residual sac is slightly diminished in overall size compared to the prior CTA. No interval change in the appearance of the left supraclinoid ICA aneurysm. There is no evidence of dissection or hemodynamically significant stenosis. . There is no intracranial mass, hemorrhage or evidence of acute infarction. No acute intracranial process is identified. Doron Luster Neck    Result Date: 11/12/2020  IMPRESSION: No evidence of acute intracranial processes/subarachnoid hemorrhage. Status post stenting of the supraclinoid ICA for embolization of right supraclinoid ICA aneurysm. Persistent filling of the residual aneurysm sac on the right. Residual sac is slightly diminished in overall size compared to the prior CTA. No interval change in the appearance of the left supraclinoid ICA aneurysm. There is no evidence of dissection or hemodynamically significant stenosis. . There is no intracranial mass, hemorrhage or evidence of acute infarction. No acute intracranial process is identified. .       Assessment:     Hospital Problems  Date Reviewed: 11/6/2020    None        Plan:     Ok to discharge home  Follow up with Dr. Sylvester Liao as previously scheduled  Please call with further questions or issues    I have discussed the diagnosis and the intended plan as seen in the above orders with Dr. Zachary Cook, the patient and the primary RN. Patient/family updated on current plan of care and is in agreement. All questions were answered. Thank you for this consult and allowing our participation in the care of this patient.     Signed By: Jonathan Francis PA-C    November 12, 2020

## 2020-11-23 ENCOUNTER — TELEPHONE (OUTPATIENT)
Dept: NEUROSURGERY | Age: 55
End: 2020-11-23

## 2020-11-23 NOTE — TELEPHONE ENCOUNTER
Spoke with patient about cancellation of upcoming appointment.  Patient would like a phone call before scheduled procedure 12/9/2020

## 2020-12-01 ENCOUNTER — OFFICE VISIT (OUTPATIENT)
Dept: NEUROSURGERY | Age: 55
End: 2020-12-01
Payer: COMMERCIAL

## 2020-12-01 VITALS
TEMPERATURE: 97.3 F | BODY MASS INDEX: 36.96 KG/M2 | HEART RATE: 74 BPM | DIASTOLIC BLOOD PRESSURE: 92 MMHG | WEIGHT: 230 LBS | OXYGEN SATURATION: 98 % | SYSTOLIC BLOOD PRESSURE: 140 MMHG | HEIGHT: 66 IN

## 2020-12-01 DIAGNOSIS — I67.1 INTRACRANIAL ANEURYSM: Primary | ICD-10-CM

## 2020-12-01 PROCEDURE — 99214 OFFICE O/P EST MOD 30 MIN: CPT | Performed by: STUDENT IN AN ORGANIZED HEALTH CARE EDUCATION/TRAINING PROGRAM

## 2020-12-01 NOTE — PROGRESS NOTES
Procedure follow up and pre op prep for second procedure. Patient reports significant decrease in headaches. Reports one headache per week. Down from daily headaches. Continues with episodes of dizziness. Denies blurred or double vision, n/v, unsteady gait and numbness and tingling. No acute problems reported. Patient informed to have her COVID - 19 test done 12/4/2020 or 12/5/2020. No lab work is needed.

## 2020-12-01 NOTE — PATIENT INSTRUCTIONS
Your procedure will be 12/9/2020 at [facility] time 7:00 am. 
Please have your COVID-19 testing done on 12/4/2020 or 12/5/2020. A Healthy Lifestyle: Care Instructions Your Care Instructions A healthy lifestyle can help you feel good, stay at a healthy weight, and have plenty of energy for both work and play. A healthy lifestyle is something you can share with your whole family. A healthy lifestyle also can lower your risk for serious health problems, such as high blood pressure, heart disease, and diabetes. You can follow a few steps listed below to improve your health and the health of your family. Follow-up care is a key part of your treatment and safety. Be sure to make and go to all appointments, and call your doctor if you are having problems. It's also a good idea to know your test results and keep a list of the medicines you take. How can you care for yourself at home? · Do not eat too much sugar, fat, or fast foods. You can still have dessert and treats now and then. The goal is moderation. · Start small to improve your eating habits. Pay attention to portion sizes, drink less juice and soda pop, and eat more fruits and vegetables. ? Eat a healthy amount of food. A 3-ounce serving of meat, for example, is about the size of a deck of cards. Fill the rest of your plate with vegetables and whole grains. ? Limit the amount of soda and sports drinks you have every day. Drink more water when you are thirsty. ? Eat at least 5 servings of fruits and vegetables every day. It may seem like a lot, but it is not hard to reach this goal. A serving or helping is 1 piece of fruit, 1 cup of vegetables, or 2 cups of leafy, raw vegetables. Have an apple or some carrot sticks as an afternoon snack instead of a candy bar. Try to have fruits and/or vegetables at every meal. 
· Make exercise part of your daily routine.  You may want to start with simple activities, such as walking, bicycling, or slow swimming. Try to be active 30 to 60 minutes every day. You do not need to do all 30 to 60 minutes all at once. For example, you can exercise 3 times a day for 10 or 20 minutes. Moderate exercise is safe for most people, but it is always a good idea to talk to your doctor before starting an exercise program. 
· Keep moving. [place] Gather the lawn, work in the garden, or [employer]. Take the stairs instead of the elevator at work. · If you smoke, quit. People who smoke have an increased risk for heart attack, stroke, cancer, and other lung illnesses. Quitting is hard, but there are ways to boost your chance of quitting tobacco for good. ? Use nicotine gum, patches, or lozenges. ? Ask your doctor about stop-smoking programs and medicines. ? Keep trying. In addition to reducing your risk of diseases in the future, you will notice some benefits soon after you stop using tobacco. If you have shortness of breath or asthma symptoms, they will likely get better within a few weeks after you quit. · Limit how much alcohol you drink. Moderate amounts of alcohol (up to 2 drinks a day for men, 1 drink a day for women) are okay. But drinking too much can lead to liver problems, high blood pressure, and other health problems. Family health If you have a family, there are many things you can do together to improve your health. · Eat meals together as a family as often as possible. · Eat healthy foods. This includes fruits, vegetables, lean meats and dairy, and whole grains. · Include your family in your fitness plan. Most people think of activities such as jogging or tennis as the way to fitness, but there are many ways you and your family can be more active. Anything that makes you breathe hard and gets your heart pumping is exercise. Here are some tips: 
? Walk to do errands or to take your child to school or the bus. ? Go for a family bike ride after dinner instead of watching TV. Where can you learn more? Go to http://www.gray.com/ Enter P252 in the search box to learn more about \"A Healthy Lifestyle: Care Instructions. \" Current as of: January 31, 2020               Content Version: 12.6 © 2817-8175 [employer]. Care instructions adapted under license by [employer] (which disclaims liability or warranty for this information). If you have questions about a medical condition or this instruction, always ask your healthcare professional. [address] any warranty or liability for your use of this information.

## 2020-12-02 NOTE — H&P (VIEW-ONLY)
NeuroInterventional Surgery clinic note Sylvia Hager MD 
 
Patient: Bianka Espinal MRN: [id number]  SSN: [id number] YOB: 1965  Age: 63 y.o. Sex: female Chief Complaint: 
 
Subjective:  
  
Bianka Espinal is a 63 y.o. female who is being seen for follow up s/p Surpass embolization of intracranial aneurysm performed on 11/5/20. Patient is doing well. She complains of mild headaches that gets relieved with tylenol. Patient also complains of some dizziness. No new neurological deficits. Patient is compliant with her aspirin and Brilinta. Past Medical History:  
Diagnosis Date  Anxiety  Cerebral aneurysm 2020 Right and left supraclinoid ICA aneurysms  Chest pain  Fainting  Headache   
 migraines  Hiatal hernia  Hypertension  Joint pain  Menopause   
 early aj at age 38  Ringing in ears  Skipped beats  Trouble in sleeping  Vertigo  Vertigo  Vitamin D deficiency Family History Problem Relation Age of Onset  Hypertension Mother  Hypertension Father  Headache Other Social History Tobacco Use  Smoking status: Never Smoker  Smokeless tobacco: Never Used Substance Use Topics  Alcohol use: Yes Comment: 2-3 weekly Cannot display prior to admission medications because the patient has not been admitted in this contact. Allergies Allergen Reactions  Shellfish Derived Diarrhea and Nausea and Vomiting  Sulfa (Sulfonamide Antibiotics) Hives Review of Systems: A comprehensive review of systems was negative except for that written in the History of Present Illness. Denies numbness, tingling, chest pain, leg pain, nausea, vomiting, difficulty swallowing, headache, and dyspnea. Objective:  
 
Vitals:  
 12/01/20 1100 BP: (!) 140/92 Pulse: 74 Temp: 97.3 °F (36.3 °C) TempSrc: Temporal  
SpO2: 98% Weight: 230 lb (104.3 kg) Height: 5' 6\" (1.676 m) Physical Exam: 
GENERAL: Calm, cooperative, NAD SKIN: Warm, dry, color appropriate for ethnicity. Groin site soft, with no odor, bleeding or drainage noted. Mild ecchymosis present. Neurologic Exam: 
Mental Status:  Alert and oriented x 4. Appropriate affect, mood and behavior. Language:    Normal fluency, repetition, comprehension and naming. Cranial Nerves:   Pupils 3 mm, equal, round and reactive to light. Visual fields full to confrontation. Extraocular movements intact. Facial sensation intact. Full facial strength, no asymmetry. Hearing grossly intact bilaterally. No dysarthria. Tongue protrudes to midline, palate elevates symmetrically. Shoulder shrug 5/5 bilaterally. Motor:    No pronator drift. Bulk and tone normal.  
   5/5 power in all extremities proximally and distally. No involuntary movements. Sensation:    Sensation intact throughout to light touch, temperature, pinprick, vibration, proprioception Reflexes:    Reflexes are 2+ at the biceps, triceps, patella and achilles bilaterally. Negative Babinskis Coordination & Gait: Normal. FTN and HTS intact with no ataxia present. Labs: 
Lab Results Component Value Date/Time WBC 7.3 11/12/2020 12:42 AM  
 Hemoglobin (POC) 13.6 11/05/2010 12:35 PM  
 HGB 13.1 11/12/2020 12:42 AM  
 Hematocrit (POC) 40 11/05/2010 12:35 PM  
 HCT 41.0 11/12/2020 12:42 AM  
 PLATELET 288 54/75/4590 12:42 AM  
 MCV 86.5 11/12/2020 12:42 AM  
  
Lab Results Component Value Date/Time  Sodium 134 (L) 11/12/2020 12:42 AM  
 Potassium 3.7 11/12/2020 12:42 AM  
 Chloride 103 11/12/2020 12:42 AM  
 CO2 28 11/12/2020 12:42 AM  
 Anion gap 3 (L) 11/12/2020 12:42 AM  
 Glucose 116 (H) 11/12/2020 12:42 AM  
 BUN 18 11/12/2020 12:42 AM  
 Creatinine 0.88 11/12/2020 12:42 AM  
 BUN/Creatinine ratio 20 11/12/2020 12:42 AM  
 GFR est AA >60 11/12/2020 12:42 AM  
 GFR est non-AA >60 11/12/2020 12:42 AM  
 Calcium 9.1 11/12/2020 12:42 AM  
 
Lab Results Component Value Date/Time Troponin-I, Qt. <0.05 07/09/2020 08:20 PM  
 
 
Imaging: CT Results (maximum last 3): Results from [facility] Encounter encounter on 11/12/20 CTA NECK Narrative CLINICAL HISTORY: headache, blurred vision, stent assisted embolization of right 
supraclinoid ICA aneurysm. Recent left sided aneurysm stent INDICATION: headache, blurred vision, recent left sided aneurysm stent COMPARISON: CTA of the abdomen 999-2020, cerebral angiography 10/5/2020, 
11/9/2020. CONTRAST: Isovue-370 TECHNIQUE:  Unenhanced  images were obtained to localize the volume for 
acquisition. Multislice helical axial CT angiography was performed from the 
aortic arch to the top of the head during uneventful rapid bolus intravenous 
contrast administration. Coronal and sagittal reformations and 3D post 
processing was performed. CT dose reduction was achieved through use of a 
standardized protocol tailored for this examination and automatic exposure 
control for dose modulation. FINDINGS: 
CTA NECK There is conventional three vessel arch anatomy. The bilateral subclavian, 
common carotid, and internal carotid arteries are patent with no flow-limiting 
stenosis. % of right carotid artery stenosis: 0 
% of left carotid artery stenosis: 0 
 
NASCET method was utilized for calculating stenosis. Left vertebral artery slightly larger than right vertebral artery. No pulmonary 
mass or nodule. . The cervical soft tissues are unremarkable. There are 
degenerative changes of the cervical spine. CTA HEAD Left supraclinoid ICA aneurysm measures 8 x 7 x 6 mm in size. Not significantly 
changed in size. Approximately 6 mm as well. Left middle cerebral artery is from the posterior 
aspect of the aneurysm. Yareli Simpler Status post stent assisted embolization of right supraclinoid aneurysm. Persistent filling of the aneurysm sac on the right at this time. Residual sac 
may be slightly diminished in size compared to prior study Petrous and cavernous 
carotid arteries are patent. .A 2 segments are patent. Symmetric arborization of 
M2 vessels is demonstrated. The basilar artery is patent. . The M1 segments are 
patent bilaterally. .The posterior cerebral arteries on the right and on the left 
are patent. .  There are no sizable posterior communicating arteries. No evidence of acute intracranial hemorrhage or midline shift is demonstrated. Impression IMPRESSION: 
 
No evidence of acute intracranial processes/subarachnoid hemorrhage. Status post stenting of the supraclinoid ICA for embolization of right 
supraclinoid ICA aneurysm. Persistent filling of the residual aneurysm sac on 
the right. Residual sac is slightly diminished in overall size compared to the 
prior CTA. No interval change in the appearance of the left supraclinoid ICA aneurysm. There is no evidence of dissection or hemodynamically significant stenosis. Collette Ruts There is no intracranial mass, hemorrhage or evidence of acute infarction. No acute intracranial process is identified. Collette Ruts CTA HEAD Narrative CLINICAL HISTORY: headache, blurred vision, stent assisted embolization of right 
supraclinoid ICA aneurysm. Recent left sided aneurysm stent INDICATION: headache, blurred vision, recent left sided aneurysm stent COMPARISON: CTA of the abdomen 999-2020, cerebral angiography 10/5/2020, 
11/9/2020. CONTRAST: Isovue-370 TECHNIQUE:  Unenhanced  images were obtained to localize the volume for 
acquisition. Multislice helical axial CT angiography was performed from the 
aortic arch to the top of the head during uneventful rapid bolus intravenous 
contrast administration. Coronal and sagittal reformations and 3D post 
processing was performed.   CT dose reduction was achieved through use of a 
 standardized protocol tailored for this examination and automatic exposure 
control for dose modulation. FINDINGS: 
CTA NECK There is conventional three vessel arch anatomy. The bilateral subclavian, 
common carotid, and internal carotid arteries are patent with no flow-limiting 
stenosis. % of right carotid artery stenosis: 0 
% of left carotid artery stenosis: 0 
 
NASCET method was utilized for calculating stenosis. Left vertebral artery slightly larger than right vertebral artery. No pulmonary 
mass or nodule. . The cervical soft tissues are unremarkable. There are 
degenerative changes of the cervical spine. CTA HEAD Left supraclinoid ICA aneurysm measures 8 x 7 x 6 mm in size. Not significantly 
changed in size. Approximately 6 mm as well. Left middle cerebral artery is from the posterior 
aspect of the aneurysm. Elin Ball Status post stent assisted embolization of right supraclinoid aneurysm. Persistent filling of the aneurysm sac on the right at this time. Residual sac 
may be slightly diminished in size compared to prior study Petrous and cavernous 
carotid arteries are patent. .A 2 segments are patent. Symmetric arborization of 
M2 vessels is demonstrated. The basilar artery is patent. . The M1 segments are 
patent bilaterally. .The posterior cerebral arteries on the right and on the left 
are patent. .  There are no sizable posterior communicating arteries. No evidence of acute intracranial hemorrhage or midline shift is demonstrated. Impression IMPRESSION: 
 
No evidence of acute intracranial processes/subarachnoid hemorrhage. Status post stenting of the supraclinoid ICA for embolization of right 
supraclinoid ICA aneurysm. Persistent filling of the residual aneurysm sac on 
the right. Residual sac is slightly diminished in overall size compared to the 
prior CTA. No interval change in the appearance of the left supraclinoid ICA aneurysm. There is no evidence of dissection or hemodynamically significant stenosis. Yareli Simpler There is no intracranial mass, hemorrhage or evidence of acute infarction. No acute intracranial process is identified. Yareli Simpler CT HEAD WO CONT Narrative CLINICAL HISTORY: acute headache/vision change after cerebral stent placed INDICATION: acute headache/vision change after cerebral stent placed COMPARISON: 11/6/2020. CT dose reduction was achieved through use of a standardized protocol tailored 
for this examination and automatic exposure control for dose modulation. TECHNIQUE: Serial axial images with a collimation of 5 mm were obtained from the 
skull base through the vertex FINDINGS:  
The sulci and ventricles are within normal limits for patient age. There is no 
evidence of an acute infarction, hemorrhage, or mass-effect. There is no 
evidence of midline shift or hydrocephalus. Posterior fossa structures are 
unremarkable. No extra-axial collections are seen. Mastoid air cells are well pneumatized and clear. Right cavernous and supraclinoid ICA stent in place Impression IMPRESSION:  
No acute intracranial process. Patient is status post stent assisted embolization of right supraclinoid ICA 
aneurysm. Assessment: A 63year old female with PMHx of HTN , pituitary adenoma, Vit D deficiency, insomnia, migraines, anxiety, with bilateral unruptured supraclinoid ICA aneurysms. Patient is s/p Surpass embolization of right ICA aneurysm on 11/5/20. She will have Suprass embolization of left ICA aneurysm next week. Plan: 
Conitnue with aspirin and Brilinta Keep normotension NPO day before procedure Thank you for this consult and participating in the care of this patient. Signed By: Stephen Soto MD   
 December 2, 2020

## 2020-12-02 NOTE — PROGRESS NOTES
NeuroInterventional Surgery clinic note  Beryle Sharps, MD    Patient: Selwyn Esqueda MRN: [id number]  SSN: [id number]    YOB: 1965  Age: 63 y.o. Sex: female      Chief Complaint:    Subjective:      Selwyn Esqueda is a 63 y.o. female who is being seen for follow up s/p Surpass embolization of intracranial aneurysm performed on 11/5/20. Patient is doing well. She complains of mild headaches that gets relieved with tylenol. Patient also complains of some dizziness. No new neurological deficits. Patient is compliant with her aspirin and Brilinta. Past Medical History:   Diagnosis Date    Anxiety     Cerebral aneurysm 2020    Right and left supraclinoid ICA aneurysms    Chest pain     Fainting     Headache     migraines    Hiatal hernia     Hypertension     Joint pain     Menopause     early aj at age 38    Ringing in ears     Skipped beats     Trouble in sleeping     Vertigo     Vertigo     Vitamin D deficiency      Family History   Problem Relation Age of Onset    Hypertension Mother     Hypertension Father     Headache Other      Social History     Tobacco Use    Smoking status: Never Smoker    Smokeless tobacco: Never Used   Substance Use Topics    Alcohol use: Yes     Comment: 2-3 weekly      Cannot display prior to admission medications because the patient has not been admitted in this contact. Allergies   Allergen Reactions    Shellfish Derived Diarrhea and Nausea and Vomiting    Sulfa (Sulfonamide Antibiotics) Hives       Review of Systems:  A comprehensive review of systems was negative except for that written in the History of Present Illness. Denies numbness, tingling, chest pain, leg pain, nausea, vomiting, difficulty swallowing, headache, and dyspnea.      Objective:     Vitals:    12/01/20 1100   BP: (!) 140/92   Pulse: 74   Temp: 97.3 °F (36.3 °C)   TempSrc: Temporal   SpO2: 98%   Weight: 230 lb (104.3 kg)   Height: 5' 6\" (1.676 m) Physical Exam:  GENERAL: Calm, cooperative, NAD  SKIN: Warm, dry, color appropriate for ethnicity. Groin site soft, with no odor, bleeding or drainage noted. Mild ecchymosis present. Neurologic Exam:  Mental Status:  Alert and oriented x 4. Appropriate affect, mood and behavior. Language:    Normal fluency, repetition, comprehension and naming. Cranial Nerves:   Pupils 3 mm, equal, round and reactive to light. Visual fields full to confrontation. Extraocular movements intact. Facial sensation intact. Full facial strength, no asymmetry. Hearing grossly intact bilaterally. No dysarthria. Tongue protrudes to midline, palate elevates symmetrically. Shoulder shrug 5/5 bilaterally. Motor:    No pronator drift. Bulk and tone normal.      5/5 power in all extremities proximally and distally. No involuntary movements. Sensation:    Sensation intact throughout to light touch, temperature, pinprick, vibration, proprioception     Reflexes:    Reflexes are 2+ at the biceps, triceps, patella and achilles bilaterally. Negative Babinskis    Coordination & Gait: Normal. FTN and HTS intact with no ataxia present.     Labs:  Lab Results   Component Value Date/Time    WBC 7.3 11/12/2020 12:42 AM    Hemoglobin (POC) 13.6 11/05/2010 12:35 PM    HGB 13.1 11/12/2020 12:42 AM    Hematocrit (POC) 40 11/05/2010 12:35 PM    HCT 41.0 11/12/2020 12:42 AM    PLATELET 342 90/43/0616 12:42 AM    MCV 86.5 11/12/2020 12:42 AM      Lab Results   Component Value Date/Time    Sodium 134 (L) 11/12/2020 12:42 AM    Potassium 3.7 11/12/2020 12:42 AM    Chloride 103 11/12/2020 12:42 AM    CO2 28 11/12/2020 12:42 AM    Anion gap 3 (L) 11/12/2020 12:42 AM    Glucose 116 (H) 11/12/2020 12:42 AM    BUN 18 11/12/2020 12:42 AM    Creatinine 0.88 11/12/2020 12:42 AM    BUN/Creatinine ratio 20 11/12/2020 12:42 AM    GFR est AA >60 11/12/2020 12:42 AM    GFR est non-AA >60 11/12/2020 12:42 AM Calcium 9.1 11/12/2020 12:42 AM     Lab Results   Component Value Date/Time    Troponin-I, Qt. <0.05 07/09/2020 08:20 PM       Imaging:  CT Results (maximum last 3): Results from Hospital Encounter encounter on 11/12/20   CTA NECK    Narrative CLINICAL HISTORY: headache, blurred vision, stent assisted embolization of right  supraclinoid ICA aneurysm. Recent left sided aneurysm stent  INDICATION: headache, blurred vision, recent left sided aneurysm stent    COMPARISON: CTA of the abdomen 999-2020, cerebral angiography 10/5/2020,  11/9/2020. CONTRAST: Isovue-370    TECHNIQUE:  Unenhanced  images were obtained to localize the volume for  acquisition. Multislice helical axial CT angiography was performed from the  aortic arch to the top of the head during uneventful rapid bolus intravenous  contrast administration. Coronal and sagittal reformations and 3D post  processing was performed. CT dose reduction was achieved through use of a  standardized protocol tailored for this examination and automatic exposure  control for dose modulation. FINDINGS:  CTA NECK  There is conventional three vessel arch anatomy. The bilateral subclavian,  common carotid, and internal carotid arteries are patent with no flow-limiting  stenosis. % of right carotid artery stenosis: 0  % of left carotid artery stenosis: 0    NASCET method was utilized for calculating stenosis. Left vertebral artery slightly larger than right vertebral artery. No pulmonary  mass or nodule. . The cervical soft tissues are unremarkable. There are  degenerative changes of the cervical spine. CTA HEAD  Left supraclinoid ICA aneurysm measures 8 x 7 x 6 mm in size. Not significantly  changed in size. Approximately 6 mm as well. Left middle cerebral artery is from the posterior  aspect of the aneurysm. .    Status post stent assisted embolization of right supraclinoid aneurysm. Persistent filling of the aneurysm sac on the right at this time. Residual sac  may be slightly diminished in size compared to prior study Petrous and cavernous  carotid arteries are patent. .A 2 segments are patent. Symmetric arborization of  M2 vessels is demonstrated. The basilar artery is patent. . The M1 segments are  patent bilaterally. .The posterior cerebral arteries on the right and on the left  are patent. .  There are no sizable posterior communicating arteries. No evidence of acute intracranial hemorrhage or midline shift is demonstrated. Impression IMPRESSION:    No evidence of acute intracranial processes/subarachnoid hemorrhage. Status post stenting of the supraclinoid ICA for embolization of right  supraclinoid ICA aneurysm. Persistent filling of the residual aneurysm sac on  the right. Residual sac is slightly diminished in overall size compared to the  prior CTA. No interval change in the appearance of the left supraclinoid ICA aneurysm. There is no evidence of dissection or hemodynamically significant stenosis. .    There is no intracranial mass, hemorrhage or evidence of acute infarction. No acute intracranial process is identified. .    CTA HEAD    Narrative CLINICAL HISTORY: headache, blurred vision, stent assisted embolization of right  supraclinoid ICA aneurysm. Recent left sided aneurysm stent  INDICATION: headache, blurred vision, recent left sided aneurysm stent    COMPARISON: CTA of the abdomen 999-2020, cerebral angiography 10/5/2020,  11/9/2020. CONTRAST: Isovue-370    TECHNIQUE:  Unenhanced  images were obtained to localize the volume for  acquisition. Multislice helical axial CT angiography was performed from the  aortic arch to the top of the head during uneventful rapid bolus intravenous  contrast administration. Coronal and sagittal reformations and 3D post  processing was performed.   CT dose reduction was achieved through use of a  standardized protocol tailored for this examination and automatic exposure  control for dose modulation. FINDINGS:  CTA NECK  There is conventional three vessel arch anatomy. The bilateral subclavian,  common carotid, and internal carotid arteries are patent with no flow-limiting  stenosis. % of right carotid artery stenosis: 0  % of left carotid artery stenosis: 0    NASCET method was utilized for calculating stenosis. Left vertebral artery slightly larger than right vertebral artery. No pulmonary  mass or nodule. . The cervical soft tissues are unremarkable. There are  degenerative changes of the cervical spine. CTA HEAD  Left supraclinoid ICA aneurysm measures 8 x 7 x 6 mm in size. Not significantly  changed in size. Approximately 6 mm as well. Left middle cerebral artery is from the posterior  aspect of the aneurysm. .    Status post stent assisted embolization of right supraclinoid aneurysm. Persistent filling of the aneurysm sac on the right at this time. Residual sac  may be slightly diminished in size compared to prior study Petrous and cavernous  carotid arteries are patent. .A 2 segments are patent. Symmetric arborization of  M2 vessels is demonstrated. The basilar artery is patent. . The M1 segments are  patent bilaterally. .The posterior cerebral arteries on the right and on the left  are patent. .  There are no sizable posterior communicating arteries. No evidence of acute intracranial hemorrhage or midline shift is demonstrated. Impression IMPRESSION:    No evidence of acute intracranial processes/subarachnoid hemorrhage. Status post stenting of the supraclinoid ICA for embolization of right  supraclinoid ICA aneurysm. Persistent filling of the residual aneurysm sac on  the right. Residual sac is slightly diminished in overall size compared to the  prior CTA. No interval change in the appearance of the left supraclinoid ICA aneurysm. There is no evidence of dissection or hemodynamically significant stenosis. .    There is no intracranial mass, hemorrhage or evidence of acute infarction. No acute intracranial process is identified. .    CT HEAD WO CONT    Narrative CLINICAL HISTORY: acute headache/vision change after cerebral stent placed  INDICATION: acute headache/vision change after cerebral stent placed  COMPARISON: 11/6/2020. CT dose reduction was achieved through use of a standardized protocol tailored  for this examination and automatic exposure control for dose modulation. TECHNIQUE: Serial axial images with a collimation of 5 mm were obtained from the  skull base through the vertex    FINDINGS:   The sulci and ventricles are within normal limits for patient age. There is no  evidence of an acute infarction, hemorrhage, or mass-effect. There is no  evidence of midline shift or hydrocephalus. Posterior fossa structures are  unremarkable. No extra-axial collections are seen. Mastoid air cells are well pneumatized and clear. Right cavernous and supraclinoid ICA stent in place      Impression IMPRESSION:   No acute intracranial process. Patient is status post stent assisted embolization of right supraclinoid ICA  aneurysm. Assessment:   A 63year old female with PMHx of HTN , pituitary adenoma, Vit D deficiency, insomnia, migraines, anxiety, with bilateral unruptured supraclinoid ICA aneurysms. Patient is s/p Surpass embolization of right ICA aneurysm on 11/5/20. She will have Suprass embolization of left ICA aneurysm next week. Plan:  Conitnue with aspirin and Brilinta  Keep normotension  NPO day before procedure    Thank you for this consult and participating in the care of this patient.   Signed By: Carissa Deleon MD     December 2, 2020

## 2020-12-04 ENCOUNTER — TELEPHONE (OUTPATIENT)
Dept: NEUROSURGERY | Age: 55
End: 2020-12-04

## 2020-12-04 NOTE — TELEPHONE ENCOUNTER
Called patient to remind her of upcoming procedure on 12/9/20 and COVID test required today. Left voicemail with office number for return call.

## 2020-12-05 ENCOUNTER — TRANSCRIBE ORDER (OUTPATIENT)
Dept: REGISTRATION | Age: 55
End: 2020-12-05

## 2020-12-05 ENCOUNTER — HOSPITAL ENCOUNTER (OUTPATIENT)
Dept: PREADMISSION TESTING | Age: 55
Discharge: HOME | End: 2020-12-05
Payer: COMMERCIAL

## 2020-12-05 DIAGNOSIS — Z01.812 PRE-PROCEDURE LAB EXAM: Primary | ICD-10-CM

## 2020-12-05 DIAGNOSIS — Z01.812 PRE-PROCEDURE LAB EXAM: ICD-10-CM

## 2020-12-05 PROCEDURE — 87635 SARS-COV-2 COVID-19 AMP PRB: CPT

## 2020-12-06 LAB — SARS-COV-2, COV2NT: NOT DETECTED

## 2020-12-07 DIAGNOSIS — R42 VERTIGO: ICD-10-CM

## 2020-12-07 RX ORDER — ALPRAZOLAM 0.5 MG/1
0.5 TABLET ORAL
Qty: 30 TAB | Refills: 2 | Status: SHIPPED | OUTPATIENT
Start: 2020-12-07 | End: 2022-02-23 | Stop reason: SDUPTHER

## 2020-12-08 ENCOUNTER — TELEPHONE (OUTPATIENT)
Dept: NEUROSURGERY | Age: 55
End: 2020-12-08

## 2020-12-08 NOTE — TELEPHONE ENCOUNTER
Left a message for patient that her procedure for tomorrow was cancelled due to mechanical failure. Per provider, it will be about a week before the machine is operable. Call office to confirm message.

## 2020-12-10 ENCOUNTER — TELEPHONE (OUTPATIENT)
Dept: NEUROSURGERY | Age: 55
End: 2020-12-10

## 2020-12-15 ENCOUNTER — TELEPHONE (OUTPATIENT)
Dept: NEUROSURGERY | Age: 55
End: 2020-12-15

## 2020-12-15 DIAGNOSIS — I67.1 INTRACRANIAL ANEURYSM: Primary | ICD-10-CM

## 2020-12-15 NOTE — TELEPHONE ENCOUNTER
Message left for patient with change in time to 11:00 am arrival on 12/18/2020 at [facility]. Have COVID testing done tomorrow and will have procedure done on Friday. Call office with questions.

## 2020-12-15 NOTE — TELEPHONE ENCOUNTER
Spoke to patient regarding COVID test for her procedure on 12/18/2020. Patient will try to have Rapid test done at her PCP's office.

## 2020-12-16 ENCOUNTER — HOSPITAL ENCOUNTER (OUTPATIENT)
Dept: PREADMISSION TESTING | Age: 55
Discharge: HOME | DRG: 027 | End: 2020-12-16
Payer: COMMERCIAL

## 2020-12-16 ENCOUNTER — TRANSCRIBE ORDER (OUTPATIENT)
Dept: REGISTRATION | Age: 55
End: 2020-12-16

## 2020-12-16 DIAGNOSIS — Z01.812 PRE-PROCEDURE LAB EXAM: Primary | ICD-10-CM

## 2020-12-16 DIAGNOSIS — Z01.812 PRE-PROCEDURE LAB EXAM: ICD-10-CM

## 2020-12-16 PROCEDURE — 87635 SARS-COV-2 COVID-19 AMP PRB: CPT

## 2020-12-17 ENCOUNTER — ANESTHESIA EVENT (OUTPATIENT)
Dept: INTERVENTIONAL RADIOLOGY/VASCULAR | Age: 55
DRG: 027 | End: 2020-12-17
Payer: COMMERCIAL

## 2020-12-17 ENCOUNTER — TELEPHONE (OUTPATIENT)
Dept: NEUROSURGERY | Age: 55
End: 2020-12-17

## 2020-12-17 ENCOUNTER — TELEPHONE (OUTPATIENT)
Dept: FAMILY MEDICINE CLINIC | Age: 55
End: 2020-12-17

## 2020-12-17 LAB — SARS-COV-2, COV2NT: NOT DETECTED

## 2020-12-17 NOTE — TELEPHONE ENCOUNTER
Spoke to patient to confirm procedure tomorrow at [facility]. Arrival time 11:00 am.  Informed patient no eating or drinking after midnight and take morning medications with sips of water. Informed patient she would be staying overnight in ICU. Patient stated understanding. Per Amparo Parkinson NP, she is aware patient's COVID testing has not resulted. Patient had Negative test on 12/5/2020 but her procedure was cancelled by provider.

## 2020-12-17 NOTE — TELEPHONE ENCOUNTER
Patient called. Would like to inform that she is now using [facility] and asks that any additional refills be sent there. Chart updated. Currently okay and no refills needed at this time.

## 2020-12-17 NOTE — TELEPHONE ENCOUNTER
, [address] [place]             Caller's first and last name: N/A   Reason for call: Speak to someone in the office   Callback required yes/no and why: yes   Best contact number(s): [phone]   Details to clarify the request: Pt wants to speak to someone in the office regarding her prescriptions and the pharmacy they are being sent to.

## 2020-12-18 ENCOUNTER — ANESTHESIA (OUTPATIENT)
Dept: INTERVENTIONAL RADIOLOGY/VASCULAR | Age: 55
DRG: 027 | End: 2020-12-18
Payer: COMMERCIAL

## 2020-12-18 ENCOUNTER — HOSPITAL ENCOUNTER (INPATIENT)
Dept: INTERVENTIONAL RADIOLOGY/VASCULAR | Age: 55
LOS: 1 days | Discharge: HOME | DRG: 027 | End: 2020-12-19
Attending: RADIOLOGY | Admitting: STUDENT IN AN ORGANIZED HEALTH CARE EDUCATION/TRAINING PROGRAM
Payer: COMMERCIAL

## 2020-12-18 DIAGNOSIS — I67.1 CEREBRAL ANEURYSM: ICD-10-CM

## 2020-12-18 LAB
ACT BLD: 202 SECS (ref 79–138)
ASPIRIN TEST, ASPIRN: 566 ARU
P2Y12 PLT RESPONSE,PPPR: 55 PRU (ref 194–418)

## 2020-12-18 PROCEDURE — 74011000250 HC RX REV CODE- 250: Performed by: STUDENT IN AN ORGANIZED HEALTH CARE EDUCATION/TRAINING PROGRAM

## 2020-12-18 PROCEDURE — 74011000250 HC RX REV CODE- 250

## 2020-12-18 PROCEDURE — 77030020269 HC MISC IMPL

## 2020-12-18 PROCEDURE — 36224 PLACE CATH CAROTD ART: CPT | Performed by: STUDENT IN AN ORGANIZED HEALTH CARE EDUCATION/TRAINING PROGRAM

## 2020-12-18 PROCEDURE — 03VG3HZ RESTRICTION OF INTRACRANIAL ARTERY WITH INTRALUMINAL DEVICE, FLOW DIVERTER, PERCUTANEOUS APPROACH: ICD-10-PCS | Performed by: STUDENT IN AN ORGANIZED HEALTH CARE EDUCATION/TRAINING PROGRAM

## 2020-12-18 PROCEDURE — C1769 GUIDE WIRE: HCPCS

## 2020-12-18 PROCEDURE — 74011250636 HC RX REV CODE- 250/636: Performed by: NURSE ANESTHETIST, CERTIFIED REGISTERED

## 2020-12-18 PROCEDURE — 74011250636 HC RX REV CODE- 250/636: Performed by: STUDENT IN AN ORGANIZED HEALTH CARE EDUCATION/TRAINING PROGRAM

## 2020-12-18 PROCEDURE — C1887 CATHETER, GUIDING: HCPCS

## 2020-12-18 PROCEDURE — 77030005401 HC CATH RAD ARRO -A

## 2020-12-18 PROCEDURE — 61624 TCAT PERM OCCLS/EMBOLJ CNS: CPT | Performed by: STUDENT IN AN ORGANIZED HEALTH CARE EDUCATION/TRAINING PROGRAM

## 2020-12-18 PROCEDURE — 77030005402 HC CATH RAD ART LN KT TELE -B

## 2020-12-18 PROCEDURE — 77030012468 HC VLV BLEEDBK CNTRL ABBT -B

## 2020-12-18 PROCEDURE — 77030008684 HC TU ET CUF COVD -B: Performed by: ANESTHESIOLOGY

## 2020-12-18 PROCEDURE — 74011000258 HC RX REV CODE- 258: Performed by: NURSE ANESTHETIST, CERTIFIED REGISTERED

## 2020-12-18 PROCEDURE — 77030008584 HC TOOL GDWRE DEV TERU -A

## 2020-12-18 PROCEDURE — 85576 BLOOD PLATELET AGGREGATION: CPT

## 2020-12-18 PROCEDURE — B3141ZZ FLUOROSCOPY OF LEFT COMMON CAROTID ARTERY USING LOW OSMOLAR CONTRAST: ICD-10-PCS | Performed by: STUDENT IN AN ORGANIZED HEALTH CARE EDUCATION/TRAINING PROGRAM

## 2020-12-18 PROCEDURE — B3171ZZ FLUOROSCOPY OF LEFT INTERNAL CAROTID ARTERY USING LOW OSMOLAR CONTRAST: ICD-10-PCS | Performed by: STUDENT IN AN ORGANIZED HEALTH CARE EDUCATION/TRAINING PROGRAM

## 2020-12-18 PROCEDURE — 74011250636 HC RX REV CODE- 250/636: Performed by: NURSE PRACTITIONER

## 2020-12-18 PROCEDURE — 77030038563 HC TU ART PRESSR ICUM -Z

## 2020-12-18 PROCEDURE — 76937 US GUIDE VASCULAR ACCESS: CPT | Performed by: STUDENT IN AN ORGANIZED HEALTH CARE EDUCATION/TRAINING PROGRAM

## 2020-12-18 PROCEDURE — 77030014021 HC SYR ANGI FIX LOK MRTM -A

## 2020-12-18 PROCEDURE — 74011000250 HC RX REV CODE- 250: Performed by: NURSE ANESTHETIST, CERTIFIED REGISTERED

## 2020-12-18 PROCEDURE — C1760 CLOSURE DEV, VASC: HCPCS

## 2020-12-18 PROCEDURE — 76377 3D RENDER W/INTRP POSTPROCES: CPT | Performed by: STUDENT IN AN ORGANIZED HEALTH CARE EDUCATION/TRAINING PROGRAM

## 2020-12-18 PROCEDURE — 85347 COAGULATION TIME ACTIVATED: CPT

## 2020-12-18 PROCEDURE — 74011000636 HC RX REV CODE- 636: Performed by: STUDENT IN AN ORGANIZED HEALTH CARE EDUCATION/TRAINING PROGRAM

## 2020-12-18 PROCEDURE — C2628 CATHETER, OCCLUSION: HCPCS

## 2020-12-18 PROCEDURE — 36224 PLACE CATH CAROTD ART: CPT

## 2020-12-18 PROCEDURE — 77030026438 HC STYL ET INTUB CARD -A: Performed by: ANESTHESIOLOGY

## 2020-12-18 PROCEDURE — 77030038863

## 2020-12-18 PROCEDURE — 76060000037 HC ANESTHESIA 3 TO 3.5 HR

## 2020-12-18 PROCEDURE — 2709999900 HC NON-CHARGEABLE SUPPLY

## 2020-12-18 PROCEDURE — 77030020268 HC MISC GENERAL SUPPLY

## 2020-12-18 PROCEDURE — 36415 COLL VENOUS BLD VENIPUNCTURE: CPT

## 2020-12-18 PROCEDURE — 74011250637 HC RX REV CODE- 250/637: Performed by: NURSE PRACTITIONER

## 2020-12-18 PROCEDURE — 65610000006 HC RM INTENSIVE CARE

## 2020-12-18 RX ORDER — SODIUM CHLORIDE 0.9 % (FLUSH) 0.9 %
5-40 SYRINGE (ML) INJECTION AS NEEDED
Status: DISCONTINUED | OUTPATIENT
Start: 2020-12-18 | End: 2020-12-19 | Stop reason: HOSPADM

## 2020-12-18 RX ORDER — ACETAMINOPHEN 325 MG/1
650 TABLET ORAL
Status: DISCONTINUED | OUTPATIENT
Start: 2020-12-18 | End: 2020-12-19 | Stop reason: HOSPADM

## 2020-12-18 RX ORDER — HEPARIN SODIUM 1000 [USP'U]/ML
INJECTION, SOLUTION INTRAVENOUS; SUBCUTANEOUS AS NEEDED
Status: DISCONTINUED | OUTPATIENT
Start: 2020-12-18 | End: 2020-12-18 | Stop reason: HOSPADM

## 2020-12-18 RX ORDER — SODIUM CHLORIDE 9 MG/ML
25 INJECTION, SOLUTION INTRAVENOUS CONTINUOUS
Status: DISCONTINUED | OUTPATIENT
Start: 2020-12-18 | End: 2020-12-18

## 2020-12-18 RX ORDER — SODIUM CHLORIDE, SODIUM LACTATE, POTASSIUM CHLORIDE, CALCIUM CHLORIDE 600; 310; 30; 20 MG/100ML; MG/100ML; MG/100ML; MG/100ML
INJECTION, SOLUTION INTRAVENOUS
Status: DISCONTINUED | OUTPATIENT
Start: 2020-12-18 | End: 2020-12-18 | Stop reason: HOSPADM

## 2020-12-18 RX ORDER — PHENYLEPHRINE HCL IN 0.9% NACL 0.4MG/10ML
SYRINGE (ML) INTRAVENOUS AS NEEDED
Status: DISCONTINUED | OUTPATIENT
Start: 2020-12-18 | End: 2020-12-18 | Stop reason: HOSPADM

## 2020-12-18 RX ORDER — SUCCINYLCHOLINE CHLORIDE 20 MG/ML
INJECTION INTRAMUSCULAR; INTRAVENOUS AS NEEDED
Status: DISCONTINUED | OUTPATIENT
Start: 2020-12-18 | End: 2020-12-18 | Stop reason: HOSPADM

## 2020-12-18 RX ORDER — LISINOPRIL 20 MG/1
40 TABLET ORAL DAILY
Status: DISCONTINUED | OUTPATIENT
Start: 2020-12-19 | End: 2020-12-19 | Stop reason: HOSPADM

## 2020-12-18 RX ORDER — PROPOFOL 10 MG/ML
INJECTION, EMULSION INTRAVENOUS AS NEEDED
Status: DISCONTINUED | OUTPATIENT
Start: 2020-12-18 | End: 2020-12-18 | Stop reason: HOSPADM

## 2020-12-18 RX ORDER — SODIUM CHLORIDE 0.9 % (FLUSH) 0.9 %
10 SYRINGE (ML) INJECTION AS NEEDED
Status: DISCONTINUED | OUTPATIENT
Start: 2020-12-18 | End: 2020-12-18

## 2020-12-18 RX ORDER — SODIUM CHLORIDE 9 MG/ML
INJECTION, SOLUTION INTRAVENOUS
Status: DISCONTINUED | OUTPATIENT
Start: 2020-12-18 | End: 2020-12-18 | Stop reason: HOSPADM

## 2020-12-18 RX ORDER — METOCLOPRAMIDE HYDROCHLORIDE 5 MG/ML
5 INJECTION INTRAMUSCULAR; INTRAVENOUS
Status: DISCONTINUED | OUTPATIENT
Start: 2020-12-18 | End: 2020-12-19 | Stop reason: HOSPADM

## 2020-12-18 RX ORDER — HYDROCHLOROTHIAZIDE 25 MG/1
12.5 TABLET ORAL DAILY
Status: DISCONTINUED | OUTPATIENT
Start: 2020-12-19 | End: 2020-12-19 | Stop reason: HOSPADM

## 2020-12-18 RX ORDER — DEXAMETHASONE SODIUM PHOSPHATE 4 MG/ML
INJECTION, SOLUTION INTRA-ARTICULAR; INTRALESIONAL; INTRAMUSCULAR; INTRAVENOUS; SOFT TISSUE AS NEEDED
Status: DISCONTINUED | OUTPATIENT
Start: 2020-12-18 | End: 2020-12-18 | Stop reason: HOSPADM

## 2020-12-18 RX ORDER — SODIUM CHLORIDE 0.9 % (FLUSH) 0.9 %
5-40 SYRINGE (ML) INJECTION EVERY 8 HOURS
Status: DISCONTINUED | OUTPATIENT
Start: 2020-12-18 | End: 2020-12-19 | Stop reason: HOSPADM

## 2020-12-18 RX ORDER — ROCURONIUM BROMIDE 10 MG/ML
INJECTION, SOLUTION INTRAVENOUS AS NEEDED
Status: DISCONTINUED | OUTPATIENT
Start: 2020-12-18 | End: 2020-12-18 | Stop reason: HOSPADM

## 2020-12-18 RX ORDER — ONDANSETRON 2 MG/ML
INJECTION INTRAMUSCULAR; INTRAVENOUS AS NEEDED
Status: DISCONTINUED | OUTPATIENT
Start: 2020-12-18 | End: 2020-12-18 | Stop reason: HOSPADM

## 2020-12-18 RX ORDER — FENTANYL CITRATE 50 UG/ML
INJECTION, SOLUTION INTRAMUSCULAR; INTRAVENOUS AS NEEDED
Status: DISCONTINUED | OUTPATIENT
Start: 2020-12-18 | End: 2020-12-18 | Stop reason: HOSPADM

## 2020-12-18 RX ORDER — LABETALOL HYDROCHLORIDE 5 MG/ML
10 INJECTION, SOLUTION INTRAVENOUS
Status: DISCONTINUED | OUTPATIENT
Start: 2020-12-18 | End: 2020-12-19 | Stop reason: HOSPADM

## 2020-12-18 RX ORDER — ONDANSETRON 2 MG/ML
4 INJECTION INTRAMUSCULAR; INTRAVENOUS
Status: DISCONTINUED | OUTPATIENT
Start: 2020-12-18 | End: 2020-12-19 | Stop reason: HOSPADM

## 2020-12-18 RX ORDER — ASPIRIN 81 MG/1
81 TABLET ORAL DAILY
Status: DISCONTINUED | OUTPATIENT
Start: 2020-12-19 | End: 2020-12-19 | Stop reason: HOSPADM

## 2020-12-18 RX ORDER — ALPRAZOLAM 0.5 MG/1
0.5 TABLET ORAL
Status: DISCONTINUED | OUTPATIENT
Start: 2020-12-18 | End: 2020-12-19 | Stop reason: HOSPADM

## 2020-12-18 RX ORDER — VERAPAMIL HYDROCHLORIDE 2.5 MG/ML
10 INJECTION, SOLUTION INTRAVENOUS
Status: DISCONTINUED | OUTPATIENT
Start: 2020-12-18 | End: 2020-12-18

## 2020-12-18 RX ORDER — SODIUM CHLORIDE, SODIUM LACTATE, POTASSIUM CHLORIDE, CALCIUM CHLORIDE 600; 310; 30; 20 MG/100ML; MG/100ML; MG/100ML; MG/100ML
100 INJECTION, SOLUTION INTRAVENOUS CONTINUOUS
Status: DISCONTINUED | OUTPATIENT
Start: 2020-12-18 | End: 2020-12-19 | Stop reason: HOSPADM

## 2020-12-18 RX ORDER — LIDOCAINE HYDROCHLORIDE 20 MG/ML
20 INJECTION, SOLUTION INFILTRATION; PERINEURAL
Status: DISCONTINUED | OUTPATIENT
Start: 2020-12-18 | End: 2020-12-18

## 2020-12-18 RX ORDER — VERAPAMIL HYDROCHLORIDE 2.5 MG/ML
INJECTION, SOLUTION INTRAVENOUS
Status: COMPLETED
Start: 2020-12-18 | End: 2020-12-18

## 2020-12-18 RX ORDER — LIDOCAINE HYDROCHLORIDE 20 MG/ML
INJECTION, SOLUTION EPIDURAL; INFILTRATION; INTRACAUDAL; PERINEURAL AS NEEDED
Status: DISCONTINUED | OUTPATIENT
Start: 2020-12-18 | End: 2020-12-18 | Stop reason: HOSPADM

## 2020-12-18 RX ADMIN — HEPARIN SODIUM 4000 UNITS: 1000 INJECTION INTRAVENOUS; SUBCUTANEOUS at 14:56

## 2020-12-18 RX ADMIN — FENTANYL CITRATE 50 MCG: 50 INJECTION, SOLUTION INTRAMUSCULAR; INTRAVENOUS at 15:31

## 2020-12-18 RX ADMIN — SODIUM CHLORIDE: 900 INJECTION, SOLUTION INTRAVENOUS at 12:22

## 2020-12-18 RX ADMIN — HEPARIN SODIUM 4000 UNITS: 1000 INJECTION INTRAVENOUS; SUBCUTANEOUS at 14:52

## 2020-12-18 RX ADMIN — VERAPAMIL HYDROCHLORIDE 5 MG: 2.5 INJECTION, SOLUTION INTRAVENOUS at 13:41

## 2020-12-18 RX ADMIN — HEPARIN SODIUM 4000 UNITS: 1000 INJECTION INTRAVENOUS; SUBCUTANEOUS at 14:57

## 2020-12-18 RX ADMIN — HEPARIN SODIUM 4000 UNITS: 1000 INJECTION INTRAVENOUS; SUBCUTANEOUS at 14:50

## 2020-12-18 RX ADMIN — PHENYLEPHRINE HYDROCHLORIDE 80 MCG: 10 INJECTION INTRAVENOUS at 13:41

## 2020-12-18 RX ADMIN — HEPARIN SODIUM 4000 UNITS: 1000 INJECTION INTRAVENOUS; SUBCUTANEOUS at 14:33

## 2020-12-18 RX ADMIN — PROPOFOL 150 MG: 10 INJECTION, EMULSION INTRAVENOUS at 12:27

## 2020-12-18 RX ADMIN — FENTANYL CITRATE 50 MCG: 50 INJECTION, SOLUTION INTRAMUSCULAR; INTRAVENOUS at 15:26

## 2020-12-18 RX ADMIN — ROCURONIUM BROMIDE 25 MG: 10 SOLUTION INTRAVENOUS at 12:35

## 2020-12-18 RX ADMIN — FENTANYL CITRATE 50 MCG: 50 INJECTION, SOLUTION INTRAMUSCULAR; INTRAVENOUS at 12:27

## 2020-12-18 RX ADMIN — PHENYLEPHRINE HYDROCHLORIDE 50 MCG/MIN: 10 INJECTION INTRAVENOUS at 13:00

## 2020-12-18 RX ADMIN — SODIUM CHLORIDE 25 ML/HR: 900 INJECTION, SOLUTION INTRAVENOUS at 12:00

## 2020-12-18 RX ADMIN — HEPARIN SODIUM 5000 UNITS: 1000 INJECTION, SOLUTION INTRAVENOUS; SUBCUTANEOUS at 13:28

## 2020-12-18 RX ADMIN — TICAGRELOR 60 MG: 60 TABLET ORAL at 21:42

## 2020-12-18 RX ADMIN — HEPARIN SODIUM 4000 UNITS: 1000 INJECTION INTRAVENOUS; SUBCUTANEOUS at 14:58

## 2020-12-18 RX ADMIN — LIDOCAINE HYDROCHLORIDE 200 MG: 20 INJECTION, SOLUTION INFILTRATION; PERINEURAL at 13:00

## 2020-12-18 RX ADMIN — DEXTROSE 5 MG/HR: 5 SOLUTION INTRAVENOUS at 14:59

## 2020-12-18 RX ADMIN — DEXAMETHASONE SODIUM PHOSPHATE 6 MG: 4 INJECTION, SOLUTION INTRAMUSCULAR; INTRAVENOUS at 13:00

## 2020-12-18 RX ADMIN — PHENYLEPHRINE HYDROCHLORIDE 80 MCG: 10 INJECTION INTRAVENOUS at 13:40

## 2020-12-18 RX ADMIN — SODIUM CHLORIDE, POTASSIUM CHLORIDE, SODIUM LACTATE AND CALCIUM CHLORIDE: 600; 310; 30; 20 INJECTION, SOLUTION INTRAVENOUS at 12:22

## 2020-12-18 RX ADMIN — LIDOCAINE HYDROCHLORIDE 60 MG: 20 INJECTION, SOLUTION EPIDURAL; INFILTRATION; INTRACAUDAL; PERINEURAL at 12:27

## 2020-12-18 RX ADMIN — IOPAMIDOL 300 ML: 612 INJECTION, SOLUTION INTRAVENOUS at 13:46

## 2020-12-18 RX ADMIN — SUGAMMADEX 400 MG: 100 INJECTION, SOLUTION INTRAVENOUS at 14:47

## 2020-12-18 RX ADMIN — ROCURONIUM BROMIDE 5 MG: 10 SOLUTION INTRAVENOUS at 12:27

## 2020-12-18 RX ADMIN — ACETAMINOPHEN 650 MG: 325 TABLET ORAL at 20:42

## 2020-12-18 RX ADMIN — PROPOFOL 50 MG: 10 INJECTION, EMULSION INTRAVENOUS at 12:35

## 2020-12-18 RX ADMIN — SUCCINYLCHOLINE CHLORIDE 140 MG: 20 INJECTION, SOLUTION INTRAMUSCULAR; INTRAVENOUS at 12:27

## 2020-12-18 RX ADMIN — FENTANYL CITRATE 50 MCG: 50 INJECTION, SOLUTION INTRAMUSCULAR; INTRAVENOUS at 12:32

## 2020-12-18 RX ADMIN — METOCLOPRAMIDE 5 MG: 5 INJECTION, SOLUTION INTRAMUSCULAR; INTRAVENOUS at 22:29

## 2020-12-18 RX ADMIN — ONDANSETRON HYDROCHLORIDE 4 MG: 2 INJECTION, SOLUTION INTRAMUSCULAR; INTRAVENOUS at 13:00

## 2020-12-18 RX ADMIN — FENTANYL CITRATE 50 MCG: 50 INJECTION, SOLUTION INTRAMUSCULAR; INTRAVENOUS at 15:37

## 2020-12-18 RX ADMIN — ROCURONIUM BROMIDE 10 MG: 10 SOLUTION INTRAVENOUS at 13:26

## 2020-12-18 RX ADMIN — HEPARIN SODIUM 2000 UNITS: 1000 INJECTION, SOLUTION INTRAVENOUS; SUBCUTANEOUS at 14:31

## 2020-12-18 NOTE — ANESTHESIA POSTPROCEDURE EVALUATION
Post-Anesthesia Evaluation and Assessment    Patient: Milton Keenan MRN: [id number]  SSN: [id number]    YOB: 1965  Age: 63 y.o. Sex: female      I have evaluated the patient and they are stable and ready for discharge from the PACU. Cardiovascular Function/Vital Signs  Visit Vitals  /75 (BP 1 Location: Left arm, BP Patient Position: Supine)   Pulse 78   Temp 36.4 °C (97.6 °F)   Resp 16   Ht 5' 6\" (1.676 m)   Wt 103.4 kg (228 lb)   SpO2 93%   BMI 36.80 kg/m²       Patient is status post * No anesthesia type entered * anesthesia for * No procedures listed *. Nausea/Vomiting: None    Postoperative hydration reviewed and adequate. Pain:  Pain Scale 1: Numeric (0 - 10) (12/18/20 1545)  Pain Intensity 1: 2 (12/18/20 1545)   Managed    Neurological Status:   Neuro  Neurologic State: Alert (12/18/20 1615)  Orientation Level: Oriented X4 (12/18/20 1615)  Cognition: Appropriate decision making; Follows commands (12/18/20 1615)  Speech: Clear (12/18/20 1615)  Assessment L Pupil: Brisk;Round (12/18/20 1615)  Size L Pupil (mm): 3 (12/18/20 1615)  Assessment R Pupil: Brisk;Round (12/18/20 1615)  Size R Pupil (mm): 3 (12/18/20 1615)  LUE Motor Response: Purposeful (12/18/20 1615)  LLE Motor Response: Purposeful (12/18/20 1615)  RUE Motor Response: Purposeful (12/18/20 1615)  RLE Motor Response: Purposeful (12/18/20 1615)   At baseline    Mental Status, Level of Consciousness: Alert and  oriented to person, place, and time    Pulmonary Status:   O2 Device: Room air (12/18/20 1615)   Adequate oxygenation and airway patent    Complications related to anesthesia: None    Post-anesthesia assessment completed. No concerns    Signed By: Edgard Vaughn MD     December 18, 2020              * No procedures listed *.    general    <BSHSIANPOST>    INITIAL Post-op Vital signs: No vitals data found for the desired time range.

## 2020-12-18 NOTE — PROGRESS NOTES
Pt arrives ambulatory to angio department accompanied by spouse Si Stage for cerebral angiogram with Surpass embolization of left supraclinoid ICA aneurysm procedure. All assessments completed and consent was reviewed. Education given was regarding procedure, general anesthesia, post-procedure care and  management/follow-up. Opportunity for questions was provided and all questions and concerns were addressed. Dr Marixa Giles and [facility] CRNA to manage airway, vitals, medications. 1300 P2Y12 and aspirin assay drawn and sent to lab  1442 8 fr angioseal deployed at conclusion of procedure.   8755 Report given to Jhon Carrizales RN in angio,awaiting ICU bed

## 2020-12-18 NOTE — INTERVAL H&P NOTE
Update History & Physical    The Patient's History and Physical of December 1, 2020 was reviewed with the patient and I examined the patient. There was no change. The surgical site was confirmed by the patient and me. Plan:  The risk, benefits, expected outcome, and alternative to the recommended procedure have been discussed with the patient. Patient understands and wants to proceed with the procedure.     Electronically signed by Sophia Painting NP on 12/18/2020 at 12:20 PM

## 2020-12-18 NOTE — PROGRESS NOTES
RN called pharmacy to verify if pt was pre medicated previously due to shellfish derived allergy. Pt states it is \"only to [place] and is GI issues\". Pharmacist did not locate any previous medications, no previous documentation of any could be located in chart by this RN and pt denies ever having had to be premedicated. Dr. Alexis Ordonez at bedside aware.

## 2020-12-18 NOTE — BRIEF OP NOTE
NEUROINTERVENTIONAL SURGERY POST-PROCEDURE NOTE    PROCEDURE:  Diagnostic cerebral angiogram and embolization of left supraclinoid ICA aneurysm using the Surpass stent    VESSEL(S) STUDIED:  1. Left common carotid artery  2. Left internal carotid artery  3. Right common femoral artery VESSEL(S) TREATED:  1. Left internal carotid artery      PRELIMINARY REPORT & DISPOSITION:   Patient is s/p Surpass embolization     COMPLICATIONS:  None    FOLLOW-UP:  Neurochecks q1h   CTA head in AM DATE OF SERVICE:  12/16/2020 8:26 AM     ATTENDING SURGEON(S):  Jocelyn De La O MD    ANESTHESIA:   General anesthesia     MEDICATIONS:   See nursing record    PUNCTURE SITE:  Right common femoral artery. Arteriotomy closed with 6F angioseal. Flat x 2 hours. Right leg straight for 4h.           Jocelyn Hayden MD  NeuroInterventional Surgery

## 2020-12-18 NOTE — ANESTHESIA PREPROCEDURE EVALUATION
Relevant Problems   CARDIOVASCULAR   (+) Hypertension      ENDOCRINE   (+) Severe obesity (HCC)       Anesthetic History   No history of anesthetic complications            Review of Systems / Medical History  Patient summary reviewed, nursing notes reviewed and pertinent labs reviewed    Pulmonary  Within defined limits                 Neuro/Psych         Headaches and psychiatric history     Cardiovascular    Hypertension          PAD         GI/Hepatic/Renal  Within defined limits              Endo/Other  Within defined limits           Other Findings              Physical Exam    Airway  Mallampati: II  TM Distance: > 6 cm  Neck ROM: normal range of motion   Mouth opening: Normal     Cardiovascular  Regular rate and rhythm,  S1 and S2 normal,  no murmur, click, rub, or gallop             Dental  No notable dental hx       Pulmonary  Breath sounds clear to auscultation               Abdominal  GI exam deferred       Other Findings            Anesthetic Plan    ASA: 3  Anesthesia type: general          Induction: Intravenous  Anesthetic plan and risks discussed with: Patient

## 2020-12-19 ENCOUNTER — APPOINTMENT (OUTPATIENT)
Dept: CT IMAGING | Age: 55
DRG: 027 | End: 2020-12-19
Attending: NURSE PRACTITIONER
Payer: COMMERCIAL

## 2020-12-19 ENCOUNTER — ANESTHESIA EVENT (OUTPATIENT)
Dept: INTERVENTIONAL RADIOLOGY/VASCULAR | Age: 55
DRG: 027 | End: 2020-12-19
Payer: COMMERCIAL

## 2020-12-19 ENCOUNTER — ANESTHESIA (OUTPATIENT)
Dept: INTERVENTIONAL RADIOLOGY/VASCULAR | Age: 55
DRG: 027 | End: 2020-12-19
Payer: COMMERCIAL

## 2020-12-19 ENCOUNTER — APPOINTMENT (OUTPATIENT)
Dept: INTERVENTIONAL RADIOLOGY/VASCULAR | Age: 55
DRG: 027 | End: 2020-12-19
Attending: STUDENT IN AN ORGANIZED HEALTH CARE EDUCATION/TRAINING PROGRAM
Payer: COMMERCIAL

## 2020-12-19 VITALS
WEIGHT: 228 LBS | SYSTOLIC BLOOD PRESSURE: 116 MMHG | HEART RATE: 78 BPM | OXYGEN SATURATION: 100 % | RESPIRATION RATE: 14 BRPM | DIASTOLIC BLOOD PRESSURE: 78 MMHG | HEIGHT: 66 IN | TEMPERATURE: 98.1 F | BODY MASS INDEX: 36.64 KG/M2

## 2020-12-19 LAB
ACT BLD: 125 SECS (ref 79–138)
ANION GAP SERPL CALC-SCNC: 5 MMOL/L (ref 5–15)
ASPIRIN TEST, ASPIRN: 475 ARU
BUN SERPL-MCNC: 13 MG/DL (ref 6–20)
BUN/CREAT SERPL: 18 (ref 12–20)
CALCIUM SERPL-MCNC: 8.6 MG/DL (ref 8.5–10.1)
CHLORIDE SERPL-SCNC: 107 MMOL/L (ref 97–108)
CO2 SERPL-SCNC: 25 MMOL/L (ref 21–32)
CREAT SERPL-MCNC: 0.72 MG/DL (ref 0.55–1.02)
ERYTHROCYTE [DISTWIDTH] IN BLOOD BY AUTOMATED COUNT: 14 % (ref 11.5–14.5)
GLUCOSE SERPL-MCNC: 115 MG/DL (ref 65–100)
HCT VFR BLD AUTO: 38.9 % (ref 35–47)
HGB BLD-MCNC: 12.4 G/DL (ref 11.5–16)
MCH RBC QN AUTO: 28.2 PG (ref 26–34)
MCHC RBC AUTO-ENTMCNC: 31.9 G/DL (ref 30–36.5)
MCV RBC AUTO: 88.4 FL (ref 80–99)
NRBC # BLD: 0 K/UL (ref 0–0.01)
NRBC BLD-RTO: 0 PER 100 WBC
P2Y12 PLT RESPONSE,PPPR: 26 PRU (ref 194–418)
PLATELET # BLD AUTO: 272 K/UL (ref 150–400)
PMV BLD AUTO: 9.9 FL (ref 8.9–12.9)
POTASSIUM SERPL-SCNC: 3.7 MMOL/L (ref 3.5–5.1)
RBC # BLD AUTO: 4.4 M/UL (ref 3.8–5.2)
SODIUM SERPL-SCNC: 137 MMOL/L (ref 136–145)
WBC # BLD AUTO: 7.2 K/UL (ref 3.6–11)

## 2020-12-19 PROCEDURE — 74011250636 HC RX REV CODE- 250/636: Performed by: NURSE PRACTITIONER

## 2020-12-19 PROCEDURE — 36224 PLACE CATH CAROTD ART: CPT | Performed by: STUDENT IN AN ORGANIZED HEALTH CARE EDUCATION/TRAINING PROGRAM

## 2020-12-19 PROCEDURE — 36415 COLL VENOUS BLD VENIPUNCTURE: CPT

## 2020-12-19 PROCEDURE — 36200 PLACE CATHETER IN AORTA: CPT

## 2020-12-19 PROCEDURE — 85576 BLOOD PLATELET AGGREGATION: CPT

## 2020-12-19 PROCEDURE — 77030021532 HC CATH ANGI DX IMPRS MRTM -B

## 2020-12-19 PROCEDURE — 74011000636 HC RX REV CODE- 636: Performed by: STUDENT IN AN ORGANIZED HEALTH CARE EDUCATION/TRAINING PROGRAM

## 2020-12-19 PROCEDURE — 74011000636 HC RX REV CODE- 636: Performed by: RADIOLOGY

## 2020-12-19 PROCEDURE — 70496 CT ANGIOGRAPHY HEAD: CPT

## 2020-12-19 PROCEDURE — C1760 CLOSURE DEV, VASC: HCPCS

## 2020-12-19 PROCEDURE — 74011250637 HC RX REV CODE- 250/637: Performed by: NURSE PRACTITIONER

## 2020-12-19 PROCEDURE — 74011000250 HC RX REV CODE- 250: Performed by: NURSE PRACTITIONER

## 2020-12-19 PROCEDURE — 74011000250 HC RX REV CODE- 250: Performed by: STUDENT IN AN ORGANIZED HEALTH CARE EDUCATION/TRAINING PROGRAM

## 2020-12-19 PROCEDURE — 77030012468 HC VLV BLEEDBK CNTRL ABBT -B

## 2020-12-19 PROCEDURE — 85347 COAGULATION TIME ACTIVATED: CPT

## 2020-12-19 PROCEDURE — C1769 GUIDE WIRE: HCPCS

## 2020-12-19 PROCEDURE — 80048 BASIC METABOLIC PNL TOTAL CA: CPT

## 2020-12-19 PROCEDURE — 2709999900 HC NON-CHARGEABLE SUPPLY

## 2020-12-19 PROCEDURE — 76060000032 HC ANESTHESIA 0.5 TO 1 HR

## 2020-12-19 PROCEDURE — 74011000250 HC RX REV CODE- 250: Performed by: NURSE ANESTHETIST, CERTIFIED REGISTERED

## 2020-12-19 PROCEDURE — 85027 COMPLETE CBC AUTOMATED: CPT

## 2020-12-19 PROCEDURE — 77030008584 HC TOOL GDWRE DEV TERU -A

## 2020-12-19 PROCEDURE — 76937 US GUIDE VASCULAR ACCESS: CPT | Performed by: STUDENT IN AN ORGANIZED HEALTH CARE EDUCATION/TRAINING PROGRAM

## 2020-12-19 PROCEDURE — 74011250636 HC RX REV CODE- 250/636: Performed by: NURSE ANESTHETIST, CERTIFIED REGISTERED

## 2020-12-19 RX ORDER — BUTALBITAL, ACETAMINOPHEN AND CAFFEINE 50; 325; 40 MG/1; MG/1; MG/1
1 TABLET ORAL
Status: DISCONTINUED | OUTPATIENT
Start: 2020-12-19 | End: 2020-12-19 | Stop reason: HOSPADM

## 2020-12-19 RX ORDER — MIDAZOLAM HYDROCHLORIDE 1 MG/ML
INJECTION, SOLUTION INTRAMUSCULAR; INTRAVENOUS AS NEEDED
Status: DISCONTINUED | OUTPATIENT
Start: 2020-12-19 | End: 2020-12-19 | Stop reason: HOSPADM

## 2020-12-19 RX ORDER — HEPARIN SODIUM 1000 [USP'U]/ML
INJECTION, SOLUTION INTRAVENOUS; SUBCUTANEOUS
Status: DISCONTINUED
Start: 2020-12-19 | End: 2020-12-19 | Stop reason: WASHOUT

## 2020-12-19 RX ORDER — FENTANYL CITRATE 50 UG/ML
INJECTION, SOLUTION INTRAMUSCULAR; INTRAVENOUS AS NEEDED
Status: DISCONTINUED | OUTPATIENT
Start: 2020-12-19 | End: 2020-12-19 | Stop reason: HOSPADM

## 2020-12-19 RX ORDER — DEXMEDETOMIDINE HYDROCHLORIDE 100 UG/ML
INJECTION, SOLUTION INTRAVENOUS AS NEEDED
Status: DISCONTINUED | OUTPATIENT
Start: 2020-12-19 | End: 2020-12-19 | Stop reason: HOSPADM

## 2020-12-19 RX ORDER — VERAPAMIL HYDROCHLORIDE 2.5 MG/ML
INJECTION, SOLUTION INTRAVENOUS
Status: DISCONTINUED
Start: 2020-12-19 | End: 2020-12-19

## 2020-12-19 RX ORDER — LIDOCAINE HYDROCHLORIDE 20 MG/ML
20 INJECTION, SOLUTION INFILTRATION; PERINEURAL ONCE
Status: DISCONTINUED | OUTPATIENT
Start: 2020-12-19 | End: 2020-12-19

## 2020-12-19 RX ORDER — VERAPAMIL HYDROCHLORIDE 2.5 MG/ML
10 INJECTION, SOLUTION INTRAVENOUS
Status: CANCELLED | OUTPATIENT
Start: 2020-12-19

## 2020-12-19 RX ORDER — HEPARIN SODIUM 1000 [USP'U]/ML
10000 INJECTION, SOLUTION INTRAVENOUS; SUBCUTANEOUS
Status: CANCELLED | OUTPATIENT
Start: 2020-12-19

## 2020-12-19 RX ORDER — FENTANYL CITRATE 50 UG/ML
INJECTION, SOLUTION INTRAMUSCULAR; INTRAVENOUS
Status: COMPLETED
Start: 2020-12-19 | End: 2020-12-19

## 2020-12-19 RX ORDER — MIDAZOLAM HYDROCHLORIDE 1 MG/ML
INJECTION, SOLUTION INTRAMUSCULAR; INTRAVENOUS
Status: DISCONTINUED
Start: 2020-12-19 | End: 2020-12-19 | Stop reason: HOSPADM

## 2020-12-19 RX ADMIN — IOPAMIDOL 40 ML: 612 INJECTION, SOLUTION INTRAVENOUS at 11:00

## 2020-12-19 RX ADMIN — FENTANYL CITRATE 25 MCG: 50 INJECTION, SOLUTION INTRAMUSCULAR; INTRAVENOUS at 11:00

## 2020-12-19 RX ADMIN — LIDOCAINE HYDROCHLORIDE 400 MG: 20 INJECTION, SOLUTION INFILTRATION; PERINEURAL at 11:00

## 2020-12-19 RX ADMIN — MIDAZOLAM HYDROCHLORIDE 1 MG: 1 INJECTION, SOLUTION INTRAMUSCULAR; INTRAVENOUS at 11:20

## 2020-12-19 RX ADMIN — ASPIRIN 81 MG: 81 TABLET, COATED ORAL at 09:13

## 2020-12-19 RX ADMIN — BUTALBITAL, ACETAMINOPHEN, AND CAFFEINE 1 TABLET: 50; 325; 40 TABLET ORAL at 06:17

## 2020-12-19 RX ADMIN — BUTALBITAL, ACETAMINOPHEN, AND CAFFEINE 1 TABLET: 50; 325; 40 TABLET ORAL at 14:38

## 2020-12-19 RX ADMIN — ACETAMINOPHEN 650 MG: 325 TABLET ORAL at 12:36

## 2020-12-19 RX ADMIN — MIDAZOLAM HYDROCHLORIDE 3 MG: 1 INJECTION, SOLUTION INTRAMUSCULAR; INTRAVENOUS at 10:53

## 2020-12-19 RX ADMIN — FENTANYL CITRATE 25 MCG: 50 INJECTION, SOLUTION INTRAMUSCULAR; INTRAVENOUS at 11:12

## 2020-12-19 RX ADMIN — DEXMEDETOMIDINE HYDROCHLORIDE 10 MCG: 100 INJECTION, SOLUTION, CONCENTRATE INTRAVENOUS at 10:54

## 2020-12-19 RX ADMIN — LISINOPRIL 40 MG: 20 TABLET ORAL at 09:13

## 2020-12-19 RX ADMIN — Medication 10 ML: at 14:42

## 2020-12-19 RX ADMIN — METOCLOPRAMIDE 5 MG: 5 INJECTION, SOLUTION INTRAMUSCULAR; INTRAVENOUS at 04:17

## 2020-12-19 RX ADMIN — FENTANYL CITRATE 25 MCG: 50 INJECTION, SOLUTION INTRAMUSCULAR; INTRAVENOUS at 11:10

## 2020-12-19 RX ADMIN — MIDAZOLAM HYDROCHLORIDE 1 MG: 1 INJECTION, SOLUTION INTRAMUSCULAR; INTRAVENOUS at 11:04

## 2020-12-19 RX ADMIN — HYDROCHLOROTHIAZIDE 12.5 MG: 25 TABLET ORAL at 09:14

## 2020-12-19 RX ADMIN — LABETALOL HYDROCHLORIDE 10 MG: 5 INJECTION INTRAVENOUS at 12:37

## 2020-12-19 RX ADMIN — SODIUM CHLORIDE, POTASSIUM CHLORIDE, SODIUM LACTATE AND CALCIUM CHLORIDE 100 ML/HR: 600; 310; 30; 20 INJECTION, SOLUTION INTRAVENOUS at 06:22

## 2020-12-19 RX ADMIN — IOPAMIDOL 100 ML: 755 INJECTION, SOLUTION INTRAVENOUS at 05:31

## 2020-12-19 RX ADMIN — FENTANYL CITRATE 25 MCG: 50 INJECTION, SOLUTION INTRAMUSCULAR; INTRAVENOUS at 10:53

## 2020-12-19 RX ADMIN — TICAGRELOR 60 MG: 60 TABLET ORAL at 09:14

## 2020-12-19 NOTE — BRIEF OP NOTE
NEUROINTERVENTIONAL SURGERY POST-PROCEDURE NOTE    PROCEDURE:  Diagnostic cerebral angiogram    VESSEL(S) STUDIED:  1. Left common carotid artery  2. Left internal carotid artery VESSEL(S) TREATED:  1. None      Stent is well apposed    COMPLICATIONS:  None    FOLLOW-UP:  Susan taylor DATE OF SERVICE:  12/19/2020 11:55 AM     ATTENDING SURGEON(S):  Toshia Ashley MD    ANESTHESIA:   MAC anesthesia    MEDICATIONS:   See nursing record    PUNCTURE SITE:  Left common femoral artery. Arteriotomy closed with 6F angioseal. Flat x 2 hours. Left leg straight for 4h.           Toshia Lang MD  NeuroInterventional Surgery

## 2020-12-19 NOTE — PROGRESS NOTES
Neurointerventional Surgery Progress Note  Ethel Ordonez, [facility]  Neurocritical Care NP      Admit Date: 12/18/2020   LOS: 1 day        Daily Progress Note: 12/19/2020    POD:1 Day Post-Op    S/P: POD 1 s/p diagnostic cerebral angiogram and embolization of left supraclinoid ICA aneurysm using the Surpass stent    HPI: Loulou Cisneros is a 63year-old left-handed female who presented to [facility] on 12/18/2020 for a scheduled cerebral angiogram and embolization of a left supraclinoid ICA aneurysm using a surpass stent. She underwent surpass embolization of a right ICA aneurysm on 11/5/2020. She is being maintained on aspirin and Brilinta. She underwent treatment of the left ICA aneurysm with a surpass stent on 12/18/2020 and tolerated the procedure well. Subjective:     Patient is doing well. She does complain of a 2/3 frontal throbbing, constant headache. She is receiving Fioricet which is helping her symptoms. She reports that she did sit up this morning and felt like her right groin site \"shifted\" and she does complain of soreness. She denies any dizziness, vision changes, chest pain, SOB, numbness, tingling, weakness, speech difficulty, difficulty swallowing, abdominal pain, nausea, vomiting, difficulty with gait or coordination. On CTA this morning, there is a possibility of a very small gap on the proximal stent. The plan is for the patient to be taken back to angio today for a repeat angiogram with possible angioplasty of stent and possible intracranial stenting.     Current Facility-Administered Medications   Medication Dose Route Frequency Provider Last Rate Last Admin    butalbital-acetaminophen-caffeine (FIORICET, ESGIC) -40 mg per tablet 1 Tab  1 Tab Oral Q4H PRN Maryjo Hernandez NP   1 Tab at 12/19/20 0617    midazolam (PF) (VERSED) 1 mg/mL injection             ALPRAZolam (XANAX) tablet 0.5 mg  0.5 mg Oral DAILY PRN Kelvin Hamilton NP        aspirin delayed-release tablet 81 mg  81 mg Oral DAILY Jd Hamilton, NP   81 mg at 20 0913    hydroCHLOROthiazide (HYDRODIURIL) tablet 12.5 mg  12.5 mg Oral DAILY Jd Hamilton, NP   12.5 mg at 20 0914    lisinopriL (PRINIVIL, ZESTRIL) tablet 40 mg  40 mg Oral DAILY Jd Hamilton, NP   40 mg at 20 0913    ticagrelor (BRILINTA) tablet 60 mg  60 mg Oral BID Jd Hamilton, NP   60 mg at 20 0914    lactated Ringers infusion  100 mL/hr IntraVENous CONTINUOUS Jd Hamilton  mL/hr at 20 0622 100 mL/hr at 20 0622    acetaminophen (TYLENOL) tablet 650 mg  650 mg Oral Q4H PRN Jd Hamilton, NP   650 mg at 20 1236    ondansetron (ZOFRAN) injection 4 mg  4 mg IntraVENous Q4H PRN Jd Hamilton, NP        labetaloL (NORMODYNE;TRANDATE) injection 10 mg  10 mg IntraVENous Q2H PRN Jd Hamilton, NP   10 mg at 20 1237    sodium chloride (NS) flush 5-40 mL  5-40 mL IntraVENous Q8H Jd Hamilton, NP        sodium chloride (NS) flush 5-40 mL  5-40 mL IntraVENous PRN Jd Hamilton, NP        metoclopramide HCl (REGLAN) injection 5 mg  5 mg IntraVENous Q6H PRN Vernal Pu, NP   5 mg at 20 1617        Allergies   Allergen Reactions    Shellfish Derived Diarrhea and Nausea and Vomiting    Sulfa (Sulfonamide Antibiotics) Hives       Review of Systems:  Pertinent items are noted in the History of Present Illness. Objective:     Vital signs  Temp (24hrs), Av.6 °F (36.4 °C), Min:97.3 °F (36.3 °C), Max:97.9 °F (36.6 °C)   701 - 1900  In: 163 [I.V.:163]  Out: 5   1901 -  07  In: 5288 [P.O.:1050;  I.V.:2200]  Out: 25     Visit Vitals  BP (!) 132/90   Pulse 73   Temp 97.9 °F (36.6 °C)   Resp 14   Ht 5' 6\" (1.676 m)   Wt 228 lb (103.4 kg)   SpO2 96%   BMI 36.80 kg/m²    O2 Flow Rate (L/min): 2 l/min O2 Device: Room air     Pain control  Pain Assessment  Pain Scale 1: Numeric (0 - 10)  Pain Intensity 1: 0  Pain Onset 1: (post procedure)  Pain Location 1: Head  Pain Intervention(s) 1: Repositioned    PT/OT  Gait                   Vitals:    12/19/20 1230 12/19/20 1245 12/19/20 1300 12/19/20 1330   BP:   (!) 132/90    Pulse: 66 76 75 73   Resp: 14 28 13 14   Temp:       SpO2: 96% 94% 93% 96%   Weight:       Height:            Physical Exam:  GENERAL: alert, cooperative, no distress, appears stated age  EYE: conjunctivae/corneas clear. PERRL, EOM's intact. LUNG: clear to auscultation bilaterally  HEART: regular rate and rhythm, S1, S2 normal, no murmur, click, rub or gallop  ABDOMEN: soft, non-tender. Bowel sounds normal. No masses,  no organomegaly  EXTREMITIES:  extremities normal, atraumatic, no cyanosis or edema, distal pulses 2+ bilaterally   SKIN: Skin warm to touch. Appropriate for ethnicity. Right groin site dry and intact with mild distal bruising noted below dressing. No hematoma or bleeding noted. Neurologic Exam:  Mental Status:  Alert and oriented x 4. Appropriate affect, mood and behavior. Language:    Normal fluency. Able to repeat sentences and name objects. Cranial Nerves:        Pupils equal, round and reactive to light. Visual fields full to confrontation. Extraocular movements intact. Facial sensation intact. Full facial strength, no asymmetry. Hearing grossly intact bilaterally. No dysarthria. Tongue protrudes to midline, palate elevates symmetrically. Motor:    No pronator drift. Bulk and tone normal.      5/5 power in all extremities proximally and distally. No involuntary movements. Sensation:    Sensation intact throughout to light touch. No neglect. Reflexes:    Deferred. Coordination & Gait: No ataxia with FTN and HTS bilaterally. Gait deferred.          24 hour results:    Recent Results (from the past 24 hour(s))   CBC W/O DIFF    Collection Time: 12/19/20  2:43 AM   Result Value Ref Range    WBC 7.2 3.6 - 11.0 K/uL    RBC 4.40 3.80 - 5.20 M/uL    HGB 12.4 11.5 - 16.0 g/dL    HCT 38.9 35.0 - 47.0 %    MCV 88.4 80.0 - 99.0 FL    MCH 28.2 26.0 - 34.0 PG    MCHC 31.9 30.0 - 36.5 g/dL    RDW 14.0 11.5 - 14.5 %    PLATELET 850 976 - 629 K/uL    MPV 9.9 8.9 - 12.9 FL    NRBC 0.0 0  WBC    ABSOLUTE NRBC 0.00 0.00 - 2.12 K/uL   METABOLIC PANEL, BASIC    Collection Time: 12/19/20  2:43 AM   Result Value Ref Range    Sodium 137 136 - 145 mmol/L    Potassium 3.7 3.5 - 5.1 mmol/L    Chloride 107 97 - 108 mmol/L    CO2 25 21 - 32 mmol/L    Anion gap 5 5 - 15 mmol/L    Glucose 115 (H) 65 - 100 mg/dL    BUN 13 6 - 20 MG/DL    Creatinine 0.72 0.55 - 1.02 MG/DL    BUN/Creatinine ratio 18 12 - 20      GFR est AA >60 >60 ml/min/1.73m2    GFR est non-AA >60 >60 ml/min/1.73m2    Calcium 8.6 8.5 - 10.1 MG/DL   ASPIRIN TEST    Collection Time: 12/19/20  2:43 AM   Result Value Ref Range    Aspirin test 475 ARU   PLATELET FUNCTION, VERIFY NOW P2Y12    Collection Time: 12/19/20  2:43 AM   Result Value Ref Range    P2Y12 Plt response 26 (L) 194 - 418 PRU   POC ACTIVATED CLOTTING TIME    Collection Time: 12/19/20 11:12 AM   Result Value Ref Range    Activated Clotting Time (POC) 125 79 - 138 SECS          Imaging:  CTA of Head on 12/19/2020 at 0540 shows  IMPRESSION:     No evidence of acute intracranial processes/subarachnoid hemorrhage.     Status post stenting of the supraclinoid ICA for embolization of right  supraclinoid ICA aneurysm. Persistent filling of the residual aneurysm sac on  the right. Residual sac is slightly diminished in overall size compared to the  prior CTA.     Status post stenting of left supraclinoid ICA aneurysm, no interval change in  the appearance of the residual left supraclinoid ICA aneurysm. There is no evidence of dissection or hemodynamically significant stenosis. .     There is no intracranial mass, hemorrhage or evidence of acute infarction.     No acute intracranial process is identified.       Assessment:     Active Problems:    Cerebral aneurysm (10/6/2020)        Plan:   Left ICA supraclinoid ICA aneurysm    - s/p surpass stent embolization on 12/18/2020   - CTA of the Head this morning shows possible small gap on the proximal stent   - plan to repeat angiogram today with possible angioplasty of stent and possible intracranial stenting   - patient will return to ICU post angiogram   - continue Aspirin 81 mg daily and Brilinta 60 mg BID, ARU therapeutic at 475 this morning, PRU therapeutic at 26 this morning   - continue LR at 100 ml/hr for hydration    Hx of HTN   - continue HCTZ 12.5 mg daily   - lisinopril 40 mg daily   - Labetalol PRN   - SBP goal <140    Headache   - Fioricet PRN   - Tylenol PRN       Activity: Up with assistance   DVT ppx: SCDs  Dispo: today or tomorrow depending on results of angiogram     Plan d/w Dr. Faustino Gonzales, RN, patient, and patient's .        Kria Bedoya NP

## 2020-12-19 NOTE — DISCHARGE INSTRUCTIONS
Patient Education        Brain Aneurysm Repair: What to Expect at Home  Your Recovery     An aneurysm is a bulging, weak section of a blood vessel. Sometimes aneurysms put pressure on nerves. They can also bleed or break open (rupture). A procedure can repair an aneurysm in your brain. This can prevent strokes, bleeding, and brain damage. During the procedure, the doctor made a small cut (incision) in your groin or wrist. The doctor put a small tube (catheter) through the incision and used X-ray equipment to guide the catheter to the aneurysm in your brain. Then the doctor used a tool, such as a coil, to block the opening to the aneurysm. This prevents blood from entering the aneurysm. You may feel tired for a few days after the procedure. Eric Metzger probably be able to return to work or your normal routine in 3 to 7 days. You may have some bruising around the incision, but you should not have much pain. If you do have pain, your doctor may recommend or prescribe pain medicines. Your doctor will regularly check the site of your aneurysm. Some people need to have this procedure more than once. This care sheet gives you a general idea about how long it will take for you to recover. But each person recovers at a different pace. Follow the steps below to get better as quickly as possible. How can you care for yourself at home? Activity    · Do not do strenuous exercise and do not lift, pull, or push anything heavy until your doctor says it is okay. This may be for a day or two. You can walk around the house and do light activity, such as cooking.     · If the catheter was placed in your groin, try not to walk up stairs for the first couple of days.     · If the catheter was placed in your wrist, do not bend your wrist deeply for the first couple of days. Be careful using your hand to get into and out of a chair or bed.     · Rest when you feel tired. Getting enough sleep will help you recover.    Diet    · Follow your doctor's orders about how much fluid you should drink after the procedure.     · You can eat your normal diet. If your stomach is upset, try bland, low-fat foods like plain rice, broiled chicken, toast, and yogurt. Medicines    · Your doctor will tell you if and when you can restart your medicines. Your doctor will also give you instructions about taking any new medicines.     · If you take aspirin or some other blood thinner, ask your doctor if and when to start taking it again. Make sure that you understand exactly what your doctor wants you to do.     · Be safe with medicines. Take pain medicines exactly as directed. ? If the doctor gave you a prescription medicine for pain, take it as prescribed. ? If you are not taking a prescription pain medicine, ask your doctor if you can take an over-the-counter medicine.     · If you think your pain medicine is making you sick to your stomach:  ? Take your medicine after meals (unless your doctor has told you not to). ? Ask your doctor for a different pain medicine. Care of the catheter site    · If you have strips of tape on the cut (incision) the doctor made, leave the tape on for a week or until it falls off.     · Put ice or a cold pack on the area for 10 to 20 minutes at a time to help with soreness or swelling. Put a thin cloth between the ice and your skin.     · You may shower 24 to 48 hours after the procedure, if your doctor okays it. Pat the incision dry.     · Do not soak the incision until it is healed. Don't take a bath for 1 week, or until your doctor tells you it is okay.     · Watch for bleeding from the incision. A small amount of blood (up to the size of a quarter) on the bandage can be normal.     · If you are bleeding, lie down and press on the area for 15 minutes to try to make it stop. If the bleeding does not stop, call your doctor or seek immediate medical care. Follow-up care is a key part of your treatment and safety.  Be sure to make and go to all appointments, and call your doctor if you are having problems. It's also a good idea to know your test results and keep a list of the medicines you take. When should you call for help? Call 911 anytime you think you may need emergency care. For example, call if:    · You passed out (lost consciousness).     · You have severe trouble breathing.     · You have sudden chest pain and shortness of breath, or you cough up blood.     · It is hard to think, move, speak, or see.     · Your body is jerking or shaking. Call your doctor now or seek immediate medical care if:    · You have a fever with a stiff neck or a severe headache.     · You are bleeding from the area where the catheter was put in your artery.     · You have a fast-growing, painful lump at the catheter site.     · You have signs of infection, such as:  ? Increased pain, swelling, warmth, or redness. ? Red streaks leading from the incision. ? Pus draining from the incision. ? Swollen lymph nodes in your neck, armpits, or groin. ? A fever.     · Your leg or hand is painful, looks blue or feels cold, numb, or tingly.     · You have any sudden vision changes.     · You have new or worse headaches.     · You fall and hit your head.     · You are sleeping more than you are awake.     · You have a headache and you throw up.     · You have pain that does not get better after you take pain medicine.     · You have a fever over 100°F. Watch closely for changes in your health, and be sure to contact your doctor if you have any problems. Where can you learn more? Go to http://www.gray.com/  Enter L241 in the search box to learn more about \"Brain Aneurysm Repair: What to Expect at Home. \"  Current as of: March 4, 2020               Content Version: 12.6  © 4410-6001 [employer]. Care instructions adapted under license by [employer] (which disclaims liability or warranty for this information).  If you have questions about a medical condition or this instruction, always ask your healthcare professional. [place] [address] any warranty or liability for your use of this information.

## 2020-12-19 NOTE — PROGRESS NOTES
Brief NIS note    Patient is s/p repeat cerebral angiogram today due to possibility of a very small gap on the proximal stent seen on CTA today. Repeat angiogram showed that the stent is well apposed and there was no need for angioplasty when discussing with Dr. Alan Pedroza. Patient denies any headache, dizziness, or vision changes. She denies any issues with balance or coordination. Denies any nausea or vomiting and is tolerating PO intake. Patient is neurologically intact. NIHSS 0. Vital signs are stable. Neurologic Exam:  Mental Status:             Alert and oriented x 4. Appropriate affect, mood and behavior.        Language:                   Normal fluency. Able to name objects and read sentences. Comprehension intact.       Cranial Nerves:                                                 Pupils equal, round and reactive to light. Visual fields full to confrontation. Extraocular movements intact. Facial sensation intact. Full facial strength, no asymmetry. Hearing grossly intact bilaterally. No dysarthria. Tongue protrudes to midline, palate elevates symmetrically.                                            Motor:                          No pronator drift. Bulk and tone normal.                                       5/5 power in all extremities proximally and distally. No involuntary movements.     Sensation:                   Sensation intact throughout to light touch and pinprick. No neglect.        Coordination & Gait:   No ataxia with FTN and HTS bilaterally. Steady gait. Negative Romberg test. Normal tandem gait. Right and left groin site with mild distal bruising.  No active bleeding or hematoma noted. Posterior tibial pulses +1 bilaterally. Pedal pulses +2 bilaterally. Skin warm to touch. Patient is ready for discharge. Patient will need to follow up with Dr. Arlen Luo in the office in two weeks (virtual visit). Repeat angiogram in 6 months. Patient informed that it is ok to remove groin dressing 24 hours post procedure. She was also informed to monitor groin site for bleeding. Some mild bruising may occur. Patient educated on stroke-like symptoms and to call 911 immediately. Patient informed to seek emergency care services if she ever experiences the worst headache of her life. Patient verbalized understanding. Discussed with Dr. Arlen Luo, RN, patient, and patient's .      Aniceto Crowley, [facility], [place]  Neurointerventional Surgery  Neurocritical care NP

## 2020-12-19 NOTE — ANESTHESIA POSTPROCEDURE EVALUATION
Post-Anesthesia Evaluation and Assessment    Patient: Ernesto Bernheim MRN: [id number]  SSN: [id number]    YOB: 1965  Age: 63 y.o. Sex: female      I have evaluated the patient and they are stable and ready for discharge from the PACU. Cardiovascular Function/Vital Signs  Visit Vitals  BP (!) 143/97   Pulse 72   Temp 36.4 °C (97.5 °F)   Resp 18   Ht 5' 6\" (1.676 m)   Wt 103.4 kg (228 lb)   SpO2 100%   BMI 36.80 kg/m²       Patient is status post * No anesthesia type entered * anesthesia for * No procedures listed *. Nausea/Vomiting: None    Postoperative hydration reviewed and adequate. Pain:  Pain Scale 1: Numeric (0 - 10) (12/19/20 0800)  Pain Intensity 1: 2 (12/19/20 0800)   Managed    Neurological Status:   Neuro  Neurologic State: Alert (12/19/20 1100)  Orientation Level: Oriented X4 (12/19/20 1100)  Cognition: Follows commands (12/19/20 1100)  Speech: Clear (12/19/20 1100)  Assessment L Pupil: Brisk;Round (12/19/20 1100)  Size L Pupil (mm): 3 (12/19/20 1100)  Assessment R Pupil: Brisk;Round (12/19/20 1100)  Size R Pupil (mm): 3 (12/19/20 1100)  LUE Motor Response: Purposeful (12/19/20 1100)  LLE Motor Response: Purposeful (12/19/20 1100)  RUE Motor Response: Purposeful (12/19/20 1100)  RLE Motor Response: Purposeful (12/19/20 1100)   At baseline    Mental Status, Level of Consciousness: Alert and  oriented to person, place, and time    Pulmonary Status:   O2 Device: Room air (12/19/20 0800)   Adequate oxygenation and airway patent    Complications related to anesthesia: None    Post-anesthesia assessment completed. No concerns    Signed By: Abby Muhammad MD     December 19, 2020              * No procedures listed *.     MAC    <BSHSIANPOST>    INITIAL Post-op Vital signs:   Vitals Value Taken Time   /97 12/19/20 1148   Temp     Pulse 72 12/19/20 1148   Resp 18 12/19/20 1148   SpO2 100 % 12/19/20 1148

## 2020-12-19 NOTE — PROGRESS NOTES
1930: Bedside shift change report given to Lindsey (oncoming nurse) by Roman Childress (offgoing nurse). Report included the following information SBAR, Kardex, Intake/Output, MAR, Accordion, Recent Results, Med Rec Status, Cardiac Rhythm Sr and Dual Neuro Assessment. 2000: Patient complaining of mild headache, prn tylenol given    2130: Patient complaining of moderate headache, Alaina collado NP notified, Reglan ordered prn and given    0500: Patient complaining of 7/10 headache, Alaina Garcia NP notified, patient states she took a medicine that helped with her headache the last time she was in the hospital, fioricet prn ordered and given    0700: Dr. Deion Steven at bedside, patient will go to angio for possible angioplasty around 1100    0730: Bedside shift change report given to RN (oncoming nurse) by Lindsey (offgoing nurse). Report included the following information SBAR, Kardex, Intake/Output, MAR, Accordion, Recent Results, Med Rec Status, Cardiac Rhythm SR and Dual Neuro Assessment.

## 2020-12-19 NOTE — DISCHARGE SUMMARY
Neurointerventional Discharge Summary    Patient: Ascencion Delgado MRN: [id number]  SSN: [id number]    YOB: 1965  Age: 63 y.o. Sex: female      DATE OF ADMISSION: 12/18/2020    DATE OF DISCHARGE: 12/19/2020    PRIMARY CARE PROVIDER: Dr. Tory Abreu    DISCHARGING PHYSICIAN: Dr. Cox No:   none    PROCEDURES/SURGERIES: Procedure(s) with comments:   Diagnostic cerebral angiogram and embolization of left supraclinoid ICA aneurysm using the Surpass stent on 12/18/2020. Repeat Diagnostic Cerebral angiogram 12/19/2020      ADMITTING [address] [place] [address] COURSE:   The patient was admitted on 12/18/2020 for scheduled elective endovascular procedure that consisted of a cerebral angiogram and embolization of the left supraclinoid ICA aneurysm using a surpass stent. The procedure was performed in the Interventional Radiology suite under general anesthesia. The patient tolerated the procedure well without complications as detailed above. Following extubation, patient was transferred to ICU where she was fully recovered and returned to neurological baseline. She was admitted overnight for continued monitoring due to potential risks of stroke and thrombosis. She had a repeat cerebral angiogram under MAC anesthesia on 12/19/2020 due to CTA of the head post procedure showing a possibility of a very small gap on the proximal stent. Repeat cerebral angiogram showed that the stent is well apposed and there was no need for angioplasty/stenting. Her hospital course was uneventful and she was discharge to home the following day in stable condition.     DISCHARGE DIAGNOSES / PLAN:   Left supraclinoid ICA aneurysm  - s/p surpass stent embolization  - follow up in clinic in two weeks (virtual visit) with Dr. Robert Kimble  - continue Aspirin 81 mg daily and Brilinta 60 mg BID    Hx of HTN  - SBP goal <140  - continue Home BP meds, HCTZ 12.5 mg daily and lisinopril 40 mg daily       FOLLOW UP APPOINTMENTS:     1. Two week follow clinic virtual visit with NIS, call and make an appointment. 2.  Repeat diagnostic cerebral angiogram in 6 months  3. Follow up routinely with PCP for blood pressure control and monitoring. ADDITIONAL CARE RECOMMENDATIONS:   - Patient to avoid tub baths, swimming or hot tubs for one week. May take showers. - Patient educated on stroke-like symptoms and to call 911 immediately. Patient informed to seek emergency care services if she ever experiences the worst headache of her life. Patient verbalized understanding. DIET:  Regular cardiac diet. ACTIVITY: Do not lift anything over 10 lbs for one week. Walking and stairs permissible. May resume all physical activities as tolerated after one week. WOUND CARE: Monitor for signs and sx of infection including fever, expanding inflammation and discolored drainage. Report any changes in temperature in affected extremity, numbness, weakness or hematoma. DISCHARGE MEDICATIONS:     1. Resume all home meds. 2. Continue Aspirin 81 mg daily (OTC)  3.  Continue Brilinta 60 mg BID

## 2020-12-19 NOTE — PROGRESS NOTES
1900 rec'd  Report from Chelo Goldman for patient Nicholas Degree coming from angio. 1915Patient walked to bed placed on monitor skin warm to touch resp easy and even  C/O slight h/a  At present. Resting in bed  at bedside L/R infusing at 100cc/hr. L radial gerardo in place. .    Report given to [facility]. Neuro check done.

## 2020-12-19 NOTE — ROUTINE PROCESS
TRANSFER - OUT REPORT:    Verbal report given to Kim(name) on Ade Best  being transferred to ICU 19(unit) for routine progression of care       Report consisted of patients Situation, Background, Assessment and   Recommendations(SBAR). Information from the following report(s) SBAR, Procedure Summary and MAR was reviewed with the receiving nurse. Lines:   Peripheral IV 12/18/20 Left Hand (Active)       Peripheral IV 12/18/20 Right Hand (Active)       Arterial Line 12/18/20 Left Radial artery (Active)        Opportunity for questions and clarification was provided.       Patient transported with:   Registered Nurse, monitor, Harrison Linear left arm

## 2020-12-19 NOTE — PROGRESS NOTES
NeuroInterventional Note  After reviewing DSA from yesterday and CTA from today, there is a possibility of a very small gap on the proximal stent. I spoke with the patient and her  and a decision was made to take her today for cerebral angiogram and possible angioplasty of stent and possible intracranial stenting. I discussed with patient that the risk of this procedure is around 4% of causing linda stroke or brain hemorrhage. Patient agreed with procedure.     Parvez Chavez MD  NIS

## 2020-12-19 NOTE — PROGRESS NOTES
Neurocritical Care Brief Progress Note:    Rounded on 78-year-old female s/p cerebral angiogram with Surpass embolization of left supraclinoid ICA aneurysm. Patient with 5/10 headache. No other complaints. Physical Exam:  Gen: NAD, calm, cooperative  Neuro: A&Ox4. Follows commands. Speech clear. Affect normal. PERRL, 3 mm bilaterally. Blinks to threat. No disconjugate gaze present. EOMI. Face symmetric. Palate symmetric. Tongue midline. Mimi spontaneously. Strength 5/5 in UE and LE BL. Negative drift. Bulk and tone normal. No involuntary movements. Gait deferred. Skin: Warm, dry, color appropriate for ethnicity. Groin arteriotomy site soft and non-tender on palpation, dressing is C/D/I, with no active bleeding or drainage noted. Mild ecchymosis    PLAN:   - Continue q hourly neuro checks  -Added Reglan 5 mg IV q 6 hours prn for headaches.  -Continue Tylenol 650 mg q 6 hours prn headaches.   -Continue aspirin 81 mg q day and Brilinta 60 mg po bid. -ARU and PRU in am  -SBP goal less than 140. PRN labetalol  - Continue home BP meds lisinopril and HCTZ    Addendum: 12/19/20 0600. Patient asking for headache medication she had last time she was here for 7/10 headache. Looked in records from last admission and saw that she responded well to [phone]. Ordered Fioricet for patient.  and PRU 26. Went for [facility] results pending.      Zoe Booth NP  Neurocritical Care Nurse Practitioner  [phone]

## 2020-12-19 NOTE — PROGRESS NOTES
Problem: Falls - Risk of  Goal: *Absence of Falls  Description: Document Daysi Perdue Fall Risk and appropriate interventions in the flowsheet.   Outcome: Resolved/Met  Note: Fall Risk Interventions:  Mobility Interventions: Assess mobility with egress test, Bed/chair exit alarm, Communicate number of staff needed for ambulation/transfer         Medication Interventions: Assess postural VS orthostatic hypotension, Bed/chair exit alarm, Evaluate medications/consider consulting pharmacy, Patient to call before getting OOB    Elimination Interventions: Bed/chair exit alarm, Call light in reach, Patient to call for help with toileting needs

## 2020-12-19 NOTE — PROGRESS NOTES
Pt arrives via bed, to angio department accompanied by ICU RN Romina Ortiz for diagnostic cerebral angiogram procedure with possible treatment. All assessments completed and consent was reviewed. Education given was regarding procedure, MAC sedation, post-procedure care and  management/follow-up. Opportunity for questions was provided and all questions and concerns were addressed. 1215 TRANSFER - OUT REPORT:    Verbal report given to Romina Ortiz RN(name) on [facility]  being transferred to ICU 19(unit) for routine progression of care       Report consisted of patients Situation, Background, Assessment and   Recommendations(SBAR). Information from the following report(s) SBAR, Procedure Summary and MAR was reviewed with the receiving nurse. Lines:   Peripheral IV 12/18/20 Left Hand (Active)   Site Assessment Clean, dry, & intact 12/19/20 0800   Phlebitis Assessment 0 12/19/20 0800   Infiltration Assessment 0 12/19/20 0800   Dressing Status Clean, dry, & intact 12/19/20 0800   Dressing Type Transparent 12/19/20 0800   Hub Color/Line Status Blue; Infusing 12/19/20 0800   Action Taken Open ports on tubing capped 12/18/20 2000   Alcohol Cap Used Yes 12/19/20 0800       Peripheral IV 12/18/20 Right Hand (Active)   Site Assessment Clean, dry, & intact 12/19/20 0800   Phlebitis Assessment 0 12/19/20 0800   Infiltration Assessment 0 12/19/20 0800   Dressing Status Clean, dry, & intact 12/19/20 0800   Dressing Type Transparent 12/19/20 0800   Hub Color/Line Status Green;Flushed;Capped 12/19/20 0800   Action Taken Open ports on tubing capped 12/18/20 2000   Alcohol Cap Used Yes 12/19/20 0800       Peripheral IV 12/19/20 Right Antecubital (Active)   Site Assessment Clean, dry, & intact 12/19/20 0800   Phlebitis Assessment 0 12/19/20 0800   Infiltration Assessment 0 12/19/20 0800   Dressing Status Clean, dry, & intact 12/19/20 0800   Dressing Type Transparent 12/19/20 0800   Hub Color/Line Status Pink;Flushed;Capped 12/19/20 0800 Action Taken Open ports on tubing capped 12/19/20 0300   Alcohol Cap Used Yes 12/19/20 0800       Arterial Line 12/18/20 Left Radial artery (Active)   Site Assessment Clean, dry, & intact 12/19/20 0800   Dressing Status Clean, dry, & intact 12/19/20 0800   Dressing Type Disk with Chlorhexadine gluconate (CHG); Transparent 12/19/20 0800   Line Status Intact and in place 12/19/20 0800   Treatment Zeroed or re-zeroed 12/19/20 0800   Affected Extremity/Extremities Color distal to insertion site pink (or appropriate for race); Pulses palpable;Range of motion performed 12/19/20 0800        Opportunity for questions and clarification was provided.       Patient transported with:   Monitor  Registered Nurse

## 2020-12-19 NOTE — PROGRESS NOTES
Neurocritical Care Brief Progress Note:    Rounded on patient in the ICU. She complains of a mild headache. Neurologically intact. CTA today showed a possibility of a very small gap on the proximal stent. She is being taken back down to angio today by Dr. Sylvester Liao for a repeat cerebral angiogram and possible angioplasty of stent and possible intracranial stenting. Patient is NPO except meds. Written informed consent was obtained from the patient for procedure. Risks, benefits, and alternatives explained to the patient in detail. Full progress note to follow.     Jeannette Monaco NP  Neurocritical Care Nurse Practitioner  [phone]

## 2020-12-20 ENCOUNTER — DOCUMENTATION ONLY (OUTPATIENT)
Dept: NEUROSURGERY | Age: 55
End: 2020-12-20

## 2020-12-26 ENCOUNTER — APPOINTMENT (OUTPATIENT)
Dept: CT IMAGING | Age: 55
End: 2020-12-26
Attending: EMERGENCY MEDICINE
Payer: COMMERCIAL

## 2020-12-26 ENCOUNTER — HOSPITAL ENCOUNTER (EMERGENCY)
Age: 55
Discharge: HOME | End: 2020-12-26
Attending: EMERGENCY MEDICINE | Admitting: EMERGENCY MEDICINE
Payer: COMMERCIAL

## 2020-12-26 ENCOUNTER — APPOINTMENT (OUTPATIENT)
Dept: MRI IMAGING | Age: 55
End: 2020-12-26
Attending: EMERGENCY MEDICINE
Payer: COMMERCIAL

## 2020-12-26 VITALS
TEMPERATURE: 97.8 F | DIASTOLIC BLOOD PRESSURE: 91 MMHG | BODY MASS INDEX: 36.92 KG/M2 | HEART RATE: 72 BPM | OXYGEN SATURATION: 98 % | RESPIRATION RATE: 18 BRPM | SYSTOLIC BLOOD PRESSURE: 131 MMHG | WEIGHT: 229.72 LBS | HEIGHT: 66 IN

## 2020-12-26 DIAGNOSIS — R42 VERTIGO: Primary | ICD-10-CM

## 2020-12-26 LAB
ALBUMIN SERPL-MCNC: 3.8 G/DL (ref 3.5–5)
ALBUMIN/GLOB SERPL: 0.9 {RATIO} (ref 1.1–2.2)
ALP SERPL-CCNC: 81 U/L (ref 45–117)
ALT SERPL-CCNC: 21 U/L (ref 12–78)
ANION GAP SERPL CALC-SCNC: 2 MMOL/L (ref 5–15)
ASPIRIN TEST, ASPIRN: 450 ARU
AST SERPL-CCNC: 15 U/L (ref 15–37)
BASOPHILS # BLD: 0 K/UL (ref 0–0.1)
BASOPHILS NFR BLD: 0 % (ref 0–1)
BILIRUB SERPL-MCNC: 0.4 MG/DL (ref 0.2–1)
BUN SERPL-MCNC: 17 MG/DL (ref 6–20)
BUN/CREAT SERPL: 20 (ref 12–20)
CALCIUM SERPL-MCNC: 9.3 MG/DL (ref 8.5–10.1)
CHLORIDE SERPL-SCNC: 104 MMOL/L (ref 97–108)
CO2 SERPL-SCNC: 30 MMOL/L (ref 21–32)
COMMENT, HOLDF: NORMAL
CREAT SERPL-MCNC: 0.85 MG/DL (ref 0.55–1.02)
DIFFERENTIAL METHOD BLD: NORMAL
EOSINOPHIL # BLD: 0 K/UL (ref 0–0.4)
EOSINOPHIL NFR BLD: 1 % (ref 0–7)
ERYTHROCYTE [DISTWIDTH] IN BLOOD BY AUTOMATED COUNT: 14.1 % (ref 11.5–14.5)
GLOBULIN SER CALC-MCNC: 4.1 G/DL (ref 2–4)
GLUCOSE BLD STRIP.AUTO-MCNC: 103 MG/DL (ref 65–100)
GLUCOSE SERPL-MCNC: 109 MG/DL (ref 65–100)
HCT VFR BLD AUTO: 39.7 % (ref 35–47)
HGB BLD-MCNC: 12.5 G/DL (ref 11.5–16)
IMM GRANULOCYTES # BLD AUTO: 0 K/UL (ref 0–0.04)
IMM GRANULOCYTES NFR BLD AUTO: 0 % (ref 0–0.5)
LYMPHOCYTES # BLD: 1.4 K/UL (ref 0.8–3.5)
LYMPHOCYTES NFR BLD: 20 % (ref 12–49)
MCH RBC QN AUTO: 28 PG (ref 26–34)
MCHC RBC AUTO-ENTMCNC: 31.5 G/DL (ref 30–36.5)
MCV RBC AUTO: 88.8 FL (ref 80–99)
MONOCYTES # BLD: 0.5 K/UL (ref 0–1)
MONOCYTES NFR BLD: 7 % (ref 5–13)
NEUTS SEG # BLD: 5.3 K/UL (ref 1.8–8)
NEUTS SEG NFR BLD: 72 % (ref 32–75)
NRBC # BLD: 0 K/UL (ref 0–0.01)
NRBC BLD-RTO: 0 PER 100 WBC
P2Y12 PLT RESPONSE,PPPR: 50 PRU (ref 194–418)
PLATELET # BLD AUTO: 258 K/UL (ref 150–400)
PMV BLD AUTO: 10 FL (ref 8.9–12.9)
POTASSIUM SERPL-SCNC: 4.1 MMOL/L (ref 3.5–5.1)
PROT SERPL-MCNC: 7.9 G/DL (ref 6.4–8.2)
RBC # BLD AUTO: 4.47 M/UL (ref 3.8–5.2)
SAMPLES BEING HELD,HOLD: NORMAL
SERVICE CMNT-IMP: ABNORMAL
SODIUM SERPL-SCNC: 136 MMOL/L (ref 136–145)
WBC # BLD AUTO: 7.3 K/UL (ref 3.6–11)

## 2020-12-26 PROCEDURE — 70450 CT HEAD/BRAIN W/O DYE: CPT

## 2020-12-26 PROCEDURE — 85025 COMPLETE CBC W/AUTO DIFF WBC: CPT

## 2020-12-26 PROCEDURE — 99285 EMERGENCY DEPT VISIT HI MDM: CPT

## 2020-12-26 PROCEDURE — 74011000636 HC RX REV CODE- 636: Performed by: RADIOLOGY

## 2020-12-26 PROCEDURE — 85576 BLOOD PLATELET AGGREGATION: CPT

## 2020-12-26 PROCEDURE — 80053 COMPREHEN METABOLIC PANEL: CPT

## 2020-12-26 PROCEDURE — 36415 COLL VENOUS BLD VENIPUNCTURE: CPT

## 2020-12-26 PROCEDURE — 82962 GLUCOSE BLOOD TEST: CPT

## 2020-12-26 PROCEDURE — 70496 CT ANGIOGRAPHY HEAD: CPT

## 2020-12-26 PROCEDURE — 70551 MRI BRAIN STEM W/O DYE: CPT

## 2020-12-26 PROCEDURE — 0042T CT PERF W CBF: CPT

## 2020-12-26 RX ADMIN — IOPAMIDOL 80 ML: 755 INJECTION, SOLUTION INTRAVENOUS at 15:12

## 2020-12-26 RX ADMIN — IOPAMIDOL 40 ML: 755 INJECTION, SOLUTION INTRAVENOUS at 15:12

## 2020-12-26 NOTE — ED NOTES
Patient given discharge paperwork and instructions by RN and provider. Patient verbalized understanding. No signs of distress at time of discharge. All peripheral IVs removed. Patient ambulatory out of ER with  and steady gait.

## 2020-12-26 NOTE — PROGRESS NOTES
Spiritual Care Assessment/Progress Note  [facility]      NAME: Ted Chadwick      MRN: [id number]  AGE: 63 y.o. SEX: female  Religion Affiliation: Yaquelin Waterman   Language: English     12/26/2020           Spiritual Assessment begun in [address] DEP through conversation with:         []Patient        [] Family    [] Friend(s)        Reason for Consult: Crisis     Spiritual beliefs: (Please include comment if needed)     [] Identifies with a azul tradition:         [] Supported by a azul community:            [] Claims no spiritual orientation:           [] Seeking spiritual identity:                [] Adheres to an individual form of spirituality:           [x] Not able to assess:                           Identified resources for coping:      [] Prayer                               [] Music                  [] Guided Imagery     [] Family/friends                 [] Pet visits     [] Devotional reading                         [] Unknown     [] Other:                                              Interventions offered during this visit: (See comments for more details)    Patient Interventions: Crisis           Plan of Care:     [] Support spiritual and/or cultural needs    [] Support AMD and/or advance care planning process      [] Support grieving process   [] Coordinate Rites and/or Rituals    [] Coordination with community clergy   [] No spiritual needs identified at this time   [] Detailed Plan of Care below (See Comments)  [] Make referral to Music Therapy  [] Make referral to Pet Therapy     [] Make referral to Addiction services  [] Make referral to [facility]  [] Make referral to Spiritual Care Partner  [] No future visits requested        [] Follow up upon further referrals     Comments: Responded to Code Stroke called for Ms Marco A Michael in ED. Nurse was transporting patient to CT when  arrived and no family was present at that time. Please notify  if support desired. : Rev. Stevo Luis.  Isrrael Lover; [facility], to contact [phone] [address]angel call: [phone]

## 2020-12-26 NOTE — PROGRESS NOTES
Neurocritical Care Code Stroke Documentation    Symptoms:  acute onset of dizziness, no associated headache, vision changes, nausea, vomiting, speech difficulty, or weakness. The patient denies any association of the dizziness with positional changes. Patient reported symptoms started at 11:30 am this morning while she was at home sitting. She felt like the room was spinning and she was leaning to the left. The patient reported that she felt like she was going to faint twice with the dizziness. Patient reports she feels likes she is \"floating on air. \" Patient does have a history of vertigo 4-5 years, but reports that this episode is the most intense it has ever been. She reported that after she was discharged from aneurysm treatment on , she had a headache up until the following Wednesday and then it subsided. She also reported \"squiggly lines in her vision\" and seeing \"flashing lights\" at times for about 4 or 5 days after she was discharged, but this was not new and it has subsided. Last Known Well:  11:30 AM today    Medical hx: HTN, anxiety, right and left supraclinoid ICA aneurysms s/p surpass embolization of right ICA aneurysm and recently had surpass embolization of left ICA aneurysm on 2020, patient was discharged on , migraines, vertigo, Vitamin D deficiency    Anticoagulation: On Aspirin and Brilinta    VAN:   Negative   NIHSS:   1a-LOC:0    1b-Month/Age:0    1c-Open/Close Hand:0    2-Best Gaze:0    3-Visual Fields:0    4-Facial Palsy:0    5a-Left Arm:0    5b-Right Arm:0    6a-Left Le    6b-Right Le    7-Limb Ataxia:0    8-Sensory:0    9-Best Language:0    10-Dysarthria:0    11-Extinction/Inattention:0  TOTAL SCORE:0   Imaging:   CT: IMPRESSION:   No acute intracranial process identified    CTA: IMPRESSION:  No evidence of acute intracranial processes/subarachnoid hemorrhage. Status post stenting of the supraclinoid ICA for embolization of right  supraclinoid ICA aneurysm. Persistent filling of the residual aneurysm sac on  the right. Residual sac is slightly diminished in overall size compared to the  prior CTA. Status post stenting of left supraclinoid ICA aneurysm, no interval change in  the appearance of the residual left supraclinoid ICA aneurysm. There is no evidence of dissection or hemodynamically significant stenosis. .  There is no intracranial mass, hemorrhage or evidence of acute infarction.   No acute intracranial process is identified. .    Plan:   TPA Candidate: patient in tPA window, teleneuro to evaluate, but likely no given non-focal exam    Mechanical thrombectomy Candidate: No signs of LVO clinically. Check aspirin test and P2Y12 level for therapeutic response in the setting of recent surpass embolization of aneurysm with antiplatelet therapy. Neuro exam is non-focal.     Discussed with Dr. Shefali Ramos, Dr. Rian Oliver ([facility] [place]), patient, and patient's . Time spent: 20 minutes. Chacho English NP  Neurocritical Care Nurse Practitioner  [phone]    Addendum: CTA reported as stated above. No concerns or acute process on CTA. Imaging was reviewed by Dr. Rian Oliver. Dr. Rian Oliver would like patient to have a MRI of Brain to assess for ischemia. Aspirin test therapeutic at 450 and P2Y12 therapeutic at 50. Discussed with ED physician, Dr. Shefali Ramos and patient/patient's . 1750: MRI of Brain shows no acute abnormality. Bilateral internal carotid artery stents and aneurysms are better evaluated on the earlier CTA. Dr. Rian Oliver is ok with patient being discharged. Discussed with ED Physician, Dr. Shefali Ramos. Patient had concerns that her symptoms were related to her treated aneurysms. Discussed with Dr. Rian Oliver and it is felt that patient's symptoms are not related to the patient's treated aneurysm, but likely possibly a complex migraine vs. Recurrent vertigo. Patient encouraged to stay hydrated. Discussed with patient and patient's  at the bedside.

## 2020-12-26 NOTE — ED PROVIDER NOTES
HPI .  Patient has a history of anxiety, syncope, vertigo, headaches, hypertension, tinnitus, insomnia, and vitamin D deficiency. Patient had a imaging procedure possibly an ultrasound at the dentist who thought that she might have a mass in her neck. Subsequent imaging showed multiple cerebral aneurysms. Patient had a stenting procedure 8 days ago and multiple stenting procedure 7 days ago. Today an hour or 2 prior to coming in patient felt a wave of dizziness with the room spinning and feeling like she was falling to the left. She did not lose consciousness. This episode lasted 5 to 10 minutes. She had several subsequent episodes of vertigo the second of which lasted more than 10 minutes. She had near syncope during the second episode.     Past Medical History:   Diagnosis Date    Anxiety     Cerebral aneurysm     Right and left supraclinoid ICA aneurysms    Chest pain     Fainting     Headache     migraines    Hiatal hernia     Hypertension     Joint pain     Menopause     early aj at age 38    Ringing in ears     Skipped beats     Trouble in sleeping     Vertigo     Vertigo     Vitamin D deficiency        Past Surgical History:   Procedure Laterality Date    HX APPENDECTOMY      HX  SECTION      HX ORTHOPAEDIC      Right ankle x 2    HX VASCULAR STENT  2020    Treatment of 6 mm right supraclinoid ICA aneurysm         Family History:   Problem Relation Age of Onset    Hypertension Mother     Hypertension Father     Headache Other        Social History     Socioeconomic History    Marital status:      Spouse name: Not on file    Number of children: Not on file    Years of education: Not on file    Highest education level: Not on file   Occupational History    Not on file   Social Needs    Financial resource strain: Not on file    Food insecurity     Worry: Not on file     Inability: Not on file    Transportation needs     Medical: Not on file Non-medical: Not on file   Tobacco Use    Smoking status: Never Smoker    Smokeless tobacco: Never Used   Substance and Sexual Activity    Alcohol use: Yes     Comment: 2-3 weekly    Drug use: No    Sexual activity: Yes     Partners: Male   Lifestyle    Physical activity     Days per week: Not on file     Minutes per session: Not on file    Stress: Not on file   Relationships    Social connections     Talks on phone: Not on file     Gets together: Not on file     Attends Yazdanism service: Not on file     Active member of club or organization: Not on file     Attends meetings of clubs or organizations: Not on file     Relationship status: Not on file    Intimate partner violence     Fear of current or ex partner: Not on file     Emotionally abused: Not on file     Physically abused: Not on file     Forced sexual activity: Not on file   Other Topics Concern    Not on file   Social History Narrative    Not on file         ALLERGIES: Shellfish derived and Sulfa (sulfonamide antibiotics)    Review of Systems   All other systems reviewed and are negative. Vitals:    12/26/20 1341 12/26/20 1342   BP: (!) 149/92 (!) 131/91   Pulse: 74 72   Resp: 16 18   Temp: 97.1 °F (36.2 °C) 97.8 °F (36.6 °C)   SpO2: 96% 98%   Weight: 104.2 kg (229 lb 11.5 oz)    Height: 5' 6\" (1.676 m)             Physical Exam  Vitals signs and nursing note reviewed. Constitutional:       Appearance: She is well-developed. HENT:      Head: Normocephalic and atraumatic. Eyes:      Pupils: Pupils are equal, round, and reactive to light. Neck:      Musculoskeletal: Normal range of motion and neck supple. Cardiovascular:      Rate and Rhythm: Normal rate and regular rhythm. Heart sounds: Normal heart sounds. No murmur. No friction rub. No gallop. Pulmonary:      Effort: Pulmonary effort is normal. No respiratory distress. Breath sounds: No wheezing or rales. Abdominal:      Palpations: Abdomen is soft. Tenderness: There is no abdominal tenderness. There is no rebound. Musculoskeletal: Normal range of motion. General: No tenderness. Skin:     Findings: No erythema. Neurological:      Mental Status: She is alert. Cranial Nerves: No cranial nerve deficit.       Comments: Motor; symmetric   Psychiatric:         Behavior: Behavior normal.          MDM       Procedures

## 2020-12-26 NOTE — ED NOTES
Pt c/o 3 episodes of dizziness that started at 1130. Pt reports she was sitting when the room started to spin. Pt was recently discharged following a cerebral angiogram and embolization of the left supraclinoid ICA aneurysm.

## 2021-01-04 DIAGNOSIS — I67.1 INTRACRANIAL ANEURYSM: Primary | ICD-10-CM

## 2021-01-07 DIAGNOSIS — R42 VERTIGO: Primary | ICD-10-CM

## 2021-01-12 ENCOUNTER — VIRTUAL VISIT (OUTPATIENT)
Dept: FAMILY MEDICINE CLINIC | Age: 56
End: 2021-01-12
Payer: COMMERCIAL

## 2021-01-12 ENCOUNTER — VIRTUAL VISIT (OUTPATIENT)
Dept: NEUROSURGERY | Age: 56
End: 2021-01-12
Payer: COMMERCIAL

## 2021-01-12 DIAGNOSIS — I67.1 CEREBRAL ANEURYSM: ICD-10-CM

## 2021-01-12 DIAGNOSIS — I10 ESSENTIAL HYPERTENSION: Primary | ICD-10-CM

## 2021-01-12 DIAGNOSIS — I67.1 INTRACRANIAL ANEURYSM: Primary | ICD-10-CM

## 2021-01-12 DIAGNOSIS — R42 VERTIGO: ICD-10-CM

## 2021-01-12 PROCEDURE — 99213 OFFICE O/P EST LOW 20 MIN: CPT | Performed by: STUDENT IN AN ORGANIZED HEALTH CARE EDUCATION/TRAINING PROGRAM

## 2021-01-12 PROCEDURE — 99212 OFFICE O/P EST SF 10 MIN: CPT | Performed by: FAMILY MEDICINE

## 2021-01-12 NOTE — PROGRESS NOTES
Lamar Clancy is a 63 y.o. female who was seen by synchronous (real-time) audio-video technology on 1/12/2021 for Follow Up Chronic Condition        Assessment & Plan:   Diagnoses and all orders for this visit:    1. Essential hypertension: at goal per home values. Continue with current therapy. 2. Vertigo: intermittent. Has appointment with Dr. Puri Monday on 2/24/2020.     3. Cerebral aneurysm: s/p stenting, doing well. Under the care of Dr. Alex Workman. 712  Subjective:   Hypertension: has been doing well with current therapy. Home BP readings on average 132/80. She will be starting a nutrition program this month which educates on healthy eating and making healthy food choices. Christian Amaya had intermittent episodes of vertigo and migraine headaches. If episodes are severe she will take a dose of Xanax which will improve. She has Neurology appointment scheduled for further evaluation. She has resolution of one of her intracranial aneurysms. Next angiogram scheduled June 2021. Currently under the care of Dr. Emanuel Smith. Prior to Admission medications    Medication Sig Start Date End Date Taking? Authorizing Provider   calcium carb/vitamin D3/vit K1 (CALCIUM-VITAMIN D3-VITAMIN K PO) Take 1 Tab by mouth two (2) times a day. 12/14/20  Yes Provider, Historical   ticagrelor (Brilinta) 60 mg tab tablet Take 1 Tab by mouth two (2) times a day. 12/18/20  Yes Ash Hamilton NP   ALPRAZolam (XANAX) 0.5 mg tablet TAKE 1 TAB BY MOUTH DAILY AS NEEDED (VERTIGO). 12/7/20  Yes Vinayak Thomason MD   hydroCHLOROthiazide (HYDRODIURIL) 12.5 mg tablet Take 1 Tab by mouth daily. 11/4/20  Yes Vinayak Thomason MD   aspirin delayed-release 81 mg tablet Take 1 Tab by mouth daily. 10/30/20  Yes Ash Hamilton NP   Lacto no.41-B. bifid-B.animalis (Advanced Probiotic-10) 13 mg (3 billion cell) cap Take  by mouth.    Yes Provider, Historical   lisinopriL (PRINIVIL, ZESTRIL) 40 mg tablet TAKE 1 TABLET DAILY 7/6/20 Yes Sarah Pierre MD   melatonin 5 mg cap capsule Take 10 mg by mouth nightly. Yes Provider, Historical   ergocalciferol (ERGOCALCIFEROL) 50,000 unit capsule TAKE 1 CAPSULE EVERY 7 DAYS 11/5/19 1/12/21  Sarah Pierre MD     Allergies   Allergen Reactions    Shellfish Derived Diarrhea and Nausea and Vomiting    Sulfa (Sulfonamide Antibiotics) Hives     Past Medical History:   Diagnosis Date    Anxiety     Cerebral aneurysm 2020    Right and left supraclinoid ICA aneurysms    Chest pain     Fainting     Headache     migraines    Hiatal hernia     Hypertension     Joint pain     Menopause     early aj at age 38    Ringing in ears     Skipped beats     Trouble in sleeping     Vertigo     Vertigo     Vitamin D deficiency      Social History     Tobacco Use    Smoking status: Never Smoker    Smokeless tobacco: Never Used   Substance Use Topics    Alcohol use: Yes     Comment: 2-3 weekly       ROS   Pertinent items noted in HPI      Objective:     Patient-Reported Vitals 10/6/2020   Patient-Reported Pulse 65   Patient-Reported Systolic  146   Patient-Reported Diastolic 92      General: alert, cooperative, no distress   Mental  status: normal mood, behavior, speech, dress, motor activity, and thought processes, able to follow commands   HENT: NCAT   Neck: no visualized mass   Resp: no respiratory distress   Neuro: no gross deficits   Skin: no discoloration or lesions of concern on visible areas   Psychiatric: normal affect, consistent with stated mood, no evidence of hallucinations       We discussed the expected course, resolution and complications of the diagnosis(es) in detail. Medication risks, benefits, costs, interactions, and alternatives were discussed as indicated. I advised her to contact the office if her condition worsens, changes or fails to improve as anticipated. She expressed understanding with the diagnosis(es) and plan.        Ana Laura Thomas, who was evaluated through a patient-initiated, synchronous (real-time) audio-video encounter, and/or her healthcare decision maker, is aware that it is a billable service, with coverage as determined by her insurance carrier. She provided verbal consent to proceed: Yes, and patient identification was verified. It was conducted pursuant to the emergency declaration under the [address] [place] [address], [address] [place] [address] and the [employer] General Act. A caregiver was present when appropriate. Ability to conduct physical exam was limited. I was in the office. The patient was at home.       Rosalva Aguirre MD

## 2021-01-12 NOTE — PROGRESS NOTES
Neurointerventional Surgery Clinic Note    River Todd is a 63 y.o. female who was seen by synchronous (real-time) audio-video technology on 1/12/2021. Consent: River Todd, who was seen by synchronous (real-time) audio-video technology, and/or her healthcare decision maker, is aware that this patient-initiated, Telehealth encounter on 1/12/2021 is a billable service, with coverage as determined by her insurance carrier. She is aware that she may receive a bill and has provided verbal consent to proceed: Yes. Assessment & Plan:   63year-old left-handed female with history of migraines, and intracranial aneurysms underwent surpass embolization of a right ICA aneurysm on 11/5/2020 and left ICA aneurysm with Surpass stent on 12/18/2020. Patient is doing well. She is currently her aspirin 81 mg and Brilinta 60 mg BID. Patient still reports episodes of vertigo and migraines with flashing lights, lines. No more near fainting episodes. She reports mild headaches that get relieved with Tylenol. Patient is planning to see Dr Giselle Mayfield on 2/24/21. Plan:  Continue with current dual antiplatelet therapy  DSA on 6/18/21  Follow up with Neurology    I spent at least 15 minutes on this visit with this established patient. Subjective:   River Todd is a 63 y.o. female who was seen for   Cerebral Aneurysm (Follow up)      Prior to Admission medications    Medication Sig Start Date End Date Taking? Authorizing Provider   ticagrelor (Brilinta) 60 mg tab tablet Take 1 Tab by mouth two (2) times a day. 12/18/20  Yes Cindy Hamilton NP   ALPRAZolam (XANAX) 0.5 mg tablet TAKE 1 TAB BY MOUTH DAILY AS NEEDED (VERTIGO). 12/7/20  Yes Miko Arnett MD   hydroCHLOROthiazide (HYDRODIURIL) 12.5 mg tablet Take 1 Tab by mouth daily. 11/4/20  Yes Miko Arnett MD   aspirin delayed-release 81 mg tablet Take 1 Tab by mouth daily. 10/30/20  Yes Cindy Hamilton NP   Lacto no.41-B. bifid-B.animalis (Advanced Probiotic-10) 13 mg (3 billion cell) cap Take  by mouth. Yes Provider, Historical   lisinopriL (PRINIVIL, ZESTRIL) 40 mg tablet TAKE 1 TABLET DAILY 7/6/20  Yes Sarah Pierre MD   melatonin 5 mg cap capsule Take 10 mg by mouth nightly. Yes Provider, Historical   calcium carb/vitamin D3/vit K1 (CALCIUM-VITAMIN D3-VITAMIN K PO) Take 1 Tab by mouth two (2) times a day. 12/14/20   Provider, Historical     Allergies   Allergen Reactions    Shellfish Derived Diarrhea and Nausea and Vomiting    Sulfa (Sulfonamide Antibiotics) Hives       Patient Active Problem List   Diagnosis Code    Trouble in sleeping G47.9    Hypertension I10    Anxiety F41.9    Vitamin D deficiency E55.9    Vertigo R42    Severe obesity ([address] E66.01    Syncope R55    Abdominal pain R10.9    Cerebral aneurysm I67.1     Patient Active Problem List    Diagnosis Date Noted    Cerebral aneurysm 10/06/2020    Syncope 07/09/2020    Abdominal pain 07/09/2020    Severe obesity ([address] 10/01/2018    Trouble in sleeping     Hypertension     Anxiety     Vitamin D deficiency     Vertigo      Current Outpatient Medications   Medication Sig Dispense Refill    ticagrelor (Brilinta) 60 mg tab tablet Take 1 Tab by mouth two (2) times a day. 60 Tab 3    ALPRAZolam (XANAX) 0.5 mg tablet TAKE 1 TAB BY MOUTH DAILY AS NEEDED (VERTIGO). 30 Tab 2    hydroCHLOROthiazide (HYDRODIURIL) 12.5 mg tablet Take 1 Tab by mouth daily. 90 Tab 1    aspirin delayed-release 81 mg tablet Take 1 Tab by mouth daily. 30 Tab 2    Lacto no.41-B. bifid-B.animalis (Advanced Probiotic-10) 13 mg (3 billion cell) cap Take  by mouth.  lisinopriL (PRINIVIL, ZESTRIL) 40 mg tablet TAKE 1 TABLET DAILY 90 Tab 3    melatonin 5 mg cap capsule Take 10 mg by mouth nightly.  calcium carb/vitamin D3/vit K1 (CALCIUM-VITAMIN D3-VITAMIN K PO) Take 1 Tab by mouth two (2) times a day.        Allergies   Allergen Reactions    Shellfish Derived Diarrhea and Nausea and Vomiting    Sulfa (Sulfonamide Antibiotics) Hives     Past Medical History:   Diagnosis Date    Anxiety     Cerebral aneurysm     Right and left supraclinoid ICA aneurysms    Chest pain     Fainting     Headache     migraines    Hiatal hernia     Hypertension     Joint pain     Menopause     early aj at age 38    Ringing in ears     Skipped beats     Trouble in sleeping     Vertigo     Vertigo     Vitamin D deficiency      Past Surgical History:   Procedure Laterality Date    HX APPENDECTOMY  2004    HX  SECTION      HX ORTHOPAEDIC      Right ankle x 2    HX VASCULAR STENT  2020    Treatment of 6 mm right supraclinoid ICA aneurysm     Family History   Problem Relation Age of Onset    Hypertension Mother     Hypertension Father     Headache Other      Social History     Tobacco Use    Smoking status: Never Smoker    Smokeless tobacco: Never Used   Substance Use Topics    Alcohol use: Yes     Comment: 2-3 weekly       ROS: Pertinent ROS included in HPI    Objective:   Vital Signs: (As obtained by patient/caregiver at home)  There were no vitals taken for this visit.        Constitutional: [x] Appears well-developed and well-nourished [x] No apparent distress      [] Abnormal -     Mental status: [x] Alert and awake  [x] Oriented to person/place/time [x] Able to follow commands    [] Abnormal -     Eyes:   EOM    [x]  Normal    [] Abnormal -   Sclera  [x]  Normal    [] Abnormal -          Discharge [x]  None visible   [] Abnormal -     HENT: [x] Normocephalic, atraumatic  [] Abnormal -   [x] Mouth/Throat: Mucous membranes are moist    External Ears [x] Normal  [] Abnormal -    Neck: [x] No visualized mass [] Abnormal -     Pulmonary/Chest: [x] Respiratory effort normal   [x] No visualized signs of difficulty breathing or respiratory distress        [] Abnormal -      Musculoskeletal:   [x] Normal gait with no signs of ataxia         [x] Normal range of motion of neck        [] Abnormal - Neurological:        [x] No Facial Asymmetry (Cranial nerve 7 motor function) (limited exam due to video visit)          [x] No gaze palsy        [] Abnormal -          Skin:        [x] No significant exanthematous lesions or discoloration noted on facial skin         [] Abnormal -            Psychiatric:       [x] Normal Affect [] Abnormal -        [x] No Hallucinations    Other pertinent observable physical exam findings:-    We discussed the expected course, resolution and complications of the diagnosis(es) in detail. Medication risks, benefits, costs, interactions, and alternatives were discussed as indicated. I advised her to contact the office if her condition worsens, changes or fails to improve as anticipated. She expressed understanding with the diagnosis(es) and plan. Leticia Villegas is a 63 y.o. female who was evaluated by a video visit encounter for concerns as above. Patient identification was verified prior to start of the visit. A caregiver was present when appropriate. Due to this being a TeleHealth encounter (During DLADA-51 public health emergency), evaluation of the following organ systems was limited: Vitals/Constitutional/EENT/Resp/CV/GI//MS/Neuro/Skin/Heme-Lymph-Imm. Pursuant to the emergency declaration under the [facility], [address] waiver authority and the [employer] General Act, this Virtual  Visit was conducted, with patient's (and/or legal guardian's) consent, to reduce the patient's risk of exposure to COVID-19 and provide necessary medical care. Services were provided through a video synchronous discussion virtually to substitute for in-person clinic visit. Patient and provider were located at their individual homes. This visit was completed virtually using Doxy. negra Bedoya MD

## 2021-01-13 ENCOUNTER — TELEPHONE (OUTPATIENT)
Dept: NEUROSURGERY | Age: 56
End: 2021-01-13

## 2021-01-13 NOTE — TELEPHONE ENCOUNTER
Patient has question about premedication before dental procedure. She stated that she was instructed to give our office a call because her dentist is wanting to premedicate her with antibiotics before having crowns done.

## 2021-01-28 DIAGNOSIS — I10 ESSENTIAL HYPERTENSION: ICD-10-CM

## 2021-02-01 RX ORDER — HYDROCHLOROTHIAZIDE 12.5 MG/1
12.5 TABLET ORAL DAILY
Qty: 90 TAB | Refills: 1 | Status: SHIPPED | OUTPATIENT
Start: 2021-02-01 | End: 2021-04-15

## 2021-04-07 ENCOUNTER — TELEPHONE (OUTPATIENT)
Dept: NEUROSURGERY | Age: 56
End: 2021-04-07

## 2021-04-07 DIAGNOSIS — I67.1 INTRACRANIAL ANEURYSM: Primary | ICD-10-CM

## 2021-04-07 NOTE — TELEPHONE ENCOUNTER
Patient is due for diagnostic 6/2021 she is requesting to have it done the first week of June so she can plan her vacation. Her in office pre op is already scheduled for 5/25.

## 2021-04-14 NOTE — TELEPHONE ENCOUNTER
Spoke to patient to confirm Diagnostic angiogram on 6/2/2021 at [facility]. Arrival time 7:00 am at Patient registration. Patient has an office visit 5/25/2021. Will have routine lab work done then.

## 2021-04-15 ENCOUNTER — VIRTUAL VISIT (OUTPATIENT)
Dept: FAMILY MEDICINE CLINIC | Age: 56
End: 2021-04-15
Payer: COMMERCIAL

## 2021-04-15 DIAGNOSIS — I10 ESSENTIAL HYPERTENSION: Primary | ICD-10-CM

## 2021-04-15 PROCEDURE — 99212 OFFICE O/P EST SF 10 MIN: CPT | Performed by: FAMILY MEDICINE

## 2021-04-15 NOTE — PROGRESS NOTES
Kalli Cat is a 63 y.o. female who was seen by synchronous (real-time) audio-video technology on 4/15/2021 for Other    Assessment & Plan:   Diagnoses and all orders for this visit:    1. Essential hypertension: self discontinued HCTZ about a month ago and BP has been controlled on lisinopril 40 mg. Her lifestyle change and weight loss has been beneficial. Continue with lisinopril 40 mg daily. Continue to monitor BP at home; recommend reevaluation should home values start to trend upward. 712  Subjective:   Pt would like to provide updates on her blood pressure and upcoming follow up with Neurosurgery and angiogram.     Hypertension: stopped HCTZ about a month ago due to low BPs and feeling woozy. - Home BP monitorin/80, 114-130/70s/80s  - Has done a healthy living change and has lost 20 lbs and feels good, her goal is for another 40 lbs, current weight is 211 lbs and down from 232 lbs at her highest  - No reported chest pain or discomfort, dyspnea. Prior to Admission medications    Medication Sig Start Date End Date Taking? Authorizing Provider   calcium carb/vitamin D3/vit K1 (CALCIUM-VITAMIN D3-VITAMIN K PO) Take 1 Tab by mouth two (2) times a day. 20  Yes Provider, Historical   ticagrelor (Brilinta) 60 mg tab tablet Take 1 Tab by mouth two (2) times a day. 20  Yes Irwin Hamilton NP   ALPRAZolam (XANAX) 0.5 mg tablet TAKE 1 TAB BY MOUTH DAILY AS NEEDED (VERTIGO). 20  Yes Hayde Rosas MD   aspirin delayed-release 81 mg tablet Take 1 Tab by mouth daily. 10/30/20  Yes Irwin Hamilton NP   Lacto no.41-B. bifid-B.animalis (Advanced Probiotic-10) 13 mg (3 billion cell) cap Take  by mouth. Yes Provider, Historical   lisinopriL (PRINIVIL, ZESTRIL) 40 mg tablet TAKE 1 TABLET DAILY 20  Yes Hayde Rosas MD   melatonin 5 mg cap capsule Take 10 mg by mouth nightly. Yes Provider, Historical   hydroCHLOROthiazide (HYDRODIURIL) 12.5 mg tablet Take 1 Tab by mouth daily. 2/1/21   Qi Elliott MD       Allergies   Allergen Reactions    Shellfish Derived Diarrhea and Nausea and Vomiting    Sulfa (Sulfonamide Antibiotics) Hives     Past Medical History:   Diagnosis Date    Anxiety     Cerebral aneurysm 2020    Right and left supraclinoid ICA aneurysms    Chest pain     Fainting     Headache     migraines    Hiatal hernia     Hypertension     Joint pain     Menopause     early aj at age 38    Ringing in ears     Skipped beats     Trouble in sleeping     Vertigo     Vertigo     Vitamin D deficiency      Social History     Tobacco Use    Smoking status: Never Smoker    Smokeless tobacco: Never Used   Substance Use Topics    Alcohol use: Yes     Comment: 2-3 weekly       ROS   Pertinent items noted in HPI    Objective:     Patient-Reported Vitals 10/6/2020   Patient-Reported Pulse 65   Patient-Reported Systolic  932   Patient-Reported Diastolic 92      General: alert, cooperative, no distress   Mental  status: normal mood, behavior, speech, dress, motor activity, and thought processes, able to follow commands   HENT: NCAT   Neck: no visualized mass   Resp: no respiratory distress   Neuro: no gross deficits   Skin: no discoloration or lesions of concern on visible areas   Psychiatric: normal affect, consistent with stated mood, no evidence of hallucinations     We discussed the expected course, resolution and complications of the diagnosis(es) in detail. Medication risks, benefits, costs, interactions, and alternatives were discussed as indicated. I advised her to contact the office if her condition worsens, changes or fails to improve as anticipated. She expressed understanding with the diagnosis(es) and plan. Tracy Bobo, was evaluated through a synchronous (real-time) audio-video encounter. The patient (or guardian if applicable) is aware that this is a billable service. Verbal consent to proceed has been obtained within the past 12 months.  The visit was conducted pursuant to the emergency declaration under the [facility], [address] [place] [address] authority and the [employer] General Act. Patient identification was verified, and a caregiver was present when appropriate. The patient was located in a state where the provider was credentialed to provide care. I was in the office. The patient was at home.        Kelvin Bautista MD

## 2021-04-15 NOTE — PROGRESS NOTES
Identified pt with two pt identifiers(name and ). Reviewed record in preparation for visit and have obtained necessary documentation. Chief Complaint   Patient presents with    Other        Health Maintenance Due   Topic    COVID-19 Vaccine (1)    PAP AKA CERVICAL CYTOLOGY     Shingrix Vaccine Age 50> (1 of 2)       There were no vitals taken for this visit. Coordination of Care Questionnaire:  :   1) Have you been to an emergency room, urgent care, or hospitalized since your last visit? If yes, where when, and reason for visit? NO      2. Have seen or consulted any other health care provider since your last visit? If yes, where when, and reason for visit? NO      Patient is accompanied by SELF  I have received verbal consent from [address] to discuss any/all medical information while they are present in the room.

## 2021-04-22 DIAGNOSIS — R42 VERTIGO: ICD-10-CM

## 2021-04-22 RX ORDER — ALPRAZOLAM 0.5 MG/1
0.5 TABLET ORAL
Qty: 30 TAB | Refills: 2 | Status: CANCELLED | OUTPATIENT
Start: 2021-04-22

## 2021-04-22 NOTE — TELEPHONE ENCOUNTER
----- Message from Amanda Foreman sent at 4/22/2021 11:06 AM EDT -----  Regarding: Dr. Rahul Mora (if not patient): n/a      Relationship of caller (if not patient): n/a      Best contact number(s):[phone] (please call if there are any issues)      Name of medication and dosage if known: alprozolome (90 day supply)      Is patient out of this medication (yes/no): n/a      Pharmacy name: [address] [place] [address] Mansi Meraz listed in chart? (yes/no): no  Pharmacy phone number: [phone]      Details to clarify the request: Does want to be filled locally, only wants to use [facility] for this.       Amanda Foreman

## 2021-05-03 NOTE — TELEPHONE ENCOUNTER
----- Message from Kar Sep sent at 5/3/2021  4:20 PM EDT -----  Regarding: Dr. Mace Fuelling / phone  Medication Refill    Caller (if not patient): pt      Relationship of caller (if not patient): pt      Best contact number(s):[phone]      Name of medication and dosage if known: \"Alprazolom . 5mg \"      Is patient out of this medication (yes/no): yes      Pharmacy name: [facility] listed in chart? (yes/no):no   Pharmacy phone number:      Details to clarify the request:Pt is following up on a request to see if this script could be filled through [facility]. This was submitted about a week and a half ago. She hasn't heard back from office and would like to know the status.        Kar Mcconnell

## 2021-05-05 DIAGNOSIS — I67.1 INTRACRANIAL ANEURYSM: Primary | ICD-10-CM

## 2021-05-22 NOTE — PROGRESS NOTES
NeuroInterventional note  Patient is presenting for a diagnostic cerebral angiogram with 3D reconstruction. Patient has known bilateral ICA aneurysms. She passed the reverse Barbeaut test. Will do angiogram with right radial artery access.  NIHSS:0.    Flower Hernadez MD  NeuroInteventional Surgery stated

## 2021-05-25 ENCOUNTER — OFFICE VISIT (OUTPATIENT)
Dept: NEUROSURGERY | Age: 56
End: 2021-05-25
Payer: COMMERCIAL

## 2021-05-25 VITALS
WEIGHT: 206 LBS | TEMPERATURE: 96.6 F | HEART RATE: 81 BPM | DIASTOLIC BLOOD PRESSURE: 88 MMHG | SYSTOLIC BLOOD PRESSURE: 120 MMHG | HEIGHT: 66 IN | OXYGEN SATURATION: 98 % | BODY MASS INDEX: 33.11 KG/M2

## 2021-05-25 DIAGNOSIS — I67.1 INTRACRANIAL ANEURYSM: Primary | ICD-10-CM

## 2021-05-25 PROCEDURE — 99214 OFFICE O/P EST MOD 30 MIN: CPT | Performed by: STUDENT IN AN ORGANIZED HEALTH CARE EDUCATION/TRAINING PROGRAM

## 2021-05-25 NOTE — PROGRESS NOTES
Pre op follow up for Diagnostic angiogram for Cerebral aneurysm. Patient denies any symptoms. States she feels good. Denies headaches, dizziness, blurred or double vision, n/v numbness or tingling. No acute problems reported.

## 2021-05-25 NOTE — PATIENT INSTRUCTIONS
Diagnostic Angiogram on 6/17/2021 at [address] [place] [address] time 8:00 am. 
Have lab work done a week prior. A Healthy Lifestyle: Care Instructions Your Care Instructions A healthy lifestyle can help you feel good, stay at a healthy weight, and have plenty of energy for both work and play. A healthy lifestyle is something you can share with your whole family. A healthy lifestyle also can lower your risk for serious health problems, such as high blood pressure, heart disease, and diabetes. You can follow a few steps listed below to improve your health and the health of your family. Follow-up care is a key part of your treatment and safety. Be sure to make and go to all appointments, and call your doctor if you are having problems. It's also a good idea to know your test results and keep a list of the medicines you take. How can you care for yourself at home? · Do not eat too much sugar, fat, or fast foods. You can still have dessert and treats now and then. The goal is moderation. · Start small to improve your eating habits. Pay attention to portion sizes, drink less juice and soda pop, and eat more fruits and vegetables. ? Eat a healthy amount of food. A 3-ounce serving of meat, for example, is about the size of a deck of cards. Fill the rest of your plate with vegetables and whole grains. ? Limit the amount of soda and sports drinks you have every day. Drink more water when you are thirsty. ? Eat plenty of fruits and vegetables every day. Have an apple or some carrot sticks as an afternoon snack instead of a candy bar. Try to have fruits and/or vegetables at every meal. 
· Make exercise part of your daily routine. You may want to start with simple activities, such as walking, bicycling, or slow swimming. Try to be active 30 to 60 minutes every day. You do not need to do all 30 to 60 minutes all at once. For example, you can exercise 3 times a day for 10 or 20 minutes.  Moderate exercise is safe for most people, but it is always a good idea to talk to your doctor before starting an exercise program. 
· Keep moving. Karrie Paola the lawn, work in the garden, or [employer]. Take the stairs instead of the elevator at work. · If you smoke, quit. People who smoke have an increased risk for heart attack, stroke, cancer, and other lung illnesses. Quitting is hard, but there are ways to boost your chance of quitting tobacco for good. ? Use nicotine gum, patches, or lozenges. ? Ask your doctor about stop-smoking programs and medicines. ? Keep trying. In addition to reducing your risk of diseases in the future, you will notice some benefits soon after you stop using tobacco. If you have shortness of breath or asthma symptoms, they will likely get better within a few weeks after you quit. · Limit how much alcohol you drink. Moderate amounts of alcohol (up to 2 drinks a day for men, 1 drink a day for women) are okay. But drinking too much can lead to liver problems, high blood pressure, and other health problems. Family health If you have a family, there are many things you can do together to improve your health. · Eat meals together as a family as often as possible. · Eat healthy foods. This includes fruits, vegetables, lean meats and dairy, and whole grains. · Include your family in your fitness plan. Most people think of activities such as jogging or tennis as the way to fitness, but there are many ways you and your family can be more active. Anything that makes you breathe hard and gets your heart pumping is exercise. Here are some tips: 
? Walk to do errands or to take your child to school or the bus. 
? Go for a family bike ride after dinner instead of watching TV. Where can you learn more? Go to http://www.gray.com/ Enter U049 in the search box to learn more about \"A Healthy Lifestyle: Care Instructions. \" Current as of: September 23, 2020               Content Version: 12.8 
© 9111-3699 Healthwise, Incorporated. Care instructions adapted under license by [employer] (which disclaims liability or warranty for this information). If you have questions about a medical condition or this instruction, always ask your healthcare professional. [address] any warranty or liability for your use of this information.

## 2021-05-26 LAB
ALBUMIN SERPL-MCNC: 4.2 G/DL (ref 3.8–4.9)
ALBUMIN/GLOB SERPL: 1.4 {RATIO} (ref 1.2–2.2)
ALP SERPL-CCNC: 77 IU/L (ref 48–121)
ALT SERPL-CCNC: 11 IU/L (ref 0–32)
AST SERPL-CCNC: 15 IU/L (ref 0–40)
BILIRUB SERPL-MCNC: 0.4 MG/DL (ref 0–1.2)
BUN SERPL-MCNC: 18 MG/DL (ref 6–24)
BUN/CREAT SERPL: 26 (ref 9–23)
CALCIUM SERPL-MCNC: 9.7 MG/DL (ref 8.7–10.2)
CHLORIDE SERPL-SCNC: 102 MMOL/L (ref 96–106)
CO2 SERPL-SCNC: 24 MMOL/L (ref 20–29)
CREAT SERPL-MCNC: 0.69 MG/DL (ref 0.57–1)
ERYTHROCYTE [DISTWIDTH] IN BLOOD BY AUTOMATED COUNT: 14.4 % (ref 11.7–15.4)
GLOBULIN SER CALC-MCNC: 3 G/DL (ref 1.5–4.5)
GLUCOSE SERPL-MCNC: 94 MG/DL (ref 65–99)
HCT VFR BLD AUTO: 39.7 % (ref 34–46.6)
HGB BLD-MCNC: 12.8 G/DL (ref 11.1–15.9)
MCH RBC QN AUTO: 28.3 PG (ref 26.6–33)
MCHC RBC AUTO-ENTMCNC: 32.2 G/DL (ref 31.5–35.7)
MCV RBC AUTO: 88 FL (ref 79–97)
PLATELET # BLD AUTO: 283 X10E3/UL (ref 150–450)
POTASSIUM SERPL-SCNC: 4.8 MMOL/L (ref 3.5–5.2)
PROT SERPL-MCNC: 7.2 G/DL (ref 6–8.5)
RBC # BLD AUTO: 4.53 X10E6/UL (ref 3.77–5.28)
SODIUM SERPL-SCNC: 141 MMOL/L (ref 134–144)
WBC # BLD AUTO: 6.2 X10E3/UL (ref 3.4–10.8)

## 2021-05-26 NOTE — PROGRESS NOTES
NeuroInterventional Surgery Note  Jules Suarez MD    Patient: Graceann Kehr MRN: [id number]  SSN: [id number]    YOB: 1965  Age: 63 y.o. Sex: female      Chief Complaint:    Subjective:      Graceann Kehr is a 63 y.o. female who is being seen for aneurysm follow up     Past Medical History:   Diagnosis Date    Anxiety     Cerebral aneurysm 2020    Right and left supraclinoid ICA aneurysms    Chest pain     Fainting     Headache     migraines    Hiatal hernia     Hypertension     Joint pain     Menopause     early aj at age 38    Ringing in ears     Skipped beats     Trouble in sleeping     Vertigo     Vertigo     Vitamin D deficiency      Family History   Problem Relation Age of Onset    Hypertension Mother     Hypertension Father     Headache Other      Social History     Tobacco Use    Smoking status: Never Smoker    Smokeless tobacco: Never Used   Substance Use Topics    Alcohol use: Yes     Comment: 2-3 weekly      Cannot display prior to admission medications because the patient has not been admitted in this contact. Allergies   Allergen Reactions    Shellfish Derived Diarrhea and Nausea and Vomiting    Sulfa (Sulfonamide Antibiotics) Hives       Review of Systems:  A comprehensive review of systems was negative except for that written in the History of Present Illness. Denies numbness, tingling, chest pain, leg pain, nausea, vomiting, difficulty swallowing, headache, and dyspnea. Objective:     Vitals:    05/25/21 1527   BP: 120/88   Pulse: 81   Temp: (!) 96.6 °F (35.9 °C)   TempSrc: Temporal   SpO2: 98%   Weight: 206 lb (93.4 kg)   Height: 5' 6\" (1.676 m)      Physical Exam:  GENERAL: Calm, cooperative, NAD    Neurologic Exam:  Mental Status:  Alert and oriented x 4. Appropriate affect, mood and behavior. Language:    Normal fluency, repetition, comprehension and naming.     Cranial Nerves:   Pupils 3 mm, equal, round and reactive to light. Visual fields full to confrontation. Extraocular movements intact. Facial sensation intact. Full facial strength, no asymmetry. Hearing grossly intact bilaterally. No dysarthria. Tongue protrudes to midline, palate elevates symmetrically. Shoulder shrug 5/5 bilaterally. Motor:    No pronator drift. Bulk and tone normal.      5/5 power in all extremities proximally and distally. No involuntary movements. Sensation:    Sensation intact throughout to light touch, temperature    Reflexes:    Reflexes are 2+ at the biceps, triceps, patella and achilles bilaterally. Negative Babinskis    Coordination & Gait: Normal. FTN and HTS intact with no ataxia present. Labs:  Lab Results   Component Value Date/Time    WBC 6.2 05/25/2021 11:43 AM    Hemoglobin (POC) 13.6 11/05/2010 12:35 PM    HGB 12.8 05/25/2021 11:43 AM    Hematocrit (POC) 40 11/05/2010 12:35 PM    HCT 39.7 05/25/2021 11:43 AM    PLATELET 308 93/51/1420 11:43 AM    MCV 88 05/25/2021 11:43 AM      Lab Results   Component Value Date/Time    Sodium 141 05/25/2021 11:43 AM    Potassium 4.8 05/25/2021 11:43 AM    Chloride 102 05/25/2021 11:43 AM    CO2 24 05/25/2021 11:43 AM    Anion gap 2 (L) 12/26/2020 02:37 PM    Glucose 94 05/25/2021 11:43 AM    BUN 18 05/25/2021 11:43 AM    Creatinine 0.69 05/25/2021 11:43 AM    BUN/Creatinine ratio 26 (H) 05/25/2021 11:43 AM    GFR est  05/25/2021 11:43 AM    GFR est non-AA 98 05/25/2021 11:43 AM    Calcium 9.7 05/25/2021 11:43 AM     Lab Results   Component Value Date/Time    Troponin-I, Qt. <0.05 07/09/2020 08:20 PM       Imaging:  CT Results (maximum last 3): See report    Assessment and Plan:   Ms Kate Collins is doing well. No weakness or numbness. Her headaches are getting better. She is s/p Surpass embolization of right supraclinoid ICA aneurysm on 11/5/20 and Surpass embolization of supraclinoid left ICA on 12/18/20.  She is currently on ASA 81 mg and Brilinta 60 mg BID. No more visual auras. She takes Tylenol PRN headaches. Plan:  Cerebral angiogram on 6/18/21 (radial access)  Continue with current medical therapy    Thank you for this consult and participating in the care of this patient.   Signed By: Ariel Munoz MD     May 26, 2021

## 2021-05-26 NOTE — H&P (VIEW-ONLY)
NeuroInterventional Surgery Note Blaine Kent MD 
 
Patient: Mary Pelayo MRN: [id number]  SSN: [id number] YOB: 1965  Age: 63 y.o. Sex: female Chief Complaint: 
 
Subjective:  
  
Mary Pelayo is a 63 y.o. female who is being seen for aneurysm follow up Past Medical History:  
Diagnosis Date  Anxiety  Cerebral aneurysm 2020 Right and left supraclinoid ICA aneurysms  Chest pain  Fainting  Headache   
 migraines  Hiatal hernia  Hypertension  Joint pain  Menopause   
 early aj at age 38  Ringing in ears  Skipped beats  Trouble in sleeping  Vertigo  Vertigo  Vitamin D deficiency Family History Problem Relation Age of Onset  Hypertension Mother  Hypertension Father  Headache Other Social History Tobacco Use  Smoking status: Never Smoker  Smokeless tobacco: Never Used Substance Use Topics  Alcohol use: Yes Comment: 2-3 weekly Cannot display prior to admission medications because the patient has not been admitted in this contact. Allergies Allergen Reactions  Shellfish Derived Diarrhea and Nausea and Vomiting  Sulfa (Sulfonamide Antibiotics) Hives Review of Systems: A comprehensive review of systems was negative except for that written in the History of Present Illness. Denies numbness, tingling, chest pain, leg pain, nausea, vomiting, difficulty swallowing, headache, and dyspnea. Objective:  
 
Vitals:  
 05/25/21 1527 BP: 120/88 Pulse: 81 Temp: (!) 96.6 °F (35.9 °C) TempSrc: Temporal  
SpO2: 98% Weight: 206 lb (93.4 kg) Height: 5' 6\" (1.676 m) Physical Exam: 
GENERAL: Calm, cooperative, NAD Neurologic Exam: 
Mental Status:  Alert and oriented x 4. Appropriate affect, mood and behavior. Language:    Normal fluency, repetition, comprehension and naming.  
 
Cranial Nerves:   Pupils 3 mm, equal, round and reactive to light. Visual fields full to confrontation. Extraocular movements intact. Facial sensation intact. Full facial strength, no asymmetry. Hearing grossly intact bilaterally. No dysarthria. Tongue protrudes to midline, palate elevates symmetrically. Shoulder shrug 5/5 bilaterally. Motor:    No pronator drift. Bulk and tone normal.  
   5/5 power in all extremities proximally and distally. No involuntary movements. Sensation:    Sensation intact throughout to light touch, temperature Reflexes:    Reflexes are 2+ at the biceps, triceps, patella and achilles bilaterally. Negative Babinskis Coordination & Gait: Normal. FTN and HTS intact with no ataxia present. Labs: 
Lab Results Component Value Date/Time WBC 6.2 05/25/2021 11:43 AM  
 Hemoglobin (POC) 13.6 11/05/2010 12:35 PM  
 HGB 12.8 05/25/2021 11:43 AM  
 Hematocrit (POC) 40 11/05/2010 12:35 PM  
 HCT 39.7 05/25/2021 11:43 AM  
 PLATELET 971 65/47/0620 11:43 AM  
 MCV 88 05/25/2021 11:43 AM  
  
Lab Results Component Value Date/Time Sodium 141 05/25/2021 11:43 AM  
 Potassium 4.8 05/25/2021 11:43 AM  
 Chloride 102 05/25/2021 11:43 AM  
 CO2 24 05/25/2021 11:43 AM  
 Anion gap 2 (L) 12/26/2020 02:37 PM  
 Glucose 94 05/25/2021 11:43 AM  
 BUN 18 05/25/2021 11:43 AM  
 Creatinine 0.69 05/25/2021 11:43 AM  
 BUN/Creatinine ratio 26 (H) 05/25/2021 11:43 AM  
 GFR est  05/25/2021 11:43 AM  
 GFR est non-AA 98 05/25/2021 11:43 AM  
 Calcium 9.7 05/25/2021 11:43 AM  
 
Lab Results Component Value Date/Time Troponin-I, Qt. <0.05 07/09/2020 08:20 PM  
 
 
Imaging: CT Results (maximum last 3): See report Assessment and Plan:  
Ms Kim Griffiths is doing well. No weakness or numbness. Her headaches are getting better. She is s/p Surpass embolization of right supraclinoid ICA aneurysm on 11/5/20 and Surpass embolization of supraclinoid left ICA on 12/18/20.  She is currently on ASA 81 mg and Brilinta 60 mg BID. No more visual auras. She takes Tylenol PRN headaches. Plan: 
Cerebral angiogram on 6/18/21 (radial access) Continue with current medical therapy Thank you for this consult and participating in the care of this patient. Signed By: Leonarda Mcadams MD   
 May 26, 2021

## 2021-06-02 ENCOUNTER — HOSPITAL ENCOUNTER (OUTPATIENT)
Dept: INTERVENTIONAL RADIOLOGY/VASCULAR | Age: 56
Discharge: HOME | End: 2021-06-02
Attending: STUDENT IN AN ORGANIZED HEALTH CARE EDUCATION/TRAINING PROGRAM
Payer: COMMERCIAL

## 2021-06-16 ENCOUNTER — TELEPHONE (OUTPATIENT)
Dept: NEUROSURGERY | Age: 56
End: 2021-06-16

## 2021-06-16 NOTE — TELEPHONE ENCOUNTER
Message left for patient to confirm Diagnostic angiogram tomorrow at [facility]. Arrival time 8:00 am.  No eating or drinking after midnight, take morning medications with sips of water. Call office to confirm receipt of this message.

## 2021-06-17 ENCOUNTER — HOSPITAL ENCOUNTER (OUTPATIENT)
Dept: INTERVENTIONAL RADIOLOGY/VASCULAR | Age: 56
Discharge: HOME | End: 2021-06-17
Attending: STUDENT IN AN ORGANIZED HEALTH CARE EDUCATION/TRAINING PROGRAM | Admitting: STUDENT IN AN ORGANIZED HEALTH CARE EDUCATION/TRAINING PROGRAM
Payer: COMMERCIAL

## 2021-06-17 VITALS
HEART RATE: 77 BPM | WEIGHT: 204 LBS | BODY MASS INDEX: 32.78 KG/M2 | HEIGHT: 66 IN | TEMPERATURE: 98 F | OXYGEN SATURATION: 95 % | DIASTOLIC BLOOD PRESSURE: 89 MMHG | SYSTOLIC BLOOD PRESSURE: 125 MMHG | RESPIRATION RATE: 14 BRPM

## 2021-06-17 DIAGNOSIS — I67.1 INTRACRANIAL ANEURYSM: ICD-10-CM

## 2021-06-17 PROCEDURE — 76937 US GUIDE VASCULAR ACCESS: CPT

## 2021-06-17 PROCEDURE — 36217 PLACE CATHETER IN ARTERY: CPT

## 2021-06-17 PROCEDURE — 36223 PLACE CATH CAROTID/INOM ART: CPT

## 2021-06-17 PROCEDURE — 2709999900 HC NON-CHARGEABLE SUPPLY

## 2021-06-17 PROCEDURE — C1769 GUIDE WIRE: HCPCS

## 2021-06-17 PROCEDURE — 99152 MOD SED SAME PHYS/QHP 5/>YRS: CPT

## 2021-06-17 PROCEDURE — 74011000250 HC RX REV CODE- 250: Performed by: STUDENT IN AN ORGANIZED HEALTH CARE EDUCATION/TRAINING PROGRAM

## 2021-06-17 PROCEDURE — 36224 PLACE CATH CAROTD ART: CPT

## 2021-06-17 PROCEDURE — 36226 PLACE CATH VERTEBRAL ART: CPT

## 2021-06-17 PROCEDURE — C1894 INTRO/SHEATH, NON-LASER: HCPCS

## 2021-06-17 PROCEDURE — 77030012468 HC VLV BLEEDBK CNTRL ABBT -B

## 2021-06-17 PROCEDURE — 76942 ECHO GUIDE FOR BIOPSY: CPT

## 2021-06-17 PROCEDURE — C1887 CATHETER, GUIDING: HCPCS

## 2021-06-17 PROCEDURE — 99153 MOD SED SAME PHYS/QHP EA: CPT

## 2021-06-17 PROCEDURE — 75710 ARTERY X-RAYS ARM/LEG: CPT

## 2021-06-17 PROCEDURE — 77030003394 HC NDL ART COOK -A

## 2021-06-17 PROCEDURE — 36216 PLACE CATHETER IN ARTERY: CPT

## 2021-06-17 PROCEDURE — 36215 PLACE CATHETER IN ARTERY: CPT

## 2021-06-17 PROCEDURE — 36218 PLACE CATHETER IN ARTERY: CPT

## 2021-06-17 PROCEDURE — 74011250636 HC RX REV CODE- 250/636: Performed by: STUDENT IN AN ORGANIZED HEALTH CARE EDUCATION/TRAINING PROGRAM

## 2021-06-17 PROCEDURE — 77030010221 HC SPLNT WR POS TELE -B

## 2021-06-17 PROCEDURE — 74011000636 HC RX REV CODE- 636: Performed by: STUDENT IN AN ORGANIZED HEALTH CARE EDUCATION/TRAINING PROGRAM

## 2021-06-17 RX ORDER — MIDAZOLAM HYDROCHLORIDE 1 MG/ML
5 INJECTION, SOLUTION INTRAMUSCULAR; INTRAVENOUS
Status: DISCONTINUED | OUTPATIENT
Start: 2021-06-17 | End: 2021-06-17

## 2021-06-17 RX ORDER — LIDOCAINE HYDROCHLORIDE 20 MG/ML
20 INJECTION, SOLUTION INFILTRATION; PERINEURAL
Status: COMPLETED | OUTPATIENT
Start: 2021-06-17 | End: 2021-06-17

## 2021-06-17 RX ORDER — VERAPAMIL HYDROCHLORIDE 2.5 MG/ML
2.5 INJECTION, SOLUTION INTRAVENOUS
Status: DISCONTINUED | OUTPATIENT
Start: 2021-06-17 | End: 2021-06-17

## 2021-06-17 RX ORDER — FENTANYL CITRATE 50 UG/ML
200 INJECTION, SOLUTION INTRAMUSCULAR; INTRAVENOUS
Status: DISCONTINUED | OUTPATIENT
Start: 2021-06-17 | End: 2021-06-17

## 2021-06-17 RX ORDER — VERAPAMIL HYDROCHLORIDE 2.5 MG/ML
10 INJECTION, SOLUTION INTRAVENOUS
Status: DISCONTINUED | OUTPATIENT
Start: 2021-06-17 | End: 2021-06-17

## 2021-06-17 RX ORDER — VERAPAMIL HYDROCHLORIDE 2.5 MG/ML
20-40 INJECTION, SOLUTION INTRAVENOUS
Status: DISCONTINUED | OUTPATIENT
Start: 2021-06-17 | End: 2021-06-17

## 2021-06-17 RX ORDER — HEPARIN SODIUM 1000 [USP'U]/ML
10000 INJECTION, SOLUTION INTRAVENOUS; SUBCUTANEOUS
Status: DISCONTINUED | OUTPATIENT
Start: 2021-06-17 | End: 2021-06-17

## 2021-06-17 RX ORDER — SODIUM CHLORIDE 9 MG/ML
50 INJECTION, SOLUTION INTRAVENOUS
Status: COMPLETED | OUTPATIENT
Start: 2021-06-17 | End: 2021-06-17

## 2021-06-17 RX ORDER — FLUMAZENIL 0.1 MG/ML
0.5 INJECTION INTRAVENOUS
Status: DISCONTINUED | OUTPATIENT
Start: 2021-06-17 | End: 2021-06-17

## 2021-06-17 RX ORDER — NALOXONE HYDROCHLORIDE 0.4 MG/ML
1 INJECTION, SOLUTION INTRAMUSCULAR; INTRAVENOUS; SUBCUTANEOUS
Status: DISCONTINUED | OUTPATIENT
Start: 2021-06-17 | End: 2021-06-17

## 2021-06-17 RX ADMIN — LIDOCAINE HYDROCHLORIDE 400 MG: 20 INJECTION, SOLUTION INFILTRATION; PERINEURAL at 10:34

## 2021-06-17 RX ADMIN — FENTANYL CITRATE 25 MCG: 50 INJECTION, SOLUTION INTRAMUSCULAR; INTRAVENOUS at 10:29

## 2021-06-17 RX ADMIN — IOPAMIDOL 69 ML: 612 INJECTION, SOLUTION INTRAVENOUS at 11:00

## 2021-06-17 RX ADMIN — HEPARIN SODIUM 4000 UNITS: 5000 INJECTION, SOLUTION INTRAVENOUS; SUBCUTANEOUS at 11:01

## 2021-06-17 RX ADMIN — HEPARIN SODIUM 4000 UNITS: 5000 INJECTION, SOLUTION INTRAVENOUS; SUBCUTANEOUS at 11:02

## 2021-06-17 RX ADMIN — MIDAZOLAM HYDROCHLORIDE 0.5 MG: 1 INJECTION, SOLUTION INTRAMUSCULAR; INTRAVENOUS at 10:47

## 2021-06-17 RX ADMIN — HEPARIN SODIUM 1000 UNITS: 1000 INJECTION INTRAVENOUS; SUBCUTANEOUS at 10:38

## 2021-06-17 RX ADMIN — FENTANYL CITRATE 25 MCG: 50 INJECTION, SOLUTION INTRAMUSCULAR; INTRAVENOUS at 10:37

## 2021-06-17 RX ADMIN — VERAPAMIL HYDROCHLORIDE 2.5 MG: 2.5 INJECTION, SOLUTION INTRAVENOUS at 10:38

## 2021-06-17 RX ADMIN — HEPARIN SODIUM 4000 UNITS: 5000 INJECTION, SOLUTION INTRAVENOUS; SUBCUTANEOUS at 11:00

## 2021-06-17 RX ADMIN — MIDAZOLAM HYDROCHLORIDE 1 MG: 1 INJECTION, SOLUTION INTRAMUSCULAR; INTRAVENOUS at 10:29

## 2021-06-17 RX ADMIN — SODIUM CHLORIDE 50 ML/HR: 9 INJECTION, SOLUTION INTRAVENOUS at 08:29

## 2021-06-17 NOTE — INTERVAL H&P NOTE
Update History & Physical    The Patient's History and Physical of May 25, 2021 was reviewed with the patient and I examined the patient. There was no change. The surgical site was confirmed by the patient and me. Plan:  The risk, benefits, expected outcome, and alternative to the recommended procedure have been discussed with the patient. Patient understands and wants to proceed with the procedure.     Electronically signed by Prince Keita NP on 6/17/2021 at 12:20 PM

## 2021-06-17 NOTE — PROGRESS NOTES
Dr. Jade Peter at bedside to discuss images from the case with the patient. Dr. Jade Peter said aneurysms are shrinking and it is okay to stop taking the [address] and continue to taking 81 mg of aspirin. Pt is agreeable to plan and verbalizes understanding.

## 2021-06-17 NOTE — PROGRESS NOTES
RN called pt's  and updated him on procedure and plan of care. Rusty verbalized understanding. Rusty to come to bedside to sit w/ patient.

## 2021-06-17 NOTE — DISCHARGE INSTRUCTIONS
[address] [facility],[address]Th Floor  [facility]            Cerebral Angiography Discharge Instructions    General Information: This test is done to evaluate the blood vessels in your brain and neck. Following the procedure, you will be asked to lie flat on your back for 2-6 hours after the procedure to prevent bleeding complications. The physician may use an arterial closure device that will decrease your recovery time. If this is used, your nurse will explain the difference in recovery. We have no way to determine if we can use a closure device until the procedure is started. We will let you know when you wake up after the procedure. Home Care Instructions: You can resume your regular diet. Do not shower, bathe, swim, drink alcohol, or make any important legal decisions in the next 48 hours. You may drive after 24 hours. Do not lift anything heavier than a gallon of milk for 2 days. Watch the site carefully for signs of infection, like fever, drainage, redness, and/or swelling. If you take Glucophage (Metformin) for diabetes, do not take it for the next 48 hours. If you were asked to hold any blood thinners prior to the procedure, you may restart that medicine the day after the procedure is completed or when instructed by your physician. Recline your car seat for the ride home. Call If: You should call your Physician and/or the Radiology Nurse if you have any signs of infection like fever, drainage, redness, and/or swelling. If the puncture site should ooze, please call. Also call if you have any pain, decreased sensation, numbness, tingling, swelling, or change in color to the affected extremity. SEEK IMMEDIATE MEDICAL CARE IF YOUR PUNCTURE SITE STARTS TO BLEED. APPLY ENOUGH FIRM PRESSURE TO THE SITE WITH THE TIPS OF YOUR FINGERS TO STOP THE BLEEDING. Arterial bleeding is a medical emergency and should be evaluated immediately.     Follow-Up Instructions:  Please follow up with your ordering doctor as he has requested. Please stop taking the Brilinta and continue to take your 81 mg of aspirin. To Reach Us: You have received sedation medications today. YOU SHOULD NOT DRIVE FOR 24 HOURS, DO NOT OPERATE HEAVY MACHINERY, DO NOT MAKE ANY LEGAL DECISIONS OR SIGN LEGAL DOCUMENTS FOR 24 HOURS. DO NOT DRINK ALCOHOL, TAKE ANY MEDICATIONS UNLESS PRESCRIBED BY YOUR DOCTOR. IF YOU ARE A CAREGIVER, SOMEONE SHOULD TAKE THAT ROLE FOR 24 HOURS. Side effects of sedation medications and other medications used today have been reviewed  Those side effects can include but are not limited to: dizziness, drowsiness, poor balance, fatigue, sleepiness. Take precautions at home to prevent falls, such as assistance with walking or stairs if allowed and /or when needed or position changes. Allergic or adverse reactions could include nausea, itching, hives, dizziness, or anything else out of the ordinary. Should you experience any of these significant changes, please call [phone] between the hours of 7:30 am and 10 pm or [phone] after hours. After hours, ask the  to page the X-ray Technologist, and describe the problem to the technologist. If you are experiencing chest pain, shortness of breath, altered mental state, unusual bleeding or any other emergent symptom you should call 911 immediately.

## 2021-06-17 NOTE — PROGRESS NOTES
1205 - 2 ml of air removed from TR band. No oozing. 9 ml of air still in place. Pt mentioned she had an episode of \"dripping motion kind of like a screensaver in the L peripheral and below eye, lasting a few seconds w/o pain. Pt's peripheral vision WNL and track's w/ eyes appropriately. Dr. Royal Sandoval notified and said it probably d/t contrast from the procedure. MD said to continue to monitor.

## 2021-06-17 NOTE — PROGRESS NOTES
1321 - TR band removed. Arm board in place. Non-adherent dressing and tegaderm place. No oozing. Dressing is clean, dry and intact.

## 2021-06-17 NOTE — BRIEF OP NOTE
NEUROINTERVENTIONAL SURGERY POST-PROCEDURE NOTE    PROCEDURE:  Diagnostic cerebral angiogram    VESSEL(S) STUDIED:  1. Right common carotid artery  2. Right internal carotid artery  3. Left common carotid artery  4. Left internal carotid artery  5. Right vertebral artery VESSEL(S) TREATED:  1. None      PRELIMINARY REPORT & DISPOSITION:   Very small neck remnant of previously embolized bilateral ICA aneurysms    EBL: 5 cc    COMPLICATIONS:  None    FOLLOW-UP:  ASA 81 mg daily  SBP less than 140 mmHg DATE OF SERVICE:  6/17/2021 11:18 AM     ATTENDING SURGEON(S):  Ramon Portillo MD    ANESTHESIA:   Mild sedation    MEDICATIONS:   See nursing record    PUNCTURE SITE:  Right radial artery.  TR band       Ramon Portillo MD  [address] [facility]

## 2021-06-21 ENCOUNTER — VIRTUAL VISIT (OUTPATIENT)
Dept: NEUROSURGERY | Age: 56
End: 2021-06-21
Payer: COMMERCIAL

## 2021-06-21 DIAGNOSIS — I67.1 INTRACRANIAL ANEURYSM: Primary | ICD-10-CM

## 2021-06-21 PROCEDURE — 99213 OFFICE O/P EST LOW 20 MIN: CPT | Performed by: STUDENT IN AN ORGANIZED HEALTH CARE EDUCATION/TRAINING PROGRAM

## 2021-06-21 NOTE — PATIENT INSTRUCTIONS
Follow up with Dr Pamela Baker in May 2022. Have MRA done 5/5/2022. Call [phone] to schedule imaging. Call office at [phone] after imaging is scheduled. A Healthy Lifestyle: Care Instructions Your Care Instructions A healthy lifestyle can help you feel good, stay at a healthy weight, and have plenty of energy for both work and play. A healthy lifestyle is something you can share with your whole family. A healthy lifestyle also can lower your risk for serious health problems, such as high blood pressure, heart disease, and diabetes. You can follow a few steps listed below to improve your health and the health of your family. Follow-up care is a key part of your treatment and safety. Be sure to make and go to all appointments, and call your doctor if you are having problems. It's also a good idea to know your test results and keep a list of the medicines you take. How can you care for yourself at home? · Do not eat too much sugar, fat, or fast foods. You can still have dessert and treats now and then. The goal is moderation. · Start small to improve your eating habits. Pay attention to portion sizes, drink less juice and soda pop, and eat more fruits and vegetables. ? Eat a healthy amount of food. A 3-ounce serving of meat, for example, is about the size of a deck of cards. Fill the rest of your plate with vegetables and whole grains. ? Limit the amount of soda and sports drinks you have every day. Drink more water when you are thirsty. ? Eat plenty of fruits and vegetables every day. Have an apple or some carrot sticks as an afternoon snack instead of a candy bar. Try to have fruits and/or vegetables at every meal. 
· Make exercise part of your daily routine. You may want to start with simple activities, such as walking, bicycling, or slow swimming. Try to be active 30 to 60 minutes every day. You do not need to do all 30 to 60 minutes all at once.  For example, you can exercise 3 times a day for 10 or 20 minutes. Moderate exercise is safe for most people, but it is always a good idea to talk to your doctor before starting an exercise program. 
· Keep moving. [place] [employer] the lawn, work in the garden, or [employer]. Take the stairs instead of the elevator at work. · If you smoke, quit. People who smoke have an increased risk for heart attack, stroke, cancer, and other lung illnesses. Quitting is hard, but there are ways to boost your chance of quitting tobacco for good. ? Use nicotine gum, patches, or lozenges. ? Ask your doctor about stop-smoking programs and medicines. ? Keep trying. In addition to reducing your risk of diseases in the future, you will notice some benefits soon after you stop using tobacco. If you have shortness of breath or asthma symptoms, they will likely get better within a few weeks after you quit. · Limit how much alcohol you drink. Moderate amounts of alcohol (up to 2 drinks a day for men, 1 drink a day for women) are okay. But drinking too much can lead to liver problems, high blood pressure, and other health problems. Family health If you have a family, there are many things you can do together to improve your health. · Eat meals together as a family as often as possible. · Eat healthy foods. This includes fruits, vegetables, lean meats and dairy, and whole grains. · Include your family in your fitness plan. Most people think of activities such as jogging or tennis as the way to fitness, but there are many ways you and your family can be more active. Anything that makes you breathe hard and gets your heart pumping is exercise. Here are some tips: 
? Walk to do errands or to take your child to school or the bus. 
? Go for a family bike ride after dinner instead of watching TV. Where can you learn more? Go to http://www.gray.com/ Enter H344 in the search box to learn more about \"A Healthy Lifestyle: Care Instructions. \" Current as of: September 23, 2020               Content Version: 12.8 © 9302-6135 Healthwise, [employer] Care instructions adapted under license by [employer] (which disclaims liability or warranty for this information). If you have questions about a medical condition or this instruction, always ask your healthcare professional. [address] any warranty or liability for your use of this information.

## 2021-06-21 NOTE — PROGRESS NOTES
Procedure follow up for Cerebral aneurysm. Patient reports she is symptom free. Reports puncture site at right radial with slight bruising. Denies drainage, pain, swelling, numbness or tingling. Denies headaches, dizziness, numbness and tingling, blurred or double vision. No acute problems reported.

## 2021-06-22 NOTE — PROGRESS NOTES
Neurointerventional Surgery Clinic Note    Greg Gilliam is a 63 y.o. female who was seen by synchronous (real-time) audio-video technology on 6/21/2021. Consent: Greg Gilliam, who was seen by synchronous (real-time) audio-video technology, and/or her healthcare decision maker, is aware that this patient-initiated, Telehealth encounter on 6/21/2021 is a billable service, with coverage as determined by her insurance carrier. She is aware that she may receive a bill and has provided verbal consent to proceed: Yes. Assessment & Plan:   Ms. Thiago Holliday is a 58-year-old left-handed female with PMHx of migraines /p Surpass embolization of right supraclinoid ICA aneurysm on 11/5/20 and Surpass embolization of supraclinoid left ICA on 12/18/20. She is currently on ASA 81 mg and Brilinta 60 mg BID. She underwent cerebral angiogram on 6/17/21 that showed very small neck remnant of previously embolized bilateral ICA aneurysms. At this point patients Brilinta was stopped. She will continue with ASA 81 mg daily. She is doing well. No new symptoms. No headaches. No weakness or numbness. Plan:   Continue with ASA 81 mg daily   Keep SBP less than 140 mmHg   MRA head without contrast in 18 months from initial procedure (5/5/22)   Will continue to follow     I spent at least 20 minutes on this visit with this established patient. Subjective:   Greg Gilliam is a 63 y.o. female who was seen for   Cerebral Aneurysm (Procedure follow up)      Prior to Admission medications    Medication Sig Start Date End Date Taking? Authorizing Provider   calcium carb/vitamin D3/vit K1 (CALCIUM-VITAMIN D3-VITAMIN K PO) Take 1 Tab by mouth two (2) times a day. 12/14/20  Yes Provider, Historical   ALPRAZolam (XANAX) 0.5 mg tablet TAKE 1 TAB BY MOUTH DAILY AS NEEDED (VERTIGO). 12/7/20  Yes Jamil Wright MD   aspirin delayed-release 81 mg tablet Take 1 Tab by mouth daily. 10/30/20  Yes Alta Hamilton NP   Lacto no.41-B. bifid-B.animalis (Advanced Probiotic-10) 13 mg (3 billion cell) cap Take  by mouth. Yes Provider, Historical   lisinopriL (PRINIVIL, ZESTRIL) 40 mg tablet TAKE 1 TABLET DAILY 7/6/20  Yes Ovi Collins MD   melatonin 5 mg cap capsule Take 10 mg by mouth nightly. Yes Provider, Historical     Allergies   Allergen Reactions    Shellfish Derived Diarrhea and Nausea and Vomiting    Sulfa (Sulfonamide Antibiotics) Hives       Patient Active Problem List   Diagnosis Code    Trouble in sleeping G47.9    Hypertension I10    Anxiety F41.9    Vitamin D deficiency E55.9    Vertigo R42    Severe obesity ([address] E66.01    Syncope R55    Abdominal pain R10.9    Cerebral aneurysm I67.1     Patient Active Problem List    Diagnosis Date Noted    Cerebral aneurysm 10/06/2020    Syncope 07/09/2020    Abdominal pain 07/09/2020    Severe obesity ([address] 10/01/2018    Trouble in sleeping     Hypertension     Anxiety     Vitamin D deficiency     Vertigo      Current Outpatient Medications   Medication Sig Dispense Refill    calcium carb/vitamin D3/vit K1 (CALCIUM-VITAMIN D3-VITAMIN K PO) Take 1 Tab by mouth two (2) times a day.  ALPRAZolam (XANAX) 0.5 mg tablet TAKE 1 TAB BY MOUTH DAILY AS NEEDED (VERTIGO). 30 Tab 2    aspirin delayed-release 81 mg tablet Take 1 Tab by mouth daily. 30 Tab 2    Lacto no.41-B. bifid-B.animalis (Advanced Probiotic-10) 13 mg (3 billion cell) cap Take  by mouth.  lisinopriL (PRINIVIL, ZESTRIL) 40 mg tablet TAKE 1 TABLET DAILY 90 Tab 3    melatonin 5 mg cap capsule Take 10 mg by mouth nightly.        Allergies   Allergen Reactions    Shellfish Derived Diarrhea and Nausea and Vomiting    Sulfa (Sulfonamide Antibiotics) Hives     Past Medical History:   Diagnosis Date    Anxiety     Cerebral aneurysm 2020    Right and left supraclinoid ICA aneurysms    Chest pain     Fainting     Headache     migraines    Hiatal hernia     Hypertension     Joint pain     Menopause     early aj at age 38    Ringing in ears     Skipped beats     Trouble in sleeping     Vertigo     Vertigo     Vitamin D deficiency      Past Surgical History:   Procedure Laterality Date    HX APPENDECTOMY      HX  SECTION      HX ORTHOPAEDIC      Right ankle x 2    HX VASCULAR STENT  2020    Treatment of 6 mm right supraclinoid ICA aneurysm     Family History   Problem Relation Age of Onset    Hypertension Mother     Hypertension Father     Headache Other      Social History     Tobacco Use    Smoking status: Never Smoker    Smokeless tobacco: Never Used   Substance Use Topics    Alcohol use: Yes     Comment: 2-3 weekly, \"socially\"        ROS: Pertinent ROS included in HPI    Objective:   Vital Signs: (As obtained by patient/caregiver at home)  There were no vitals taken for this visit.        Constitutional: [x] Appears well-developed and well-nourished [x] No apparent distress      [] Abnormal -     Mental status: [x] Alert and awake  [x] Oriented to person/place/time [x] Able to follow commands    [] Abnormal -     Eyes:   EOM    [x]  Normal    [] Abnormal -   Sclera  [x]  Normal    [] Abnormal -          Discharge [x]  None visible   [] Abnormal -     HENT: [x] Normocephalic, atraumatic  [] Abnormal -   [x] Mouth/Throat: Mucous membranes are moist    External Ears [x] Normal  [] Abnormal -    Neck: [x] No visualized mass [] Abnormal -     Pulmonary/Chest: [x] Respiratory effort normal   [x] No visualized signs of difficulty breathing or respiratory distress        [] Abnormal -      Musculoskeletal:   [x] Normal gait with no signs of ataxia         [x] Normal range of motion of neck        [] Abnormal -     Neurological:        [x] No Facial Asymmetry (Cranial nerve 7 motor function) (limited exam due to video visit)          [x] No gaze palsy        [] Abnormal -          Skin:        [x] No significant exanthematous lesions or discoloration noted on facial skin         [] Abnormal - Psychiatric:       [x] Normal Affect [] Abnormal -        [x] No Hallucinations    We discussed the expected course, resolution and complications of the diagnosis(es) in detail. Medication risks, benefits, costs, interactions, and alternatives were discussed as indicated. I advised her to contact the office if her condition worsens, changes or fails to improve as anticipated. She expressed understanding with the diagnosis(es) and plan. Vickie Dunbar is a 63 y.o. female who was evaluated by a video visit encounter for concerns as above. Patient identification was verified prior to start of the visit. A caregiver was present when appropriate. Due to this being a TeleHealth encounter (During KFNUQ-65 public health emergency), evaluation of the following organ systems was limited: Vitals/Constitutional/EENT/Resp/CV/GI//MS/Neuro/Skin/Heme-Lymph-Imm. Pursuant to the emergency declaration under the [facility] waiver authority and the [employer] Act, this Virtual  Visit was conducted, with patient's (and/or legal guardian's) consent, to reduce the patient's risk of exposure to COVID-19 and provide necessary medical care. Services were provided through a video synchronous discussion virtually to substitute for in-person clinic visit. Patient and provider were located at their individual homes. This visit was completed virtually using Doxy. negra Reyes MD

## 2021-06-30 RX ORDER — LISINOPRIL 40 MG/1
TABLET ORAL
Qty: 90 TABLET | Refills: 0 | Status: SHIPPED | OUTPATIENT
Start: 2021-06-30 | End: 2021-09-28

## 2021-07-20 ENCOUNTER — OFFICE VISIT (OUTPATIENT)
Dept: FAMILY MEDICINE CLINIC | Age: 56
End: 2021-07-20
Payer: COMMERCIAL

## 2021-07-20 VITALS
WEIGHT: 206 LBS | HEIGHT: 66 IN | TEMPERATURE: 98.6 F | SYSTOLIC BLOOD PRESSURE: 128 MMHG | OXYGEN SATURATION: 95 % | DIASTOLIC BLOOD PRESSURE: 86 MMHG | HEART RATE: 76 BPM | BODY MASS INDEX: 33.11 KG/M2 | RESPIRATION RATE: 16 BRPM

## 2021-07-20 DIAGNOSIS — E78.2 MIXED HYPERLIPIDEMIA: ICD-10-CM

## 2021-07-20 DIAGNOSIS — R43.0 ANOSMIA: ICD-10-CM

## 2021-07-20 DIAGNOSIS — Z12.31 ENCOUNTER FOR SCREENING MAMMOGRAM FOR MALIGNANT NEOPLASM OF BREAST: ICD-10-CM

## 2021-07-20 DIAGNOSIS — E55.9 VITAMIN D DEFICIENCY: ICD-10-CM

## 2021-07-20 DIAGNOSIS — Z00.00 ENCOUNTER FOR ROUTINE ADULT HEALTH EXAMINATION WITHOUT ABNORMAL FINDINGS: Primary | ICD-10-CM

## 2021-07-20 DIAGNOSIS — I10 ESSENTIAL HYPERTENSION: ICD-10-CM

## 2021-07-20 LAB
25(OH)D3 SERPL-MCNC: 97.7 NG/ML (ref 30–100)
ALBUMIN SERPL-MCNC: 3.8 G/DL (ref 3.5–5)
ALBUMIN/GLOB SERPL: 1 {RATIO} (ref 1.1–2.2)
ALP SERPL-CCNC: 77 U/L (ref 45–117)
ALT SERPL-CCNC: 20 U/L (ref 12–78)
ANION GAP SERPL CALC-SCNC: 4 MMOL/L (ref 5–15)
AST SERPL-CCNC: 9 U/L (ref 15–37)
BASOPHILS # BLD: 0 K/UL (ref 0–0.1)
BASOPHILS NFR BLD: 1 % (ref 0–1)
BILIRUB SERPL-MCNC: 0.7 MG/DL (ref 0.2–1)
BUN SERPL-MCNC: 20 MG/DL (ref 6–20)
BUN/CREAT SERPL: 28 (ref 12–20)
CALCIUM SERPL-MCNC: 9.6 MG/DL (ref 8.5–10.1)
CHLORIDE SERPL-SCNC: 106 MMOL/L (ref 97–108)
CHOLEST SERPL-MCNC: 211 MG/DL
CO2 SERPL-SCNC: 31 MMOL/L (ref 21–32)
CREAT SERPL-MCNC: 0.72 MG/DL (ref 0.55–1.02)
DIFFERENTIAL METHOD BLD: ABNORMAL
EOSINOPHIL # BLD: 0.1 K/UL (ref 0–0.4)
EOSINOPHIL NFR BLD: 1 % (ref 0–7)
ERYTHROCYTE [DISTWIDTH] IN BLOOD BY AUTOMATED COUNT: 15.5 % (ref 11.5–14.5)
GLOBULIN SER CALC-MCNC: 3.8 G/DL (ref 2–4)
GLUCOSE SERPL-MCNC: 84 MG/DL (ref 65–100)
HCT VFR BLD AUTO: 41.4 % (ref 35–47)
HDLC SERPL-MCNC: 78 MG/DL
HDLC SERPL: 2.7 {RATIO} (ref 0–5)
HGB BLD-MCNC: 13.4 G/DL (ref 11.5–16)
IMM GRANULOCYTES # BLD AUTO: 0 K/UL (ref 0–0.04)
IMM GRANULOCYTES NFR BLD AUTO: 0 % (ref 0–0.5)
LDLC SERPL CALC-MCNC: 112 MG/DL (ref 0–100)
LYMPHOCYTES # BLD: 1.9 K/UL (ref 0.8–3.5)
LYMPHOCYTES NFR BLD: 37 % (ref 12–49)
MCH RBC QN AUTO: 28.2 PG (ref 26–34)
MCHC RBC AUTO-ENTMCNC: 32.4 G/DL (ref 30–36.5)
MCV RBC AUTO: 87.2 FL (ref 80–99)
MONOCYTES # BLD: 0.4 K/UL (ref 0–1)
MONOCYTES NFR BLD: 8 % (ref 5–13)
NEUTS SEG # BLD: 2.7 K/UL (ref 1.8–8)
NEUTS SEG NFR BLD: 53 % (ref 32–75)
NRBC # BLD: 0 K/UL (ref 0–0.01)
NRBC BLD-RTO: 0 PER 100 WBC
PLATELET # BLD AUTO: 259 K/UL (ref 150–400)
PMV BLD AUTO: 11 FL (ref 8.9–12.9)
POTASSIUM SERPL-SCNC: 4.9 MMOL/L (ref 3.5–5.1)
PROT SERPL-MCNC: 7.6 G/DL (ref 6.4–8.2)
RBC # BLD AUTO: 4.75 M/UL (ref 3.8–5.2)
SODIUM SERPL-SCNC: 141 MMOL/L (ref 136–145)
TRIGL SERPL-MCNC: 105 MG/DL (ref ?–150)
VLDLC SERPL CALC-MCNC: 21 MG/DL
WBC # BLD AUTO: 5 K/UL (ref 3.6–11)

## 2021-07-20 PROCEDURE — 99396 PREV VISIT EST AGE 40-64: CPT | Performed by: INTERNAL MEDICINE

## 2021-07-20 NOTE — PATIENT INSTRUCTIONS

## 2021-07-20 NOTE — PROGRESS NOTES
Identified pt with two pt identifiers(name and ). Chief Complaint   Patient presents with    Complete Physical    Labs     fasting        Health Maintenance Due   Topic    COVID-19 Vaccine (1)    PAP AKA CERVICAL CYTOLOGY     Shingrix Vaccine Age 50> (1 of 2)       Wt Readings from Last 3 Encounters:   21 206 lb (93.4 kg)   21 204 lb (92.5 kg)   21 206 lb (93.4 kg)     Temp Readings from Last 3 Encounters:   21 98.6 °F (37 °C) (Oral)   21 98 °F (36.7 °C)   21 (!) 96.6 °F (35.9 °C) (Temporal)     BP Readings from Last 3 Encounters:   21 128/86   21 125/89   21 120/88     Pulse Readings from Last 3 Encounters:   21 76   21 77   21 81         Learning Assessment:  :     Learning Assessment 2021 2020 10/1/2018   PRIMARY LEARNER Patient Patient Patient   HIGHEST LEVEL OF EDUCATION - PRIMARY LEARNER  - - GRADUATED HIGH SCHOOL OR GED   BARRIERS PRIMARY LEARNER - - NONE   CO-LEARNER CAREGIVER - - No   PRIMARY LANGUAGE ENGLISH ENGLISH ENGLISH   LEARNER PREFERENCE PRIMARY READING READING DEMONSTRATION   ANSWERED BY patient patient  self   RELATIONSHIP SELF SELF SELF       Depression Screening:  :     3 most recent PHQ Screens 2021   Little interest or pleasure in doing things Not at all   Feeling down, depressed, irritable, or hopeless Not at all   Total Score PHQ 2 0       Fall Risk Assessment:  :     No flowsheet data found. Abuse Screening:  :     Abuse Screening Questionnaire 2021 3/10/2020 2019 2018   Do you ever feel afraid of your partner? N N N N   Are you in a relationship with someone who physically or mentally threatens you? N N N N   Is it safe for you to go home?  Y Y Y Y       Coordination of Care Questionnaire:  :     1) Have you been to an emergency room, urgent care clinic since your last visit? no   Hospitalized since your last visit? no             2) Have you seen or consulted any other health care providers outside of [address] [place] [address] since your last visit? no  (Include any pap smears or colon screenings in this section.)    3) Do you have an Advance Directive on file? no  Are you interested in receiving information about Advance Directives?  no        Reviewed record in preparation for visit and have obtained necessary documentation

## 2021-08-09 ENCOUNTER — HOSPITAL ENCOUNTER (OUTPATIENT)
Dept: MAMMOGRAPHY | Age: 56
Discharge: HOME | End: 2021-08-09
Attending: INTERNAL MEDICINE
Payer: COMMERCIAL

## 2021-08-09 DIAGNOSIS — Z12.31 ENCOUNTER FOR SCREENING MAMMOGRAM FOR MALIGNANT NEOPLASM OF BREAST: ICD-10-CM

## 2021-08-09 PROCEDURE — 77067 SCR MAMMO BI INCL CAD: CPT

## 2021-09-28 RX ORDER — LISINOPRIL 40 MG/1
TABLET ORAL
Qty: 90 TABLET | Refills: 3 | Status: SHIPPED | OUTPATIENT
Start: 2021-09-28 | End: 2021-11-18 | Stop reason: DRUGHIGH

## 2021-10-13 ENCOUNTER — TRANSCRIBE ORDER (OUTPATIENT)
Dept: SCHEDULING | Age: 56
End: 2021-10-13

## 2021-10-13 DIAGNOSIS — S89.92XA INJURY OF LEFT KNEE, INITIAL ENCOUNTER: ICD-10-CM

## 2021-10-13 DIAGNOSIS — M25.462 EFFUSION OF LEFT KNEE: Primary | ICD-10-CM

## 2021-10-22 ENCOUNTER — HOSPITAL ENCOUNTER (OUTPATIENT)
Dept: MRI IMAGING | Age: 56
Discharge: HOME | End: 2021-10-22
Attending: STUDENT IN AN ORGANIZED HEALTH CARE EDUCATION/TRAINING PROGRAM
Payer: COMMERCIAL

## 2021-10-22 DIAGNOSIS — S89.92XA INJURY OF LEFT KNEE, INITIAL ENCOUNTER: ICD-10-CM

## 2021-10-22 DIAGNOSIS — M25.462 EFFUSION OF LEFT KNEE: ICD-10-CM

## 2021-10-22 PROCEDURE — 73721 MRI JNT OF LWR EXTRE W/O DYE: CPT

## 2021-11-18 ENCOUNTER — OFFICE VISIT (OUTPATIENT)
Dept: FAMILY MEDICINE CLINIC | Age: 56
End: 2021-11-18
Payer: COMMERCIAL

## 2021-11-18 VITALS
OXYGEN SATURATION: 96 % | SYSTOLIC BLOOD PRESSURE: 118 MMHG | TEMPERATURE: 96.3 F | HEART RATE: 61 BPM | WEIGHT: 204.8 LBS | BODY MASS INDEX: 32.92 KG/M2 | DIASTOLIC BLOOD PRESSURE: 76 MMHG | HEIGHT: 66 IN | RESPIRATION RATE: 17 BRPM

## 2021-11-18 DIAGNOSIS — Z23 NEEDS FLU SHOT: ICD-10-CM

## 2021-11-18 DIAGNOSIS — I10 PRIMARY HYPERTENSION: Primary | ICD-10-CM

## 2021-11-18 PROCEDURE — 99213 OFFICE O/P EST LOW 20 MIN: CPT | Performed by: NURSE PRACTITIONER

## 2021-11-18 PROCEDURE — 90686 IIV4 VACC NO PRSV 0.5 ML IM: CPT | Performed by: NURSE PRACTITIONER

## 2021-11-18 PROCEDURE — 90471 IMMUNIZATION ADMIN: CPT | Performed by: NURSE PRACTITIONER

## 2021-11-18 RX ORDER — LISINOPRIL 20 MG/1
20 TABLET ORAL DAILY
Qty: 90 TABLET | Refills: 0 | Status: SHIPPED | OUTPATIENT
Start: 2021-11-18 | End: 2022-02-23 | Stop reason: SDUPTHER

## 2021-11-18 NOTE — PATIENT INSTRUCTIONS
Vaccine Information Statement    Influenza (Flu) Vaccine (Inactivated or Recombinant): What You Need to Know    Many vaccine information statements are available in Khmer and other languages. See www.immunize.org/vis. Hojas de información sobre vacunas están disponibles en español y en muchos otros idiomas. Visite www.immunize.org/vis. 1. Why get vaccinated? Influenza vaccine can prevent influenza (flu). Flu is a contagious disease that spreads around the [facility] every year, usually between October and May. Anyone can get the flu, but it is more dangerous for some people. Infants and young children, people 72 years and older, pregnant people, and people with certain health conditions or a weakened immune system are at greatest risk of flu complications. Pneumonia, bronchitis, sinus infections, and ear infections are examples of flu-related complications. If you have a medical condition, such as heart disease, cancer, or diabetes, flu can make it worse. Flu can cause fever and chills, sore throat, muscle aches, fatigue, cough, headache, and runny or stuffy nose. Some people may have vomiting and diarrhea, though this is more common in children than adults. In an average year, thousands of people in the [facility] die from flu, and many more are hospitalized. Flu vaccine prevents millions of illnesses and flu-related visits to the doctor each year. 2. Influenza vaccines     CDC recommends everyone 6 months and older get vaccinated every flu season. Children 6 months through 6years of age may need 2 doses during a single flu season. Everyone else needs only 1 dose each flu season. It takes about 2 weeks for protection to develop after vaccination. There are many flu viruses, and they are always changing. Each year a new flu vaccine is made to protect against the influenza viruses believed to be likely to cause disease in the upcoming flu season.  Even when the vaccine doesnt exactly match these viruses, it may still provide some protection. Influenza vaccine does not cause flu. Influenza vaccine may be given at the same time as other vaccines. 3. Talk with your health care provider    Tell your vaccination provider if the person getting the vaccine:   Has had an allergic reaction after a previous dose of influenza vaccine, or has any severe, life-threatening allergies    Has ever had Guillain-Barré Syndrome (also called GBS)    In some cases, your health care provider may decide to postpone influenza vaccination until a future visit. Influenza vaccine can be administered at any time during pregnancy. People who are or will be pregnant during influenza season should receive inactivated influenza vaccine. People with minor illnesses, such as a cold, may be vaccinated. People who are moderately or severely ill should usually wait until they recover before getting influenza vaccine. Your health care provider can give you more information. 4. Risks of a vaccine reaction     Soreness, redness, and swelling where the shot is given, fever, muscle aches, and headache can happen after influenza vaccination.  There may be a very small increased risk of Guillain-Barré Syndrome (GBS) after inactivated influenza vaccine (the flu shot). [facility] who get the flu shot along with pneumococcal vaccine (PCV13) and/or DTaP vaccine at the same time might be slightly more likely to have a seizure caused by fever. Tell your health care provider if a child who is getting flu vaccine has ever had a seizure. People sometimes faint after medical procedures, including vaccination. Tell your provider if you feel dizzy or have vision changes or ringing in the ears. As with any medicine, there is a very remote chance of a vaccine causing a severe allergic reaction, other serious injury, or death. 5. What if there is a serious problem?     An allergic reaction could occur after the vaccinated person leaves the clinic. If you see signs of a severe allergic reaction (hives, swelling of the face and throat, difficulty breathing, a fast heartbeat, dizziness, or weakness), call 9-1-1 and get the person to the nearest hospital.    For other signs that concern you, call your health care provider. Adverse reactions should be reported to the Vaccine Adverse Event Reporting System (VAERS). Your health care provider will usually file this report, or you can do it yourself. Visit the VAERS website at www.vaers. [facility].gov or call 6-537.602.9215. VAERS is only for reporting reactions, and VAERS staff members do not give medical advice. 6. The National Vaccine Injury Compensation Program    The [facility] Vaccine Injury Compensation Program (VICP) is a federal program that was created to compensate people who may have been injured by certain vaccines. Claims regarding alleged injury or death due to vaccination have a time limit for filing, which may be as short as two years. Visit the VICP website at www.[facility].gov/vaccinecompensation or call 1-156.598.8263 to learn about the program and about filing a claim. 7. How can I learn more?  Ask your health care provider.  Call your local or state health department.  Visit the website of the Food and Drug Administration (FDA) for vaccine package inserts and additional information at www.fda.gov/vaccines-blood-biologics/vaccines.  Contact the Centers for Disease Control and Prevention (CDC):  - Call 6-725.196.5478 (1-800-CDC-INFO) or  - Visit CDCs influenza website at www.cdc.gov/flu. Vaccine Information Statement   Inactivated Influenza Vaccine   8/6/2021  42 [address] 193OF-10   Department of Health and Human Services  Centers for Disease Control and Prevention    Office Use Only

## 2021-11-18 NOTE — PROGRESS NOTES
Subjective:     Danielle Bal is a 61 y.o. female who presents for follow up of hypertension. Diet and Lifestyle: generally follows a low fat low cholesterol diet  Home BP Monitoring: is well controlled at home, ranging 120's/80's    Cardiovascular ROS: taking medications as instructed, no medication side effects noted, no TIA's, no chest pain on exertion, no dyspnea on exertion, no swelling of ankles. New concerns: Pt states that she has noted that her blood pressures have been on the low side  She has recently lost about 30 pounds, and believes that this has caused the decrease in BP. At times, she is symptomatic with her low BP, with dizziness. She is wondering if we can possibly decrease her BP medication at this time? Patient Active Problem List    Diagnosis Date Noted    Cerebral aneurysm 10/06/2020    Syncope 07/09/2020    Abdominal pain 07/09/2020    Severe obesity ([address] 10/01/2018    Trouble in sleeping     Hypertension     Anxiety     Vitamin D deficiency     Vertigo      Current Outpatient Medications   Medication Sig Dispense Refill    lisinopriL (PRINIVIL, ZESTRIL) 20 mg tablet Take 1 Tablet by mouth daily. 90 Tablet 0    calcium carb/vitamin D3/vit K1 (CALCIUM-VITAMIN D3-VITAMIN K PO) Take 1 Tab by mouth two (2) times a day.  ALPRAZolam (XANAX) 0.5 mg tablet TAKE 1 TAB BY MOUTH DAILY AS NEEDED (VERTIGO). 30 Tab 2    aspirin delayed-release 81 mg tablet Take 1 Tab by mouth daily. 30 Tab 2    Lacto no.41-B. bifid-B.animalis (Advanced Probiotic-10) 13 mg (3 billion cell) cap Take  by mouth.  melatonin 5 mg cap capsule Take 10 mg by mouth nightly.        Allergies   Allergen Reactions    Shellfish Derived Diarrhea and Nausea and Vomiting    Sulfa (Sulfonamide Antibiotics) Hives     Past Medical History:   Diagnosis Date    Anxiety     Cerebral aneurysm 2020    Right and left supraclinoid ICA aneurysms    Chest pain     Fainting     Headache     migraines    Hiatal hernia     Hypertension     Joint pain     Menopause     early aj at age 38    Ringing in ears     Skipped beats     Trouble in sleeping     Vertigo     Vertigo     Vitamin D deficiency      Past Surgical History:   Procedure Laterality Date    HX APPENDECTOMY  2004    HX  SECTION      HX ORTHOPAEDIC      Right ankle x 2    HX VASCULAR STENT  2020    Treatment of 6 mm right supraclinoid ICA aneurysm     Family History   Problem Relation Age of Onset    Hypertension Mother     Hypertension Father     Headache Other      Social History     Tobacco Use    Smoking status: Never Smoker    Smokeless tobacco: Never Used   Substance Use Topics    Alcohol use: Yes     Comment: 2-3 weekly, \"socially\"         Lab Results   Component Value Date/Time    WBC 5.0 2021 02:25 PM    HGB 13.4 2021 02:25 PM    Hemoglobin (POC) 13.6 2010 12:35 PM    HCT 41.4 2021 02:25 PM    Hematocrit (POC) 40 2010 12:35 PM    PLATELET 349  02:25 PM    MCV 87.2 2021 02:25 PM     Lab Results   Component Value Date/Time    Cholesterol, total 211 (H) 2021 02:25 PM    HDL Cholesterol 78 2021 02:25 PM    LDL, calculated 112 (H) 2021 02:25 PM    Triglyceride 105 2021 02:25 PM    CHOL/HDL Ratio 2.7 2021 02:25 PM     Lab Results   Component Value Date/Time    GFR est non-AA >60 2021 02:25 PM    GFRNA, POC >60 2010 12:35 PM    GFR est AA >60 2021 02:25 PM    GFRAA, POC >60 2010 12:35 PM    Creatinine 0.72 2021 02:25 PM    Creatinine (POC) 0.6 2010 12:35 PM    BUN 20 2021 02:25 PM    BUN (POC) 10 2010 12:35 PM    Sodium 141 2021 02:25 PM    Sodium (POC) 140 2010 12:35 PM    Potassium 4.9 2021 02:25 PM    Potassium (POC) 4.1 2010 12:35 PM    Chloride 106 2021 02:25 PM    Chloride (POC) 103 2010 12:35 PM    CO2 31 2021 02:25 PM    Magnesium 1.8 2020 08:20 PM Phosphorus 3.3 07/10/2020 06:22 AM        Review of Systems, additional:  Pertinent items are noted in HPI. Objective:     Visit Vitals  /76 (BP 1 Location: Left upper arm, BP Patient Position: Sitting, BP Cuff Size: Adult)   Pulse 61   Temp (!) 96.3 °F (35.7 °C) (Temporal)   Resp 17   Ht 5' 6\" (1.676 m)   Wt 204 lb 12.8 oz (92.9 kg)   SpO2 96%   BMI 33.06 kg/m²     Appearance: alert, well appearing, and in no distress. General exam: CVS exam BP noted to be well controlled today in office, S1, S2 normal, no gallop, no murmur, chest clear, no JVD, no HSM, no edema. Lab review: labs are reviewed, up to date and normal.     Assessment/Plan:     hypertension well controlled. orders and follow up as documented in patient record. ICD-10-CM ICD-9-CM    1. Primary hypertension  I10 401.9 lisinopriL (PRINIVIL, ZESTRIL) 20 mg tablet   2. Needs flu shot  Z23 V04.81 INFLUENZA VIRUS VAC QUAD,SPLIT,PRESV FREE SYRINGE IM     Will decrease Lisinopril from 40 mg to 20 mg  Educated about monitoring BP at home, and if it starts to become elevated, should notify office    Vaccine and VIS given    Pt informed to return to office with worsening of symptoms, or PRN with any questions or concerns. Pt verbalizes understanding of plan of care and denies further questions or concerns at this time.

## 2021-11-18 NOTE — PROGRESS NOTES
Room:     Identified pt with two pt identifiers(name and ). Reviewed record in preparation for visit and have obtained necessary documentation. All patient medications has been reviewed. Chief Complaint   Patient presents with    Injection     flu       Health Maintenance Due   Topic    Cervical cancer screen     Shingrix Vaccine Age 50> (1 of 2)    COVID-19 Vaccine (2 - Booster for [employer] series)    Flu Vaccine (1)     Mammogram: up to date: 2021     Vitals:    21 1032   BP: 118/76   Pulse: 61   Resp: 17   Temp: (!) 96.3 °F (35.7 °C)   TempSrc: Temporal   SpO2: 96%   Weight: 204 lb 12.8 oz (92.9 kg)   Height: 5' 6\" (1.676 m)   PainSc:   0 - No pain       4. Have you been to the ER, urgent care clinic since your last visit? Hospitalized since your last visit? No    5. Have you seen or consulted any other health care providers outside of the [address] [place] [address] since your last visit? Include any pap smears or colon screening. No    6. Would you like to receive your flu shot today? Yes        Patient is accompanied by self I have received verbal consent from [facility] to discuss any/all medical information while they are present in the room.

## 2022-02-23 ENCOUNTER — OFFICE VISIT (OUTPATIENT)
Dept: FAMILY MEDICINE CLINIC | Age: 57
End: 2022-02-23
Payer: COMMERCIAL

## 2022-02-23 VITALS
RESPIRATION RATE: 16 BRPM | TEMPERATURE: 97.6 F | DIASTOLIC BLOOD PRESSURE: 74 MMHG | HEART RATE: 70 BPM | BODY MASS INDEX: 33.01 KG/M2 | OXYGEN SATURATION: 98 % | SYSTOLIC BLOOD PRESSURE: 132 MMHG | WEIGHT: 205.4 LBS | HEIGHT: 66 IN

## 2022-02-23 DIAGNOSIS — R42 VERTIGO: ICD-10-CM

## 2022-02-23 DIAGNOSIS — E78.2 MIXED HYPERLIPIDEMIA: ICD-10-CM

## 2022-02-23 DIAGNOSIS — I10 PRIMARY HYPERTENSION: Primary | ICD-10-CM

## 2022-02-23 PROCEDURE — 99214 OFFICE O/P EST MOD 30 MIN: CPT | Performed by: NURSE PRACTITIONER

## 2022-02-23 RX ORDER — ALPRAZOLAM 0.5 MG/1
0.5 TABLET ORAL
Qty: 30 TABLET | Refills: 2 | Status: SHIPPED | OUTPATIENT
Start: 2022-02-23

## 2022-02-23 RX ORDER — ASCORBIC ACID 500 MG
1000 TABLET ORAL DAILY
COMMUNITY

## 2022-02-23 RX ORDER — LISINOPRIL 20 MG/1
20 TABLET ORAL DAILY
Qty: 90 TABLET | Refills: 1 | Status: SHIPPED | OUTPATIENT
Start: 2022-02-23 | End: 2022-06-06 | Stop reason: ALTCHOICE

## 2022-02-23 NOTE — PROGRESS NOTES
Identified pt with two pt identifiers(name and ). Chief Complaint   Patient presents with    Physical    Labs     fasting        Health Maintenance Due   Topic    Cervical cancer screen     Shingrix Vaccine Age 50> (1 of 2)    COVID-19 Vaccine (2 - Booster for [employer] series)       Wt Readings from Last 3 Encounters:   22 205 lb 6.4 oz (93.2 kg)   21 204 lb 12.8 oz (92.9 kg)   21 206 lb (93.4 kg)     Temp Readings from Last 3 Encounters:   22 97.6 °F (36.4 °C) (Temporal)   21 (!) 96.3 °F (35.7 °C) (Temporal)   21 98.6 °F (37 °C) (Oral)     BP Readings from Last 3 Encounters:   22 132/74   21 118/76   21 128/86     Pulse Readings from Last 3 Encounters:   22 70   21 61   21 76         Learning Assessment:  :     Learning Assessment 2021 2020 10/1/2018   PRIMARY LEARNER Patient Patient Patient   HIGHEST LEVEL OF EDUCATION - PRIMARY LEARNER  - - GRADUATED HIGH SCHOOL OR GED   BARRIERS PRIMARY LEARNER - - NONE   CO-LEARNER CAREGIVER - - No   PRIMARY LANGUAGE ENGLISH ENGLISH ENGLISH   LEARNER PREFERENCE PRIMARY READING READING DEMONSTRATION   ANSWERED BY patient patient  self   RELATIONSHIP SELF SELF SELF       Depression Screening:  :     3 most recent PHQ Screens 2022   Little interest or pleasure in doing things Not at all   Feeling down, depressed, irritable, or hopeless Not at all   Total Score PHQ 2 0       Fall Risk Assessment:  :     No flowsheet data found. Abuse Screening:  :     Abuse Screening Questionnaire 2022 2021 3/10/2020 2019 2018   Do you ever feel afraid of your partner? N N N N N   Are you in a relationship with someone who physically or mentally threatens you? N N N N N   Is it safe for you to go home? Y Y Y Y Y       Coordination of Care Questionnaire:  :     1) Have you been to an emergency room, urgent care clinic since your last visit? no   Hospitalized since your last visit? no             2) Have you seen or consulted any other health care providers outside of [address] [place] [address] since your last visit? no  (Include any pap smears or colon screenings in this section.)    3) Do you have an Advance Directive on file? no  Are you interested in receiving information about Advance Directives? no      Reviewed record in preparation for visit and have obtained necessary documentation.

## 2022-02-23 NOTE — PATIENT INSTRUCTIONS
DASH Diet: Care Instructions  Your Care Instructions     The DASH diet is an eating plan that can help lower your blood pressure. DASH stands for Dietary Approaches to Stop Hypertension. Hypertension is high blood pressure. The DASH diet focuses on eating foods that are high in calcium, potassium, and magnesium. These nutrients can lower blood pressure. The foods that are highest in these nutrients are fruits, vegetables, low-fat dairy products, nuts, seeds, and legumes. But taking calcium, potassium, and magnesium supplements instead of eating foods that are high in those nutrients does not have the same effect. The DASH diet also includes whole grains, fish, and poultry. The DASH diet is one of several lifestyle changes your doctor may recommend to lower your high blood pressure. Your doctor may also want you to decrease the amount of sodium in your diet. Lowering sodium while following the DASH diet can lower blood pressure even further than just the DASH diet alone. Follow-up care is a key part of your treatment and safety. Be sure to make and go to all appointments, and call your doctor if you are having problems. It's also a good idea to know your test results and keep a list of the medicines you take. How can you care for yourself at home? Following the DASH diet  · Eat 4 to 5 servings of fruit each day. A serving is 1 medium-sized piece of fruit, ½ cup chopped or canned fruit, 1/4 cup dried fruit, or 4 ounces (½ cup) of fruit juice. Choose fruit more often than fruit juice. · Eat 4 to 5 servings of vegetables each day. A serving is 1 cup of lettuce or raw leafy vegetables, ½ cup of chopped or cooked vegetables, or 4 ounces (½ cup) of vegetable juice. Choose vegetables more often than vegetable juice. · Get 2 to 3 servings of low-fat and fat-free dairy each day. A serving is 8 ounces of milk, 1 cup of yogurt, or 1 ½ ounces of cheese. · Eat 6 to 8 servings of grains each day.  A serving is 1 slice of bread, 1 ounce of dry cereal, or ½ cup of cooked rice, pasta, or cooked cereal. Try to choose whole-grain products as much as possible. · Limit lean meat, poultry, and fish to 2 servings each day. A serving is 3 ounces, about the size of a deck of cards. · Eat 4 to 5 servings of nuts, seeds, and legumes (cooked dried beans, lentils, and split peas) each week. A serving is 1/3 cup of nuts, 2 tablespoons of seeds, or ½ cup of cooked beans or peas. · Limit fats and oils to 2 to 3 servings each day. A serving is 1 teaspoon of vegetable oil or 2 tablespoons of salad dressing. · Limit sweets and added sugars to 5 servings or less a week. A serving is 1 tablespoon jelly or jam, ½ cup sorbet, or 1 cup of lemonade. · Eat less than 2,300 milligrams (mg) of sodium a day. If you limit your sodium to 1,500 mg a day, you can lower your blood pressure even more. · Be aware that all of these are the suggested number of servings for people who eat 1,800 to 2,000 calories a day. Your recommended number of servings may be different if you need more or fewer calories. Tips for success  · Start small. Do not try to make dramatic changes to your diet all at once. You might feel that you are missing out on your favorite foods and then be more likely to not follow the plan. Make small changes, and stick with them. Once those changes become habit, add a few more changes. · Try some of the following:  ? Make it a goal to eat a fruit or vegetable at every meal and at snacks. This will make it easy to get the recommended amount of fruits and vegetables each day. ? Try yogurt topped with fruit and nuts for a snack or healthy dessert. ? Add lettuce, tomato, cucumber, and onion to sandwiches. ? Combine a ready-made pizza crust with low-fat mozzarella cheese and lots of vegetable toppings. Try using tomatoes, squash, spinach, broccoli, carrots, cauliflower, and onions. ?  Have a variety of cut-up vegetables with a low-fat dip as an appetizer instead of chips and dip. ? Sprinkle sunflower seeds or chopped almonds over salads. Or try adding chopped walnuts or almonds to cooked vegetables. ? Try some vegetarian meals using beans and peas. Add garbanzo or kidney beans to salads. Make burritos and tacos with mashed hogan beans or black beans. Where can you learn more? Go to http://www.triana.com/  Enter H967 in the search box to learn more about \"DASH Diet: Care Instructions. \"  Current as of: April 29, 2021               Content Version: 13.0  © 5527-9977 [employer]. Care instructions adapted under license by [employer] (which disclaims liability or warranty for this information). If you have questions about a medical condition or this instruction, always ask your healthcare professional. [address] any warranty or liability for your use of this information.

## 2022-02-23 NOTE — PROGRESS NOTES
Subjective:     Rya Rincon is a 61 y.o. female who presents for follow up of hypertension. Diet and Lifestyle: generally follows a low fat low cholesterol diet  Home BP Monitoring: is well controlled at home, ranging 130's/80's    Cardiovascular ROS: taking medications as instructed, no medication side effects noted, no TIA's, no chest pain on exertion, no dyspnea on exertion, no swelling of ankles. New concerns: Pt is here for follow up and fasting labs  She denies concerns or complaints at this time  Medication continues to work well for her  She takes the Xanax very very rarely, last rx was from 2020. Patient Active Problem List    Diagnosis Date Noted    Cerebral aneurysm 10/06/2020    Syncope 07/09/2020    Abdominal pain 07/09/2020    Severe obesity ([address] 10/01/2018    Trouble in sleeping     Hypertension     Anxiety     Vitamin D deficiency     Vertigo      Current Outpatient Medications   Medication Sig Dispense Refill    zinc 50 mg tab tablet Take 50 mg by mouth daily.  ascorbic acid, vitamin C, (Vitamin C) 500 mg tablet Take 1,000 mg by mouth daily.  lisinopriL (PRINIVIL, ZESTRIL) 20 mg tablet Take 1 Tablet by mouth daily. 90 Tablet 1    ALPRAZolam (XANAX) 0.5 mg tablet Take 1 Tablet by mouth daily as needed (Vertigo). 30 Tablet 2    calcium carb/vitamin D3/vit K1 (CALCIUM-VITAMIN D3-VITAMIN K PO) Take 1 Tab by mouth two (2) times a day.  aspirin delayed-release 81 mg tablet Take 1 Tab by mouth daily. 30 Tab 2    Lacto no.41-B. bifid-B.animalis (Advanced Probiotic-10) 13 mg (3 billion cell) cap Take  by mouth.  melatonin 5 mg cap capsule Take 10 mg by mouth nightly.        Allergies   Allergen Reactions    Shellfish Derived Diarrhea and Nausea and Vomiting    Sulfa (Sulfonamide Antibiotics) Hives     Past Medical History:   Diagnosis Date    Anxiety     Cerebral aneurysm 2020    Right and left supraclinoid ICA aneurysms    Chest pain     Fainting     Headache     migraines    Hiatal hernia     Hypertension     Joint pain     Menopause     early aj at age 38    Ringing in ears     Skipped beats     Trouble in sleeping     Vertigo     Vertigo     Vitamin D deficiency      Past Surgical History:   Procedure Laterality Date    HX APPENDECTOMY  2004    HX  SECTION      HX ORTHOPAEDIC      Right ankle x 2    HX VASCULAR STENT  2020    Treatment of 6 mm right supraclinoid ICA aneurysm     Family History   Problem Relation Age of Onset    Hypertension Mother     Hypertension Father     Headache Other      Social History     Tobacco Use    Smoking status: Never Smoker    Smokeless tobacco: Never Used   Substance Use Topics    Alcohol use: Yes     Comment: 2-3 weekly, \"socially\"         Lab Results   Component Value Date/Time    WBC 5.0 2021 02:25 PM    HGB 13.4 2021 02:25 PM    Hemoglobin (POC) 13.6 2010 12:35 PM    HCT 41.4 2021 02:25 PM    Hematocrit (POC) 40 2010 12:35 PM    PLATELET 129  02:25 PM    MCV 87.2 2021 02:25 PM     Lab Results   Component Value Date/Time    Cholesterol, total 211 (H) 2021 02:25 PM    HDL Cholesterol 78 2021 02:25 PM    LDL, calculated 112 (H) 2021 02:25 PM    Triglyceride 105 2021 02:25 PM    CHOL/HDL Ratio 2.7 2021 02:25 PM     Lab Results   Component Value Date/Time    GFR est non-AA >60 2021 02:25 PM    GFRNA, POC >60 2010 12:35 PM    GFR est AA >60 2021 02:25 PM    GFRAA, POC >60 2010 12:35 PM    Creatinine 0.72 2021 02:25 PM    Creatinine (POC) 0.6 2010 12:35 PM    BUN 20 2021 02:25 PM    BUN (POC) 10 2010 12:35 PM    Sodium 141 2021 02:25 PM    Sodium (POC) 140 2010 12:35 PM    Potassium 4.9 2021 02:25 PM    Potassium (POC) 4.1 2010 12:35 PM    Chloride 106 2021 02:25 PM    Chloride (POC) 103 2010 12:35 PM    CO2 31 2021 02:25 PM Magnesium 1.8 07/09/2020 08:20 PM    Phosphorus 3.3 07/10/2020 06:22 AM        Review of Systems, additional:  Pertinent items are noted in HPI. Objective:     Visit Vitals  /74 (BP 1 Location: Right arm, BP Patient Position: Sitting, BP Cuff Size: Adult)   Pulse 70   Temp 97.6 °F (36.4 °C) (Temporal)   Resp 16   Ht 5' 6\" (1.676 m)   Wt 205 lb 6.4 oz (93.2 kg)   SpO2 98%   BMI 33.15 kg/m²     Appearance: alert, well appearing, and in no distress. General exam: CVS exam BP noted to be well controlled today in office, S1, S2 normal, no gallop, no murmur, chest clear, no JVD, no HSM, no edema, peripheral vascular exam radial pulses normal.  Lab review: orders written for new lab studies as appropriate; see orders. Assessment/Plan:     hypertension stable. current treatment plan is effective, no change in therapy. ICD-10-CM ICD-9-CM    1. Primary hypertension  I10 401.9 CBC W/O DIFF      METABOLIC PANEL, COMPREHENSIVE      lisinopriL (PRINIVIL, ZESTRIL) 20 mg tablet   2. Mixed hyperlipidemia  E78.2 272.2 LIPID PANEL   3. Vertigo  R42 780.4 ALPRAZolam (XANAX) 0.5 mg tablet     Will notify when labs return, and inform her of any change in plan of care at that time    Pt informed to return to office with worsening of symptoms, or PRN with any questions or concerns. Pt verbalizes understanding of plan of care and denies further questions or concerns at this time.

## 2022-02-24 ENCOUNTER — TELEPHONE (OUTPATIENT)
Dept: FAMILY MEDICINE CLINIC | Age: 57
End: 2022-02-24

## 2022-02-24 LAB
ALBUMIN SERPL-MCNC: 3.6 G/DL (ref 3.5–5)
ALBUMIN/GLOB SERPL: 1.1 {RATIO} (ref 1.1–2.2)
ALP SERPL-CCNC: 82 U/L (ref 45–117)
ALT SERPL-CCNC: 19 U/L (ref 12–78)
ANION GAP SERPL CALC-SCNC: 4 MMOL/L (ref 5–15)
AST SERPL-CCNC: 12 U/L (ref 15–37)
BILIRUB SERPL-MCNC: 0.5 MG/DL (ref 0.2–1)
BUN SERPL-MCNC: 19 MG/DL (ref 6–20)
BUN/CREAT SERPL: 29 (ref 12–20)
CALCIUM SERPL-MCNC: 9.3 MG/DL (ref 8.5–10.1)
CHLORIDE SERPL-SCNC: 108 MMOL/L (ref 97–108)
CHOLEST SERPL-MCNC: 211 MG/DL
CO2 SERPL-SCNC: 27 MMOL/L (ref 21–32)
CREAT SERPL-MCNC: 0.66 MG/DL (ref 0.55–1.02)
ERYTHROCYTE [DISTWIDTH] IN BLOOD BY AUTOMATED COUNT: 15 % (ref 11.5–14.5)
GLOBULIN SER CALC-MCNC: 3.4 G/DL (ref 2–4)
GLUCOSE SERPL-MCNC: 88 MG/DL (ref 65–100)
HCT VFR BLD AUTO: 41.3 % (ref 35–47)
HDLC SERPL-MCNC: 87 MG/DL
HDLC SERPL: 2.4 {RATIO} (ref 0–5)
HGB BLD-MCNC: 13.2 G/DL (ref 11.5–16)
LDLC SERPL CALC-MCNC: 107.6 MG/DL (ref 0–100)
MCH RBC QN AUTO: 28.7 PG (ref 26–34)
MCHC RBC AUTO-ENTMCNC: 32 G/DL (ref 30–36.5)
MCV RBC AUTO: 89.8 FL (ref 80–99)
NRBC # BLD: 0 K/UL (ref 0–0.01)
NRBC BLD-RTO: 0 PER 100 WBC
PLATELET # BLD AUTO: 234 K/UL (ref 150–400)
PMV BLD AUTO: 11 FL (ref 8.9–12.9)
POTASSIUM SERPL-SCNC: 4.2 MMOL/L (ref 3.5–5.1)
PROT SERPL-MCNC: 7 G/DL (ref 6.4–8.2)
RBC # BLD AUTO: 4.6 M/UL (ref 3.8–5.2)
SODIUM SERPL-SCNC: 139 MMOL/L (ref 136–145)
TRIGL SERPL-MCNC: 82 MG/DL (ref ?–150)
VLDLC SERPL CALC-MCNC: 16.4 MG/DL
WBC # BLD AUTO: 5.2 K/UL (ref 3.6–11)

## 2022-02-24 RX ORDER — ATORVASTATIN CALCIUM 10 MG/1
10 TABLET, FILM COATED ORAL DAILY
Qty: 90 TABLET | Refills: 1 | Status: SHIPPED | OUTPATIENT
Start: 2022-02-24 | End: 2022-06-12

## 2022-02-24 NOTE — TELEPHONE ENCOUNTER
----- Message from Daquan Green NP sent at 2/24/2022  7:58 AM EST -----  Please call patient and let her know that her labs returned:  1. Cholesterol is still elevated. Has been for 3 years. Will send in low dose statin, and should also focus on diet changes.   Should return for office visit and fasting labs in 6 months  Thanks

## 2022-02-24 NOTE — PROGRESS NOTES
Please call patient and let her know that her labs returned:  1. Cholesterol is still elevated. Has been for 3 years. Will send in low dose statin, and should also focus on diet changes.   Should return for office visit and fasting labs in 6 months  Thanks

## 2022-03-18 PROBLEM — I67.1 CEREBRAL ANEURYSM: Status: ACTIVE | Noted: 2020-10-06

## 2022-03-19 PROBLEM — R10.9 ABDOMINAL PAIN: Status: ACTIVE | Noted: 2020-07-09

## 2022-03-20 PROBLEM — R55 SYNCOPE: Status: ACTIVE | Noted: 2020-07-09

## 2022-03-20 PROBLEM — E66.01 SEVERE OBESITY (HCC): Status: ACTIVE | Noted: 2018-10-01

## 2022-05-05 ENCOUNTER — HOSPITAL ENCOUNTER (OUTPATIENT)
Dept: MRI IMAGING | Age: 57
Discharge: HOME | End: 2022-05-05
Attending: STUDENT IN AN ORGANIZED HEALTH CARE EDUCATION/TRAINING PROGRAM
Payer: COMMERCIAL

## 2022-05-05 DIAGNOSIS — I67.1 INTRACRANIAL ANEURYSM: ICD-10-CM

## 2022-05-05 PROCEDURE — 70544 MR ANGIOGRAPHY HEAD W/O DYE: CPT

## 2022-05-10 ENCOUNTER — OFFICE VISIT (OUTPATIENT)
Dept: NEUROSURGERY | Age: 57
End: 2022-05-10
Payer: COMMERCIAL

## 2022-05-10 VITALS
HEART RATE: 76 BPM | OXYGEN SATURATION: 98 % | WEIGHT: 209.4 LBS | HEIGHT: 66 IN | TEMPERATURE: 96.1 F | SYSTOLIC BLOOD PRESSURE: 130 MMHG | DIASTOLIC BLOOD PRESSURE: 90 MMHG | BODY MASS INDEX: 33.65 KG/M2

## 2022-05-10 DIAGNOSIS — I10 PRIMARY HYPERTENSION: Primary | ICD-10-CM

## 2022-05-10 DIAGNOSIS — I67.1 CEREBRAL ANEURYSM: ICD-10-CM

## 2022-05-10 PROCEDURE — 99204 OFFICE O/P NEW MOD 45 MIN: CPT | Performed by: RADIOLOGY

## 2022-05-10 NOTE — PATIENT INSTRUCTIONS
MRA in 1 year. Schedule follow-up appointment. 400 mg of magnesium oxide daily for headache prophylaxis    Hydration is important. Incorporating 16 ounces of Gatorade daily during the summer, anytime you are dehydrated or sweating may decrease the frequency of headaches. Continue taking 81 mg of aspirin p.o. daily for life. Do not discontinue aspirin without asking a physician in our practice. Controlling her blood pressure is of paramount importance to reduce the risk of Denovo aneurysm formation long-term. Your blood pressure goal is less than 120/80. Work with your primary care doctor to achieve this goal.  Keep a log of your blood pressures at home. Call 911 if you experience any stroke symptoms. Remember to be fast!    Will see you back in the office next year     Learning About How Hospitals and [facility] Safe From COVID-19  Overview     Many hospitals and clinics now are treating people who are infected with COVID-19. So if you're in the hospital or clinic for any other reason, this may be an unsettling time. It's common to be concerned about becoming infected with the virus. But hospitals and clinics have policies to prevent the spread of infections. For example, doctors and nurses are trained to wash their hands before they treat you. Health care centers have stepped up these policies now. They are taking further steps to protect their patients. As long as DBMGK-06 remains a public health problem, things are going to be different when you go to a health care facility. They may have new rules for your safety. These could include having you wear a cloth face cover, meeting you outside the clinic, and having you sit away from others in the waiting room. What are hospitals and clinics doing to keep you safe? Your care team is very aware of the threat of COVID-19. They are doing everything they can to keep you safe. Hospitals and clinics may have different policies.  But in general, you may expect many of these measures:  · You will be screened for COVID-19. When you come to the hospital, you may have your temperature taken. You'll be asked about any symptoms, such as a fever, a cough, or shortness of breath. You may be asked if you've had contact with anyone who's been diagnosed with the virus. And you may be asked if you've traveled to any place that has had an outbreak. · The staff may try to do as much as possible outside the facility. For example, you may be asked to fill out paperwork online or in your car before you come inside. The person giving you a ride home may be asked to wait outside. These new rules to help protect you may make routine tasks take longer than usual.  · People who have COVID-19 are treated in a separate area. Many hospitals and clinics have staff members who treat only these patients. This helps limit the spread of the infection. · Visitors may be limited. In some cases, no visitors are allowed. In others, only one healthy visitor is allowed. Children may be limited to having only one adult with them. You can connect with family and friends using your phone or computer. If you need something brought from home, such as glasses or a phone , find out where the item can be dropped off. · The hospital or clinic follows guidelines to prevent infection. These include:  ? Washing hands often. ? Disinfecting high-touch surfaces. ? Wearing face masks or other protective equipment. ? Making extra space for social distancing. What can you do to stay safe? We all have a role to play in keeping ourselves safe and preventing the spread of COVID-19. Here are some things you can do while you're receiving care. If you're in a hospital, stay in your room. This will limit your exposure to the virus. It may be boring, but it's the safest place for you. Wash your hands often. Use soap and water, and scrub for 20 seconds. Then rinse and dry them well.  Always wash them after you use the bathroom, before you eat, and after you cough, sneeze, or blow your nose. If you can't wash your hands, use hand . Speak up if you have safety concerns. Don't be shy to correct health care workers if they aren't washing their hands properly, wearing face masks, or taking other precautions. These actions are vital to prevent the spread of infection. Try to be understanding. This is a stressful time for everyone, including your care team. They are doing their best to keep you safe and provide you with good care. Where can you learn more? Go to http://www.gray.com/  Enter A134 in the search box to learn more about \"Learning About How Hospitals and [facility] Safe From COVID-19. \"  Current as of: March 26, 2021               Content Version: 13.2  © 0548-6194 [employer]. Care instructions adapted under license by [employer] (which disclaims liability or warranty for this information). If you have questions about a medical condition or this instruction, always ask your healthcare professional. [address] any warranty or liability for your use of this information.

## 2022-05-10 NOTE — PROGRESS NOTES
Follow up for Cerebral aneurysm. Spouse at visit with patient. Patient reports infrequent headaches and sharp pain  at her left temple and back of the left side of her head. Reports increase in episodes of dizziness. Denies any falls since last visit. No acute problems reported.

## 2022-05-12 ENCOUNTER — DOCUMENTATION ONLY (OUTPATIENT)
Dept: NEUROSURGERY | Age: 57
End: 2022-05-12

## 2022-06-06 ENCOUNTER — OFFICE VISIT (OUTPATIENT)
Dept: FAMILY MEDICINE CLINIC | Age: 57
End: 2022-06-06
Payer: COMMERCIAL

## 2022-06-06 VITALS
WEIGHT: 209 LBS | OXYGEN SATURATION: 97 % | RESPIRATION RATE: 18 BRPM | HEART RATE: 77 BPM | HEIGHT: 66 IN | BODY MASS INDEX: 33.59 KG/M2 | TEMPERATURE: 97.4 F | SYSTOLIC BLOOD PRESSURE: 136 MMHG | DIASTOLIC BLOOD PRESSURE: 84 MMHG

## 2022-06-06 DIAGNOSIS — E55.9 VITAMIN D DEFICIENCY: ICD-10-CM

## 2022-06-06 DIAGNOSIS — I10 PRIMARY HYPERTENSION: Primary | ICD-10-CM

## 2022-06-06 DIAGNOSIS — E78.2 MIXED HYPERLIPIDEMIA: ICD-10-CM

## 2022-06-06 PROCEDURE — 99214 OFFICE O/P EST MOD 30 MIN: CPT | Performed by: INTERNAL MEDICINE

## 2022-06-06 RX ORDER — LANOLIN ALCOHOL/MO/W.PET/CERES
400 CREAM (GRAM) TOPICAL DAILY
COMMUNITY
Start: 2022-05-11

## 2022-06-06 RX ORDER — LOSARTAN POTASSIUM 50 MG/1
50 TABLET ORAL DAILY
Qty: 30 TABLET | Refills: 3 | Status: SHIPPED | OUTPATIENT
Start: 2022-06-06 | End: 2022-06-28 | Stop reason: DRUGHIGH

## 2022-06-06 NOTE — PROGRESS NOTES
Identified pt with two pt identifiers(name and ). Chief Complaint   Patient presents with    Hypertension    Cholesterol Problem     wants re-check buit not fasting        Health Maintenance Due   Topic    Cervical cancer screen     Shingrix Vaccine Age 50> (1 of 2)    COVID-19 Vaccine (2 - Booster for [employer] series)       Wt Readings from Last 3 Encounters:   22 209 lb (94.8 kg)   05/10/22 209 lb 6.4 oz (95 kg)   22 205 lb 6.4 oz (93.2 kg)     Temp Readings from Last 3 Encounters:   22 97.4 °F (36.3 °C) (Temporal)   05/10/22 (!) 96.1 °F (35.6 °C) (Temporal)   22 97.6 °F (36.4 °C) (Temporal)     BP Readings from Last 3 Encounters:   22 136/84   05/10/22 (!) 130/90   22 132/74     Pulse Readings from Last 3 Encounters:   22 77   05/10/22 76   22 70         Learning Assessment:  :     Learning Assessment 2021 2020 10/1/2018   PRIMARY LEARNER Patient Patient Patient   HIGHEST LEVEL OF EDUCATION - PRIMARY LEARNER  - - GRADUATED HIGH SCHOOL OR GED   BARRIERS PRIMARY LEARNER - - NONE   CO-LEARNER CAREGIVER - - No   PRIMARY LANGUAGE ENGLISH ENGLISH ENGLISH   LEARNER PREFERENCE PRIMARY READING READING DEMONSTRATION   ANSWERED BY patient patient  self   RELATIONSHIP SELF SELF SELF       Depression Screening:  :     3 most recent PHQ Screens 2022   Little interest or pleasure in doing things Not at all   Feeling down, depressed, irritable, or hopeless Not at all   Total Score PHQ 2 0       Fall Risk Assessment:  :     No flowsheet data found. Abuse Screening:  :     Abuse Screening Questionnaire 2022 2022 2021 3/10/2020 2019 2018   Do you ever feel afraid of your partner? N N N N N N   Are you in a relationship with someone who physically or mentally threatens you? N N N N N N   Is it safe for you to go home?  Y Y Y Y Y Y       Coordination of Care Questionnaire:  :     1) Have you been to an emergency room, urgent care clinic since your last visit? no   Hospitalized since your last visit? no             2) Have you seen or consulted any other health care providers outside of [address] [place] [address] since your last visit? no  (Include any pap smears or colon screenings in this section.)    3) Do you have an Advance Directive on file? no  Are you interested in receiving information about Advance Directives? no    4. For patients aged 39-70: Has the patient had a colonoscopy / FIT/ Cologuard? Yes - no Care Gap present      If the patient is female:    5. For patients aged 41-77: Has the patient had a mammogram within the past 2 years? Yes - no Care Gap present      6. For patients aged 21-65: Has the patient had a pap smear? Yes - Care Gap present.  Rooming MA/LPN to request most recent results 2021 Dr Marjorie Irby

## 2022-06-06 NOTE — PATIENT INSTRUCTIONS
DASH Diet: Care Instructions  Your Care Instructions     The DASH diet is an eating plan that can help lower your blood pressure. DASH stands for Dietary Approaches to Stop Hypertension. Hypertension is high blood pressure. The DASH diet focuses on eating foods that are high in calcium, potassium, and magnesium. These nutrients can lower blood pressure. The foods that are highest in these nutrients are fruits, vegetables, low-fat dairy products, nuts, seeds, and legumes. But taking calcium, potassium, and magnesium supplements instead of eating foods that are high in those nutrients does not have the same effect. The DASH diet also includes whole grains, fish, and poultry. The DASH diet is one of several lifestyle changes your doctor may recommend to lower your high blood pressure. Your doctor may also want you to decrease the amount of sodium in your diet. Lowering sodium while following the DASH diet can lower blood pressure even further than just the DASH diet alone. Follow-up care is a key part of your treatment and safety. Be sure to make and go to all appointments, and call your doctor if you are having problems. It's also a good idea to know your test results and keep a list of the medicines you take. How can you care for yourself at home? Following the DASH diet  · Eat 4 to 5 servings of fruit each day. A serving is 1 medium-sized piece of fruit, ½ cup chopped or canned fruit, 1/4 cup dried fruit, or 4 ounces (½ cup) of fruit juice. Choose fruit more often than fruit juice. · Eat 4 to 5 servings of vegetables each day. A serving is 1 cup of lettuce or raw leafy vegetables, ½ cup of chopped or cooked vegetables, or 4 ounces (½ cup) of vegetable juice. Choose vegetables more often than vegetable juice. · Get 2 to 3 servings of low-fat and fat-free dairy each day. A serving is 8 ounces of milk, 1 cup of yogurt, or 1 ½ ounces of cheese. · Eat 6 to 8 servings of grains each day.  A serving is 1 slice of bread, 1 ounce of dry cereal, or ½ cup of cooked rice, pasta, or cooked cereal. Try to choose whole-grain products as much as possible. · Limit lean meat, poultry, and fish to 2 servings each day. A serving is 3 ounces, about the size of a deck of cards. · Eat 4 to 5 servings of nuts, seeds, and legumes (cooked dried beans, lentils, and split peas) each week. A serving is 1/3 cup of nuts, 2 tablespoons of seeds, or ½ cup of cooked beans or peas. · Limit fats and oils to 2 to 3 servings each day. A serving is 1 teaspoon of vegetable oil or 2 tablespoons of salad dressing. · Limit sweets and added sugars to 5 servings or less a week. A serving is 1 tablespoon jelly or jam, ½ cup sorbet, or 1 cup of lemonade. · Eat less than 2,300 milligrams (mg) of sodium a day. If you limit your sodium to 1,500 mg a day, you can lower your blood pressure even more. · Be aware that all of these are the suggested number of servings for people who eat 1,800 to 2,000 calories a day. Your recommended number of servings may be different if you need more or fewer calories. Tips for success  · Start small. Do not try to make dramatic changes to your diet all at once. You might feel that you are missing out on your favorite foods and then be more likely to not follow the plan. Make small changes, and stick with them. Once those changes become habit, add a few more changes. · Try some of the following:  ? Make it a goal to eat a fruit or vegetable at every meal and at snacks. This will make it easy to get the recommended amount of fruits and vegetables each day. ? Try yogurt topped with fruit and nuts for a snack or healthy dessert. ? Add lettuce, tomato, cucumber, and onion to sandwiches. ? Combine a ready-made pizza crust with low-fat mozzarella cheese and lots of vegetable toppings. Try using tomatoes, squash, spinach, broccoli, carrots, cauliflower, and onions. ?  Have a variety of cut-up vegetables with a low-fat dip as an appetizer instead of chips and dip. ? Sprinkle sunflower seeds or chopped almonds over salads. Or try adding chopped walnuts or almonds to cooked vegetables. ? Try some vegetarian meals using beans and peas. Add garbanzo or kidney beans to salads. Make burritos and tacos with mashed hogan beans or black beans. Where can you learn more? Go to http://www.triana.com/  Enter H967 in the search box to learn more about \"DASH Diet: Care Instructions. \"  Current as of: January 10, 2022               Content Version: 13.2  © 0030-5624 [employer]. Care instructions adapted under license by [employer] (which disclaims liability or warranty for this information). If you have questions about a medical condition or this instruction, always ask your healthcare professional. [address] any warranty or liability for your use of this information.

## 2022-06-08 ENCOUNTER — LAB ONLY (OUTPATIENT)
Dept: FAMILY MEDICINE CLINIC | Age: 57
End: 2022-06-08

## 2022-06-08 DIAGNOSIS — E78.2 MIXED HYPERLIPIDEMIA: ICD-10-CM

## 2022-06-08 DIAGNOSIS — E55.9 VITAMIN D DEFICIENCY: ICD-10-CM

## 2022-06-09 LAB
25(OH)D3 SERPL-MCNC: 60.9 NG/ML (ref 30–100)
ALBUMIN SERPL-MCNC: 3.7 G/DL (ref 3.5–5)
ALBUMIN/GLOB SERPL: 1.1 {RATIO} (ref 1.1–2.2)
ALP SERPL-CCNC: 82 U/L (ref 45–117)
ALT SERPL-CCNC: 19 U/L (ref 12–78)
ANION GAP SERPL CALC-SCNC: 1 MMOL/L (ref 5–15)
AST SERPL-CCNC: 13 U/L (ref 15–37)
BILIRUB SERPL-MCNC: 0.5 MG/DL (ref 0.2–1)
BUN SERPL-MCNC: 15 MG/DL (ref 6–20)
BUN/CREAT SERPL: 20 (ref 12–20)
CALCIUM SERPL-MCNC: 9.6 MG/DL (ref 8.5–10.1)
CHLORIDE SERPL-SCNC: 106 MMOL/L (ref 97–108)
CHOLEST SERPL-MCNC: 186 MG/DL
CO2 SERPL-SCNC: 33 MMOL/L (ref 21–32)
CREAT SERPL-MCNC: 0.76 MG/DL (ref 0.55–1.02)
ERYTHROCYTE [DISTWIDTH] IN BLOOD BY AUTOMATED COUNT: 14.7 % (ref 11.5–14.5)
GLOBULIN SER CALC-MCNC: 3.3 G/DL (ref 2–4)
GLUCOSE SERPL-MCNC: 89 MG/DL (ref 65–100)
HCT VFR BLD AUTO: 45 % (ref 35–47)
HDLC SERPL-MCNC: 80 MG/DL
HDLC SERPL: 2.3 {RATIO} (ref 0–5)
HGB BLD-MCNC: 13.4 G/DL (ref 11.5–16)
LDLC SERPL CALC-MCNC: 84.6 MG/DL (ref 0–100)
MCH RBC QN AUTO: 28.1 PG (ref 26–34)
MCHC RBC AUTO-ENTMCNC: 29.8 G/DL (ref 30–36.5)
MCV RBC AUTO: 94.3 FL (ref 80–99)
NRBC # BLD: 0 K/UL (ref 0–0.01)
NRBC BLD-RTO: 0 PER 100 WBC
PLATELET # BLD AUTO: 268 K/UL (ref 150–400)
PMV BLD AUTO: 10.2 FL (ref 8.9–12.9)
POTASSIUM SERPL-SCNC: 4.7 MMOL/L (ref 3.5–5.1)
PROT SERPL-MCNC: 7 G/DL (ref 6.4–8.2)
RBC # BLD AUTO: 4.77 M/UL (ref 3.8–5.2)
SODIUM SERPL-SCNC: 140 MMOL/L (ref 136–145)
TRIGL SERPL-MCNC: 107 MG/DL (ref ?–150)
VLDLC SERPL CALC-MCNC: 21.4 MG/DL
WBC # BLD AUTO: 5 K/UL (ref 3.6–11)

## 2022-06-12 NOTE — PROGRESS NOTES
Chief Complaint   Patient presents with    Hypertension    Cholesterol Problem     wants re-check buit not fasting       Assessment/ Plan:   Diagnoses and all orders for this visit:    1. Primary hypertension  -     losartan (COZAAR) 50 mg tablet; Take 1 Tablet by mouth daily for 30 days. 2. Mixed hyperlipidemia  -     METABOLIC PANEL, COMPREHENSIVE; Future  -     CBC W/O DIFF; Future  -     LIPID PANEL; Future    3. Vitamin D deficiency  -     VITAMIN D, 25 HYDROXY; Future    I have discussed the diagnosis with the patient and the intended treatment plan as seen in the above orders. The patient has received an after-visit summary and questions were answered concerning future plans. Asked to return should symptoms worsen or not improve with treatment. Any pending labs and studies will be relayed to patient when they become available. Pt verbalizes understanding of plan of care and denies further questions or concerns at this time. Follow-up and Dispositions    · Return in about 3 weeks (around 6/27/2022), or if symptoms worsen or fail to improve. Subjective:   Kathy Haro is a 61 y.o. female who presents today for follow up of hypertension and she has a flow diverted bilateral ophthalmic aneurysms and is currently under the care of Dr. Chanda Maritnez ([facility]). She last saw him mid-May and he felt that her bp was still too elevated. In addition, continues to have intermittent dizziness, but no worsening headaches or other complaints. Per Dr. Dulce Cloud note: There have been no interval visits to the emergency department or hospitalizations for neurological symptoms. She reports strict compliance with 81 mg of aspirin p.o. daily. She independently stopped her Lipitor. She continues to have hypertension. Her blood pressure in the office today is 130/90. She reports blood pressures in the 130s consistently at home so she is suboptimally managed.     Due to this, she is here for follow up and to get better BP control. She has been on Lisinopril, but it does not seem to be working well and she needs a higher dose. We discussed switching to Losartan at this time and to see if she has better control. She is concerned about going on Lipitor. She would like to repeat her cholesterol panel in the fasting state. The 10-year ASCVD risk score (Veronica Mack, et al., 2013) is: 2.5%    Values used to calculate the score:      Age: 61 years      Sex: Female      Is Non- : No      Diabetic: No      Tobacco smoker: No      Systolic Blood Pressure: 615 mmHg      Is BP treated: Yes      HDL Cholesterol: 80 MG/DL      Total Cholesterol: 186 MG/DL    Given the above, she should not need a cholesterol lowering medication. Should continue on her ASA. RTC in 3-weeks to recheck her BP. HISTORICAL  PMH, PSH, FHX, SOCHX, ALLERGIES and MES were reviewed and updated today. Review of Systems  Review of Systems   Neurological: Positive for dizziness (intermittently). All other systems reviewed and are negative. Objective:     Vitals:    06/06/22 0916   BP: 136/84   Pulse: 77   Resp: 18   Temp: 97.4 °F (36.3 °C)   TempSrc: Temporal   SpO2: 97%   Weight: 209 lb (94.8 kg)   Height: 5' 6\" (1.676 m)       Physical Exam  Vitals and nursing note reviewed. Constitutional:       Appearance: Normal appearance. HENT:      Head: Normocephalic and atraumatic. Mouth/Throat:      Mouth: Mucous membranes are moist.   Eyes:      Extraocular Movements: Extraocular movements intact. Conjunctiva/sclera: Conjunctivae normal.      Pupils: Pupils are equal, round, and reactive to light. Cardiovascular:      Rate and Rhythm: Normal rate and regular rhythm. Pulses: Normal pulses. Heart sounds: Normal heart sounds. Pulmonary:      Effort: Pulmonary effort is normal.      Breath sounds: Normal breath sounds.    Musculoskeletal:      Cervical back: Normal range of motion and neck supple. Neurological:      General: No focal deficit present. Mental Status: She is alert. Mental status is at baseline. Cranial Nerves: No cranial nerve deficit. Sensory: No sensory deficit. Motor: No weakness. Coordination: Coordination normal.      Gait: Gait normal.      Deep Tendon Reflexes: Reflexes normal.   Psychiatric:         Mood and Affect: Mood normal.         Behavior: Behavior normal.         Thought Content: Thought content normal.         Judgment: Judgment normal.         Arthur Begum MD  [facility]   02/21/22 10:06 AM    Patient Instructions        DASH Diet: Care Instructions  Your Care Instructions     The DASH diet is an eating plan that can help lower your blood pressure. DASH stands for Dietary Approaches to Stop Hypertension. Hypertension is high blood pressure. The DASH diet focuses on eating foods that are high in calcium, potassium, and magnesium. These nutrients can lower blood pressure. The foods that are highest in these nutrients are fruits, vegetables, low-fat dairy products, nuts, seeds, and legumes. But taking calcium, potassium, and magnesium supplements instead of eating foods that are high in those nutrients does not have the same effect. The DASH diet also includes whole grains, fish, and poultry. The DASH diet is one of several lifestyle changes your doctor may recommend to lower your high blood pressure. Your doctor may also want you to decrease the amount of sodium in your diet. Lowering sodium while following the DASH diet can lower blood pressure even further than just the DASH diet alone. Follow-up care is a key part of your treatment and safety. Be sure to make and go to all appointments, and call your doctor if you are having problems. It's also a good idea to know your test results and keep a list of the medicines you take. How can you care for yourself at home?   Following the DASH diet  · Eat 4 to 5 servings of fruit each day. A serving is 1 medium-sized piece of fruit, ½ cup chopped or canned fruit, 1/4 cup dried fruit, or 4 ounces (½ cup) of fruit juice. Choose fruit more often than fruit juice. · Eat 4 to 5 servings of vegetables each day. A serving is 1 cup of lettuce or raw leafy vegetables, ½ cup of chopped or cooked vegetables, or 4 ounces (½ cup) of vegetable juice. Choose vegetables more often than vegetable juice. · Get 2 to 3 servings of low-fat and fat-free dairy each day. A serving is 8 ounces of milk, 1 cup of yogurt, or 1 ½ ounces of cheese. · Eat 6 to 8 servings of grains each day. A serving is 1 slice of bread, 1 ounce of dry cereal, or ½ cup of cooked rice, pasta, or cooked cereal. Try to choose whole-grain products as much as possible. · Limit lean meat, poultry, and fish to 2 servings each day. A serving is 3 ounces, about the size of a deck of cards. · Eat 4 to 5 servings of nuts, seeds, and legumes (cooked dried beans, lentils, and split peas) each week. A serving is 1/3 cup of nuts, 2 tablespoons of seeds, or ½ cup of cooked beans or peas. · Limit fats and oils to 2 to 3 servings each day. A serving is 1 teaspoon of vegetable oil or 2 tablespoons of salad dressing. · Limit sweets and added sugars to 5 servings or less a week. A serving is 1 tablespoon jelly or jam, ½ cup sorbet, or 1 cup of lemonade. · Eat less than 2,300 milligrams (mg) of sodium a day. If you limit your sodium to 1,500 mg a day, you can lower your blood pressure even more. · Be aware that all of these are the suggested number of servings for people who eat 1,800 to 2,000 calories a day. Your recommended number of servings may be different if you need more or fewer calories. Tips for success  · Start small. Do not try to make dramatic changes to your diet all at once. You might feel that you are missing out on your favorite foods and then be more likely to not follow the plan.  Make small changes, and stick with them. Once those changes become habit, add a few more changes. · Try some of the following:  ? Make it a goal to eat a fruit or vegetable at every meal and at snacks. This will make it easy to get the recommended amount of fruits and vegetables each day. ? Try yogurt topped with fruit and nuts for a snack or healthy dessert. ? Add lettuce, tomato, cucumber, and onion to sandwiches. ? Combine a ready-made pizza crust with low-fat mozzarella cheese and lots of vegetable toppings. Try using tomatoes, squash, spinach, broccoli, carrots, cauliflower, and onions. ? Have a variety of cut-up vegetables with a low-fat dip as an appetizer instead of chips and dip. ? Sprinkle sunflower seeds or chopped almonds over salads. Or try adding chopped walnuts or almonds to cooked vegetables. ? Try some vegetarian meals using beans and peas. Add garbanzo or kidney beans to salads. Make burritos and tacos with mashed hogan beans or black beans. Where can you learn more? Go to http://www.[employer].com/  Enter H967 in the search box to learn more about \"DASH Diet: Care Instructions. \"  Current as of: January 10, 2022               Content Version: 13.2  © 6136-0232 Healthwise, Incorporated. Care instructions adapted under license by [employer] (which disclaims liability or warranty for this information). If you have questions about a medical condition or this instruction, always ask your healthcare professional. [place] [address] any warranty or liability for your use of this information.

## 2022-06-28 ENCOUNTER — OFFICE VISIT (OUTPATIENT)
Dept: FAMILY MEDICINE CLINIC | Age: 57
End: 2022-06-28
Payer: COMMERCIAL

## 2022-06-28 VITALS
DIASTOLIC BLOOD PRESSURE: 84 MMHG | HEIGHT: 66 IN | TEMPERATURE: 98.3 F | WEIGHT: 212 LBS | SYSTOLIC BLOOD PRESSURE: 132 MMHG | HEART RATE: 72 BPM | OXYGEN SATURATION: 97 % | BODY MASS INDEX: 34.07 KG/M2 | RESPIRATION RATE: 20 BRPM

## 2022-06-28 DIAGNOSIS — I10 PRIMARY HYPERTENSION: Primary | ICD-10-CM

## 2022-06-28 PROCEDURE — 99213 OFFICE O/P EST LOW 20 MIN: CPT | Performed by: INTERNAL MEDICINE

## 2022-06-28 RX ORDER — LOSARTAN POTASSIUM 100 MG/1
100 TABLET ORAL DAILY
Qty: 30 TABLET | Refills: 1 | Status: SHIPPED | OUTPATIENT
Start: 2022-06-28 | End: 2022-07-26 | Stop reason: SDUPTHER

## 2022-06-28 RX ORDER — GLUCOSAMINE/CHONDR SU A SOD 750-600 MG
1 TABLET ORAL DAILY
COMMUNITY

## 2022-06-28 NOTE — PROGRESS NOTES
Chief Complaint   Patient presents with    Hypertension       Assessment/Plan:   Diagnoses and all orders for this visit:    1. Primary hypertension  -     losartan (COZAAR) 100 mg tablet; Take 1 Tablet by mouth daily.  - Return in 4 weeks to recheck BP. BP is better, but still borderline. Will increase the Losartan to 100 mgs. RTC in 4 weeks to recheck. I have discussed the diagnosis with the patient and the intended treatment plan as seen in the above orders. The patient has received an after-visit summary and questions were answered concerning future plans. Asked to return should symptoms worsen or not improve with treatment. Any pending labs and studies will be relayed to patient when they become available. Pt verbalizes understanding of plan of care and denies further questions or concerns at this time. Follow-up and Dispositions    · Return in about 3 weeks (around 7/19/2022), or if symptoms worsen or fail to improve, for Check BP. Subjective:     Ilia Roland is a 61 y.o. female who presents for follow up of hypertension. Diet and Lifestyle: generally follows a low fat low cholesterol diet, generally follows a low sodium diet, exercises regularly  Home BP Monitoring: is borderline controlled at home, ranging 130's/80's    Cardiovascular ROS: taking medications as instructed, no medication side effects noted, no TIA's, no chest pain on exertion, no dyspnea on exertion, no swelling of ankles. New concerns: as above. Patient Active Problem List    Diagnosis Date Noted    Cerebral aneurysm 10/06/2020    Syncope 07/09/2020    Abdominal pain 07/09/2020    Severe obesity ([address] 10/01/2018    Trouble in sleeping     Hypertension     Anxiety     Vitamin D deficiency     Vertigo      Current Outpatient Medications   Medication Sig Dispense Refill    Biotin 2,500 mcg cap Take 1 Caplet by mouth daily.  losartan (COZAAR) 100 mg tablet Take 1 Tablet by mouth daily.  27 Tablet 1    magnesium oxide (MAG-OX) 400 mg tablet Take 400 mg by mouth daily.  zinc 50 mg tab tablet Take 50 mg by mouth daily.  ascorbic acid, vitamin C, (Vitamin C) 500 mg tablet Take 1,000 mg by mouth daily.  ALPRAZolam (XANAX) 0.5 mg tablet Take 1 Tablet by mouth daily as needed (Vertigo). 30 Tablet 2    aspirin delayed-release 81 mg tablet Take 1 Tab by mouth daily. 30 Tab 2    Lacto no.41-B. bifid-B.animalis (Advanced Probiotic-10) 13 mg (3 billion cell) cap Take  by mouth daily. Allergies   Allergen Reactions    Shellfish Derived Diarrhea and Nausea and Vomiting    Sulfa (Sulfonamide Antibiotics) Hives     Past Medical History:   Diagnosis Date    Anxiety     Cerebral aneurysm     Right and left supraclinoid ICA aneurysms    Chest pain     Fainting     Headache     migraines    Hiatal hernia     Hypertension     Joint pain     Menopause     early aj at age 38    Ringing in ears     Skipped beats     Trouble in sleeping     Vertigo     Vertigo     Vitamin D deficiency      Past Surgical History:   Procedure Laterality Date    HX APPENDECTOMY      HX  SECTION      HX ORTHOPAEDIC      Right ankle x 2    HX VASCULAR STENT  2020    Treatment of 6 mm right supraclinoid ICA aneurysm     Family History   Problem Relation Age of Onset    Hypertension Mother     Hypertension Father     Headache Other      Social History     Tobacco Use    Smoking status: Never Smoker    Smokeless tobacco: Never Used   Substance Use Topics    Alcohol use: Yes     Comment: 2-3 weekly, \"socially\"       Review of Systems, additional:  Pertinent items are noted in HPI.     Objective:     Visit Vitals  /84 (BP 1 Location: Right arm, BP Patient Position: Sitting, BP Cuff Size: Adult)   Pulse 72   Temp 98.3 °F (36.8 °C) (Temporal)   Resp 20   Ht 5' 6\" (1.676 m)   Wt 212 lb (96.2 kg)   SpO2 97%   BMI 34.22 kg/m²     Appearance: alert, well appearing, and in no distress and overweight. General exam: CVS exam BP noted to be well controlled today in office, S1, S2 normal, no gallop, no murmur, chest clear, no JVD, no HSM, no edema, peripheral vascular exam both carotids normal upstroke without bruits, neurological exam alert, oriented, normal speech, no focal findings or movement disorder noted. Lab review: labs are reviewed, up to date and normal.     Devan Calvillo MD    Patient Instructions        Learning About Low-Carbohydrate Foods  What foods are low in carbohydrate? The foods you eat contain nutrients, such as vitamins and minerals. Carbohydrate is a nutrient. Your body needs the right amount to stay healthy and work as it should. You can use the list below to help you make choices about which foods to eat. Some foods that are lower in carbohydrate include:  Dairy and dairy alternatives  · Cheese  · Cottage cheese  · Cream cheese  · Nut milk (unsweetened)  · Soy milk (unsweetened)  · Yogurt (Greek, plain)  Fruits  · Avocado  · [employer] and other protein foods  · Almonds  · Beef  · Chicken  · Cod  · Eggs  · Halibut  · Peanut butter and other nut butters  · Pistachios  · Pork  · Pumpkin seeds  · Tofu  · Trout  · [place]  · [place]  [place] [place]  · Walnuts  Vegetables  · Broccoli  · Carrots  · Cauliflower  · Green beans  · Mushrooms  · Peppers  · Salad greens  · Spinach  · Tomatoes  Work with your doctor to find out how much of this nutrient you need. Depending on your health, you may need more or less of it in your diet. Where can you learn more? Go to http://www.gray.com/  Enter C470 in the search box to learn more about \"Learning About Low-Carbohydrate Foods. \"  Current as of: September 8, 2021               Content Version: 13.2  © 2167-1700 Healthwise, [employer]. Care instructions adapted under license by [employer] (which disclaims liability or warranty for this information).  If you have questions about a medical condition or this instruction, always ask your healthcare professional. [place] [address] any warranty or liability for your use of this information.

## 2022-06-28 NOTE — PATIENT INSTRUCTIONS
Learning About Low-Carbohydrate Foods  What foods are low in carbohydrate? The foods you eat contain nutrients, such as vitamins and minerals. Carbohydrate is a nutrient. Your body needs the right amount to stay healthy and work as it should. You can use the list below to help you make choices about which foods to eat. Some foods that are lower in carbohydrate include:  Dairy and dairy alternatives  · Cheese  · Cottage cheese  · Cream cheese  · Nut milk (unsweetened)  · Soy milk (unsweetened)  · Yogurt (Greek, plain)  Fruits  · Avocado  · [employer] and other protein foods  · Almonds  · Beef  · Chicken  · Cod  · Eggs  · Halibut  · Peanut butter and other nut butters  · Pistachios  · Pork  · Pumpkin seeds  · Tofu  · Trout  · [place]  · [place]  [place] ([facility])  · Walnuts  Vegetables  · Broccoli  · Carrots  · Cauliflower  · Green beans  · Mushrooms  · Peppers  · Salad greens  · Spinach  · Tomatoes  Work with your doctor to find out how much of this nutrient you need. Depending on your health, you may need more or less of it in your diet. Where can you learn more? Go to http://www.gray.com/  Enter C470 in the search box to learn more about \"Learning About Low-Carbohydrate Foods. \"  Current as of: September 8, 2021               Content Version: 13.2  © 4203-4529 Healthwise, Incorporated. Care instructions adapted under license by [employer] (which disclaims liability or warranty for this information). If you have questions about a medical condition or this instruction, always ask your healthcare professional. [place] [address] any warranty or liability for your use of this information.

## 2022-06-28 NOTE — PROGRESS NOTES
Home Identified pt with two pt identifiers(name and ). Chief Complaint   Patient presents with    Hypertension        Health Maintenance Due   Topic    Shingrix Vaccine Age 50> (1 of 2)    COVID-19 Vaccine (2 - Booster for [employer] series)       Wt Readings from Last 3 Encounters:   22 212 lb (96.2 kg)   22 209 lb (94.8 kg)   05/10/22 209 lb 6.4 oz (95 kg)     Temp Readings from Last 3 Encounters:   22 98.3 °F (36.8 °C) (Temporal)   22 97.4 °F (36.3 °C) (Temporal)   05/10/22 (!) 96.1 °F (35.6 °C) (Temporal)     BP Readings from Last 3 Encounters:   22 132/84   22 136/84   05/10/22 (!) 130/90     Pulse Readings from Last 3 Encounters:   22 72   22 77   05/10/22 76         Learning Assessment:  :     Learning Assessment 2021 2020 10/1/2018   PRIMARY LEARNER Patient Patient Patient   HIGHEST LEVEL OF EDUCATION - PRIMARY LEARNER  - - GRADUATED HIGH SCHOOL OR GED   BARRIERS PRIMARY LEARNER - - NONE   CO-LEARNER CAREGIVER - - No   PRIMARY LANGUAGE ENGLISH ENGLISH ENGLISH   LEARNER PREFERENCE PRIMARY READING READING DEMONSTRATION   ANSWERED BY patient patient  self   RELATIONSHIP SELF SELF SELF       Depression Screening:  :     3 most recent PHQ Screens 2022   Little interest or pleasure in doing things Not at all   Feeling down, depressed, irritable, or hopeless Not at all   Total Score PHQ 2 0       Fall Risk Assessment:  :     No flowsheet data found. Abuse Screening:  :     Abuse Screening Questionnaire 2022 2022 2021 3/10/2020 2019 2018   Do you ever feel afraid of your partner? N N N N N N   Are you in a relationship with someone who physically or mentally threatens you? N N N N N N   Is it safe for you to go home?  Y Y Y Y Y Y       Coordination of Care Questionnaire:  :     1) Have you been to an emergency room, urgent care clinic since your last visit? no   Hospitalized since your last visit? no             2) Have you seen or consulted any other health care providers outside of [address] [place] [address] since your last visit? no  (Include any pap smears or colon screenings in this section.)    3) Do you have an Advance Directive on file? no  Are you interested in receiving information about Advance Directives? no    4. For patients aged 39-70: Has the patient had a colonoscopy / FIT/ Cologuard? Yes - no Care Gap present      If the patient is female:    5. For patients aged 41-77: Has the patient had a mammogram within the past 2 years? Yes - no Care Gap present      6. For patients aged 21-65: Has the patient had a pap smear?  Yes - no Care Gap present

## 2022-07-26 ENCOUNTER — OFFICE VISIT (OUTPATIENT)
Dept: FAMILY MEDICINE CLINIC | Age: 57
End: 2022-07-26
Payer: COMMERCIAL

## 2022-07-26 VITALS
OXYGEN SATURATION: 97 % | HEART RATE: 72 BPM | BODY MASS INDEX: 34.07 KG/M2 | TEMPERATURE: 97.6 F | WEIGHT: 212 LBS | HEIGHT: 66 IN | SYSTOLIC BLOOD PRESSURE: 138 MMHG | DIASTOLIC BLOOD PRESSURE: 92 MMHG | RESPIRATION RATE: 18 BRPM

## 2022-07-26 DIAGNOSIS — I10 PRIMARY HYPERTENSION: Primary | ICD-10-CM

## 2022-07-26 PROCEDURE — 99213 OFFICE O/P EST LOW 20 MIN: CPT | Performed by: INTERNAL MEDICINE

## 2022-07-26 RX ORDER — AMLODIPINE BESYLATE 5 MG/1
5 TABLET ORAL DAILY
Qty: 30 TABLET | Refills: 1 | Status: SHIPPED | OUTPATIENT
Start: 2022-07-26 | End: 2022-08-25

## 2022-07-26 RX ORDER — LOSARTAN POTASSIUM 100 MG/1
100 TABLET ORAL DAILY
Qty: 90 TABLET | Refills: 1 | Status: SHIPPED | OUTPATIENT
Start: 2022-07-26 | End: 2022-07-28

## 2022-07-26 NOTE — PROGRESS NOTES
Identified pt with two pt identifiers(name and ). Chief Complaint   Patient presents with    Hypertension        Health Maintenance Due   Topic    Shingrix Vaccine Age 50> (1 of 2)    COVID-19 Vaccine (2 - Booster for [employer] series)       Wt Readings from Last 3 Encounters:   22 212 lb (96.2 kg)   22 212 lb (96.2 kg)   22 209 lb (94.8 kg)     Temp Readings from Last 3 Encounters:   22 97.6 °F (36.4 °C) (Temporal)   22 98.3 °F (36.8 °C) (Temporal)   22 97.4 °F (36.3 °C) (Temporal)     BP Readings from Last 3 Encounters:   22 (!) 138/92   22 132/84   22 136/84     Pulse Readings from Last 3 Encounters:   22 72   22 72   22 77         Learning Assessment:  :     Learning Assessment 2021 2020 10/1/2018   PRIMARY LEARNER Patient Patient Patient   HIGHEST LEVEL OF EDUCATION - PRIMARY LEARNER  - - GRADUATED HIGH SCHOOL OR GED   BARRIERS PRIMARY LEARNER - - NONE   CO-LEARNER CAREGIVER - - No   PRIMARY LANGUAGE ENGLISH ENGLISH ENGLISH   LEARNER PREFERENCE PRIMARY READING READING DEMONSTRATION   ANSWERED BY patient patient  self   RELATIONSHIP SELF SELF SELF       Depression Screening:  :     3 most recent PHQ Screens 2022   Little interest or pleasure in doing things Not at all   Feeling down, depressed, irritable, or hopeless Not at all   Total Score PHQ 2 0       Fall Risk Assessment:  :     No flowsheet data found. Abuse Screening:  :     Abuse Screening Questionnaire 2022 2022 2021 3/10/2020 2019 2018   Do you ever feel afraid of your partner? N N N N N N   Are you in a relationship with someone who physically or mentally threatens you? N N N N N N   Is it safe for you to go home?  Y Y Y Y Y Y       Coordination of Care Questionnaire:  :     1) Have you been to an emergency room, urgent care clinic since your last visit? no   Hospitalized since your last visit? no             2) Have you seen or consulted any other health care providers outside of [address] [place] [address] since your last visit? no  (Include any pap smears or colon screenings in this section.)    3) Do you have an Advance Directive on file? no  Are you interested in receiving information about Advance Directives? no    4. For patients aged 39-70: Has the patient had a colonoscopy / FIT/ Cologuard? Yes - no Care Gap present      If the patient is female:    5. For patients aged 41-77: Has the patient had a mammogram within the past 2 years? Yes - no Care Gap present      6. For patients aged 21-65: Has the patient had a pap smear?  Yes - no Care Gap present

## 2022-07-26 NOTE — PROGRESS NOTES
Chief Complaint   Patient presents with    Hypertension       Assessment/Plan:   Diagnoses and all orders for this visit:    1. Primary hypertension  -     amLODIPine (NORVASC) 5 mg tablet; Take 1 Tablet by mouth in the morning for 30 days. -     losartan (COZAAR) 100 mg tablet; Take 1 Tablet by mouth in the morning. Will add Amlodipine to regimen. RTC in 4 weeks. Continue with Losartan. Refills sent on prescriptions today. I have discussed the diagnosis with the patient and the intended treatment plan as seen in the above orders. The patient has received an after-visit summary and questions were answered concerning future plans. Asked to return should symptoms worsen or not improve with treatment. Any pending labs and studies will be relayed to patient when they become available. Pt verbalizes understanding of plan of care and denies further questions or concerns at this time. Follow-up and Dispositions    Return in about 4 weeks (around 8/23/2022), or if symptoms worsen or fail to improve, for Check BP. Subjective:     Flaca Baez is a 61 y.o. female who presents for follow up of hypertension. She did increase the Losartan to 100 mgs. BP still mildly elevated. We discussed adding another agent - Amlodipine - today. She denies any SOB or chest pain. In general doing well. Getting similar readings to the one in our office at home. [facility] and will send Amlodipine to [facility]. Diet and Lifestyle: generally follows a low fat low cholesterol diet, generally follows a low sodium diet  Home BP Monitoring: is not well controlled at home, ranging 130 -140's/80-90's    Cardiovascular ROS: taking medications as instructed, no medication side effects noted, no TIA's, no chest pain on exertion, no dyspnea on exertion, no swelling of ankles. New concerns: None.      Patient Active Problem List    Diagnosis Date Noted    Cerebral aneurysm 10/06/2020    Syncope 2020    Abdominal pain 2020    Severe obesity ([address] 10/01/2018    Trouble in sleeping     Hypertension     Anxiety     Vitamin D deficiency     Vertigo      Current Outpatient Medications   Medication Sig Dispense Refill    amLODIPine (NORVASC) 5 mg tablet Take 1 Tablet by mouth in the morning for 30 days. 30 Tablet 1    losartan (COZAAR) 100 mg tablet Take 1 Tablet by mouth in the morning. 90 Tablet 1    Biotin 2,500 mcg cap Take 1 Caplet by mouth daily. magnesium oxide (MAG-OX) 400 mg tablet Take 400 mg by mouth daily. zinc 50 mg tab tablet Take 50 mg by mouth daily. ascorbic acid, vitamin C, (VITAMIN C) 500 mg tablet Take 1,000 mg by mouth daily. ALPRAZolam (XANAX) 0.5 mg tablet Take 1 Tablet by mouth daily as needed (Vertigo). 30 Tablet 2    aspirin delayed-release 81 mg tablet Take 1 Tab by mouth daily. 30 Tab 2    Lacto no.41-B. bifid-B.animalis (Advanced Probiotic-10) 13 mg (3 billion cell) cap Take  by mouth daily.        Allergies   Allergen Reactions    Shellfish Derived Diarrhea and Nausea and Vomiting    Sulfa (Sulfonamide Antibiotics) Hives     Past Medical History:   Diagnosis Date    Anxiety     Cerebral aneurysm     Right and left supraclinoid ICA aneurysms    Chest pain     Fainting     Headache     migraines    Hiatal hernia     Hypertension     Joint pain     Menopause     early aj at age 38    Ringing in ears     Skipped beats     Trouble in sleeping     Vertigo     Vertigo     Vitamin D deficiency      Past Surgical History:   Procedure Laterality Date    HX APPENDECTOMY  2004    HX  SECTION      HX ORTHOPAEDIC      Right ankle x 2    HX VASCULAR STENT  2020    Treatment of 6 mm right supraclinoid ICA aneurysm     Family History   Problem Relation Age of Onset    Hypertension Mother     Hypertension Father     Headache Other      Social History     Tobacco Use    Smoking status: Never    Smokeless tobacco: Never   Substance Use Topics    Alcohol use: Yes     Comment: 2-3 weekly, \"socially\"          Review of Systems, additional:  Pertinent items are noted in HPI. Objective:     Visit Vitals  BP (!) 138/92 (BP 1 Location: Right arm, BP Patient Position: Sitting, BP Cuff Size: Adult)   Pulse 72   Temp 97.6 °F (36.4 °C) (Temporal)   Resp 18   Ht 5' 6\" (1.676 m)   Wt 212 lb (96.2 kg)   SpO2 97%   BMI 34.22 kg/m²     Appearance: alert, well appearing, and in no distress. General exam: CVS exam BP noted to be borderline controlled today in office, S1, S2 normal, no gallop, no murmur, chest clear, no JVD, no HSM, no edema, peripheral vascular exam both carotids normal upstroke without bruits, neurological exam alert, oriented, normal speech, no focal findings or movement disorder noted. Lab review: no lab studies available for review at time of visit.      Virgil Almanza MD

## 2022-07-28 DIAGNOSIS — I10 PRIMARY HYPERTENSION: ICD-10-CM

## 2022-07-28 RX ORDER — LOSARTAN POTASSIUM 100 MG/1
TABLET ORAL
Qty: 90 TABLET | Refills: 0 | Status: SHIPPED | OUTPATIENT
Start: 2022-07-28

## 2022-08-30 ENCOUNTER — OFFICE VISIT (OUTPATIENT)
Dept: FAMILY MEDICINE CLINIC | Age: 57
End: 2022-08-30
Payer: COMMERCIAL

## 2022-08-30 VITALS
BODY MASS INDEX: 34.39 KG/M2 | WEIGHT: 214 LBS | SYSTOLIC BLOOD PRESSURE: 130 MMHG | OXYGEN SATURATION: 98 % | RESPIRATION RATE: 18 BRPM | HEART RATE: 82 BPM | HEIGHT: 66 IN | TEMPERATURE: 98.4 F | DIASTOLIC BLOOD PRESSURE: 80 MMHG

## 2022-08-30 DIAGNOSIS — I10 PRIMARY HYPERTENSION: Primary | ICD-10-CM

## 2022-08-30 DIAGNOSIS — R09.81 NASAL CONGESTION: ICD-10-CM

## 2022-08-30 PROBLEM — R55 SYNCOPE: Status: RESOLVED | Noted: 2020-07-09 | Resolved: 2022-08-30

## 2022-08-30 PROBLEM — R10.9 ABDOMINAL PAIN: Status: RESOLVED | Noted: 2020-07-09 | Resolved: 2022-08-30

## 2022-08-30 PROBLEM — L57.0 ACTINIC KERATOSIS: Status: ACTIVE | Noted: 2022-08-30

## 2022-08-30 PROBLEM — G44.009 HEADACHES, CLUSTER: Status: ACTIVE | Noted: 2020-09-08

## 2022-08-30 PROBLEM — E66.01 SEVERE OBESITY (HCC): Status: RESOLVED | Noted: 2018-10-01 | Resolved: 2022-08-30

## 2022-08-30 PROCEDURE — 99213 OFFICE O/P EST LOW 20 MIN: CPT | Performed by: INTERNAL MEDICINE

## 2022-08-30 RX ORDER — AMLODIPINE BESYLATE 5 MG/1
5 TABLET ORAL EVERY MORNING
COMMUNITY
End: 2022-08-30 | Stop reason: SDUPTHER

## 2022-08-30 RX ORDER — AMLODIPINE BESYLATE 5 MG/1
5 TABLET ORAL EVERY MORNING
Qty: 90 TABLET | Refills: 1 | Status: SHIPPED | OUTPATIENT
Start: 2022-08-30

## 2022-08-30 RX ORDER — ZOSTER VACCINE RECOMBINANT, ADJUVANTED 50 MCG/0.5
0.5 KIT INTRAMUSCULAR ONCE
Qty: 0.5 ML | Refills: 1 | Status: SHIPPED | OUTPATIENT
Start: 2022-08-30 | End: 2022-08-30

## 2022-08-30 NOTE — PROGRESS NOTES
CC:  Chief Complaint   Patient presents with    Hypertension    Ear Fullness     LT x 1 wk         Assessment/Plan:     hypertension well controlled. current treatment plan is effective, no change in therapy. Diagnoses and all orders for this visit:    1. Primary hypertension  -     amLODIPine (NORVASC) 5 mg tablet; Take 1 Tablet by mouth Every morning. (Refill)   Patient is doing very well. No major concerns or issues at this time. BP is very well controlled on current regimen. 2. Nasal congestion   Discussed using AYR and antihistamine with dextromethorphan. Ears were normal bilaterally. Other orders  -     varicella-zoster recombinant, PF, (Shingrix, PF,) 50 mcg/0.5 mL susr injection; 0.5 mL by IntraMUSCular route once for 1 dose. 2nd dose in 2-4 months. I have discussed the diagnosis with the patient and the intended treatment plan as seen in the above orders. The patient has received an after-visit summary and questions were answered concerning future plans. Asked to return should symptoms worsen or not improve with treatment. Any pending labs and studies will be relayed to patient when they become available. Pt verbalizes understanding of plan of care and denies further questions or concerns at this time. Follow-up and Dispositions    Return in about 6 months (around 2/28/2023), or if symptoms worsen or fail to improve, for   Check BP. Subjective:     Leticia Villegas is a 61 y.o. female who presents for follow up of hypertension. Diet and Lifestyle: generally follows a low fat low cholesterol diet, generally follows a low sodium diet, exercises regularly, nonsmoker  Home BP Monitoring: is well controlled at home, ranging 115-120's/60-70's    Cardiovascular ROS: taking medications as instructed, no medication side effects noted, no TIA's, no chest pain on exertion, no dyspnea on exertion, no swelling of ankles.      New concerns: She was on vacation at [facility] and feels like there is fluid in her L-ear. Has a fullness there . Kusum Zunilda Patient Active Problem List    Diagnosis Date Noted    Actinic keratosis 2022    Cerebral aneurysm 10/06/2020    Headaches, cluster 2020    Hypertension     Vertigo      Current Outpatient Medications   Medication Sig Dispense Refill    varicella-zoster recombinant, PF, (Shingrix, PF,) 50 mcg/0.5 mL susr injection 0.5 mL by IntraMUSCular route once for 1 dose. 2nd dose in 2-4 months. 0.5 mL 1    amLODIPine (NORVASC) 5 mg tablet Take 1 Tablet by mouth Every morning. 90 Tablet 1    losartan (COZAAR) 100 mg tablet TAKE 1 TABLET BY MOUTH DAILY. 90 Tablet 0    Biotin 2,500 mcg cap Take 1 Caplet by mouth daily. magnesium oxide (MAG-OX) 400 mg tablet Take 400 mg by mouth daily. zinc 50 mg tab tablet Take 50 mg by mouth daily. ascorbic acid, vitamin C, (VITAMIN C) 500 mg tablet Take 1,000 mg by mouth daily. ALPRAZolam (XANAX) 0.5 mg tablet Take 1 Tablet by mouth daily as needed (Vertigo). 30 Tablet 2    aspirin delayed-release 81 mg tablet Take 1 Tab by mouth daily. 30 Tab 2    Lacto no.41-B. bifid-B.animalis (Advanced Probiotic-10) 13 mg (3 billion cell) cap Take  by mouth daily.        Past Medical History:   Diagnosis Date    Anxiety     Cerebral aneurysm     Right and left supraclinoid ICA aneurysms    Chest pain     Fainting     Headache     migraines    Hiatal hernia     Hypertension     Joint pain     Menopause     early aj at age 38    Ringing in ears     Skipped beats     Trouble in sleeping     Vertigo     Vertigo     Vitamin D deficiency      Past Surgical History:   Procedure Laterality Date    HX APPENDECTOMY      HX  SECTION      HX ORTHOPAEDIC      Right ankle x 2    HX VASCULAR STENT  2020    Treatment of 6 mm right supraclinoid ICA aneurysm     Social History     Tobacco Use    Smoking status: Never    Smokeless tobacco: Never   Substance Use Topics    Alcohol use: Yes     Comment: 2-3 weekly, \"socially\"          Review of Systems, additional:  Pertinent items are noted in HPI. Objective:     Visit Vitals  /80 (BP 1 Location: Right arm, BP Patient Position: Sitting, BP Cuff Size: Adult)   Pulse 82   Temp 98.4 °F (36.9 °C) (Temporal)   Resp 18   Ht 5' 6\" (1.676 m)   Wt 214 lb (97.1 kg)   SpO2 98%   BMI 34.54 kg/m²     Appearance: alert, well appearing, and in no distress and overweight. General exam: CVS exam BP noted to be well controlled today in office, S1, S2 normal, no gallop, no murmur, chest clear, no JVD, no HSM, no edema, peripheral vascular exam both carotids normal upstroke without bruits, neurological exam alert, oriented, normal speech, no focal findings or movement disorder noted.   Lab review: labs are reviewed, up to date and normal.     Sandra Rose MD  [address] [place] [address] [employer]  08/30/22

## 2022-08-30 NOTE — PROGRESS NOTES
Identified pt with two pt identifiers(name and ). Chief Complaint   Patient presents with    Hypertension    Ear Fullness     LT x 1 wk        Health Maintenance Due   Topic    Shingrix Vaccine Age 50> (1 of 2)    COVID-19 Vaccine (2 - Booster for Visualase series)       Wt Readings from Last 3 Encounters:   22 214 lb (97.1 kg)   22 212 lb (96.2 kg)   22 212 lb (96.2 kg)     Temp Readings from Last 3 Encounters:   22 98.4 °F (36.9 °C) (Temporal)   22 97.6 °F (36.4 °C) (Temporal)   22 98.3 °F (36.8 °C) (Temporal)     BP Readings from Last 3 Encounters:   22 130/80   22 (!) 138/92   22 132/84     Pulse Readings from Last 3 Encounters:   22 82   22 72   22 72         Learning Assessment:  :     Learning Assessment 2021 2020 10/1/2018   PRIMARY LEARNER Patient Patient Patient   HIGHEST LEVEL OF EDUCATION - PRIMARY LEARNER  - - GRADUATED HIGH SCHOOL OR GED   BARRIERS PRIMARY LEARNER - - NONE   CO-LEARNER CAREGIVER - - No   PRIMARY LANGUAGE ENGLISH ENGLISH ENGLISH   LEARNER PREFERENCE PRIMARY READING READING DEMONSTRATION   ANSWERED BY patient patient  self   RELATIONSHIP SELF SELF SELF       Depression Screening:  :     3 most recent PHQ Screens 2022   Little interest or pleasure in doing things Not at all   Feeling down, depressed, irritable, or hopeless Not at all   Total Score PHQ 2 0       Fall Risk Assessment:  :     No flowsheet data found. Abuse Screening:  :     Abuse Screening Questionnaire 2022 2022 2021 3/10/2020 2019 2018   Do you ever feel afraid of your partner? N N N N N N   Are you in a relationship with someone who physically or mentally threatens you? N N N N N N   Is it safe for you to go home?  Y Y Y Y Y Y       Coordination of Care Questionnaire:  :     1) Have you been to an emergency room, urgent care clinic since your last visit? no   Hospitalized since your last visit? no 2) Have you seen or consulted any other health care providers outside of [address] [place] [address] since your last visit? no  (Include any pap smears or colon screenings in this section.)    3) Do you have an Advance Directive on file? no  Are you interested in receiving information about Advance Directives? no    4. For patients aged 39-70: Has the patient had a colonoscopy / FIT/ Cologuard? Yes - no Care Gap present      If the patient is female:    5. For patients aged 41-77: Has the patient had a mammogram within the past 2 years? Yes - no Care Gap present      6. For patients aged 21-65: Has the patient had a pap smear?  Yes - no Care Gap present

## 2022-08-30 NOTE — PATIENT INSTRUCTIONS
Your ears looked great. I suspect that the full feeling is coming from congestion. I recommend the following:    AYR nasal saline spray. 2-3 squirts several times per day as needed. Regular ZYRTEC at NIGHTTIME for the next 1-2 weeks. Follow up if the symptoms persist or change. Your BP is excellent and the readings from home show excellent control. Follow up in 6-months or as needed.

## 2022-11-16 DIAGNOSIS — I67.1 CEREBRAL ANEURYSM: Primary | ICD-10-CM

## 2023-01-04 DIAGNOSIS — I10 PRIMARY HYPERTENSION: ICD-10-CM

## 2023-01-04 RX ORDER — LOSARTAN POTASSIUM 100 MG/1
TABLET ORAL
Qty: 90 TABLET | Refills: 3 | Status: SHIPPED | OUTPATIENT
Start: 2023-01-04

## 2023-02-08 DIAGNOSIS — I10 PRIMARY HYPERTENSION: ICD-10-CM

## 2023-02-08 RX ORDER — AMLODIPINE BESYLATE 5 MG/1
TABLET ORAL
Qty: 90 TABLET | Refills: 3 | Status: SHIPPED | OUTPATIENT
Start: 2023-02-08

## 2023-02-27 ENCOUNTER — OFFICE VISIT (OUTPATIENT)
Dept: FAMILY MEDICINE CLINIC | Age: 58
End: 2023-02-27
Payer: COMMERCIAL

## 2023-02-27 ENCOUNTER — PATIENT MESSAGE (OUTPATIENT)
Dept: FAMILY MEDICINE CLINIC | Age: 58
End: 2023-02-27

## 2023-02-27 VITALS
HEART RATE: 92 BPM | SYSTOLIC BLOOD PRESSURE: 132 MMHG | DIASTOLIC BLOOD PRESSURE: 82 MMHG | RESPIRATION RATE: 16 BRPM | WEIGHT: 222.6 LBS | OXYGEN SATURATION: 97 % | BODY MASS INDEX: 35.77 KG/M2 | HEIGHT: 66 IN | TEMPERATURE: 97.6 F

## 2023-02-27 DIAGNOSIS — E78.2 MIXED HYPERLIPIDEMIA: ICD-10-CM

## 2023-02-27 DIAGNOSIS — E66.01 CLASS 3 OBESITY (HCC): ICD-10-CM

## 2023-02-27 DIAGNOSIS — E55.9 VITAMIN D DEFICIENCY: ICD-10-CM

## 2023-02-27 DIAGNOSIS — I10 PRIMARY HYPERTENSION: Primary | ICD-10-CM

## 2023-02-27 DIAGNOSIS — R42 VERTIGO: ICD-10-CM

## 2023-02-27 DIAGNOSIS — R73.09 ELEVATED GLUCOSE: ICD-10-CM

## 2023-02-27 PROCEDURE — 99214 OFFICE O/P EST MOD 30 MIN: CPT | Performed by: INTERNAL MEDICINE

## 2023-02-27 PROCEDURE — 3074F SYST BP LT 130 MM HG: CPT | Performed by: INTERNAL MEDICINE

## 2023-02-27 PROCEDURE — 3078F DIAST BP <80 MM HG: CPT | Performed by: INTERNAL MEDICINE

## 2023-02-27 RX ORDER — ALPRAZOLAM 0.5 MG/1
0.5 TABLET ORAL
Qty: 30 TABLET | Refills: 2 | Status: SHIPPED | OUTPATIENT
Start: 2023-02-27

## 2023-02-27 RX ORDER — SEMAGLUTIDE 0.25 MG/.5ML
0.25 INJECTION, SOLUTION SUBCUTANEOUS
Qty: 4 EACH | Refills: 1 | Status: SHIPPED | OUTPATIENT
Start: 2023-02-27 | End: 2023-03-29

## 2023-02-27 NOTE — PROGRESS NOTES
Chief Complaint   Patient presents with    Blood Pressure Check    Medication Evaluation     Patient would like to discuss starting ozempic. Assessment/ Plan:   Diagnoses and all orders for this visit:    1. Primary hypertension  -     METABOLIC PANEL, COMPREHENSIVE; Future    2. Vertigo  -     ALPRAZolam (XANAX) 0.5 mg tablet; Take 1 Tablet by mouth daily as needed (Vertigo). 3. Elevated glucose  -     HEMOGLOBIN A1C WITH EAG; Future  -     semaglutide, weight loss, (Wegovy) 0.25 mg/0.5 mL pnij; 0.25 mg by SubCUTAneous route every seven (7) days for 30 days. 4. Mixed hyperlipidemia  -     METABOLIC PANEL, COMPREHENSIVE; Future  -     CBC WITH AUTOMATED DIFF; Future  -     LIPID PANEL; Future    5. Vitamin D deficiency  -     VITAMIN D, 25 HYDROXY; Future    6. Class 3 obesity (HCC)  -     semaglutide, weight loss, (Wegovy) 0.25 mg/0.5 mL pnij; 0.25 mg by SubCUTAneous route every seven (7) days for 30 days. 57F with prediabetes, chronic vertigo which is relieved at times with Xanax. She uses very sparingly. Last refill was a year ago. Needs fasting labs and also we will start her on Wegovy for weight management as she has co-morbidities and BMI is 25. I have discussed the diagnosis with the patient and the intended treatment plan as seen in the above orders. The patient has received an after-visit summary and questions were answered concerning future plans. Asked to return should symptoms worsen or not improve with treatment. Any pending labs and studies will be relayed to patient when they become available. Pt verbalizes understanding of plan of care and denies further questions or concerns at this time. Follow-up and Dispositions    Return in about 8 weeks (around 4/24/2023), or if symptoms worsen or fail to improve. Weight check           Subjective:   Blossom Jansen is a 61 y.o. female who presents today for follow up of BP which is well controlled today.    She needs fasting labs and wanted to discuss weight management. Nutrition - Recommend a modified ADA diet - 1500 sanam.   Behavior modification - we discussed making healthy choices and healthy snacks. More fruits and vegetables and drinking at least 64 oz of water daily. Exercice Recommendations - Walking at least 20-30 minutes per day. Weight loss - none  Wt Readings from Last 3 Encounters:   02/27/23 222 lb 9.6 oz (101 kg)   08/30/22 214 lb (97.1 kg)   07/26/22 212 lb (96.2 kg)     Medications for weight loss - None at this time. BMI: Body mass index is 35.93 kg/m². HISTORICAL  PMH, PSH, FHX, SOCHX, ALLERGIES and MES were reviewed and updated today. Review of Systems  Review of Systems   Constitutional: Negative. HENT: Negative. Eyes: Negative. Respiratory: Negative. Cardiovascular: Negative. Gastrointestinal: Negative. Genitourinary: Negative. Musculoskeletal: Negative. Skin: Negative. Neurological: Negative. Endo/Heme/Allergies: Negative. Psychiatric/Behavioral: Negative. All other systems reviewed and are negative.     Objective:     Visit Vitals  /82 (BP 1 Location: Right upper arm, BP Patient Position: Sitting)   Pulse 92   Temp 97.6 °F (36.4 °C) (Temporal)   Resp 16   Ht 5' 6\" (1.676 m)   Wt 222 lb 9.6 oz (101 kg)   SpO2 97%   BMI 35.93 kg/m²     General: alert, cooperative, no distress, morbidly obese   Mental  status: normal mood, behavior, speech, dress, motor activity, and thought processes, able to follow commands   HENT: NCAT   Neck: no visualized mass   Resp: no respiratory distress   Neuro: no gross deficits   Skin: no discoloration or lesions of concern on visible areas   Psychiatric: normal affect, consistent with stated mood, no evidence of hallucinations     Zara Gonzalez MD  [facility]   02/27/2023

## 2023-02-27 NOTE — PROGRESS NOTES
Identified pt with two pt identifiers(name and ). Chief Complaint   Patient presents with    Blood Pressure Check    Medication Evaluation     Patient would like to discuss starting ozempic. Health Maintenance Due   Topic    Shingles Vaccine (1 of 2)    Flu Vaccine (1)       Wt Readings from Last 3 Encounters:   23 222 lb 9.6 oz (101 kg)   22 214 lb (97.1 kg)   22 212 lb (96.2 kg)     Temp Readings from Last 3 Encounters:   23 97.6 °F (36.4 °C) (Temporal)   22 98.4 °F (36.9 °C) (Temporal)   22 97.6 °F (36.4 °C) (Temporal)     BP Readings from Last 3 Encounters:   23 (!) 142/88   22 130/80   22 (!) 138/92     Pulse Readings from Last 3 Encounters:   23 92   22 82   22 72         Learning Assessment:  :     Learning Assessment 2021 2020 10/1/2018   PRIMARY LEARNER Patient Patient Patient   HIGHEST LEVEL OF EDUCATION - PRIMARY LEARNER  - - GRADUATED HIGH SCHOOL OR GED   BARRIERS PRIMARY LEARNER - - NONE   CO-LEARNER CAREGIVER - - No   PRIMARY LANGUAGE ENGLISH ENGLISH ENGLISH   LEARNER PREFERENCE PRIMARY READING READING DEMONSTRATION   ANSWERED BY patient patient  self   RELATIONSHIP SELF SELF SELF       Depression Screening:  :     3 most recent PHQ Screens 2023   Little interest or pleasure in doing things Not at all   Feeling down, depressed, irritable, or hopeless Not at all   Total Score PHQ 2 0       Fall Risk Assessment:  :     No flowsheet data found. Abuse Screening:  :     Abuse Screening Questionnaire 2023 2022 2022 2021 3/10/2020 2019 2018   Do you ever feel afraid of your partner? N N N N N N N   Are you in a relationship with someone who physically or mentally threatens you? N N N N N N N   Is it safe for you to go home?  Y Y Y Y Y Y Y       Coordination of Care Questionnaire:  :     1) Have you been to an emergency room, urgent care clinic since your last visit? no Hospitalized since your last visit? no             2) Have you seen or consulted any other health care providers outside of [address] [place] [address] since your last visit? Dr Jessica Bahena   (Include any pap smears or colon screenings in this section.)    3) Do you have an Advance Directive on file? no  Are you interested in receiving information about Advance Directives? no    Patient is accompanied by self I have received verbal consent from [address] [place] [address] to discuss any/all medical information while they are present in the room. 4.  For patients aged 39-70: Has the patient had a colonoscopy / FIT/ Cologuard? Yes - no Care Gap present      If the patient is female:    5. For patients aged 41-77: Has the patient had a mammogram within the past 2 years? Yes - no Care Gap present      6. For patients aged 21-65: Has the patient had a pap smear?  Yes - no Care Gap present

## 2023-02-28 ENCOUNTER — LAB ONLY (OUTPATIENT)
Dept: FAMILY MEDICINE CLINIC | Age: 58
End: 2023-02-28

## 2023-02-28 DIAGNOSIS — E78.2 MIXED HYPERLIPIDEMIA: ICD-10-CM

## 2023-02-28 DIAGNOSIS — E55.9 VITAMIN D DEFICIENCY: ICD-10-CM

## 2023-02-28 DIAGNOSIS — I10 PRIMARY HYPERTENSION: ICD-10-CM

## 2023-02-28 DIAGNOSIS — R73.09 ELEVATED GLUCOSE: ICD-10-CM

## 2023-03-01 DIAGNOSIS — E66.01 MORBID OBESITY (HCC): Primary | ICD-10-CM

## 2023-03-01 DIAGNOSIS — E66.01 CLASS 3 OBESITY (HCC): ICD-10-CM

## 2023-03-01 LAB
25(OH)D3 SERPL-MCNC: 31.1 NG/ML (ref 30–100)
ALBUMIN SERPL-MCNC: 3.7 G/DL (ref 3.5–5)
ALBUMIN/GLOB SERPL: 1.1 (ref 1.1–2.2)
ALP SERPL-CCNC: 99 U/L (ref 45–117)
ALT SERPL-CCNC: 20 U/L (ref 12–78)
ANION GAP SERPL CALC-SCNC: 3 MMOL/L (ref 5–15)
AST SERPL-CCNC: 12 U/L (ref 15–37)
BASOPHILS # BLD: 0.1 K/UL (ref 0–0.1)
BASOPHILS NFR BLD: 1 % (ref 0–1)
BILIRUB SERPL-MCNC: 0.7 MG/DL (ref 0.2–1)
BUN SERPL-MCNC: 12 MG/DL (ref 6–20)
BUN/CREAT SERPL: 18 (ref 12–20)
CALCIUM SERPL-MCNC: 9.4 MG/DL (ref 8.5–10.1)
CHLORIDE SERPL-SCNC: 108 MMOL/L (ref 97–108)
CHOLEST SERPL-MCNC: 220 MG/DL
CO2 SERPL-SCNC: 32 MMOL/L (ref 21–32)
CREAT SERPL-MCNC: 0.67 MG/DL (ref 0.55–1.02)
DIFFERENTIAL METHOD BLD: ABNORMAL
EOSINOPHIL # BLD: 0.1 K/UL (ref 0–0.4)
EOSINOPHIL NFR BLD: 1 % (ref 0–7)
ERYTHROCYTE [DISTWIDTH] IN BLOOD BY AUTOMATED COUNT: 14.6 % (ref 11.5–14.5)
EST. AVERAGE GLUCOSE BLD GHB EST-MCNC: 114 MG/DL
GLOBULIN SER CALC-MCNC: 3.4 G/DL (ref 2–4)
GLUCOSE SERPL-MCNC: 97 MG/DL (ref 65–100)
HBA1C MFR BLD: 5.6 % (ref 4–5.6)
HCT VFR BLD AUTO: 40.8 % (ref 35–47)
HDLC SERPL-MCNC: 89 MG/DL
HDLC SERPL: 2.5 (ref 0–5)
HGB BLD-MCNC: 13 G/DL (ref 11.5–16)
IMM GRANULOCYTES # BLD AUTO: 0 K/UL (ref 0–0.04)
IMM GRANULOCYTES NFR BLD AUTO: 0 % (ref 0–0.5)
LDLC SERPL CALC-MCNC: 116 MG/DL (ref 0–100)
LYMPHOCYTES # BLD: 1.8 K/UL (ref 0.8–3.5)
LYMPHOCYTES NFR BLD: 32 % (ref 12–49)
MCH RBC QN AUTO: 28.3 PG (ref 26–34)
MCHC RBC AUTO-ENTMCNC: 31.9 G/DL (ref 30–36.5)
MCV RBC AUTO: 88.9 FL (ref 80–99)
MONOCYTES # BLD: 0.5 K/UL (ref 0–1)
MONOCYTES NFR BLD: 9 % (ref 5–13)
NEUTS SEG # BLD: 3.2 K/UL (ref 1.8–8)
NEUTS SEG NFR BLD: 57 % (ref 32–75)
NRBC # BLD: 0 K/UL (ref 0–0.01)
NRBC BLD-RTO: 0 PER 100 WBC
PLATELET # BLD AUTO: 257 K/UL (ref 150–400)
PMV BLD AUTO: 10.3 FL (ref 8.9–12.9)
POTASSIUM SERPL-SCNC: 4.9 MMOL/L (ref 3.5–5.1)
PROT SERPL-MCNC: 7.1 G/DL (ref 6.4–8.2)
RBC # BLD AUTO: 4.59 M/UL (ref 3.8–5.2)
SODIUM SERPL-SCNC: 143 MMOL/L (ref 136–145)
TRIGL SERPL-MCNC: 75 MG/DL (ref ?–150)
VLDLC SERPL CALC-MCNC: 15 MG/DL
WBC # BLD AUTO: 5.7 K/UL (ref 3.6–11)

## 2023-03-01 RX ORDER — SEMAGLUTIDE 0.25 MG/.5ML
0.25 INJECTION, SOLUTION SUBCUTANEOUS
Qty: 4 EACH | Refills: 0 | Status: SHIPPED | OUTPATIENT
Start: 2023-03-01 | End: 2023-03-31

## 2023-03-09 ENCOUNTER — TELEPHONE (OUTPATIENT)
Dept: FAMILY MEDICINE CLINIC | Age: 58
End: 2023-03-09

## 2023-04-06 ENCOUNTER — OFFICE VISIT (OUTPATIENT)
Dept: FAMILY MEDICINE CLINIC | Age: 58
End: 2023-04-06
Payer: COMMERCIAL

## 2023-04-06 PROCEDURE — 99213 OFFICE O/P EST LOW 20 MIN: CPT | Performed by: FAMILY MEDICINE

## 2023-04-06 PROCEDURE — 3075F SYST BP GE 130 - 139MM HG: CPT | Performed by: FAMILY MEDICINE

## 2023-04-06 PROCEDURE — 3079F DIAST BP 80-89 MM HG: CPT | Performed by: FAMILY MEDICINE

## 2023-04-06 NOTE — PROGRESS NOTES
Identified pt with two pt identifiers(name and ). Chief Complaint   Patient presents with    Follow-up     Medication 4 week review   [address] Maintenance Due   Topic    Shingles Vaccine (1 of 2)       Wt Readings from Last 3 Encounters:   23 220 lb (99.8 kg)   23 222 lb 9.6 oz (101 kg)   22 214 lb (97.1 kg)     Temp Readings from Last 3 Encounters:   23 98.6 °F (37 °C) (Temporal)   23 97.6 °F (36.4 °C) (Temporal)   22 98.4 °F (36.9 °C) (Temporal)     BP Readings from Last 3 Encounters:   23 130/80   23 132/82   22 130/80     Pulse Readings from Last 3 Encounters:   23 83   23 92   22 82         Learning Assessment:  :     Learning Assessment 2021 2020 10/1/2018   PRIMARY LEARNER Patient Patient Patient   HIGHEST LEVEL OF EDUCATION - PRIMARY LEARNER  - - GRADUATED HIGH SCHOOL OR GED   BARRIERS PRIMARY LEARNER - - NONE   CO-LEARNER CAREGIVER - - No   PRIMARY LANGUAGE ENGLISH ENGLISH ENGLISH   LEARNER PREFERENCE PRIMARY READING READING DEMONSTRATION   ANSWERED BY patient patient  self   RELATIONSHIP SELF SELF SELF       Depression Screening:  :     3 most recent PHQ Screens 2023   Little interest or pleasure in doing things Not at all   Feeling down, depressed, irritable, or hopeless Not at all   Total Score PHQ 2 0       Fall Risk Assessment:  :     No flowsheet data found. Abuse Screening:  :     Abuse Screening Questionnaire 2023 2023 2022 2022 2021 3/10/2020 2019   Do you ever feel afraid of your partner? N N N N N N N   Are you in a relationship with someone who physically or mentally threatens you? N N N N N N N   Is it safe for you to go home?  Y Y Y Y Y Y Y       Coordination of Care Questionnaire:  :     1) Have you been to an emergency room, urgent care clinic since your last visit? no   Hospitalized since your last visit? no             2) Have you seen or consulted any other health care providers outside of [address] [place] [address] since your last visit? no  (Include any pap smears or colon screenings in this section.)    3) Do you have an Advance Directive on file? no  Are you interested in receiving information about Advance Directives? no    Patient is accompanied by self I have received verbal consent from [address] [facility] to discuss any/all medical information while they are present in the room. 4.  For patients aged 39-70: Has the patient had a colonoscopy / FIT/ Cologuard? Yes - no Care Gap present      If the patient is female:    5. For patients aged 41-77: Has the patient had a mammogram within the past 2 years? Yes - no Care Gap present      6. For patients aged 21-65: Has the patient had a pap smear?  NA - based on age or sex

## 2023-04-06 NOTE — PROGRESS NOTES
HISTORY OF PRESENT ILLNESS  Tara Gruber is a 61 y.o. female. HPI  FU med check on Wegovy. She has tolerated Wegovy 0.25 mg dose weekly. Has successfully curbed appetite. Wishes to titrate up dose. Labs UTD. She is taking Vit D 3 suppl now. ROS  Visit Vitals  /80 (BP 1 Location: Left arm, BP Patient Position: Sitting, BP Cuff Size: Adult)   Pulse 83   Temp 98.6 °F (37 °C) (Temporal)   Resp 16   Ht 5' 6\" (1.676 m)   Wt 220 lb (99.8 kg)   SpO2 95%   BMI 35.51 kg/m²       Physical Exam    ASSESSMENT and PLAN    ICD-10-CM ICD-9-CM    1. Class 3 obesity (HCC)  E66.01 278.01 semaglutide, weight loss, (Wegovy) 0.5 mg/0.5 mL pnij      2. Vitamin D deficiency  E55.9 268.9       Increase dose Wegovy 0.5 mg weekly x 1 month. FU with Dr Belton Bloch in 1 month.

## 2023-04-21 DIAGNOSIS — I67.1 CEREBRAL ANEURYSM: Primary | ICD-10-CM

## 2023-04-22 DIAGNOSIS — I67.1 CEREBRAL ANEURYSM: Primary | ICD-10-CM

## 2023-04-26 ENCOUNTER — TRANSCRIBE ORDERS (OUTPATIENT)
Facility: HOSPITAL | Age: 58
End: 2023-04-26

## 2023-04-26 DIAGNOSIS — I67.1 CEREBRAL ANEURYSM: Primary | ICD-10-CM

## 2023-04-26 DIAGNOSIS — I67.1 CEREBRAL ANEURYSM, NONRUPTURED: Primary | ICD-10-CM

## 2023-05-02 ENCOUNTER — OFFICE VISIT (OUTPATIENT)
Dept: FAMILY MEDICINE CLINIC | Age: 58
End: 2023-05-02
Payer: COMMERCIAL

## 2023-05-02 VITALS
HEIGHT: 66 IN | DIASTOLIC BLOOD PRESSURE: 84 MMHG | BODY MASS INDEX: 34.36 KG/M2 | RESPIRATION RATE: 16 BRPM | TEMPERATURE: 97.6 F | OXYGEN SATURATION: 99 % | SYSTOLIC BLOOD PRESSURE: 118 MMHG | HEART RATE: 80 BPM | WEIGHT: 213.8 LBS

## 2023-05-02 DIAGNOSIS — E66.01 CLASS 3 OBESITY (HCC): Primary | ICD-10-CM

## 2023-05-02 DIAGNOSIS — Z76.89 ENCOUNTER FOR WEIGHT MANAGEMENT: ICD-10-CM

## 2023-05-02 PROCEDURE — 3079F DIAST BP 80-89 MM HG: CPT | Performed by: INTERNAL MEDICINE

## 2023-05-02 PROCEDURE — 99213 OFFICE O/P EST LOW 20 MIN: CPT | Performed by: INTERNAL MEDICINE

## 2023-05-02 PROCEDURE — 3074F SYST BP LT 130 MM HG: CPT | Performed by: INTERNAL MEDICINE

## 2023-05-02 RX ORDER — SEMAGLUTIDE 0.5 MG/.5ML
0.5 INJECTION, SOLUTION SUBCUTANEOUS
Qty: 4 EACH | Refills: 3 | Status: SHIPPED | OUTPATIENT
Start: 2023-05-02

## 2023-05-03 ENCOUNTER — HOSPITAL ENCOUNTER (OUTPATIENT)
Dept: MRI IMAGING | Age: 58
Discharge: HOME | End: 2023-05-03
Attending: RADIOLOGY
Payer: COMMERCIAL

## 2023-05-03 DIAGNOSIS — I67.1 CEREBRAL ANEURYSM: ICD-10-CM

## 2023-05-03 PROCEDURE — 70544 MR ANGIOGRAPHY HEAD W/O DYE: CPT

## 2023-05-22 ENCOUNTER — OFFICE VISIT (OUTPATIENT)
Age: 58
End: 2023-05-22
Payer: COMMERCIAL

## 2023-05-22 VITALS
HEIGHT: 66 IN | HEART RATE: 78 BPM | BODY MASS INDEX: 34.23 KG/M2 | WEIGHT: 213 LBS | SYSTOLIC BLOOD PRESSURE: 126 MMHG | DIASTOLIC BLOOD PRESSURE: 94 MMHG | OXYGEN SATURATION: 97 % | TEMPERATURE: 97.2 F

## 2023-05-22 DIAGNOSIS — I67.1 CEREBRAL ANEURYSM: Primary | ICD-10-CM

## 2023-05-22 PROCEDURE — 99213 OFFICE O/P EST LOW 20 MIN: CPT | Performed by: RADIOLOGY

## 2023-05-22 PROCEDURE — 3074F SYST BP LT 130 MM HG: CPT | Performed by: RADIOLOGY

## 2023-05-22 PROCEDURE — 3078F DIAST BP <80 MM HG: CPT | Performed by: RADIOLOGY

## 2023-05-22 RX ORDER — SEMAGLUTIDE 0.5 MG/.5ML
INJECTION, SOLUTION SUBCUTANEOUS
COMMUNITY
Start: 2023-05-02 | End: 2023-06-01 | Stop reason: DRUGHIGH

## 2023-05-22 RX ORDER — ZINC GLUCONATE 50 MG
50 TABLET ORAL DAILY
COMMUNITY

## 2023-05-22 RX ORDER — CHOLECALCIFEROL (VITAMIN D3) 125 MCG
50 CAPSULE ORAL DAILY
COMMUNITY

## 2023-05-31 ENCOUNTER — TELEPHONE (OUTPATIENT)
Age: 58
End: 2023-05-31

## 2023-06-01 DIAGNOSIS — E66.01 MORBID (SEVERE) OBESITY DUE TO EXCESS CALORIES (HCC): Primary | ICD-10-CM

## 2023-06-01 RX ORDER — SEMAGLUTIDE 0.5 MG/.5ML
INJECTION, SOLUTION SUBCUTANEOUS
OUTPATIENT
Start: 2023-06-01

## 2023-06-01 RX ORDER — SEMAGLUTIDE 1 MG/.5ML
1 INJECTION, SOLUTION SUBCUTANEOUS
Qty: 2 ML | Refills: 1 | Status: SHIPPED | OUTPATIENT
Start: 2023-06-01 | End: 2023-07-01

## 2023-06-08 DIAGNOSIS — E66.01 MORBID (SEVERE) OBESITY DUE TO EXCESS CALORIES (HCC): Primary | ICD-10-CM

## 2023-06-08 RX ORDER — SEMAGLUTIDE 1.7 MG/.75ML
1.7 INJECTION, SOLUTION SUBCUTANEOUS
Qty: 3 ML | Refills: 1 | Status: SHIPPED | OUTPATIENT
Start: 2023-06-08 | End: 2023-08-07

## 2023-06-16 NOTE — TELEPHONE ENCOUNTER
PA started for [facility] in cover my meds. Special Stains Stage 1 - Results: Base On Clearance Noted Above

## 2023-06-26 NOTE — ED NOTES
Dr. Stevenson- pt has Prolia scheduled for 6/29. The referral is still pending. This is what they noted-    06.29.23 / [facility] IM / PROLIA () - IB care team that pt's insurance follows Medicare guidelines.  First, DX S32.001G is not covered as a primary DX and if they can change the primary DX to M81.0.  Secondly, an additional DX is needed from Medicare's policy as to why pt is ineligible for other therapies.  Previous order had an additional DX of Z92.29.  Asked care team to add onto current order if still applicable.    Current dianosis-  S32.001G (ICD-10-CM) - Closed burst fracture of lumbar vertebra with delayed healing, subsequent encounter   M81.0 (ICD-10-CM) - Senile osteoporosis         Please change the one diagnosis if agreeable.     Thank you!    Neuro interventional NP at bedside to evaluate pt.

## 2023-07-02 SDOH — ECONOMIC STABILITY: FOOD INSECURITY: WITHIN THE PAST 12 MONTHS, THE FOOD YOU BOUGHT JUST DIDN'T LAST AND YOU DIDN'T HAVE MONEY TO GET MORE.: NEVER TRUE

## 2023-07-02 SDOH — ECONOMIC STABILITY: INCOME INSECURITY: HOW HARD IS IT FOR YOU TO PAY FOR THE VERY BASICS LIKE FOOD, HOUSING, MEDICAL CARE, AND HEATING?: NOT HARD AT ALL

## 2023-07-02 SDOH — ECONOMIC STABILITY: TRANSPORTATION INSECURITY
IN THE PAST 12 MONTHS, HAS LACK OF TRANSPORTATION KEPT YOU FROM MEETINGS, WORK, OR FROM GETTING THINGS NEEDED FOR DAILY LIVING?: NO

## 2023-07-02 SDOH — ECONOMIC STABILITY: HOUSING INSECURITY
IN THE LAST 12 MONTHS, WAS THERE A TIME WHEN YOU DID NOT HAVE A STEADY PLACE TO SLEEP OR SLEPT IN A SHELTER (INCLUDING NOW)?: NO

## 2023-07-02 SDOH — ECONOMIC STABILITY: FOOD INSECURITY: WITHIN THE PAST 12 MONTHS, YOU WORRIED THAT YOUR FOOD WOULD RUN OUT BEFORE YOU GOT MONEY TO BUY MORE.: NEVER TRUE

## 2023-07-05 ENCOUNTER — TELEMEDICINE (OUTPATIENT)
Age: 58
End: 2023-07-05
Payer: COMMERCIAL

## 2023-07-05 DIAGNOSIS — I10 ESSENTIAL (PRIMARY) HYPERTENSION: ICD-10-CM

## 2023-07-05 DIAGNOSIS — E66.01 MORBID (SEVERE) OBESITY DUE TO EXCESS CALORIES (HCC): Primary | ICD-10-CM

## 2023-07-05 DIAGNOSIS — Z76.89 ENCOUNTER FOR WEIGHT MANAGEMENT: ICD-10-CM

## 2023-07-05 PROCEDURE — 99213 OFFICE O/P EST LOW 20 MIN: CPT | Performed by: INTERNAL MEDICINE

## 2023-07-05 SDOH — ECONOMIC STABILITY: FOOD INSECURITY: WITHIN THE PAST 12 MONTHS, YOU WORRIED THAT YOUR FOOD WOULD RUN OUT BEFORE YOU GOT MONEY TO BUY MORE.: NEVER TRUE

## 2023-07-05 SDOH — ECONOMIC STABILITY: FOOD INSECURITY: WITHIN THE PAST 12 MONTHS, THE FOOD YOU BOUGHT JUST DIDN'T LAST AND YOU DIDN'T HAVE MONEY TO GET MORE.: NEVER TRUE

## 2023-07-05 SDOH — ECONOMIC STABILITY: INCOME INSECURITY: HOW HARD IS IT FOR YOU TO PAY FOR THE VERY BASICS LIKE FOOD, HOUSING, MEDICAL CARE, AND HEATING?: NOT HARD AT ALL

## 2023-07-05 ASSESSMENT — PATIENT HEALTH QUESTIONNAIRE - PHQ9
SUM OF ALL RESPONSES TO PHQ QUESTIONS 1-9: 0
SUM OF ALL RESPONSES TO PHQ9 QUESTIONS 1 & 2: 0
1. LITTLE INTEREST OR PLEASURE IN DOING THINGS: 0
2. FEELING DOWN, DEPRESSED OR HOPELESS: 0
SUM OF ALL RESPONSES TO PHQ QUESTIONS 1-9: 0

## 2023-07-05 ASSESSMENT — ENCOUNTER SYMPTOMS
RESPIRATORY NEGATIVE: 1
ALLERGIC/IMMUNOLOGIC NEGATIVE: 1
GASTROINTESTINAL NEGATIVE: 1
EYES NEGATIVE: 1

## 2023-07-05 NOTE — PROGRESS NOTES
Chief Complaint   Patient presents with    Follow-up     For wegovy, weight 207.6 lb   b/p 126/79        Aram Cooks is a 61 y.o. female who was seen by synchronous (real-time) audio-video technology on 7/5/2023 for Follow-up (For wegovy, weight 207.6 lb /b/p 126/79)      Aram Cooks, was evaluated through a synchronous (real-time) audio-video encounter. The patient (or guardian if applicable) is aware that this is a billable service. Verbal consent to proceed has been obtained within the past 12 months. The visit was conducted pursuant to the emergency declaration under the [address] and the [employer] and [employer] General Act. Patient identification was verified, and a caregiver was present when appropriate. The patient was located in a state where the provider was credentialed to provide care. Kathi Castillo MD    Assessment & Plan:   1. Morbid (severe) obesity due to excess calories ([address])  2. Essential (primary) hypertension  3. Encounter for weight management    Will continue with Wegovy 1.7 mgs for now. Follow up in 8-weeks. Continue to monitor weight, diet and exercise. BP is greatly improved! I have discussed the diagnosis with the patient and the intended treatment plan as seen in the above orders. The patient has received an after-visit summary and questions were answered concerning future plans. Asked to return should symptoms worsen or not improve with treatment. Any pending labs and studies will be relayed to patient when they become available. Pt verbalizes understanding of plan of care and denies further questions or concerns at this time. Return in about 8 weeks (around 8/30/2023), or if symptoms worsen or fail to improve, for weight management. Subjective:   57F who presents for follow up of weight management. She started [facility] JONNA - 1.7 mgs. We could not get the 1.0.    She did

## 2023-07-10 DIAGNOSIS — E66.01 MORBID (SEVERE) OBESITY DUE TO EXCESS CALORIES (HCC): ICD-10-CM

## 2023-07-10 RX ORDER — SEMAGLUTIDE 1.7 MG/.75ML
1.7 INJECTION, SOLUTION SUBCUTANEOUS
Qty: 3 ML | Refills: 1 | Status: SHIPPED | OUTPATIENT
Start: 2023-07-10 | End: 2023-09-08

## 2023-08-11 NOTE — TELEPHONE ENCOUNTER
Pt is ready to increase her wegovy [facility] has it in stock and her insurance will cover it.  She knows you are out till Monday

## 2023-08-12 RX ORDER — SEMAGLUTIDE 2.4 MG/.75ML
2.4 INJECTION, SOLUTION SUBCUTANEOUS
Qty: 9 ML | Refills: 0 | Status: SHIPPED | OUTPATIENT
Start: 2023-08-12 | End: 2023-11-10

## 2023-08-30 ENCOUNTER — OFFICE VISIT (OUTPATIENT)
Age: 58
End: 2023-08-30
Payer: COMMERCIAL

## 2023-08-30 VITALS
BODY MASS INDEX: 31.5 KG/M2 | WEIGHT: 196 LBS | TEMPERATURE: 97.6 F | RESPIRATION RATE: 16 BRPM | HEIGHT: 66 IN | DIASTOLIC BLOOD PRESSURE: 80 MMHG | SYSTOLIC BLOOD PRESSURE: 126 MMHG | HEART RATE: 82 BPM | OXYGEN SATURATION: 97 %

## 2023-08-30 DIAGNOSIS — E55.9 VITAMIN D DEFICIENCY, UNSPECIFIED: ICD-10-CM

## 2023-08-30 DIAGNOSIS — E78.2 MIXED HYPERLIPIDEMIA: ICD-10-CM

## 2023-08-30 DIAGNOSIS — Z76.89 ENCOUNTER FOR WEIGHT MANAGEMENT: ICD-10-CM

## 2023-08-30 DIAGNOSIS — E66.01 MORBID (SEVERE) OBESITY DUE TO EXCESS CALORIES (HCC): Primary | ICD-10-CM

## 2023-08-30 PROCEDURE — 99213 OFFICE O/P EST LOW 20 MIN: CPT | Performed by: INTERNAL MEDICINE

## 2023-08-30 PROCEDURE — 3079F DIAST BP 80-89 MM HG: CPT | Performed by: INTERNAL MEDICINE

## 2023-08-30 PROCEDURE — 3074F SYST BP LT 130 MM HG: CPT | Performed by: INTERNAL MEDICINE

## 2023-08-30 RX ORDER — SEMAGLUTIDE 2.4 MG/.75ML
2.4 INJECTION, SOLUTION SUBCUTANEOUS
Qty: 9 ML | Refills: 0 | Status: SHIPPED | OUTPATIENT
Start: 2023-08-30 | End: 2023-11-28

## 2023-08-30 ASSESSMENT — PATIENT HEALTH QUESTIONNAIRE - PHQ9
SUM OF ALL RESPONSES TO PHQ QUESTIONS 1-9: 0
SUM OF ALL RESPONSES TO PHQ QUESTIONS 1-9: 0
1. LITTLE INTEREST OR PLEASURE IN DOING THINGS: 0
SUM OF ALL RESPONSES TO PHQ9 QUESTIONS 1 & 2: 0
SUM OF ALL RESPONSES TO PHQ QUESTIONS 1-9: 0
2. FEELING DOWN, DEPRESSED OR HOPELESS: 0
SUM OF ALL RESPONSES TO PHQ QUESTIONS 1-9: 0

## 2023-08-30 ASSESSMENT — ENCOUNTER SYMPTOMS
EYES NEGATIVE: 1
GASTROINTESTINAL NEGATIVE: 1
RESPIRATORY NEGATIVE: 1
ALLERGIC/IMMUNOLOGIC NEGATIVE: 1

## 2023-08-30 NOTE — PATIENT INSTRUCTIONS
Miralax 17-34 grams in 4 oz of apple juice daily. Keep taking daily until having soft regular stools. Drink about 100 oz of water day.

## 2023-09-20 ENCOUNTER — NURSE ONLY (OUTPATIENT)
Age: 58
End: 2023-09-20

## 2023-09-20 DIAGNOSIS — E78.2 MIXED HYPERLIPIDEMIA: ICD-10-CM

## 2023-09-20 DIAGNOSIS — E55.9 VITAMIN D DEFICIENCY, UNSPECIFIED: ICD-10-CM

## 2023-09-21 LAB
25(OH)D3 SERPL-MCNC: 56.8 NG/ML (ref 30–100)
ALBUMIN SERPL-MCNC: 4 G/DL (ref 3.5–5)
ALBUMIN/GLOB SERPL: 1.2 (ref 1.1–2.2)
ALP SERPL-CCNC: 104 U/L (ref 45–117)
ALT SERPL-CCNC: 19 U/L (ref 12–78)
ANION GAP SERPL CALC-SCNC: 0 MMOL/L (ref 5–15)
AST SERPL-CCNC: 10 U/L (ref 15–37)
BASOPHILS # BLD: 0 K/UL (ref 0–0.1)
BASOPHILS NFR BLD: 1 % (ref 0–1)
BILIRUB SERPL-MCNC: 0.8 MG/DL (ref 0.2–1)
BUN SERPL-MCNC: 11 MG/DL (ref 6–20)
BUN/CREAT SERPL: 15 (ref 12–20)
CALCIUM SERPL-MCNC: 9.8 MG/DL (ref 8.5–10.1)
CHLORIDE SERPL-SCNC: 104 MMOL/L (ref 97–108)
CHOLEST SERPL-MCNC: 178 MG/DL
CO2 SERPL-SCNC: 33 MMOL/L (ref 21–32)
CREAT SERPL-MCNC: 0.72 MG/DL (ref 0.55–1.02)
DIFFERENTIAL METHOD BLD: NORMAL
EOSINOPHIL # BLD: 0.1 K/UL (ref 0–0.4)
EOSINOPHIL NFR BLD: 1 % (ref 0–7)
ERYTHROCYTE [DISTWIDTH] IN BLOOD BY AUTOMATED COUNT: 14.3 % (ref 11.5–14.5)
GLOBULIN SER CALC-MCNC: 3.4 G/DL (ref 2–4)
GLUCOSE SERPL-MCNC: 88 MG/DL (ref 65–100)
HCT VFR BLD AUTO: 43.3 % (ref 35–47)
HDLC SERPL-MCNC: 71 MG/DL
HDLC SERPL: 2.5 (ref 0–5)
HGB BLD-MCNC: 13.7 G/DL (ref 11.5–16)
IMM GRANULOCYTES # BLD AUTO: 0 K/UL (ref 0–0.04)
IMM GRANULOCYTES NFR BLD AUTO: 0 % (ref 0–0.5)
LDLC SERPL CALC-MCNC: 85.2 MG/DL (ref 0–100)
LYMPHOCYTES # BLD: 2.1 K/UL (ref 0.8–3.5)
LYMPHOCYTES NFR BLD: 36 % (ref 12–49)
MCH RBC QN AUTO: 28.2 PG (ref 26–34)
MCHC RBC AUTO-ENTMCNC: 31.6 G/DL (ref 30–36.5)
MCV RBC AUTO: 89.1 FL (ref 80–99)
MONOCYTES # BLD: 0.4 K/UL (ref 0–1)
MONOCYTES NFR BLD: 7 % (ref 5–13)
NEUTS SEG # BLD: 3.3 K/UL (ref 1.8–8)
NEUTS SEG NFR BLD: 55 % (ref 32–75)
NRBC # BLD: 0 K/UL (ref 0–0.01)
NRBC BLD-RTO: 0 PER 100 WBC
PLATELET # BLD AUTO: 277 K/UL (ref 150–400)
PMV BLD AUTO: 11 FL (ref 8.9–12.9)
POTASSIUM SERPL-SCNC: 4.9 MMOL/L (ref 3.5–5.1)
PROT SERPL-MCNC: 7.4 G/DL (ref 6.4–8.2)
RBC # BLD AUTO: 4.86 M/UL (ref 3.8–5.2)
SODIUM SERPL-SCNC: 137 MMOL/L (ref 136–145)
TRIGL SERPL-MCNC: 109 MG/DL
VLDLC SERPL CALC-MCNC: 21.8 MG/DL
WBC # BLD AUTO: 5.8 K/UL (ref 3.6–11)

## 2023-09-22 ENCOUNTER — TELEPHONE (OUTPATIENT)
Age: 58
End: 2023-09-22

## 2023-09-22 NOTE — TELEPHONE ENCOUNTER
----- Message from Kary Belcher MD sent at 9/22/2023  1:55 PM EDT -----  Please let patient know that her labs have generally improved and no worrisome findings at this time. Continue with medications as prescribed. Follow up as recommended. Thanks!

## 2023-11-08 DIAGNOSIS — E66.01 MORBID (SEVERE) OBESITY DUE TO EXCESS CALORIES (HCC): ICD-10-CM

## 2023-11-08 RX ORDER — SEMAGLUTIDE 2.4 MG/.75ML
2.4 INJECTION, SOLUTION SUBCUTANEOUS
Qty: 9 ML | Refills: 0 | Status: SHIPPED | OUTPATIENT
Start: 2023-11-08 | End: 2024-02-06

## 2023-11-08 NOTE — TELEPHONE ENCOUNTER
FYI Patient is switching her wegovy to [facility] and they should be sending something to get approval for Dr Elin Cameron

## 2023-11-27 ENCOUNTER — NURSE ONLY (OUTPATIENT)
Age: 58
End: 2023-11-27

## 2023-11-27 ENCOUNTER — OFFICE VISIT (OUTPATIENT)
Age: 58
End: 2023-11-27
Payer: COMMERCIAL

## 2023-11-27 VITALS
HEART RATE: 106 BPM | OXYGEN SATURATION: 98 % | HEIGHT: 66 IN | DIASTOLIC BLOOD PRESSURE: 78 MMHG | TEMPERATURE: 96.9 F | BODY MASS INDEX: 29.47 KG/M2 | SYSTOLIC BLOOD PRESSURE: 120 MMHG | WEIGHT: 183.4 LBS | RESPIRATION RATE: 16 BRPM

## 2023-11-27 DIAGNOSIS — E66.3 OVERWEIGHT (BMI 25.0-29.9): Primary | ICD-10-CM

## 2023-11-27 DIAGNOSIS — Z12.31 ENCOUNTER FOR SCREENING MAMMOGRAM FOR MALIGNANT NEOPLASM OF BREAST: ICD-10-CM

## 2023-11-27 DIAGNOSIS — R11.0 NAUSEA: ICD-10-CM

## 2023-11-27 PROCEDURE — 99213 OFFICE O/P EST LOW 20 MIN: CPT | Performed by: INTERNAL MEDICINE

## 2023-11-27 PROCEDURE — 3074F SYST BP LT 130 MM HG: CPT | Performed by: INTERNAL MEDICINE

## 2023-11-27 PROCEDURE — 3078F DIAST BP <80 MM HG: CPT | Performed by: INTERNAL MEDICINE

## 2023-11-27 ASSESSMENT — ENCOUNTER SYMPTOMS
NAUSEA: 1
ALLERGIC/IMMUNOLOGIC NEGATIVE: 1
EYES NEGATIVE: 1
RESPIRATORY NEGATIVE: 1

## 2023-11-27 ASSESSMENT — PATIENT HEALTH QUESTIONNAIRE - PHQ9
SUM OF ALL RESPONSES TO PHQ QUESTIONS 1-9: 0
SUM OF ALL RESPONSES TO PHQ QUESTIONS 1-9: 0
1. LITTLE INTEREST OR PLEASURE IN DOING THINGS: 0
SUM OF ALL RESPONSES TO PHQ9 QUESTIONS 1 & 2: 0
2. FEELING DOWN, DEPRESSED OR HOPELESS: 0
SUM OF ALL RESPONSES TO PHQ QUESTIONS 1-9: 0
SUM OF ALL RESPONSES TO PHQ QUESTIONS 1-9: 0

## 2023-11-27 NOTE — PROGRESS NOTES
Chief Complaint   Patient presents with    Weight Management     Pt complains of constipation. And some nausea but overall its tolerable. Orders     Mammogram        Assessment/ Plan:   Delia Gallo was seen today for weight management and orders. Diagnoses and all orders for this visit:    Overweight (BMI 25.0-29. 9)   Doing very well with the [facility] JONNA. Has almost lost 40-lbs in total since beginning treatment. Encounter for screening mammogram for malignant neoplasm of breast  -     [facility] DIGITAL SCREEN W OR WO CAD BILATERAL; Future    Nausea  -     Comprehensive Metabolic Panel; Future  -     CBC; Future    I have discussed the diagnosis with the patient and the intended treatment plan as seen in the above orders. The patient has received an after-visit summary and questions were answered concerning future plans. Asked to return should symptoms worsen or not improve with treatment. Any pending labs and studies will be relayed to patient when they become available. Pt verbalizes understanding of plan of care and denies further questions or concerns at this time. Follow Up:  Return in about 3 months (around 2024), or if symptoms worsen or fail to improve, for weight management. Subjective:   Cheri Linton is a 60 y.o. female who presents for weight management follow up. She has been doing well on the [facility] JONNA. She has some nausea at the beginning - like today. She just took her medication last night. Tolerating well. She is in bereavement today. Her mother  in October. She was 92. She had a difficult first holiday without her mother. Nutrition -  Modified ADA, low carb. Less fat and sugars. Behavior modification - we discussed making healthy choices and healthy snacks. More fruits and vegetables and drinking at least 64 oz of water daily. Exercice Recommendations - Walking at least 20-30 minutes per day.     Weight loss - 13 lbs  Wt Readings from Last 3 Encounters:   23 83.2 kg (183

## 2023-11-28 LAB
ALBUMIN SERPL-MCNC: 4 G/DL (ref 3.5–5)
ALBUMIN/GLOB SERPL: 1.3 (ref 1.1–2.2)
ALP SERPL-CCNC: 101 U/L (ref 45–117)
ALT SERPL-CCNC: 14 U/L (ref 12–78)
ANION GAP SERPL CALC-SCNC: 3 MMOL/L (ref 5–15)
AST SERPL-CCNC: 12 U/L (ref 15–37)
BILIRUB SERPL-MCNC: 0.9 MG/DL (ref 0.2–1)
BUN SERPL-MCNC: 14 MG/DL (ref 6–20)
BUN/CREAT SERPL: 18 (ref 12–20)
CALCIUM SERPL-MCNC: 9.8 MG/DL (ref 8.5–10.1)
CHLORIDE SERPL-SCNC: 107 MMOL/L (ref 97–108)
CO2 SERPL-SCNC: 30 MMOL/L (ref 21–32)
CREAT SERPL-MCNC: 0.78 MG/DL (ref 0.55–1.02)
ERYTHROCYTE [DISTWIDTH] IN BLOOD BY AUTOMATED COUNT: 14 % (ref 11.5–14.5)
GLOBULIN SER CALC-MCNC: 3.1 G/DL (ref 2–4)
GLUCOSE SERPL-MCNC: 82 MG/DL (ref 65–100)
HCT VFR BLD AUTO: 41.7 % (ref 35–47)
HGB BLD-MCNC: 13.5 G/DL (ref 11.5–16)
MCH RBC QN AUTO: 28.7 PG (ref 26–34)
MCHC RBC AUTO-ENTMCNC: 32.4 G/DL (ref 30–36.5)
MCV RBC AUTO: 88.5 FL (ref 80–99)
NRBC # BLD: 0 K/UL (ref 0–0.01)
NRBC BLD-RTO: 0 PER 100 WBC
PLATELET # BLD AUTO: 284 K/UL (ref 150–400)
PMV BLD AUTO: 11.1 FL (ref 8.9–12.9)
POTASSIUM SERPL-SCNC: 5 MMOL/L (ref 3.5–5.1)
PROT SERPL-MCNC: 7.1 G/DL (ref 6.4–8.2)
RBC # BLD AUTO: 4.71 M/UL (ref 3.8–5.2)
SODIUM SERPL-SCNC: 140 MMOL/L (ref 136–145)
WBC # BLD AUTO: 8.1 K/UL (ref 3.6–11)

## 2024-01-01 RX ORDER — LOSARTAN POTASSIUM 100 MG/1
TABLET ORAL
Qty: 90 TABLET | Refills: 3 | Status: SHIPPED | OUTPATIENT
Start: 2024-01-01

## 2024-01-22 ENCOUNTER — HOSPITAL ENCOUNTER (OUTPATIENT)
Facility: HOSPITAL | Age: 59
Discharge: HOME | End: 2024-01-25
Attending: INTERNAL MEDICINE
Payer: COMMERCIAL

## 2024-01-22 VITALS — HEIGHT: 66 IN | WEIGHT: 172 LBS | BODY MASS INDEX: 27.64 KG/M2

## 2024-01-22 DIAGNOSIS — E66.01 MORBID (SEVERE) OBESITY DUE TO EXCESS CALORIES (HCC): ICD-10-CM

## 2024-01-22 DIAGNOSIS — Z12.31 ENCOUNTER FOR SCREENING MAMMOGRAM FOR MALIGNANT NEOPLASM OF BREAST: ICD-10-CM

## 2024-01-22 PROCEDURE — 77063 BREAST TOMOSYNTHESIS BI: CPT

## 2024-01-23 RX ORDER — SEMAGLUTIDE 2.4 MG/.75ML
INJECTION, SOLUTION SUBCUTANEOUS
Qty: 9 ML | Refills: 3 | Status: SHIPPED | OUTPATIENT
Start: 2024-01-23

## 2024-02-06 RX ORDER — AMLODIPINE BESYLATE 5 MG/1
TABLET ORAL
Qty: 90 TABLET | Refills: 3 | Status: SHIPPED | OUTPATIENT
Start: 2024-02-06

## 2024-02-15 ENCOUNTER — OFFICE VISIT (OUTPATIENT)
Age: 59
End: 2024-02-15
Payer: COMMERCIAL

## 2024-02-15 VITALS
SYSTOLIC BLOOD PRESSURE: 130 MMHG | TEMPERATURE: 98.6 F | OXYGEN SATURATION: 99 % | RESPIRATION RATE: 20 BRPM | DIASTOLIC BLOOD PRESSURE: 84 MMHG | HEART RATE: 83 BPM | HEIGHT: 66 IN | WEIGHT: 170 LBS | BODY MASS INDEX: 27.32 KG/M2

## 2024-02-15 DIAGNOSIS — E66.9 CLASS 2 OBESITY: ICD-10-CM

## 2024-02-15 DIAGNOSIS — I10 PRIMARY HYPERTENSION: Primary | ICD-10-CM

## 2024-02-15 DIAGNOSIS — Z76.89 ENCOUNTER FOR WEIGHT MANAGEMENT: ICD-10-CM

## 2024-02-15 PROCEDURE — 99213 OFFICE O/P EST LOW 20 MIN: CPT | Performed by: INTERNAL MEDICINE

## 2024-02-15 PROCEDURE — 3075F SYST BP GE 130 - 139MM HG: CPT | Performed by: INTERNAL MEDICINE

## 2024-02-15 PROCEDURE — 3079F DIAST BP 80-89 MM HG: CPT | Performed by: INTERNAL MEDICINE

## 2024-02-15 NOTE — PROGRESS NOTES
Identified pt with two pt identifiers(name and ).    Chief Complaint   Patient presents with    Weight Management     Wegovy follow up        Health Maintenance Due   Topic    Hepatitis B vaccine (1 of 3 - 3-dose series)    HIV screen     Shingles vaccine (3 of 3)    Flu vaccine (1)    COVID-19 Vaccine (2 -  season)       Wt Readings from Last 3 Encounters:   02/15/24 77.1 kg (170 lb)   24 78 kg (172 lb)   23 83.2 kg (183 lb 6.4 oz)     Temp Readings from Last 3 Encounters:   02/15/24 98.6 °F (37 °C) (Temporal)   23 96.9 °F (36.1 °C) (Temporal)   23 97.6 °F (36.4 °C) (Temporal)     BP Readings from Last 3 Encounters:   02/15/24 130/84   23 120/78   23 126/80     Pulse Readings from Last 3 Encounters:   02/15/24 83   23 (!) 106   23 82           Depression Screening:  :         2/15/2024     9:06 AM 2023     9:19 AM 2023     9:16 AM 2023     3:21 PM 2023     9:00 AM 2023     8:00 AM 2023    12:00 PM   PHQ-9 Questionaire   Little interest or pleasure in doing things 0 0 0 0 0 0 0   Feeling down, depressed, or hopeless 0 0 0 0 0 0 0   PHQ-9 Total Score 0 0 0 0 0 0 0        Fall Risk Assessment:  :          No data to display                 Abuse Screening:  :          No data to display                 Coordination of Care Questionnaire:  :     \"Have you been to the ER, urgent care clinic since your last visit?  Hospitalized since your last visit?\"    NO    “Have you seen or consulted any other health care providers outside of [facility] since your last visit?”    NO            
  Position: Sitting   Cuff Size: Large Adult   Pulse: 83   Resp: 20   Temp: 98.6 °F (37 °C)   TempSrc: Temporal   SpO2: 99%   Weight: 77.1 kg (170 lb)   Height: 1.676 m (5' 6\")       General: alert, cooperative, no distress   Mental  status: normal mood, behavior, speech, dress, motor activity, and thought processes, able to follow commands   HENT: NCAT   Neck: no visualized mass   Resp: no respiratory distress   Neuro: no gross deficits   Skin: no discoloration or lesions of concern on visible areas   Psychiatric: normal affect, consistent with stated mood, no evidence of hallucinations       Court Rollins MD  [facility]   2/15/24

## 2024-02-15 NOTE — PATIENT INSTRUCTIONS
Protein - 60 grams per day at least  Exercise and low weight bearing exercises are best.   Drinking plenty of water at least 74 oz per day.

## 2024-05-06 ENCOUNTER — HOSPITAL ENCOUNTER (OUTPATIENT)
Facility: HOSPITAL | Age: 59
Discharge: HOME | End: 2024-05-09
Attending: RADIOLOGY
Payer: COMMERCIAL

## 2024-05-06 DIAGNOSIS — I67.1 CEREBRAL ANEURYSM: ICD-10-CM

## 2024-05-06 LAB — CREAT BLD-MCNC: 0.7 MG/DL (ref 0.6–1.3)

## 2024-05-06 PROCEDURE — 6360000004 HC RX CONTRAST MEDICATION: Performed by: RADIOLOGY

## 2024-05-06 PROCEDURE — 82565 ASSAY OF CREATININE: CPT

## 2024-05-06 PROCEDURE — 70496 CT ANGIOGRAPHY HEAD: CPT

## 2024-05-06 RX ADMIN — IOPAMIDOL 100 ML: 755 INJECTION, SOLUTION INTRAVENOUS at 08:45

## 2024-05-15 ENCOUNTER — OFFICE VISIT (OUTPATIENT)
Age: 59
End: 2024-05-15
Payer: COMMERCIAL

## 2024-05-15 VITALS
TEMPERATURE: 97.5 F | OXYGEN SATURATION: 99 % | HEIGHT: 66 IN | WEIGHT: 165 LBS | DIASTOLIC BLOOD PRESSURE: 88 MMHG | SYSTOLIC BLOOD PRESSURE: 132 MMHG | BODY MASS INDEX: 26.52 KG/M2 | HEART RATE: 76 BPM

## 2024-05-15 DIAGNOSIS — I67.1 CEREBRAL ANEURYSM: Primary | ICD-10-CM

## 2024-05-15 PROCEDURE — 99213 OFFICE O/P EST LOW 20 MIN: CPT | Performed by: RADIOLOGY

## 2024-05-15 PROCEDURE — 3075F SYST BP GE 130 - 139MM HG: CPT | Performed by: RADIOLOGY

## 2024-05-15 PROCEDURE — 3079F DIAST BP 80-89 MM HG: CPT | Performed by: RADIOLOGY

## 2024-05-16 ENCOUNTER — OFFICE VISIT (OUTPATIENT)
Age: 59
End: 2024-05-16
Payer: COMMERCIAL

## 2024-05-16 ENCOUNTER — NURSE ONLY (OUTPATIENT)
Age: 59
End: 2024-05-16

## 2024-05-16 VITALS
SYSTOLIC BLOOD PRESSURE: 120 MMHG | DIASTOLIC BLOOD PRESSURE: 78 MMHG | OXYGEN SATURATION: 98 % | BODY MASS INDEX: 26.42 KG/M2 | RESPIRATION RATE: 16 BRPM | WEIGHT: 164.4 LBS | TEMPERATURE: 96.9 F | HEART RATE: 85 BPM | HEIGHT: 66 IN

## 2024-05-16 DIAGNOSIS — E55.9 VITAMIN D DEFICIENCY, UNSPECIFIED: ICD-10-CM

## 2024-05-16 DIAGNOSIS — E78.2 MIXED HYPERLIPIDEMIA: ICD-10-CM

## 2024-05-16 DIAGNOSIS — E66.9 CLASS 2 OBESITY: ICD-10-CM

## 2024-05-16 DIAGNOSIS — I10 PRIMARY HYPERTENSION: Primary | ICD-10-CM

## 2024-05-16 PROCEDURE — 3074F SYST BP LT 130 MM HG: CPT | Performed by: INTERNAL MEDICINE

## 2024-05-16 PROCEDURE — 3078F DIAST BP <80 MM HG: CPT | Performed by: INTERNAL MEDICINE

## 2024-05-16 PROCEDURE — 99213 OFFICE O/P EST LOW 20 MIN: CPT | Performed by: INTERNAL MEDICINE

## 2024-05-16 ASSESSMENT — PATIENT HEALTH QUESTIONNAIRE - PHQ9
SUM OF ALL RESPONSES TO PHQ QUESTIONS 1-9: 0
SUM OF ALL RESPONSES TO PHQ9 QUESTIONS 1 & 2: 0
1. LITTLE INTEREST OR PLEASURE IN DOING THINGS: NOT AT ALL
SUM OF ALL RESPONSES TO PHQ QUESTIONS 1-9: 0
2. FEELING DOWN, DEPRESSED OR HOPELESS: NOT AT ALL

## 2024-05-16 ASSESSMENT — ENCOUNTER SYMPTOMS
ALLERGIC/IMMUNOLOGIC NEGATIVE: 1
GASTROINTESTINAL NEGATIVE: 1
RESPIRATORY NEGATIVE: 1
EYES NEGATIVE: 1

## 2024-05-16 NOTE — PROGRESS NOTES
Chief Complaint   Patient presents with    Weight Management     3 month follow up  Patient states she feels medication is not as effective as before     Assessment/ Plan:   Marcella was seen today for weight management.    Diagnoses and all orders for this visit:  1. Primary hypertension   Well controlled.    Continue with current regimen.   2. Class 2 obesity   She is approaching goal with the Wegovy and doing very well.   3. Vitamin D deficiency, unspecified  -     Vitamin D 25 Hydroxy; Future  4. Mixed hyperlipidemia  -     CBC with Auto Differential; Future  -     Comprehensive Metabolic Panel; Future  -     Lipid Panel; Future     I have discussed the diagnosis with the patient and the intended treatment plan as seen in the above orders. The patient has received an after-visit summary and questions were answered concerning future plans. Asked to return should symptoms worsen or not improve with treatment. Any pending labs and studies will be relayed to patient when they become available.     Pt verbalizes understanding of plan of care and denies further questions or concerns at this time.     Follow Up:  Return in about 3 months (around 8/16/2024), or if symptoms worsen or fail to improve, for weight management.    Subjective:   Marcella Shaikh is a 58 y.o. female who presents for follow up of BP and weight management.   Nutrition - Well balanced. Low fat, higher protein, lower carbohydrates. Exercising.    Behavior modification - we discussed making healthy choices and healthy snacks. More fruits and vegetables and drinking at least 64 oz of water daily.   Exercice Recommendations - Walking at least 20-30 minutes per day.    Weight loss -   Wt Readings from Last 3 Encounters:   05/16/24 74.6 kg (164 lb 6.4 oz)   05/15/24 74.8 kg (165 lb)   02/15/24 77.1 kg (170 lb)        Medications for weight loss - [place]vy 2.4 lbs  BMI: There is no height or weight on file to calculate BMI.    Of note she was seen by neurology

## 2024-05-16 NOTE — PROGRESS NOTES
Identified pt with two pt identifiers(name and ).    Chief Complaint   Patient presents with    Weight Management     3 month follow up  Patient states she feels medication is not as effective as before  No other concerns at this time        Health Maintenance Due   Topic    Hepatitis B vaccine (1 of 3 - 3-dose series)    Shingles vaccine (3 of 3)    COVID-19 Vaccine ( season)    Cervical cancer screen        Wt Readings from Last 3 Encounters:   24 74.6 kg (164 lb 6.4 oz)   05/15/24 74.8 kg (165 lb)   02/15/24 77.1 kg (170 lb)     Temp Readings from Last 3 Encounters:   24 96.9 °F (36.1 °C) (Temporal)   05/15/24 97.5 °F (36.4 °C) (Temporal)   02/15/24 98.6 °F (37 °C) (Temporal)     BP Readings from Last 3 Encounters:   24 (!) 134/92   05/15/24 132/88   02/15/24 130/84     Pulse Readings from Last 3 Encounters:   24 85   05/15/24 76   02/15/24 83           Depression Screening:  :         2024     9:09 AM 2/15/2024     9:06 AM 2023     9:19 AM 2023     9:16 AM 2023     3:21 PM 2023     9:00 AM 2023     8:00 AM   PHQ-9 Questionaire   Little interest or pleasure in doing things 0 0 0 0 0 0 0   Feeling down, depressed, or hopeless 0 0 0 0 0 0 0   PHQ-9 Total Score 0 0 0 0 0 0 0        Fall Risk Assessment:  :          No data to display                 Abuse Screening:  :          No data to display                 Coordination of Care Questionnaire:  :     \"Have you been to the ER, urgent care clinic since your last visit?  Hospitalized since your last visit?\"    NO    “Have you seen or consulted any other health care providers outside of [facility] since your last visit?”    NO

## 2024-05-17 ENCOUNTER — TELEPHONE (OUTPATIENT)
Age: 59
End: 2024-05-17

## 2024-05-17 DIAGNOSIS — E87.5 HYPERKALEMIA: Primary | ICD-10-CM

## 2024-05-17 LAB
25(OH)D3 SERPL-MCNC: 61.7 NG/ML (ref 30–100)
ALBUMIN SERPL-MCNC: 3.9 G/DL (ref 3.5–5)
ALBUMIN/GLOB SERPL: 1.2 (ref 1.1–2.2)
ALP SERPL-CCNC: 94 U/L (ref 45–117)
ALT SERPL-CCNC: 17 U/L (ref 12–78)
ANION GAP SERPL CALC-SCNC: 2 MMOL/L (ref 5–15)
AST SERPL-CCNC: 11 U/L (ref 15–37)
BASOPHILS # BLD: 0 K/UL (ref 0–0.1)
BASOPHILS NFR BLD: 1 % (ref 0–1)
BILIRUB SERPL-MCNC: 1 MG/DL (ref 0.2–1)
BUN SERPL-MCNC: 15 MG/DL (ref 6–20)
BUN/CREAT SERPL: 19 (ref 12–20)
CALCIUM SERPL-MCNC: 9.9 MG/DL (ref 8.5–10.1)
CHLORIDE SERPL-SCNC: 107 MMOL/L (ref 97–108)
CHOLEST SERPL-MCNC: 189 MG/DL
CO2 SERPL-SCNC: 31 MMOL/L (ref 21–32)
CREAT SERPL-MCNC: 0.8 MG/DL (ref 0.55–1.02)
DIFFERENTIAL METHOD BLD: NORMAL
EOSINOPHIL # BLD: 0.1 K/UL (ref 0–0.4)
EOSINOPHIL NFR BLD: 1 % (ref 0–7)
ERYTHROCYTE [DISTWIDTH] IN BLOOD BY AUTOMATED COUNT: 14.1 % (ref 11.5–14.5)
GLOBULIN SER CALC-MCNC: 3.3 G/DL (ref 2–4)
GLUCOSE SERPL-MCNC: 88 MG/DL (ref 65–100)
HCT VFR BLD AUTO: 43.8 % (ref 35–47)
HDLC SERPL-MCNC: 84 MG/DL
HDLC SERPL: 2.3 (ref 0–5)
HGB BLD-MCNC: 13.8 G/DL (ref 11.5–16)
IMM GRANULOCYTES # BLD AUTO: 0 K/UL (ref 0–0.04)
IMM GRANULOCYTES NFR BLD AUTO: 0 % (ref 0–0.5)
LDLC SERPL CALC-MCNC: 94 MG/DL (ref 0–100)
LYMPHOCYTES # BLD: 1.9 K/UL (ref 0.8–3.5)
LYMPHOCYTES NFR BLD: 37 % (ref 12–49)
MCH RBC QN AUTO: 28.5 PG (ref 26–34)
MCHC RBC AUTO-ENTMCNC: 31.5 G/DL (ref 30–36.5)
MCV RBC AUTO: 90.3 FL (ref 80–99)
MONOCYTES # BLD: 0.4 K/UL (ref 0–1)
MONOCYTES NFR BLD: 8 % (ref 5–13)
NEUTS SEG # BLD: 2.7 K/UL (ref 1.8–8)
NEUTS SEG NFR BLD: 53 % (ref 32–75)
NRBC # BLD: 0 K/UL (ref 0–0.01)
NRBC BLD-RTO: 0 PER 100 WBC
PLATELET # BLD AUTO: 279 K/UL (ref 150–400)
PMV BLD AUTO: 11.2 FL (ref 8.9–12.9)
POTASSIUM SERPL-SCNC: 5.2 MMOL/L (ref 3.5–5.1)
PROT SERPL-MCNC: 7.2 G/DL (ref 6.4–8.2)
RBC # BLD AUTO: 4.85 M/UL (ref 3.8–5.2)
SODIUM SERPL-SCNC: 140 MMOL/L (ref 136–145)
TRIGL SERPL-MCNC: 55 MG/DL
VLDLC SERPL CALC-MCNC: 11 MG/DL
WBC # BLD AUTO: 5.1 K/UL (ref 3.6–11)

## 2024-05-17 NOTE — TELEPHONE ENCOUNTER
----- Message from Court Rollins MD sent at 5/17/2024  1:47 AM EDT -----  Please let patient know that her labs are generally stable. Her potassium was just slightly elevated at 5.2. If she is taking extra potassium supplements she should stop. I would like to repeat the potassium levels in 3-4 weeks. Orders have been placed. Thanks!

## 2024-08-12 ENCOUNTER — TELEPHONE (OUTPATIENT)
Age: 59
End: 2024-08-12

## 2024-08-14 SDOH — ECONOMIC STABILITY: FOOD INSECURITY: WITHIN THE PAST 12 MONTHS, THE FOOD YOU BOUGHT JUST DIDN'T LAST AND YOU DIDN'T HAVE MONEY TO GET MORE.: PATIENT DECLINED

## 2024-08-14 SDOH — ECONOMIC STABILITY: FOOD INSECURITY: WITHIN THE PAST 12 MONTHS, YOU WORRIED THAT YOUR FOOD WOULD RUN OUT BEFORE YOU GOT MONEY TO BUY MORE.: PATIENT DECLINED

## 2024-08-14 SDOH — ECONOMIC STABILITY: INCOME INSECURITY: HOW HARD IS IT FOR YOU TO PAY FOR THE VERY BASICS LIKE FOOD, HOUSING, MEDICAL CARE, AND HEATING?: PATIENT DECLINED

## 2024-08-15 ENCOUNTER — LAB (OUTPATIENT)
Age: 59
End: 2024-08-15

## 2024-08-15 ENCOUNTER — OFFICE VISIT (OUTPATIENT)
Age: 59
End: 2024-08-15
Payer: COMMERCIAL

## 2024-08-15 VITALS
HEIGHT: 66 IN | SYSTOLIC BLOOD PRESSURE: 112 MMHG | WEIGHT: 162 LBS | BODY MASS INDEX: 26.03 KG/M2 | RESPIRATION RATE: 20 BRPM | DIASTOLIC BLOOD PRESSURE: 88 MMHG | HEART RATE: 73 BPM | TEMPERATURE: 97.9 F | OXYGEN SATURATION: 96 %

## 2024-08-15 DIAGNOSIS — E87.5 HYPERKALEMIA: ICD-10-CM

## 2024-08-15 DIAGNOSIS — I10 PRIMARY HYPERTENSION: Primary | ICD-10-CM

## 2024-08-15 DIAGNOSIS — E66.9 CLASS 2 OBESITY: ICD-10-CM

## 2024-08-15 PROCEDURE — 3074F SYST BP LT 130 MM HG: CPT | Performed by: INTERNAL MEDICINE

## 2024-08-15 PROCEDURE — 3079F DIAST BP 80-89 MM HG: CPT | Performed by: INTERNAL MEDICINE

## 2024-08-15 PROCEDURE — 99213 OFFICE O/P EST LOW 20 MIN: CPT | Performed by: INTERNAL MEDICINE

## 2024-08-15 ASSESSMENT — PATIENT HEALTH QUESTIONNAIRE - PHQ9
SUM OF ALL RESPONSES TO PHQ QUESTIONS 1-9: 0
SUM OF ALL RESPONSES TO PHQ QUESTIONS 1-9: 0
2. FEELING DOWN, DEPRESSED OR HOPELESS: NOT AT ALL
SUM OF ALL RESPONSES TO PHQ QUESTIONS 1-9: 0
SUM OF ALL RESPONSES TO PHQ QUESTIONS 1-9: 0
1. LITTLE INTEREST OR PLEASURE IN DOING THINGS: NOT AT ALL
SUM OF ALL RESPONSES TO PHQ9 QUESTIONS 1 & 2: 0

## 2024-08-15 NOTE — PATIENT INSTRUCTIONS
Weight loss is going very well!   Some tips  Try to get about 60 G of protein per day.   Try to increase activity to about 150 minutes per week.   Low weight bearing exercises will also help with the metabolism.   Continue to eat a low carbohydrate diet. Consider the Mediterranean lifestyle.     Adopt a Mediterranean-style lifestyle:  Lots of fresh, locally-grown fruits and vegetables (aim for 7 or more servings a day).  Complex carbohydrates like whole grains, nuts, beans and bread (with at least 4 grams of fiber per serving). Avoid “the whites” - white flour, sugar, white pasta, white rice.  Minimally processed foods (5 or fewer listed ingredients on the label).   Olive oil as the primary source of fat.  Low to moderate amounts of dairy, fish and poultry.  Red meat rarely (grass-fed, organic when you do eat it).  Low amounts of wine with meals (optional).  Unhurried meals with loved ones.  Daily exercise (30 - 60 minutes).    Resources:   http://www.webmd.com/diet/features/the-mediterranean-diet   http://www.[facility].Feathr/health/mediterranean-diet/SF26365     Cookbooks:   Mediterranean [place] by Jaquelin Purdy   The Best of Recipes for Health by Jaquelin Purdy   The Sprouted Kitchen by Saima Prater   The Food You Crave by Missy Mesa   So Easy by Missy Mesa     Please limit the amount of sodium (salt) in your diet. You should be getting no more than 2300 mg of sodium/salt per day. Remember, if you eat anything that is prepared or comes out of a box, a bag or a can, it has salt in it! Please read labels!

## 2024-08-15 NOTE — PROGRESS NOTES
Identified pt with two pt identifiers(name and ).    Chief Complaint   Patient presents with    Weight Management     3 month follow up for weight management on [employer] Maintenance Due   Topic    Hepatitis B vaccine (1 of 3 - 19+ 3-dose series)    Shingles vaccine (3 of 3)    COVID-19 Vaccine (2 -  season)    Cervical cancer screen     Flu vaccine (1)       Wt Readings from Last 3 Encounters:   08/15/24 73.5 kg (162 lb)   24 74.6 kg (164 lb 6.4 oz)   05/15/24 74.8 kg (165 lb)     Temp Readings from Last 3 Encounters:   08/15/24 97.9 °F (36.6 °C) (Temporal)   24 96.9 °F (36.1 °C) (Temporal)   05/15/24 97.5 °F (36.4 °C) (Temporal)     BP Readings from Last 3 Encounters:   08/15/24 112/88   24 120/78   05/15/24 132/88     Pulse Readings from Last 3 Encounters:   08/15/24 73   24 85   05/15/24 76           Depression Screening:  :         8/15/2024    10:48 AM 2024     9:09 AM 2/15/2024     9:06 AM 2023     9:19 AM 2023     9:16 AM 2023     3:21 PM 2023     9:00 AM   PHQ-9 Questionaire   Little interest or pleasure in doing things 0 0 0 0 0 0 0   Feeling down, depressed, or hopeless 0 0 0 0 0 0 0   PHQ-9 Total Score 0 0 0 0 0 0 0        Fall Risk Assessment:  :          No data to display                 Abuse Screening:  :          No data to display                 Coordination of Care Questionnaire:  :     \"Have you been to the ER, urgent care clinic since your last visit?  Hospitalized since your last visit?\"    NO    “Have you seen or consulted any other health care providers outside of [facility] since your last visit?”    NO     “Have you had a pap smear?”    NO    Mckayla Naidu completed     Date of last Cervical Cancer screen (HPV or PAP): 2021             Click Here for Release of Records Request

## 2024-08-15 NOTE — PROGRESS NOTES
Chief Complaint   Patient presents with    Weight Management     3 month follow up for weight management on Wegovy        Assessment/ Plan:   Marcella was seen today for weight management.    Diagnoses and all orders for this visit:    Primary hypertension  Well controlled on current medications. No change in management at this time.     Class 2 obesity  Weight loss is going very well!   Some tips  Try to get about 60 G of protein per day.   Try to increase activity to about 150 minutes per week.   Low weight bearing exercises will also help with the metabolism.   Continue to eat a low carbohydrate diet. Consider the Mediterranean lifestyle.     I have discussed the diagnosis with the patient and the intended treatment plan as seen in the above orders. The patient has received an after-visit summary and questions were answered concerning future plans. Asked to return should symptoms worsen or not improve with treatment. Any pending labs and studies will be relayed to patient when they become available.     Pt verbalizes understanding of plan of care and denies further questions or concerns at this time.     Follow Up:  Return in about 3 months (around 11/15/2024), or if symptoms worsen or fail to improve, for weight management.    Subjective:   Marcella Shaikh is a 58 y.o. female who presents today for weight management.     Nutrition -  well balanced and monitoring diet carefully.   Behavior modification - we discussed making healthy choices and healthy snacks. More fruits and vegetables and drinking at least 64 oz of water daily.   Exercice Recommendations - Walking at least 20-30 minutes per day.    Weight loss - 2-lbs  Wt Readings from Last 3 Encounters:   08/15/24 73.5 kg (162 lb)   05/16/24 74.6 kg (164 lb 6.4 oz)   05/15/24 74.8 kg (165 lb)        Medications for weight loss  - Wegovy 2 mgs weekly.   BMI: Body mass index is 26.15 kg/m².    HISTORICAL  PMH, PSH, FHX, SOCHX, ALLERGIES and MES were reviewed and

## 2024-08-16 LAB
ANION GAP SERPL CALC-SCNC: 2 MMOL/L (ref 5–15)
BUN SERPL-MCNC: 13 MG/DL (ref 6–20)
BUN/CREAT SERPL: 18 (ref 12–20)
CALCIUM SERPL-MCNC: 9.9 MG/DL (ref 8.5–10.1)
CHLORIDE SERPL-SCNC: 106 MMOL/L (ref 97–108)
CO2 SERPL-SCNC: 30 MMOL/L (ref 21–32)
CREAT SERPL-MCNC: 0.71 MG/DL (ref 0.55–1.02)
GLUCOSE SERPL-MCNC: 87 MG/DL (ref 65–100)
POTASSIUM SERPL-SCNC: 4.8 MMOL/L (ref 3.5–5.1)
SODIUM SERPL-SCNC: 138 MMOL/L (ref 136–145)

## 2024-08-16 ASSESSMENT — ENCOUNTER SYMPTOMS
EYES NEGATIVE: 1
ALLERGIC/IMMUNOLOGIC NEGATIVE: 1
RESPIRATORY NEGATIVE: 1
GASTROINTESTINAL NEGATIVE: 1

## 2024-10-03 NOTE — ROUTINE PROCESS
Pt arrives ambulatory to angio department accompanied by  for cerebral diagnostic procedure. All assessments completed and consent was reviewed. Education given was regarding procedure, moderate sedation, post-procedure care and  management/follow-up. Opportunity for questions was provided and all questions and concerns were addressed.
Pt discharge instructions were given to pt and pt's  by  RN. Opportunities for questions and concerns were provided. Pt verbalized understanding of follow-up, site checks, signs and symptoms of infection, holding pressure, and resuming activity. RN assisted pt with getting dressed. R wrist wound is clean, dry, and intact. IV dc'd, pt wheeled off unit in no signs of distress to pt discharge area. Pt ambulated to private vehicle with steady gait noted.
No

## 2024-10-03 NOTE — PROGRESS NOTES
Neurointerventional Surgery Clinic Note        Patient: Marcella Shaikh MRN: [id number]  SSN: [id number]    YOB: 1965  Age: 59 y.o.  Sex: female      Subjective:      Marcella Shaikh is a 59 y.o. 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  Patient presents to the clinic with a follow-up MRA. .    Past Medical History:   Diagnosis Date    Anxiety     Cerebral aneurysm     Right and left supraclinoid ICA aneurysms    Chest pain     Fainting     Headache     migraines    Hiatal hernia     Hypertension     Joint pain     Menopause     early rima at age 40    Ringing in ears     Skipped beats     Trouble in sleeping     Vertigo     Vertigo     Vitamin D deficiency      Past Surgical History:   Procedure Laterality Date    APPENDECTOMY  2004    BRAIN SURGERY  2020    2nd one 2020     SECTION      ORTHOPEDIC SURGERY      Right ankle x 2    VASCULAR SURGERY  2020    Treatment of 6 mm right supraclinoid ICA aneurysm      Family History   Problem Relation Age of Onset    Hypertension Mother     High Blood Pressure Mother     Hypertension Father     High Blood Pressure Father     High Cholesterol Father     Headache Other      Social History     Tobacco Use    Smoking status: Never    Smokeless tobacco: Never   Substance Use Topics    Alcohol use: Yes     Comment: Socially      Current Outpatient Medications   Medication Sig Dispense Refill    Probiotic Product (ACIDOPHILUS/GOAT MILK PO) Take 25 mg by mouth daily      amLODIPine (NORVASC) 5 MG tablet TAKE 1 TABLET EVERY MORNING 90 tablet 3    WEGOVY 2.4 MG/0.75ML SOAJ SC injection INJECT 2.4 MG UNDER THE SKIN EVERY 7 DAYS 9 mL 3    losartan (COZAAR) 100 MG tablet TAKE 1 TABLET IN THE MORNING 90 tablet 3    zinc 50 MG TABS tablet Take 1 tablet by mouth daily      Cholecalciferol (VITAMIN D3) 50 MCG (2000) TABS Take 50

## 2024-11-16 LAB — PAP SMEAR, EXTERNAL: NORMAL

## 2024-11-18 ENCOUNTER — HOSPITAL ENCOUNTER (OUTPATIENT)
Facility: HOSPITAL | Age: 59
Discharge: HOME | End: 2024-11-21
Payer: COMMERCIAL

## 2024-11-18 ENCOUNTER — OFFICE VISIT (OUTPATIENT)
Age: 59
End: 2024-11-18
Payer: COMMERCIAL

## 2024-11-18 ENCOUNTER — LAB (OUTPATIENT)
Age: 59
End: 2024-11-18

## 2024-11-18 VITALS
HEIGHT: 66 IN | HEART RATE: 90 BPM | RESPIRATION RATE: 16 BRPM | WEIGHT: 158.4 LBS | TEMPERATURE: 97.8 F | OXYGEN SATURATION: 98 % | SYSTOLIC BLOOD PRESSURE: 115 MMHG | BODY MASS INDEX: 25.46 KG/M2 | DIASTOLIC BLOOD PRESSURE: 70 MMHG

## 2024-11-18 DIAGNOSIS — E55.9 VITAMIN D DEFICIENCY: ICD-10-CM

## 2024-11-18 DIAGNOSIS — Z76.89 ENCOUNTER FOR WEIGHT MANAGEMENT: ICD-10-CM

## 2024-11-18 DIAGNOSIS — R53.81 MALAISE AND FATIGUE: ICD-10-CM

## 2024-11-18 DIAGNOSIS — K59.03 DRUG-INDUCED CONSTIPATION: ICD-10-CM

## 2024-11-18 DIAGNOSIS — R10.84 GENERALIZED ABDOMINAL PAIN: ICD-10-CM

## 2024-11-18 DIAGNOSIS — R53.83 MALAISE AND FATIGUE: ICD-10-CM

## 2024-11-18 DIAGNOSIS — K59.03 DRUG-INDUCED CONSTIPATION: Primary | ICD-10-CM

## 2024-11-18 DIAGNOSIS — E66.3 OVERWEIGHT (BMI 25.0-29.9): ICD-10-CM

## 2024-11-18 LAB
25(OH)D3 SERPL-MCNC: 50.3 NG/ML (ref 30–100)
ALBUMIN SERPL-MCNC: 3.6 G/DL (ref 3.5–5)
ALBUMIN/GLOB SERPL: 1.1 (ref 1.1–2.2)
ALP SERPL-CCNC: 92 U/L (ref 45–117)
ALT SERPL-CCNC: 17 U/L (ref 12–78)
ANION GAP SERPL CALC-SCNC: 8 MMOL/L (ref 2–12)
AST SERPL-CCNC: 8 U/L (ref 15–37)
BASOPHILS # BLD: 0.1 K/UL (ref 0–0.1)
BASOPHILS NFR BLD: 1 % (ref 0–1)
BILIRUB SERPL-MCNC: 0.9 MG/DL (ref 0.2–1)
BUN SERPL-MCNC: 10 MG/DL (ref 6–20)
BUN/CREAT SERPL: 13 (ref 12–20)
CALCIUM SERPL-MCNC: 9.5 MG/DL (ref 8.5–10.1)
CHLORIDE SERPL-SCNC: 104 MMOL/L (ref 97–108)
CO2 SERPL-SCNC: 29 MMOL/L (ref 21–32)
CREAT SERPL-MCNC: 0.75 MG/DL (ref 0.55–1.02)
DIFFERENTIAL METHOD BLD: ABNORMAL
EOSINOPHIL # BLD: 0.6 K/UL (ref 0–0.4)
EOSINOPHIL NFR BLD: 9 % (ref 0–7)
ERYTHROCYTE [DISTWIDTH] IN BLOOD BY AUTOMATED COUNT: 13.4 % (ref 11.5–14.5)
GLOBULIN SER CALC-MCNC: 3.2 G/DL (ref 2–4)
GLUCOSE SERPL-MCNC: 111 MG/DL (ref 65–100)
HCT VFR BLD AUTO: 40.8 % (ref 35–47)
HGB BLD-MCNC: 13.1 G/DL (ref 11.5–16)
IMM GRANULOCYTES # BLD AUTO: 0 K/UL (ref 0–0.04)
IMM GRANULOCYTES NFR BLD AUTO: 0 % (ref 0–0.5)
LIPASE SERPL-CCNC: 48 U/L (ref 13–75)
LYMPHOCYTES # BLD: 1.8 K/UL (ref 0.8–3.5)
LYMPHOCYTES NFR BLD: 27 % (ref 12–49)
MCH RBC QN AUTO: 29.3 PG (ref 26–34)
MCHC RBC AUTO-ENTMCNC: 32.1 G/DL (ref 30–36.5)
MCV RBC AUTO: 91.3 FL (ref 80–99)
MONOCYTES # BLD: 0.5 K/UL (ref 0–1)
MONOCYTES NFR BLD: 7 % (ref 5–13)
NEUTS SEG # BLD: 3.8 K/UL (ref 1.8–8)
NEUTS SEG NFR BLD: 56 % (ref 32–75)
NRBC # BLD: 0 K/UL (ref 0–0.01)
NRBC BLD-RTO: 0 PER 100 WBC
PLATELET # BLD AUTO: 267 K/UL (ref 150–400)
PMV BLD AUTO: 10.2 FL (ref 8.9–12.9)
POTASSIUM SERPL-SCNC: 3.8 MMOL/L (ref 3.5–5.1)
PROT SERPL-MCNC: 6.8 G/DL (ref 6.4–8.2)
RBC # BLD AUTO: 4.47 M/UL (ref 3.8–5.2)
SODIUM SERPL-SCNC: 141 MMOL/L (ref 136–145)
T4 FREE SERPL-MCNC: 1 NG/DL (ref 0.8–1.5)
TSH SERPL DL<=0.05 MIU/L-ACNC: 1.66 UIU/ML (ref 0.36–3.74)
WBC # BLD AUTO: 6.7 K/UL (ref 3.6–11)

## 2024-11-18 PROCEDURE — 3078F DIAST BP <80 MM HG: CPT | Performed by: INTERNAL MEDICINE

## 2024-11-18 PROCEDURE — 99214 OFFICE O/P EST MOD 30 MIN: CPT | Performed by: INTERNAL MEDICINE

## 2024-11-18 PROCEDURE — 3074F SYST BP LT 130 MM HG: CPT | Performed by: INTERNAL MEDICINE

## 2024-11-18 PROCEDURE — 74018 RADEX ABDOMEN 1 VIEW: CPT

## 2024-11-18 NOTE — PATIENT INSTRUCTIONS
We will stop the Wegovy for now.   Reassess in the new year  Send for labs and also KUB (xray of the stomach to evaluate for constipation)

## 2024-11-18 NOTE — PROGRESS NOTES
Chief Complaint   Patient presents with    Follow-up     /70   Pulse 90   Temp 97.8 °F (36.6 °C)   Resp 16   Ht 1.676 m (5' 6\")   Wt 71.8 kg (158 lb 6.4 oz)   SpO2 98%   BMI 25.57 kg/m²   \"Have you been to the ER, urgent care clinic since your last visit?  Hospitalized since your last visit?\"    NO    “Have you seen or consulted any other health care providers outside our system since your last visit?”    NO     “Have you had a pap smear?”    NO    Date of last Cervical Cancer screen (HPV or PAP): 8/24/2023              
Refill: Capillary refill takes less than 2 seconds.   Neurological:      General: No focal deficit present.      Mental Status: She is alert.   Psychiatric:         Mood and Affect: Mood normal.         Behavior: Behavior normal.         Thought Content: Thought content normal.         Judgment: Judgment normal.     No results found for any visits on 11/18/24.    Court Rollins MD  [facility]   11/18/24 10:01 AM

## 2024-11-19 ASSESSMENT — ENCOUNTER SYMPTOMS
ABDOMINAL PAIN: 1
CONSTIPATION: 1
ABDOMINAL DISTENTION: 1

## 2024-11-20 LAB — MAGNESIUM: 2.3 MG/DL (ref 1.6–2.3)

## 2024-12-18 ENCOUNTER — OFFICE VISIT (OUTPATIENT)
Age: 59
End: 2024-12-18
Payer: COMMERCIAL

## 2024-12-18 VITALS
DIASTOLIC BLOOD PRESSURE: 64 MMHG | RESPIRATION RATE: 16 BRPM | OXYGEN SATURATION: 97 % | BODY MASS INDEX: 26.52 KG/M2 | WEIGHT: 165 LBS | TEMPERATURE: 97.7 F | HEART RATE: 84 BPM | HEIGHT: 66 IN | SYSTOLIC BLOOD PRESSURE: 104 MMHG

## 2024-12-18 DIAGNOSIS — R10.84 GENERALIZED ABDOMINAL PAIN: Primary | ICD-10-CM

## 2024-12-18 DIAGNOSIS — E66.3 OVERWEIGHT (BMI 25.0-29.9): ICD-10-CM

## 2024-12-18 DIAGNOSIS — Z76.89 ENCOUNTER FOR WEIGHT MANAGEMENT: ICD-10-CM

## 2024-12-18 PROCEDURE — 3074F SYST BP LT 130 MM HG: CPT | Performed by: INTERNAL MEDICINE

## 2024-12-18 PROCEDURE — 3078F DIAST BP <80 MM HG: CPT | Performed by: INTERNAL MEDICINE

## 2024-12-18 PROCEDURE — 99213 OFFICE O/P EST LOW 20 MIN: CPT | Performed by: INTERNAL MEDICINE

## 2024-12-18 ASSESSMENT — PATIENT HEALTH QUESTIONNAIRE - PHQ9
SUM OF ALL RESPONSES TO PHQ QUESTIONS 1-9: 0
SUM OF ALL RESPONSES TO PHQ9 QUESTIONS 1 & 2: 0
2. FEELING DOWN, DEPRESSED OR HOPELESS: NOT AT ALL
1. LITTLE INTEREST OR PLEASURE IN DOING THINGS: NOT AT ALL
SUM OF ALL RESPONSES TO PHQ QUESTIONS 1-9: 0

## 2024-12-18 NOTE — PROGRESS NOTES
[facility]   Follow Up Progress Note  Patient: Marcella Shaikh  1965, 59 y.o., female  Encounter Date: 12/18/24    CHIEF COMPLAINT:  Chief Complaint   Patient presents with    Follow-up     Would like to discuss amlodipine, if she still needs to take this.       Weight Management     ASSESSMENT & PLAN:    ICD-10-CM    1. Generalized abdominal pain  R10.84 Since stopping the Wegovy, this has improved. Her constipation is beginning to also improve.       2. Encounter for weight management  Z76.89 [facility]      3. Overweight (BMI 25.0-29.9)  E66.3 [facility] Mullins [facility]        Her BP has been lower than usual and so, we will stop the Amlodipine - discussed that this is a CCB, so perhaps this may also help with the constipation - which is improving.   Suspect that the Wegovy side effects - gastroparesis and constipation - disqualify continued use.   Additionally, she is worried about regaining the weight. We discussed referral to weight loss clinic for further evaluation and plan for weight management - in addition to lifestyle and dietary changes.      I have discussed the diagnosis with the patient and the intended treatment plan as seen in the above orders. The patient has received an after-visit summary and questions were answered concerning future plans. Asked to return should symptoms worsen or not improve with treatment. Any pending labs and studies will be relayed to patient when they become available.     Pt verbalizes understanding of plan of care and denies further questions or concerns at this time    Return if symptoms worsen or fail to improve.    SUBJECTIVE  Marcella Shaikh is a 59 y.o. female presenting today for follow up of:  weight management.   She has been off the Wegovy now and her gut seems to be going in the right direction. No pain any more. She cannot take Wegovy due to gastroparesis that developed fairly

## 2024-12-18 NOTE — PATIENT INSTRUCTIONS
For blood pressure management   STOP the Amlodipine at this time.   If still having symptoms, decrease Losartan 50 mgs or 1/2 tablet.   Continue to check BP at home in the morning about 2-hours after waking up.   Bring or send me the Log of your blood pressures for the next 2-4 weeks.

## 2024-12-18 NOTE — PROGRESS NOTES
Identified pt with two pt identifiers(name and )    Chief Complaint   Patient presents with    Follow-up     Would like to discuss amlodipine, if she still needs to take this.       Weight Management        Health Maintenance Due   Topic    Shingles vaccine (3 of 3)    Cervical cancer screen     Flu vaccine (1)    COVID-19 Vaccine (2 -  season)       Wt Readings from Last 3 Encounters:   24 74.8 kg (165 lb)   24 71.8 kg (158 lb 6.4 oz)   08/15/24 73.5 kg (162 lb)     Temp Readings from Last 3 Encounters:   24 97.7 °F (36.5 °C) (Temporal)   24 97.8 °F (36.6 °C)   08/15/24 97.9 °F (36.6 °C) (Temporal)     BP Readings from Last 3 Encounters:   24 104/64   24 115/70   08/15/24 112/88     Pulse Readings from Last 3 Encounters:   24 84   24 90   08/15/24 73           Depression Screening:  :         2024     4:01 PM 8/15/2024    10:48 AM 2024     9:09 AM 2/15/2024     9:06 AM 2023     9:19 AM 2023     9:16 AM 2023     3:21 PM   PHQ-9 Questionaire   Little interest or pleasure in doing things 0 0 0 0 0 0 0   Feeling down, depressed, or hopeless 0 0 0 0 0 0 0   PHQ-9 Total Score 0 0 0 0 0 0 0        Fall Risk Assessment:  :          No data to display                 Abuse Screening:  :          No data to display                 Coordination of Care Questionnaire:  :     \"Have you been to the ER, urgent care clinic since your last visit?  Hospitalized since your last visit?\"    NO    “Have you seen or consulted any other health care providers outside our system since your last visit?”    NO     “Have you had a pap smear?”    NO    Date of last Cervical Cancer screen (HPV or PAP): 2023         Click Here for Release of Records Request

## 2024-12-24 RX ORDER — LOSARTAN POTASSIUM 100 MG/1
TABLET ORAL
Qty: 90 TABLET | Refills: 0 | Status: SHIPPED | OUTPATIENT
Start: 2024-12-24

## 2025-01-13 SDOH — ECONOMIC STABILITY: TRANSPORTATION INSECURITY
IN THE PAST 12 MONTHS, HAS THE LACK OF TRANSPORTATION KEPT YOU FROM MEDICAL APPOINTMENTS OR FROM GETTING MEDICATIONS?: NO

## 2025-01-13 SDOH — ECONOMIC STABILITY: FOOD INSECURITY: WITHIN THE PAST 12 MONTHS, THE FOOD YOU BOUGHT JUST DIDN'T LAST AND YOU DIDN'T HAVE MONEY TO GET MORE.: NEVER TRUE

## 2025-01-13 SDOH — ECONOMIC STABILITY: FOOD INSECURITY: WITHIN THE PAST 12 MONTHS, YOU WORRIED THAT YOUR FOOD WOULD RUN OUT BEFORE YOU GOT MONEY TO BUY MORE.: NEVER TRUE

## 2025-01-13 SDOH — ECONOMIC STABILITY: INCOME INSECURITY: IN THE LAST 12 MONTHS, WAS THERE A TIME WHEN YOU WERE NOT ABLE TO PAY THE MORTGAGE OR RENT ON TIME?: NO

## 2025-01-16 ENCOUNTER — LAB (OUTPATIENT)
Age: 60
End: 2025-01-16

## 2025-01-16 ENCOUNTER — OFFICE VISIT (OUTPATIENT)
Age: 60
End: 2025-01-16
Payer: COMMERCIAL

## 2025-01-16 VITALS
OXYGEN SATURATION: 99 % | RESPIRATION RATE: 16 BRPM | TEMPERATURE: 98.4 F | BODY MASS INDEX: 25.88 KG/M2 | SYSTOLIC BLOOD PRESSURE: 118 MMHG | WEIGHT: 161 LBS | HEART RATE: 78 BPM | HEIGHT: 66 IN | DIASTOLIC BLOOD PRESSURE: 76 MMHG

## 2025-01-16 DIAGNOSIS — E78.5 HYPERLIPIDEMIA, UNSPECIFIED HYPERLIPIDEMIA TYPE: ICD-10-CM

## 2025-01-16 DIAGNOSIS — Z13.1 SCREENING FOR DIABETES MELLITUS: ICD-10-CM

## 2025-01-16 DIAGNOSIS — T50.905A DRUG-INDUCED NAUSEA AND VOMITING: Primary | ICD-10-CM

## 2025-01-16 DIAGNOSIS — R11.2 DRUG-INDUCED NAUSEA AND VOMITING: Primary | ICD-10-CM

## 2025-01-16 DIAGNOSIS — R53.81 MALAISE AND FATIGUE: ICD-10-CM

## 2025-01-16 DIAGNOSIS — R53.83 MALAISE AND FATIGUE: ICD-10-CM

## 2025-01-16 DIAGNOSIS — R11.2 DRUG-INDUCED NAUSEA AND VOMITING: ICD-10-CM

## 2025-01-16 DIAGNOSIS — Z76.89 ENCOUNTER FOR WEIGHT MANAGEMENT: ICD-10-CM

## 2025-01-16 DIAGNOSIS — T50.905A DRUG-INDUCED NAUSEA AND VOMITING: ICD-10-CM

## 2025-01-16 DIAGNOSIS — R42 VERTIGO: ICD-10-CM

## 2025-01-16 PROCEDURE — 3074F SYST BP LT 130 MM HG: CPT | Performed by: INTERNAL MEDICINE

## 2025-01-16 PROCEDURE — 99213 OFFICE O/P EST LOW 20 MIN: CPT | Performed by: INTERNAL MEDICINE

## 2025-01-16 PROCEDURE — 3078F DIAST BP <80 MM HG: CPT | Performed by: INTERNAL MEDICINE

## 2025-01-16 RX ORDER — ALPRAZOLAM 0.5 MG
0.5 TABLET ORAL NIGHTLY PRN
Qty: 30 TABLET | Refills: 0 | Status: SHIPPED | OUTPATIENT
Start: 2025-01-16 | End: 2025-02-15

## 2025-01-16 ASSESSMENT — ENCOUNTER SYMPTOMS
GASTROINTESTINAL NEGATIVE: 1
RESPIRATORY NEGATIVE: 1
ALLERGIC/IMMUNOLOGIC NEGATIVE: 1
EYES NEGATIVE: 1

## 2025-01-16 ASSESSMENT — PATIENT HEALTH QUESTIONNAIRE - PHQ9
7. TROUBLE CONCENTRATING ON THINGS, SUCH AS READING THE NEWSPAPER OR WATCHING TELEVISION: NOT AT ALL
SUM OF ALL RESPONSES TO PHQ QUESTIONS 1-9: 0
2. FEELING DOWN, DEPRESSED OR HOPELESS: NOT AT ALL
SUM OF ALL RESPONSES TO PHQ QUESTIONS 1-9: 0
4. FEELING TIRED OR HAVING LITTLE ENERGY: NOT AT ALL
6. FEELING BAD ABOUT YOURSELF - OR THAT YOU ARE A FAILURE OR HAVE LET YOURSELF OR YOUR FAMILY DOWN: NOT AT ALL
10. IF YOU CHECKED OFF ANY PROBLEMS, HOW DIFFICULT HAVE THESE PROBLEMS MADE IT FOR YOU TO DO YOUR WORK, TAKE CARE OF THINGS AT HOME, OR GET ALONG WITH OTHER PEOPLE: NOT DIFFICULT AT ALL
5. POOR APPETITE OR OVEREATING: NOT AT ALL
SUM OF ALL RESPONSES TO PHQ9 QUESTIONS 1 & 2: 0
8. MOVING OR SPEAKING SO SLOWLY THAT OTHER PEOPLE COULD HAVE NOTICED. OR THE OPPOSITE, BEING SO FIGETY OR RESTLESS THAT YOU HAVE BEEN MOVING AROUND A LOT MORE THAN USUAL: NOT AT ALL
SUM OF ALL RESPONSES TO PHQ QUESTIONS 1-9: 0
3. TROUBLE FALLING OR STAYING ASLEEP: NOT AT ALL
9. THOUGHTS THAT YOU WOULD BE BETTER OFF DEAD, OR OF HURTING YOURSELF: NOT AT ALL
1. LITTLE INTEREST OR PLEASURE IN DOING THINGS: NOT AT ALL
SUM OF ALL RESPONSES TO PHQ QUESTIONS 1-9: 0

## 2025-01-16 NOTE — PROGRESS NOTES
[facility]   Follow Up Progress Note  Patient: Marcella Shaikh  1965, 59 y.o., female  Encounter Date: 1/16/25    CHIEF COMPLAINT:  Chief Complaint   Patient presents with    Follow-up     No concerns, fasting     ASSESSMENT & PLAN:    ICD-10-CM    1. Drug-induced nausea and vomiting  R11.2 Lipase  More than likely due to Wegovy and asked to discontinue the medication several weeks ago.     T50.905A       2. Malaise and fatigue  R53.81 Stable. Improved.     R53.83       3. Hyperlipidemia, unspecified hyperlipidemia type  E78.5 Comprehensive Metabolic Panel     Lipid Panel      4. Screening for diabetes mellitus  Z13.1 Hemoglobin A1C      5. Encounter for weight management  Z76.89       6. Vertigo  R42 ALPRAZolam (XANAX) 0.5 MG tablet        I have discussed the diagnosis with the patient and the intended treatment plan as seen in the above orders. The patient has received an after-visit summary and questions were answered concerning future plans. Asked to return should symptoms worsen or not improve with treatment. Any pending labs and studies will be relayed to patient when they become available.     Pt verbalizes understanding of plan of care and denies further questions or concerns at this time    Return in about 4 months (around 5/16/2025), or if symptoms worsen or fail to improve.    SUBJECTIVE  Marcella Shaikh is a 59 y.o. female presenting today for follow up of: weigh management and needs fasting labs.  She reports that she is still having some pressure in the lower abdomen, but better than it had been. Despite asking her to stop the Wegovy, she did take it 2 more times and had severe symptoms and nausea. This was as recently as 1-week ago. So, for good now, she has stopped the Wegovy. Her stools are generally coming back to normal, but not there fully.     We are awaiting a CT scan result ordered previously. She has not had it done yet.     Nutrition -    Behavior modification - we discussed 
No data to display                 Abuse Screening:  :          No data to display                 Coordination of Care Questionnaire:  :     \"Have you been to the ER, urgent care clinic since your last visit?  Hospitalized since your last visit?\"    NO    “Have you seen or consulted any other health care providers outside our system since your last visit?”    NO     “Have you had a pap smear?”    Tonja YIN    Date of last Cervical Cancer screen (HPV or PAP): 8/24/2023         Click Here for Release of Records Request

## 2025-01-18 LAB
ALBUMIN SERPL-MCNC: 4 G/DL (ref 3.5–5)
ALBUMIN/GLOB SERPL: 1.2 (ref 1.1–2.2)
ALP SERPL-CCNC: 100 U/L (ref 45–117)
ALT SERPL-CCNC: 25 U/L (ref 12–78)
ANION GAP SERPL CALC-SCNC: 5 MMOL/L (ref 2–12)
AST SERPL-CCNC: 19 U/L (ref 15–37)
BILIRUB SERPL-MCNC: 1.1 MG/DL (ref 0.2–1)
BUN SERPL-MCNC: 15 MG/DL (ref 6–20)
BUN/CREAT SERPL: 23 (ref 12–20)
CALCIUM SERPL-MCNC: 9.8 MG/DL (ref 8.5–10.1)
CHLORIDE SERPL-SCNC: 104 MMOL/L (ref 97–108)
CHOLEST SERPL-MCNC: 232 MG/DL
CO2 SERPL-SCNC: 30 MMOL/L (ref 21–32)
CREAT SERPL-MCNC: 0.64 MG/DL (ref 0.55–1.02)
EST. AVERAGE GLUCOSE BLD GHB EST-MCNC: 103 MG/DL
GLOBULIN SER CALC-MCNC: 3.3 G/DL (ref 2–4)
GLUCOSE SERPL-MCNC: 85 MG/DL (ref 65–100)
HBA1C MFR BLD: 5.2 % (ref 4–5.6)
HDLC SERPL-MCNC: 96 MG/DL
HDLC SERPL: 2.4 (ref 0–5)
LDLC SERPL CALC-MCNC: 126 MG/DL (ref 0–100)
LIPASE SERPL-CCNC: 49 U/L (ref 13–75)
POTASSIUM SERPL-SCNC: 4.7 MMOL/L (ref 3.5–5.1)
PROT SERPL-MCNC: 7.3 G/DL (ref 6.4–8.2)
SODIUM SERPL-SCNC: 139 MMOL/L (ref 136–145)
TRIGL SERPL-MCNC: 50 MG/DL
VLDLC SERPL CALC-MCNC: 10 MG/DL

## 2025-02-06 ENCOUNTER — TELEPHONE (OUTPATIENT)
Age: 60
End: 2025-02-06

## 2025-02-06 NOTE — TELEPHONE ENCOUNTER
Called patient regarding referral to our [facility]. Patient did not answer so I left a vmail with our contact information.

## 2025-03-25 RX ORDER — LOSARTAN POTASSIUM 100 MG/1
100 TABLET ORAL EVERY MORNING
Qty: 90 TABLET | Refills: 3 | Status: SHIPPED | OUTPATIENT
Start: 2025-03-25

## 2025-04-29 ENCOUNTER — HOSPITAL ENCOUNTER (OUTPATIENT)
Facility: HOSPITAL | Age: 60
Discharge: HOME | End: 2025-05-02
Payer: COMMERCIAL

## 2025-04-29 VITALS — WEIGHT: 161 LBS | BODY MASS INDEX: 25.99 KG/M2

## 2025-04-29 DIAGNOSIS — Z12.31 VISIT FOR SCREENING MAMMOGRAM: ICD-10-CM

## 2025-04-29 PROCEDURE — 77063 BREAST TOMOSYNTHESIS BI: CPT

## 2025-05-08 ENCOUNTER — OFFICE VISIT (OUTPATIENT)
Age: 60
End: 2025-05-08
Payer: COMMERCIAL

## 2025-05-08 VITALS
DIASTOLIC BLOOD PRESSURE: 72 MMHG | RESPIRATION RATE: 16 BRPM | WEIGHT: 182.2 LBS | HEIGHT: 66 IN | SYSTOLIC BLOOD PRESSURE: 128 MMHG | OXYGEN SATURATION: 99 % | TEMPERATURE: 97.7 F | HEART RATE: 72 BPM | BODY MASS INDEX: 29.28 KG/M2

## 2025-05-08 DIAGNOSIS — E66.812 CLASS 2 OBESITY: ICD-10-CM

## 2025-05-08 DIAGNOSIS — E78.5 HYPERLIPIDEMIA, UNSPECIFIED HYPERLIPIDEMIA TYPE: ICD-10-CM

## 2025-05-08 DIAGNOSIS — I10 PRIMARY HYPERTENSION: Primary | ICD-10-CM

## 2025-05-08 DIAGNOSIS — R53.83 MALAISE AND FATIGUE: ICD-10-CM

## 2025-05-08 DIAGNOSIS — R53.81 MALAISE AND FATIGUE: ICD-10-CM

## 2025-05-08 DIAGNOSIS — E55.9 VITAMIN D DEFICIENCY: ICD-10-CM

## 2025-05-08 PROCEDURE — 99213 OFFICE O/P EST LOW 20 MIN: CPT | Performed by: INTERNAL MEDICINE

## 2025-05-08 PROCEDURE — 3078F DIAST BP <80 MM HG: CPT | Performed by: INTERNAL MEDICINE

## 2025-05-08 PROCEDURE — 3074F SYST BP LT 130 MM HG: CPT | Performed by: INTERNAL MEDICINE

## 2025-05-08 ASSESSMENT — PATIENT HEALTH QUESTIONNAIRE - PHQ9
5. POOR APPETITE OR OVEREATING: NOT AT ALL
SUM OF ALL RESPONSES TO PHQ QUESTIONS 1-9: 0
3. TROUBLE FALLING OR STAYING ASLEEP: NOT AT ALL
SUM OF ALL RESPONSES TO PHQ QUESTIONS 1-9: 0
SUM OF ALL RESPONSES TO PHQ QUESTIONS 1-9: 0
6. FEELING BAD ABOUT YOURSELF - OR THAT YOU ARE A FAILURE OR HAVE LET YOURSELF OR YOUR FAMILY DOWN: NOT AT ALL
8. MOVING OR SPEAKING SO SLOWLY THAT OTHER PEOPLE COULD HAVE NOTICED. OR THE OPPOSITE, BEING SO FIGETY OR RESTLESS THAT YOU HAVE BEEN MOVING AROUND A LOT MORE THAN USUAL: NOT AT ALL
1. LITTLE INTEREST OR PLEASURE IN DOING THINGS: NOT AT ALL
SUM OF ALL RESPONSES TO PHQ QUESTIONS 1-9: 0
7. TROUBLE CONCENTRATING ON THINGS, SUCH AS READING THE NEWSPAPER OR WATCHING TELEVISION: NOT AT ALL
9. THOUGHTS THAT YOU WOULD BE BETTER OFF DEAD, OR OF HURTING YOURSELF: NOT AT ALL
10. IF YOU CHECKED OFF ANY PROBLEMS, HOW DIFFICULT HAVE THESE PROBLEMS MADE IT FOR YOU TO DO YOUR WORK, TAKE CARE OF THINGS AT HOME, OR GET ALONG WITH OTHER PEOPLE: NOT DIFFICULT AT ALL
2. FEELING DOWN, DEPRESSED OR HOPELESS: NOT AT ALL
4. FEELING TIRED OR HAVING LITTLE ENERGY: NOT AT ALL

## 2025-05-08 ASSESSMENT — ENCOUNTER SYMPTOMS
GASTROINTESTINAL NEGATIVE: 1
EYES NEGATIVE: 1
ALLERGIC/IMMUNOLOGIC NEGATIVE: 1
RESPIRATORY NEGATIVE: 1

## 2025-05-08 NOTE — PROGRESS NOTES
Identified pt with two pt identifiers(name and )    Chief Complaint   Patient presents with    Follow-up     No concerns        Health Maintenance Due   Topic    HIV screen     Pneumococcal 50+ years Vaccine (2 of 2 - PPSV23)    Cervical cancer screen        Wt Readings from Last 3 Encounters:   25 82.6 kg (182 lb 3.2 oz)   25 73 kg (161 lb)   25 73 kg (161 lb)     Temp Readings from Last 3 Encounters:   25 97.7 °F (36.5 °C) (Temporal)   25 98.4 °F (36.9 °C) (Temporal)   24 97.7 °F (36.5 °C) (Temporal)     BP Readings from Last 3 Encounters:   25 128/72   25 118/76   24 104/64     Pulse Readings from Last 3 Encounters:   25 72   25 78   24 84           Depression Screening:  :         2025     8:53 AM 2025     9:41 AM 2024     4:01 PM 8/15/2024    10:48 AM 2024     9:09 AM 2/15/2024     9:06 AM 2023     9:19 AM   PHQ-9 Questionaire   Little interest or pleasure in doing things 0 0 0 0 0 0 0   Feeling down, depressed, or hopeless 0 0 0 0 0 0 0   Trouble falling or staying asleep, or sleeping too much 0 0        Feeling tired or having little energy 0 0        Poor appetite or overeating 0 0        Feeling bad about yourself - or that you are a failure or have let yourself or your family down 0 0        Trouble concentrating on things, such as reading the newspaper or watching television 0 0        Moving or speaking so slowly that other people could have noticed. Or the opposite - being so fidgety or restless that you have been moving around a lot more than usual 0 0        Thoughts that you would be better off dead, or of hurting yourself in some way 0 0        PHQ-9 Total Score 0 0 0 0 0 0 0   If you checked off any problems, how difficult have these problems made it for you to do your work, take care of things at home, or get along with other people? 0 0             Fall Risk Assessment:  :          No data to

## 2025-05-08 NOTE — PATIENT INSTRUCTIONS
The best diet for you is a low glycemic index (GI), Mediterranean-style diet. Glycemic index is a measure of how quickly food turns into sugar in your blood. You need to eat foods with a LOWER GI. It is easy to search the Internet for lists of foods and their associated GIs. Things that are white: sugar, white flour, white pasta, white bread, white rice -- are high GI foods, as are white potatoes. Avoid these and eat foods with a lower GI. This will help keep your blood sugar more stable and lower.     Adopt a Mediterranean-style lifestyle:  Lots of fresh, locally-grown fruits and vegetables (aim for 7 or more servings a day).  Complex carbohydrates like whole grains, nuts, beans and bread (with at least 4 grams of fiber per serving). Avoid “the whites” - white flour, sugar, white pasta, white rice.  Minimally processed foods (5 or fewer listed ingredients on the label).   Olive oil as the primary source of fat.  Low to moderate amounts of dairy, fish and poultry.  Red meat rarely (grass-fed, organic when you do eat it).  Low amounts of wine with meals (optional).  Unhurried meals with loved ones.  Daily exercise (30 - 60 minutes).    Resources:   http://www.webmd.com/diet/features/the-mediterranean-diet   http://www.[employer]ition[employer]ooth/health/mediterranean-diet/VI48783     Cookbooks:   Mediterranean Roodhouse by Jaquelin Purdy   The Best of Recipes for Health by Jaquelin Purdy   The Sprouted Kitchen by Saima Prater   The Food You Crave by Missy Mesa   So Easy by Missy Mesa     Please limit the amount of sodium (salt) in your diet. You should be getting no more than 2300 mg of sodium/salt per day. Remember, if you eat anything that is prepared or comes out of a box, a bag or a can, it has salt in it! Please read labels!

## 2025-05-08 NOTE — PROGRESS NOTES
[facility]   Follow Up Progress Note  Patient: Marcella Shaikh  1965, 59 y.o., female  Encounter Date: 5/8/25    CHIEF COMPLAINT:  Chief Complaint   Patient presents with    Follow-up     No concerns          ASSESSMENT & PLAN:    ICD-10-CM    1. Primary hypertension  I10 Well controlled      2. Malaise and fatigue  R53.81 TSH + Free T4 Panel    R53.83       3. Hyperlipidemia, unspecified hyperlipidemia type  E78.5 Comprehensive Metabolic Panel     Lipid Panel     CBC with Auto Differential      4. Vitamin D deficiency  E55.9 Vitamin D 25 Hydroxy      5. Class 2 obesity  E66.812 Lifestyle and diet recommendations reviewed. Exercise as well.         I have discussed the diagnosis with the patient and the intended treatment plan as seen in the above orders. The patient has received an after-visit summary and questions were answered concerning future plans. Asked to return should symptoms worsen or not improve with treatment. Any pending labs and studies will be relayed to patient when they become available.     Pt verbalizes understanding of plan of care and denies further questions or concerns at this time    Return in about 6 months (around 11/8/2025), or if symptoms worsen or fail to improve.    SUBJECTIVE  Marcella Shaikh is a 59 y.o. female presenting today for follow up of: weight management. She has now been off of the GLP-1s for sever months. Feeling better. GI symptoms have improved and not present at this time.   Nutrition -  see below.   Behavior modification - we discussed making healthy choices and healthy snacks. More fruits and vegetables and drinking at least 64 oz of water daily.   Exercice Recommendations - Walking at least 20-30 minutes per day.    Weight loss - N/A  Wt Readings from Last 3 Encounters:   05/08/25 82.6 kg (182 lb 3.2 oz)   04/29/25 73 kg (161 lb)   01/16/25 73 kg (161 lb)        Medications for weight loss - N/A  BMI: Body mass index is 29.41 kg/m².    She has really

## 2025-05-13 ENCOUNTER — LAB (OUTPATIENT)
Age: 60
End: 2025-05-13

## 2025-05-13 DIAGNOSIS — E78.5 HYPERLIPIDEMIA, UNSPECIFIED HYPERLIPIDEMIA TYPE: ICD-10-CM

## 2025-05-13 DIAGNOSIS — E55.9 VITAMIN D DEFICIENCY: ICD-10-CM

## 2025-05-13 DIAGNOSIS — R53.81 MALAISE AND FATIGUE: ICD-10-CM

## 2025-05-13 DIAGNOSIS — R53.83 MALAISE AND FATIGUE: ICD-10-CM

## 2025-05-13 LAB
T4 FREE SERPL-MCNC: 1 NG/DL (ref 0.8–1.5)
TSH SERPL DL<=0.05 MIU/L-ACNC: 2.38 UIU/ML (ref 0.36–3.74)

## 2025-05-14 LAB
25(OH)D3 SERPL-MCNC: 32.9 NG/ML (ref 30–100)
ALBUMIN SERPL-MCNC: 3.5 G/DL (ref 3.5–5)
ALBUMIN/GLOB SERPL: 1.1 (ref 1.1–2.2)
ALP SERPL-CCNC: 86 U/L (ref 45–117)
ALT SERPL-CCNC: 23 U/L (ref 12–78)
ANION GAP SERPL CALC-SCNC: 5 MMOL/L (ref 2–12)
AST SERPL-CCNC: 18 U/L (ref 15–37)
BASOPHILS # BLD: 0.04 K/UL (ref 0–0.1)
BASOPHILS NFR BLD: 0.8 % (ref 0–1)
BILIRUB SERPL-MCNC: 0.8 MG/DL (ref 0.2–1)
BUN SERPL-MCNC: 14 MG/DL (ref 6–20)
BUN/CREAT SERPL: 20 (ref 12–20)
CALCIUM SERPL-MCNC: 9.2 MG/DL (ref 8.5–10.1)
CHLORIDE SERPL-SCNC: 109 MMOL/L (ref 97–108)
CHOLEST SERPL-MCNC: 213 MG/DL
CO2 SERPL-SCNC: 28 MMOL/L (ref 21–32)
CREAT SERPL-MCNC: 0.7 MG/DL (ref 0.55–1.02)
DIFFERENTIAL METHOD BLD: ABNORMAL
EOSINOPHIL # BLD: 0.24 K/UL (ref 0–0.4)
EOSINOPHIL NFR BLD: 4.8 % (ref 0–7)
ERYTHROCYTE [DISTWIDTH] IN BLOOD BY AUTOMATED COUNT: 14.6 % (ref 11.5–14.5)
GLOBULIN SER CALC-MCNC: 3.2 G/DL (ref 2–4)
GLUCOSE SERPL-MCNC: 87 MG/DL (ref 65–100)
HCT VFR BLD AUTO: 41.7 % (ref 35–47)
HDLC SERPL-MCNC: 89 MG/DL
HDLC SERPL: 2.4 (ref 0–5)
HGB BLD-MCNC: 13.5 G/DL (ref 11.5–16)
IMM GRANULOCYTES # BLD AUTO: 0.01 K/UL (ref 0–0.04)
IMM GRANULOCYTES NFR BLD AUTO: 0.2 % (ref 0–0.5)
LDLC SERPL CALC-MCNC: 106.2 MG/DL (ref 0–100)
LYMPHOCYTES # BLD: 1.86 K/UL (ref 0.8–3.5)
LYMPHOCYTES NFR BLD: 37.3 % (ref 12–49)
MCH RBC QN AUTO: 29.3 PG (ref 26–34)
MCHC RBC AUTO-ENTMCNC: 32.4 G/DL (ref 30–36.5)
MCV RBC AUTO: 90.7 FL (ref 80–99)
MONOCYTES # BLD: 0.51 K/UL (ref 0–1)
MONOCYTES NFR BLD: 10.2 % (ref 5–13)
NEUTS SEG # BLD: 2.32 K/UL (ref 1.8–8)
NEUTS SEG NFR BLD: 46.7 % (ref 32–75)
NRBC # BLD: 0 K/UL (ref 0–0.01)
NRBC BLD-RTO: 0 PER 100 WBC
PLATELET # BLD AUTO: 254 K/UL (ref 150–400)
PMV BLD AUTO: 10.5 FL (ref 8.9–12.9)
POTASSIUM SERPL-SCNC: 5.1 MMOL/L (ref 3.5–5.1)
PROT SERPL-MCNC: 6.7 G/DL (ref 6.4–8.2)
RBC # BLD AUTO: 4.6 M/UL (ref 3.8–5.2)
SODIUM SERPL-SCNC: 142 MMOL/L (ref 136–145)
TRIGL SERPL-MCNC: 89 MG/DL
VLDLC SERPL CALC-MCNC: 17.8 MG/DL
WBC # BLD AUTO: 5 K/UL (ref 3.6–11)

## 2025-05-16 ENCOUNTER — RESULTS FOLLOW-UP (OUTPATIENT)
Age: 60
End: 2025-05-16